# Patient Record
Sex: MALE | Race: WHITE | NOT HISPANIC OR LATINO | URBAN - METROPOLITAN AREA
[De-identification: names, ages, dates, MRNs, and addresses within clinical notes are randomized per-mention and may not be internally consistent; named-entity substitution may affect disease eponyms.]

---

## 2018-07-05 ENCOUNTER — APPOINTMENT (RX ONLY)
Dept: URBAN - METROPOLITAN AREA CLINIC 23 | Facility: CLINIC | Age: 73
Setting detail: DERMATOLOGY
End: 2018-07-05

## 2018-07-05 DIAGNOSIS — L85.3 XEROSIS CUTIS: ICD-10-CM

## 2018-07-05 DIAGNOSIS — L30.0 NUMMULAR DERMATITIS: ICD-10-CM

## 2018-07-05 PROBLEM — E13.9 OTHER SPECIFIED DIABETES MELLITUS WITHOUT COMPLICATIONS: Status: ACTIVE | Noted: 2018-07-05

## 2018-07-05 PROCEDURE — ? PRESCRIPTION

## 2018-07-05 PROCEDURE — ? TREATMENT REGIMEN

## 2018-07-05 PROCEDURE — 99202 OFFICE O/P NEW SF 15 MIN: CPT

## 2018-07-05 PROCEDURE — ? COUNSELING

## 2018-07-05 PROCEDURE — ? ADDITIONAL NOTES

## 2018-07-05 RX ORDER — PIMECROLIMUS 10 MG/G
CREAM TOPICAL
Qty: 1 | Refills: 1 | Status: ERX | COMMUNITY
Start: 2018-07-05

## 2018-07-05 RX ORDER — BETAMETHASONE DIPROPIONATE 0.5 MG/G
CREAM TOPICAL
Qty: 3 | Refills: 3 | Status: ERX | COMMUNITY
Start: 2018-07-05

## 2018-07-05 RX ADMIN — PIMECROLIMUS: 10 CREAM TOPICAL at 19:04

## 2018-07-05 RX ADMIN — BETAMETHASONE DIPROPIONATE: 0.5 CREAM TOPICAL at 19:09

## 2018-07-05 ASSESSMENT — LOCATION SIMPLE DESCRIPTION DERM
LOCATION SIMPLE: RIGHT PRETIBIAL REGION
LOCATION SIMPLE: LEFT PRETIBIAL REGION

## 2018-07-05 ASSESSMENT — LOCATION ZONE DERM: LOCATION ZONE: LEG

## 2018-07-05 ASSESSMENT — LOCATION DETAILED DESCRIPTION DERM
LOCATION DETAILED: RIGHT LATERAL DISTAL PRETIBIAL REGION
LOCATION DETAILED: LEFT LATERAL DISTAL PRETIBIAL REGION

## 2018-07-05 NOTE — PROCEDURE: TREATMENT REGIMEN
Action 4: Continue
Otc Regimen: CeraVe anti-itch cream
Detail Level: Zone
Initiate Treatment: Elidel cream bid x 2 weeks on 2 weeks off and betamethasone dipropionate 0.05 % alternating between the two tropicals advised to apply moisturizer afterwards
Plan: Advised to get sunlight on legs without getting a sunburn
Plan: Limit soap to underarms and groin and avoid hot water
Initiate Regimen: OTC moisturizer after showering

## 2018-07-05 NOTE — HPI: RASH
How Severe Is Your Rash?: moderate
Is This A New Presentation, Or A Follow-Up?: Rash
Additional History: Pt states this occurred after going on a mission trip in [place] 7 years ago.

## 2018-12-27 PROCEDURE — 83993 ASSAY FOR CALPROTECTIN FECAL: CPT | Performed by: INTERNAL MEDICINE

## 2018-12-27 PROCEDURE — 82656 EL-1 FECAL QUAL/SEMIQ: CPT | Performed by: INTERNAL MEDICINE

## 2018-12-27 PROCEDURE — 87493 C DIFF AMPLIFIED PROBE: CPT | Performed by: INTERNAL MEDICINE

## 2019-01-02 ENCOUNTER — ANESTHESIA EVENT (OUTPATIENT)
Dept: ENDOSCOPY | Facility: HOSPITAL | Age: 74
End: 2019-01-02

## 2019-01-02 ENCOUNTER — ANESTHESIA (OUTPATIENT)
Dept: ENDOSCOPY | Facility: HOSPITAL | Age: 74
End: 2019-01-02

## 2019-01-02 ENCOUNTER — HOSPITAL ENCOUNTER (OUTPATIENT)
Facility: HOSPITAL | Age: 74
Setting detail: HOSPITAL OUTPATIENT SURGERY
Discharge: HOME | End: 2019-01-02
Attending: INTERNAL MEDICINE | Admitting: INTERNAL MEDICINE
Payer: MEDICARE

## 2019-01-02 VITALS
OXYGEN SATURATION: 99 % | RESPIRATION RATE: 18 BRPM | WEIGHT: 193 LBS | BODY MASS INDEX: 27.63 KG/M2 | HEART RATE: 56 BPM | DIASTOLIC BLOOD PRESSURE: 75 MMHG | HEIGHT: 70 IN | SYSTOLIC BLOOD PRESSURE: 124 MMHG

## 2019-01-02 DIAGNOSIS — K86.89 PANCREATIC NECROSIS: ICD-10-CM

## 2019-01-02 LAB
GLUCOSE BLDC GLUCOMTR-MCNC: 188 MG/DL (ref 70–99)
GLUCOSE BLDC GLUCOMTR-MCNC: 63 MG/DL (ref 70–99)

## 2019-01-02 PROCEDURE — 82962 GLUCOSE BLOOD TEST: CPT

## 2019-01-02 RX ORDER — SODIUM CHLORIDE, SODIUM LACTATE, POTASSIUM CHLORIDE, CALCIUM CHLORIDE 600; 310; 30; 20 MG/100ML; MG/100ML; MG/100ML; MG/100ML
INJECTION, SOLUTION INTRAVENOUS CONTINUOUS
Status: DISCONTINUED | OUTPATIENT
Start: 2019-01-02 | End: 2019-01-02

## 2019-01-02 RX ORDER — DEXTROSE MONOHYDRATE 25 G/50ML
50 INJECTION, SOLUTION INTRAVENOUS
Status: DISCONTINUED | OUTPATIENT
Start: 2019-01-02 | End: 2019-01-02 | Stop reason: HOSPADM

## 2019-01-02 RX ORDER — NALOXONE HYDROCHLORIDE 0.4 MG/ML
80 INJECTION, SOLUTION INTRAMUSCULAR; INTRAVENOUS; SUBCUTANEOUS AS NEEDED
Status: DISCONTINUED | OUTPATIENT
Start: 2019-01-02 | End: 2019-01-02 | Stop reason: HOSPADM

## 2019-01-02 NOTE — H&P
[address] [facility] Department of  Gastroenterology  Update Health History :       Alon Mar  male   Aaron Faith MD     [id number]  5/30/1945 Primary Care Physician  Trae Denis DO        HPI :  Patient with pancreatic necrosis.   He was Rfl:    Pancrelipase, Lip-Prot-Amyl, (CREON) 40394-77060 units Oral Cap DR Particles Take 2 capsules by mouth 3 (three) times daily with meals.  Disp:  Rfl:    TraMADol HCl 50 MG Oral Tab Take 1 tablet (50 mg total) by mouth every 6 (six) hours as needed fo Eliquis today. He verbalized understanding. He needs to avoid dairy products. Continue with Creon 24,000 to take 3 tablets with meals and 1-2 with snacks. Wife was at the bedside and verbalized understanding.           Radha Metcalf MD

## 2019-01-02 NOTE — ANESTHESIA PREPROCEDURE EVALUATION
Anesthesia PreOp Note    HPI:     Jaxson Henry is a 72year old male who presents for preoperative consultation requested by: Lennox Riggers, MD    Date of Surgery: 1/2/2019    Procedure(s):  ENDOSCOPIC ULTRASOUND (EUS)  Indication: Pancreatic necr DR Particles Take 2 capsules by mouth 3 (three) times daily with meals. Disp:  Rfl:  Taking   TraMADol HCl 50 MG Oral Tab Take 1 tablet (50 mg total) by mouth every 6 (six) hours as needed for Pain.  Disp: 60 tablet Rfl: 0    apixaban 5 MG Oral Tab Take 5 m status: Never Smoker      Smokeless tobacco: Never Used    Substance and Sexual Activity      Alcohol use: No      Drug use: No      Sexual activity: Not on file    Other Topics      Concerns:        Not on file    Social History Narrative      Not on file my ability. The patient desires the anesthetic management as planned.   Serafin Sheth  1/2/2019 1:09 PM

## 2019-01-02 NOTE — PLAN OF CARE
Case cancelled per dr. Eusebio Cain,  spoke in length with patient and wife, multiple rn's spoke in length with patient concerning diabetes and bs control. Pt stated to anesthesia that his bs was 50's at home and was symptomatic and did not treat it.  While in

## 2020-09-23 RX ORDER — CYCLOPENTOLATE HYDROCHLORIDE 20 MG/ML
1 SOLUTION/ DROPS OPHTHALMIC
Status: CANCELLED | OUTPATIENT
Start: 2020-09-23 | End: 2020-09-23

## 2020-09-23 RX ORDER — TROPICAMIDE 10 MG/ML
1 SOLUTION/ DROPS OPHTHALMIC
Status: CANCELLED | OUTPATIENT
Start: 2020-09-23 | End: 2020-09-23

## 2020-09-23 RX ORDER — PHENYLEPHRINE HYDROCHLORIDE 25 MG/ML
1 SOLUTION/ DROPS OPHTHALMIC
Status: CANCELLED | OUTPATIENT
Start: 2020-09-23 | End: 2020-09-23

## 2020-10-05 ENCOUNTER — HOSPITAL ENCOUNTER (OUTPATIENT)
Dept: SURGERY | Age: 75
Discharge: HOME | End: 2020-10-05
Payer: MEDICARE

## 2020-10-05 VITALS
WEIGHT: 232.2 LBS | DIASTOLIC BLOOD PRESSURE: 67 MMHG | BODY MASS INDEX: 33.24 KG/M2 | SYSTOLIC BLOOD PRESSURE: 139 MMHG | RESPIRATION RATE: 16 BRPM | HEART RATE: 84 BPM | TEMPERATURE: 98.2 F | OXYGEN SATURATION: 98 % | HEIGHT: 70 IN

## 2020-10-05 LAB — GLUCOSE BLD STRIP.AUTO-MCNC: 194 MG/DL (ref 65–100)

## 2020-10-05 PROCEDURE — 82962 GLUCOSE BLOOD TEST: CPT

## 2020-10-05 RX ORDER — GLUCOSAMINE SULFATE 1500 MG
5000 POWDER IN PACKET (EA) ORAL
COMMUNITY

## 2020-10-05 RX ORDER — ASPIRIN 325 MG
325 TABLET ORAL
COMMUNITY

## 2020-10-05 RX ORDER — PANCRELIPASE 24000; 76000; 120000 [USP'U]/1; [USP'U]/1; [USP'U]/1
1 CAPSULE, DELAYED RELEASE PELLETS ORAL
COMMUNITY
End: 2022-01-24

## 2020-10-05 RX ORDER — FINASTERIDE 5 MG/1
5 TABLET, FILM COATED ORAL DAILY
COMMUNITY

## 2020-10-05 RX ORDER — INSULIN ASPART 100 [IU]/ML
INJECTION, SOLUTION INTRAVENOUS; SUBCUTANEOUS
COMMUNITY

## 2020-10-05 RX ORDER — LISINOPRIL 20 MG/1
20 TABLET ORAL DAILY
COMMUNITY

## 2020-10-05 RX ORDER — CHOLECALCIFEROL (VITAMIN D3) 50 MCG
1 CAPSULE ORAL DAILY
COMMUNITY
End: 2022-01-24

## 2020-10-05 RX ORDER — GABAPENTIN 100 MG/1
100 CAPSULE ORAL
COMMUNITY
End: 2022-01-24

## 2020-10-05 RX ORDER — ASCORBIC ACID 250 MG
1000 TABLET ORAL DAILY
COMMUNITY

## 2020-10-05 RX ORDER — BISMUTH SUBSALICYLATE 262 MG
1 TABLET,CHEWABLE ORAL
COMMUNITY

## 2020-10-05 RX ORDER — ALLOPURINOL 100 MG/1
100 TABLET ORAL DAILY
Status: ON HOLD | COMMUNITY
End: 2022-01-15

## 2020-10-05 RX ORDER — VITAMIN E 268 MG
450 CAPSULE ORAL
COMMUNITY
End: 2022-01-24

## 2020-10-05 RX ORDER — GLUCOSAM/CHONDRO/HERB 149/HYAL 750-100 MG
1 TABLET ORAL DAILY
COMMUNITY

## 2020-10-05 RX ORDER — SIMVASTATIN 10 MG/1
10 TABLET, FILM COATED ORAL
COMMUNITY
End: 2022-01-24

## 2020-10-05 RX ORDER — INSULIN GLARGINE 100 [IU]/ML
INJECTION, SOLUTION SUBCUTANEOUS 2 TIMES DAILY
COMMUNITY
End: 2022-05-03 | Stop reason: ALTCHOICE

## 2020-10-05 NOTE — PERIOP NOTES
Patient verified name and     Order for consent found in EHR and matches case posting; patient verified. Type 1B surgery, walk-in assessment complete. Labs per surgeon: none  Labs per anesthesia protocol: POC Glucose; results 194  EKG: EKG 2020, cardiac clearance and aspirin received from [facility]- to be sent to pre-op. Instructed pt if bs is >300 DOS, surgery may be canceled. Covid swab completed 10/5/2020 at [place] [address], [place]. [facility] approved surgical skin cleanser and instructions given per hospital policy. Patient provided with and instructed on educational handouts including Guide to Surgery, Pain Management, Hand Hygiene, Blood Transfusion Education, and [facility] Brochure. Patient answered medical/surgical history questions at their best of ability. All prior to admission medications documented in [facility]. Original medication prescription bottle were not visualized during patient appointment. Patient instructed to hold all vitamins 7 days prior to surgery and NSAIDS 5 days prior to surgery, patient verbalized understanding. Patient teach back successful and patient demonstrates knowledge of instructions.

## 2020-10-05 NOTE — PERIOP NOTES
PLEASE CONTINUE TAKING ALL PRESCRIPTION MEDICATIONS UP TO THE DAY OF SURGERY UNLESS OTHERWISE DIRECTED BELOW. DISCONTINUE all vitamins and supplements 7 days prior to surgery. DISCONTINUE Non-Steriodal Anti-Inflammatory (NSAIDS) such as Advil and Aleve 5 days prior to surgery. Home Medications to take  the day of surgery      Take Lantus insulin- 36 units the morning of surgery     Finasteride (Proscar)   Allopurinol (Zyloprim)  Gabapentin (Neurontin)     Home Medications   to Hold   Vitamin E  Fish oil  Multivitamin   Vitamin D  Vitamin C     Comments      Take Lantus insulin- 24 units the night before surgery. Decrease aspirin to aspirin 81 mg daily. *Visitor policy of 1 visitor per patient discussed. Please do not bring home medications with you on the day of surgery unless otherwise directed by your nurse. If you are instructed to bring home medications, please give them to your nurse as they will be administered by the nursing staff. If you have any questions, [facility] call [facility] [phone] or [address] [place] [address] [phone]. A copy of this note was provided to the patient for reference.

## 2020-10-12 ENCOUNTER — ANESTHESIA EVENT (OUTPATIENT)
Dept: SURGERY | Age: 75
End: 2020-10-12
Payer: MEDICARE

## 2020-10-13 ENCOUNTER — HOSPITAL ENCOUNTER (OUTPATIENT)
Age: 75
Setting detail: OUTPATIENT SURGERY
Discharge: HOME | End: 2020-10-13
Attending: OPHTHALMOLOGY | Admitting: OPHTHALMOLOGY
Payer: MEDICARE

## 2020-10-13 ENCOUNTER — ANESTHESIA (OUTPATIENT)
Dept: SURGERY | Age: 75
End: 2020-10-13
Payer: MEDICARE

## 2020-10-13 VITALS
HEART RATE: 76 BPM | DIASTOLIC BLOOD PRESSURE: 67 MMHG | TEMPERATURE: 98 F | SYSTOLIC BLOOD PRESSURE: 117 MMHG | OXYGEN SATURATION: 97 % | WEIGHT: 232 LBS | BODY MASS INDEX: 33.29 KG/M2 | RESPIRATION RATE: 16 BRPM

## 2020-10-13 LAB
GLUCOSE BLD STRIP.AUTO-MCNC: 109 MG/DL (ref 65–100)
GLUCOSE BLD STRIP.AUTO-MCNC: 132 MG/DL (ref 65–100)

## 2020-10-13 PROCEDURE — 76010000160 HC OR TIME 0.5 TO 1 HR INTENSV-TIER 1: Performed by: OPHTHALMOLOGY

## 2020-10-13 PROCEDURE — 77030018837 HC SOL IRR OPTH ALCN -A: Performed by: OPHTHALMOLOGY

## 2020-10-13 PROCEDURE — 77030032623 HC KT VITRCMY TOT PLS ALCN -F: Performed by: OPHTHALMOLOGY

## 2020-10-13 PROCEDURE — 77030008547 HC TBNG OPHTH BD -A: Performed by: OPHTHALMOLOGY

## 2020-10-13 PROCEDURE — 74011250636 HC RX REV CODE- 250/636: Performed by: REGISTERED NURSE

## 2020-10-13 PROCEDURE — 77030018838 HC SOL IRR OPTH ALCN -B: Performed by: OPHTHALMOLOGY

## 2020-10-13 PROCEDURE — 74011000250 HC RX REV CODE- 250: Performed by: REGISTERED NURSE

## 2020-10-13 PROCEDURE — 82962 GLUCOSE BLOOD TEST: CPT

## 2020-10-13 PROCEDURE — 2709999900 HC NON-CHARGEABLE SUPPLY: Performed by: OPHTHALMOLOGY

## 2020-10-13 PROCEDURE — 76210000021 HC REC RM PH II 0.5 TO 1 HR: Performed by: OPHTHALMOLOGY

## 2020-10-13 PROCEDURE — 74011000250 HC RX REV CODE- 250: Performed by: OPHTHALMOLOGY

## 2020-10-13 PROCEDURE — 74011250636 HC RX REV CODE- 250/636: Performed by: STUDENT IN AN ORGANIZED HEALTH CARE EDUCATION/TRAINING PROGRAM

## 2020-10-13 PROCEDURE — 74011250636 HC RX REV CODE- 250/636: Performed by: OPHTHALMOLOGY

## 2020-10-13 PROCEDURE — 76060000032 HC ANESTHESIA 0.5 TO 1 HR: Performed by: OPHTHALMOLOGY

## 2020-10-13 PROCEDURE — 77030040740 HC PK VITRCMY ULTRVIT ALCN -D: Performed by: OPHTHALMOLOGY

## 2020-10-13 RX ORDER — TETRACAINE HYDROCHLORIDE 5 MG/ML
SOLUTION OPHTHALMIC AS NEEDED
Status: DISCONTINUED | OUTPATIENT
Start: 2020-10-13 | End: 2020-10-13 | Stop reason: HOSPADM

## 2020-10-13 RX ORDER — LIDOCAINE HYDROCHLORIDE 10 MG/ML
0.1 INJECTION INFILTRATION; PERINEURAL AS NEEDED
Status: DISCONTINUED | OUTPATIENT
Start: 2020-10-13 | End: 2020-10-13 | Stop reason: HOSPADM

## 2020-10-13 RX ORDER — LIDOCAINE HYDROCHLORIDE 20 MG/ML
INJECTION, SOLUTION INFILTRATION; PERINEURAL AS NEEDED
Status: DISCONTINUED | OUTPATIENT
Start: 2020-10-13 | End: 2020-10-13 | Stop reason: HOSPADM

## 2020-10-13 RX ORDER — PROPOFOL 10 MG/ML
INJECTION, EMULSION INTRAVENOUS AS NEEDED
Status: DISCONTINUED | OUTPATIENT
Start: 2020-10-13 | End: 2020-10-13 | Stop reason: HOSPADM

## 2020-10-13 RX ORDER — PHENYLEPHRINE HYDROCHLORIDE 25 MG/ML
1 SOLUTION/ DROPS OPHTHALMIC
Status: COMPLETED | OUTPATIENT
Start: 2020-10-13 | End: 2020-10-13

## 2020-10-13 RX ORDER — CEFAZOLIN SODIUM 1 G/3ML
INJECTION, POWDER, FOR SOLUTION INTRAMUSCULAR; INTRAVENOUS AS NEEDED
Status: DISCONTINUED | OUTPATIENT
Start: 2020-10-13 | End: 2020-10-13 | Stop reason: HOSPADM

## 2020-10-13 RX ORDER — TROPICAMIDE 10 MG/ML
1 SOLUTION/ DROPS OPHTHALMIC
Status: COMPLETED | OUTPATIENT
Start: 2020-10-13 | End: 2020-10-13

## 2020-10-13 RX ORDER — SODIUM CHLORIDE 0.9 % (FLUSH) 0.9 %
5-40 SYRINGE (ML) INJECTION EVERY 8 HOURS
Status: DISCONTINUED | OUTPATIENT
Start: 2020-10-13 | End: 2020-10-13 | Stop reason: HOSPADM

## 2020-10-13 RX ORDER — ACETAMINOPHEN 500 MG
500 TABLET ORAL
COMMUNITY
End: 2022-05-03

## 2020-10-13 RX ORDER — SODIUM CHLORIDE 0.9 % (FLUSH) 0.9 %
5-40 SYRINGE (ML) INJECTION AS NEEDED
Status: DISCONTINUED | OUTPATIENT
Start: 2020-10-13 | End: 2020-10-13 | Stop reason: HOSPADM

## 2020-10-13 RX ORDER — SODIUM CHLORIDE, SODIUM LACTATE, POTASSIUM CHLORIDE, CALCIUM CHLORIDE 600; 310; 30; 20 MG/100ML; MG/100ML; MG/100ML; MG/100ML
100 INJECTION, SOLUTION INTRAVENOUS CONTINUOUS
Status: DISCONTINUED | OUTPATIENT
Start: 2020-10-13 | End: 2020-10-13 | Stop reason: HOSPADM

## 2020-10-13 RX ORDER — CYCLOPENTOLATE HYDROCHLORIDE 20 MG/ML
1 SOLUTION/ DROPS OPHTHALMIC
Status: COMPLETED | OUTPATIENT
Start: 2020-10-13 | End: 2020-10-13

## 2020-10-13 RX ORDER — BETAMETHASONE SODIUM PHOSPHATE AND BETAMETHASONE ACETATE 3; 3 MG/ML; MG/ML
INJECTION, SUSPENSION INTRA-ARTICULAR; INTRALESIONAL; INTRAMUSCULAR; SOFT TISSUE AS NEEDED
Status: DISCONTINUED | OUTPATIENT
Start: 2020-10-13 | End: 2020-10-13 | Stop reason: HOSPADM

## 2020-10-13 RX ORDER — DIPHENHYDRAMINE HYDROCHLORIDE 50 MG/ML
12.5 INJECTION, SOLUTION INTRAMUSCULAR; INTRAVENOUS
Status: DISCONTINUED | OUTPATIENT
Start: 2020-10-13 | End: 2020-10-13 | Stop reason: HOSPADM

## 2020-10-13 RX ORDER — GUAIFENESIN 100 MG/5ML
81 LIQUID (ML) ORAL DAILY
COMMUNITY
End: 2021-09-09

## 2020-10-13 RX ORDER — LIDOCAINE HYDROCHLORIDE 20 MG/ML
INJECTION, SOLUTION EPIDURAL; INFILTRATION; INTRACAUDAL; PERINEURAL AS NEEDED
Status: DISCONTINUED | OUTPATIENT
Start: 2020-10-13 | End: 2020-10-13 | Stop reason: HOSPADM

## 2020-10-13 RX ORDER — BUPIVACAINE HYDROCHLORIDE 5 MG/ML
INJECTION, SOLUTION EPIDURAL; INTRACAUDAL AS NEEDED
Status: DISCONTINUED | OUTPATIENT
Start: 2020-10-13 | End: 2020-10-13 | Stop reason: HOSPADM

## 2020-10-13 RX ORDER — NALOXONE HYDROCHLORIDE 0.4 MG/ML
0.1 INJECTION, SOLUTION INTRAMUSCULAR; INTRAVENOUS; SUBCUTANEOUS AS NEEDED
Status: DISCONTINUED | OUTPATIENT
Start: 2020-10-13 | End: 2020-10-13 | Stop reason: HOSPADM

## 2020-10-13 RX ORDER — FLUMAZENIL 0.1 MG/ML
0.2 INJECTION INTRAVENOUS
Status: DISCONTINUED | OUTPATIENT
Start: 2020-10-13 | End: 2020-10-13 | Stop reason: HOSPADM

## 2020-10-13 RX ADMIN — PHENYLEPHRINE HYDROCHLORIDE 1 DROP: 25 SOLUTION/ DROPS OPHTHALMIC at 07:07

## 2020-10-13 RX ADMIN — PROPOFOL 20 MG: 10 INJECTION, EMULSION INTRAVENOUS at 07:27

## 2020-10-13 RX ADMIN — PROPOFOL 10 MG: 10 INJECTION, EMULSION INTRAVENOUS at 07:44

## 2020-10-13 RX ADMIN — SODIUM CHLORIDE, SODIUM LACTATE, POTASSIUM CHLORIDE, AND CALCIUM CHLORIDE 100 ML/HR: 600; 310; 30; 20 INJECTION, SOLUTION INTRAVENOUS at 06:26

## 2020-10-13 RX ADMIN — CYCLOPENTOLATE HYDROCHLORIDE 1 DROP: 20 SOLUTION/ DROPS OPHTHALMIC at 07:06

## 2020-10-13 RX ADMIN — PROPOFOL 20 MG: 10 INJECTION, EMULSION INTRAVENOUS at 07:28

## 2020-10-13 RX ADMIN — TROPICAMIDE 1 DROP: 10 SOLUTION/ DROPS OPHTHALMIC at 06:55

## 2020-10-13 RX ADMIN — CYCLOPENTOLATE HYDROCHLORIDE 1 DROP: 20 SOLUTION/ DROPS OPHTHALMIC at 06:28

## 2020-10-13 RX ADMIN — PROPOFOL 40 MG: 10 INJECTION, EMULSION INTRAVENOUS at 07:26

## 2020-10-13 RX ADMIN — PHENYLEPHRINE HYDROCHLORIDE 1 DROP: 25 SOLUTION/ DROPS OPHTHALMIC at 06:55

## 2020-10-13 RX ADMIN — LIDOCAINE HYDROCHLORIDE 50 MG: 20 INJECTION, SOLUTION EPIDURAL; INFILTRATION; INTRACAUDAL; PERINEURAL at 07:26

## 2020-10-13 RX ADMIN — CYCLOPENTOLATE HYDROCHLORIDE 1 DROP: 20 SOLUTION/ DROPS OPHTHALMIC at 06:55

## 2020-10-13 RX ADMIN — TROPICAMIDE 1 DROP: 10 SOLUTION/ DROPS OPHTHALMIC at 06:28

## 2020-10-13 RX ADMIN — PHENYLEPHRINE HYDROCHLORIDE 1 DROP: 25 SOLUTION/ DROPS OPHTHALMIC at 06:28

## 2020-10-13 RX ADMIN — PROPOFOL 10 MG: 10 INJECTION, EMULSION INTRAVENOUS at 07:52

## 2020-10-13 RX ADMIN — TROPICAMIDE 1 DROP: 10 SOLUTION/ DROPS OPHTHALMIC at 07:07

## 2020-10-13 NOTE — BRIEF OP NOTE
Brief Postoperative Note    Patient: Yoselin Garner  YOB: 1945  MRN: [id number]    Date of Procedure: 10/13/2020     Pre-Op Diagnosis: Vitreous opacities of right eye [H43.391]    Post-Op Diagnosis: Same as preoperative diagnosis. Procedure(s):  RIGHT EYE VITRECTOMY POSTERIOR 25 GAUGE LASER    Surgeon(s):  Sherin Tripp MD    Surgical Assistant: None    Anesthesia: General     Estimated Blood Loss (mL): Minimal    Complications: None    Specimens: * No specimens in log *     Implants: * No implants in log *    Drains: * No LDAs found *    Findings: Dense prolapsed vitreous debris in anterior segment, PVD, vitreous opacities of the right eye.   Dictation# [id number]    Electronically Signed by Colton Banegas MD on 10/13/2020 at 8:13 AM

## 2020-10-13 NOTE — OP NOTES
[facility]  OPERATIVE REPORT    Name:  Duglas Gonzalez  MR#:  [id number]  :  1945  ACCOUNT #:  [de-identified]  DATE OF SERVICE:  10/13/2020    PREOPERATIVE DIAGNOSES:  1.  Vitreous prolapse into the anterior chamber of the right eye.  2.  Significant vitreous opacities of the right eye. 3.  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 POSTOPERATIVE DIAGNOSES:  1.  Vitreous prolapse into the anterior chamber of the right eye.  2.  Significant vitreous opacities of the right eye. 3.  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 PROCEDURE PERFORMED:  Trans pars plana vitrectomy with peripheral laser treatment of the right eye. SURGEON:  Rosaline Blackwell MD    ASSISTANT:  None. ANESTHESIA:  MAC.    COMPLICATIONS:  None. SPECIMENS REMOVED:  None. IMPLANTS:  None. ESTIMATED BLOOD LOSS:  None. INDICATIONS FOR SURGERY:  The patient has experienced blurred vision of the right eye due to vitreous prolapse into the anterior chamber and large visually significant vitreous opacities. Surgery is recommended to treat these conditions in order to improve vision. The risks and benefits of the surgical procedures were thoroughly reviewed and the patient wished to proceed. SUMMARY OF PROCEDURE:  After appropriate preoperative evaluation and signing of operative permit, the patient was taken to the operating room and placed in a supine fashion upon the procedure table. A time-out was then performed and all operating room personnel confirmed the patient's identity, the surgical site, and the procedures to be performed. The patient was then given IV sedation as well as a lid block on the right consisting of 5 mL of 50:50 solution of 2% Xylocaine and 0.75% Marcaine. This was given in a modified [employer] fashion. A retrobulbar injection was also given on the right consisting of 5 mL of the same solution.     Once an appropriate level of akinesia of the extraocular muscle was achieved, the patient was draped and prepped in sterile ophthalmic fashion with 5% Betadine solution. The conjunctiva and periorbital tissues were carefully prepped. A 25-gauge 4-mm infusion cannula was placed, 4 mm posterior to limbus at the 8 o'clock position. Once the tip of the cannula was visualized and the posterior segment was found to be clear, it was turned to the on position and the intraocular pressure was adjusted to 30 mmHg. Two additional cannulas were placed at the 10 and 2 o'clock positions, 4 mm posterior to limbus. Inspection of the anterior segment confirmed a significant vitreous debris present in the anterior chamber overlying the entire pupil. A well-centered multifocal intraocular lens was present. The handheld fiberoptic tube was inserted in the nasal cannula site while the Microvit instrument was inserted in the temporal cannula site. Visualization of posterior segment was achieved using overhead microscope and a handheld contact lens. A posterior vitreous detachment was present with significant central vitreous opacities. A thorough central vitrectomy was performed. Care was taken to trim the vitreous to the far periphery in all quadrants. Inspection of the posterior segment confirmed the pink and healthy optic nerve and a flat and dry macula. The Microvit instrument was then introduced into the anterior chamber through the temporal sclerotomy site. The vitreous was carefully removed from the anterior chamber throughout with the [facility]. No vitreous remained following this procedure. All instruments were then removed from the eye and the peripheral retina was inspected by indirect ophthalmoscopy and scleral depression. No retinal breaks or tears were identified. The indirect laser was then used to apply laser treatment throughout the far periphery between the ora carli and the vortex veins. Approximately 550 spots were placed.   All instruments were then removed from the eye and the intraocular pressure was adjusted to 20 mmHg. All cannulas were removed. No leakage was noted from the cannula sites. A subconjunctival injection consisting of 0.5 mL of Ancef and 0.5 mL of betamethasone was given in the inferotemporal quadrant. Prednisolone and ofloxacin were placed in the inferior fornix. A patch and shield were then placed over the right eye. The patient tolerated the procedure well and was taken to recovery room for routine postoperative care.       Alex Barton MD      [id number]  D:  10/13/2020 8:12  T:  10/13/2020 14:36  JOB #:  [id number]

## 2020-10-13 NOTE — PERIOP NOTES
Pt back to [facility] to dress for discharge, had liquid stool per pt. States has had \"diarrhea\" episodes for the last two days.

## 2020-10-13 NOTE — ANESTHESIA POSTPROCEDURE EVALUATION
Procedure(s):  RIGHT EYE VITRECTOMY POSTERIOR 25 GAUGE LASER.    total IV anesthesia, general - backup    Anesthesia Post Evaluation      Multimodal analgesia: multimodal analgesia used between 6 hours prior to anesthesia start to PACU discharge  Patient location during evaluation: bedside  Patient participation: complete - patient participated  Level of consciousness: awake and alert  Pain management: adequate  Airway patency: patent  Anesthetic complications: no  Cardiovascular status: acceptable  Respiratory status: acceptable  Hydration status: acceptable  Post anesthesia nausea and vomiting:  controlled  Final Post Anesthesia Temperature Assessment:  Normothermia (36.0-37.5 degrees C)      INITIAL Post-op Vital signs:   Vitals Value Taken Time   /67 10/13/2020  8:30 AM   Temp     Pulse 75 10/13/2020  8:31 AM   Resp     SpO2 97 % 10/13/2020  8:30 AM

## 2020-10-13 NOTE — ANESTHESIA PREPROCEDURE EVALUATION
Relevant Problems   No relevant active problems       Anesthetic History   No history of anesthetic complications            Review of Systems / Medical History  Patient summary reviewed, nursing notes reviewed and pertinent labs reviewed    Pulmonary        Sleep apnea: CPAP      Pertinent negatives: No smoker     Neuro/Psych   Within defined limits           Cardiovascular    Hypertension        Dysrhythmias (s/p watchman 2020.  history of ablation x3. hx of WPW.) : atrial fibrillation      Exercise tolerance: >4 METS     GI/Hepatic/Renal                Endo/Other    Diabetes: well controlled, type 2, using insulin    Obesity and arthritis     Other Findings              Physical Exam    Airway  Mallampati: II  TM Distance: 4 - 6 cm  Neck ROM: normal range of motion   Mouth opening: Normal     Cardiovascular    Rhythm: regular  Rate: normal         Dental  No notable dental hx       Pulmonary  Breath sounds clear to auscultation               Abdominal  GI exam deferred       Other Findings            Anesthetic Plan    ASA: 3  Anesthesia type: total IV anesthesia and general - backup          Induction: Intravenous  Anesthetic plan and risks discussed with: Patient and Spouse

## 2020-10-13 NOTE — PERIOP NOTES
Dc instructions reviewed with pt and wife, pt states he needs to have BM, to toilet via wheelchair with wife

## 2020-10-13 NOTE — DISCHARGE INSTRUCTIONS
[facility] [address] MD Jere Ahmadi MD Adelina Basil. MD Mainor Box. Sandhya Raymond MD    [phone] or [phone] or [phone] or [phone]    Post Operative Instructions for Retina and Vitreous Surgery Patients    The following are a few guidelines that you should observe for two weeks after surgery:    1. Avoid stooping over, lifting heavy weights or bumping your head. 2.  Special positioning:Rest on extra pillow    3. No extensive traveling except what is necessary. If a gas air bubble was put in your      eye at surgery - avoid air travel until you consult your doctor. 4.  You may watch television, read, cook and wash dishes as long as it does not interfere        with special positioning instructions. 5.  No strenuous activity or sexual intercourse. 6.  Some discharge from the operated eye is to be expected. This should gradually        improve. 8.  If you experience increasing pain, decreasing vision, or pus like discharge, call the      Office. 9. BRING ALL EYE DROPS TO YOU POSTOPERATIVE APPOINTMENT! ACTIVITY  · As tolerated and as directed by your doctor. · Bathe or shower as directed by your doctor. DIET  · Clear liquids until no nausea or vomiting; then light diet for the first day. · Advance to regular diet on second day, unless your doctor orders otherwise. · If nausea and vomiting continues, call your doctor. PAIN  · Take pain medication as directed by your doctor. · Call your doctor if pain is NOT relieved by medication. · DO NOT take aspirin of blood thinners unless directed by your doctor.          CALL YOUR DOCTOR IF   · Excessive bleeding that does not stop after holding pressure over the area  · Temperature of 101 degrees F or above  · Excessive redness, swelling or bruising, and/ or green or yellow, smelly discharge from incision    AFTER ANESTHESIA   · For the first 24 hours: DO NOT Drive, Drink alcoholic beverages, or Make important decisions. · Be aware of dizziness following anesthesia and while taking pain medication. DISCHARGE SUMMARY from Nurse    PATIENT INSTRUCTIONS:    After general anesthesia or intravenous sedation, for 24 hours or while taking prescription Narcotics:  · Limit your activities  · Do not drive and operate hazardous machinery  · Do not make important personal or business decisions  · Do  not drink alcoholic beverages  · If you have not urinated within 8 hours after discharge, please contact your surgeon on call. *  Please give a list of your current medications to your Primary Care Provider. *  Please update this list whenever your medications are discontinued, doses are      changed, or new medications (including over-the-counter products) are added. *  Please carry medication information at all times in case of emergency situations. These are general instructions for a healthy lifestyle:    No smoking/ No tobacco products/ Avoid exposure to second hand smoke    Surgeon General's Warning:  Quitting smoking now greatly reduces serious risk to your health. Obesity, smoking, and sedentary lifestyle greatly increases your risk for illness    A healthy diet, regular physical exercise & weight monitoring are important for maintaining a healthy lifestyle    You may be retaining fluid if you have a history of heart failure or if you experience any of the following symptoms:  Weight gain of 3 pounds or more overnight or 5 pounds in a week, increased swelling in our hands or feet or shortness of breath while lying flat in bed. Please call your doctor as soon as you notice any of these symptoms; do not wait until your next office visit.     Recognize signs and symptoms of STROKE:    F-face looks uneven    A-arms unable to move or move unevenly    S-speech slurred or non-existent    T-time-call 911 as soon as signs and symptoms begin-DO NOT go       Back to bed or wait to see if you get better-TIME IS BRAIN. Learning About COVID-19 and Social Distancing  What is it? Social distancing means putting space between yourself and other people. The recommended distance is 6 feet, or about 2 meters. This also means staying away from any place where people may gather, such as ceballos or other public gathering places. Why is it important? Social distancing is the best way to reduce the spread of COVID-19. This virus seems to spread from person to person through droplets from coughing and sneezing. So if you keep your distance from others, you're less likely to get it or spread it. And social distancing is important for everyone, not just those who are at high risk of infection, like older people. You might have the virus but not have symptoms. You could then give the infection to someone you come into contact with. How is it done? Putting 6 feet, or about 2 meters, between you and other people is the recommended distance. Also stay away from any place where people may gather, such as ceballos or other public gathering places. So if possible:  · Work from home, and keep your kids at home. · Don't travel if you don't have to. And avoid public transportation, ride-shares, and taxis unless you have no choice. · Limit shopping to essentials, like food and medicines. · Wear a cloth face cover if you have to go to a public place like the grocery store or pharmacy. · Don't eat in restaurants. (You can still get takeout or food deliveries.)  · Avoid crowds and busy places. Follow stay-at-home orders or other directions for your area. Current as of: July 10, 2020               Content Version: 12.6  © 0919-0631 [employer]. Care instructions adapted under license by [employer] (which disclaims liability or warranty for this information).  If you have questions about a medical condition or this instruction, always ask your healthcare professional. [place] [address] any warranty or liability for your use of this information.

## 2020-10-14 NOTE — PROGRESS NOTES
Pt stated Kieran Reyes in pre-op, Juliana ROCHA, and Jessica Tavera in PACU were all excellent and he appreciated the post-op call.

## 2020-10-30 ENCOUNTER — HOSPITAL ENCOUNTER (EMERGENCY)
Age: 75
Discharge: HOME | End: 2020-10-30
Attending: EMERGENCY MEDICINE
Payer: MEDICARE

## 2020-10-30 VITALS
HEART RATE: 82 BPM | RESPIRATION RATE: 18 BRPM | OXYGEN SATURATION: 97 % | HEIGHT: 70 IN | WEIGHT: 218 LBS | TEMPERATURE: 98.4 F | DIASTOLIC BLOOD PRESSURE: 63 MMHG | SYSTOLIC BLOOD PRESSURE: 131 MMHG | BODY MASS INDEX: 31.21 KG/M2

## 2020-10-30 DIAGNOSIS — K85.90 ACUTE PANCREATITIS, UNSPECIFIED COMPLICATION STATUS, UNSPECIFIED PANCREATITIS TYPE: Primary | ICD-10-CM

## 2020-10-30 LAB
ALBUMIN SERPL-MCNC: 3.1 G/DL (ref 3.2–4.6)
ALBUMIN/GLOB SERPL: 0.7 {RATIO} (ref 1.2–3.5)
ALP SERPL-CCNC: 77 U/L (ref 50–136)
ALT SERPL-CCNC: 18 U/L (ref 12–65)
ANION GAP SERPL CALC-SCNC: 5 MMOL/L (ref 7–16)
AST SERPL-CCNC: 18 U/L (ref 15–37)
BASOPHILS # BLD: 0.1 K/UL (ref 0–0.2)
BASOPHILS NFR BLD: 1 % (ref 0–2)
BILIRUB SERPL-MCNC: 0.3 MG/DL (ref 0.2–1.1)
BUN SERPL-MCNC: 30 MG/DL (ref 8–23)
CALCIUM SERPL-MCNC: 9 MG/DL (ref 8.3–10.4)
CHLORIDE SERPL-SCNC: 108 MMOL/L (ref 98–107)
CO2 SERPL-SCNC: 26 MMOL/L (ref 21–32)
CREAT SERPL-MCNC: 1.9 MG/DL (ref 0.8–1.5)
DIFFERENTIAL METHOD BLD: ABNORMAL
EOSINOPHIL # BLD: 0.1 K/UL (ref 0–0.8)
EOSINOPHIL NFR BLD: 2 % (ref 0.5–7.8)
ERYTHROCYTE [DISTWIDTH] IN BLOOD BY AUTOMATED COUNT: 14.2 % (ref 11.9–14.6)
GLOBULIN SER CALC-MCNC: 4.4 G/DL (ref 2.3–3.5)
GLUCOSE SERPL-MCNC: 261 MG/DL (ref 65–100)
HCT VFR BLD AUTO: 34.4 % (ref 41.1–50.3)
HGB BLD-MCNC: 10.7 G/DL (ref 13.6–17.2)
IMM GRANULOCYTES # BLD AUTO: 0.1 K/UL (ref 0–0.5)
IMM GRANULOCYTES NFR BLD AUTO: 1 % (ref 0–5)
LIPASE SERPL-CCNC: 3112 U/L (ref 73–393)
LYMPHOCYTES # BLD: 1.5 K/UL (ref 0.5–4.6)
LYMPHOCYTES NFR BLD: 18 % (ref 13–44)
MCH RBC QN AUTO: 29 PG (ref 26.1–32.9)
MCHC RBC AUTO-ENTMCNC: 31.1 G/DL (ref 31.4–35)
MCV RBC AUTO: 93.2 FL (ref 79.6–97.8)
MONOCYTES # BLD: 0.7 K/UL (ref 0.1–1.3)
MONOCYTES NFR BLD: 8 % (ref 4–12)
NEUTS SEG # BLD: 6.2 K/UL (ref 1.7–8.2)
NEUTS SEG NFR BLD: 72 % (ref 43–78)
NRBC # BLD: 0 K/UL (ref 0–0.2)
PLATELET # BLD AUTO: 208 K/UL (ref 150–450)
PMV BLD AUTO: 9.3 FL (ref 9.4–12.3)
POTASSIUM SERPL-SCNC: 4.9 MMOL/L (ref 3.5–5.1)
PROT SERPL-MCNC: 7.5 G/DL (ref 6.3–8.2)
RBC # BLD AUTO: 3.69 M/UL (ref 4.23–5.6)
SODIUM SERPL-SCNC: 139 MMOL/L (ref 136–145)
WBC # BLD AUTO: 8.6 K/UL (ref 4.3–11.1)

## 2020-10-30 PROCEDURE — 80053 COMPREHEN METABOLIC PANEL: CPT

## 2020-10-30 PROCEDURE — 74011250636 HC RX REV CODE- 250/636: Performed by: EMERGENCY MEDICINE

## 2020-10-30 PROCEDURE — 83690 ASSAY OF LIPASE: CPT

## 2020-10-30 PROCEDURE — 96374 THER/PROPH/DIAG INJ IV PUSH: CPT

## 2020-10-30 PROCEDURE — 99284 EMERGENCY DEPT VISIT MOD MDM: CPT

## 2020-10-30 PROCEDURE — 81003 URINALYSIS AUTO W/O SCOPE: CPT

## 2020-10-30 PROCEDURE — 85025 COMPLETE CBC W/AUTO DIFF WBC: CPT

## 2020-10-30 PROCEDURE — 96375 TX/PRO/DX INJ NEW DRUG ADDON: CPT

## 2020-10-30 RX ORDER — ONDANSETRON 2 MG/ML
4 INJECTION INTRAMUSCULAR; INTRAVENOUS
Status: COMPLETED | OUTPATIENT
Start: 2020-10-30 | End: 2020-10-30

## 2020-10-30 RX ORDER — ONDANSETRON 8 MG/1
8 TABLET, ORALLY DISINTEGRATING ORAL
Qty: 10 TAB | Refills: 0 | Status: SHIPPED | OUTPATIENT
Start: 2020-10-30 | End: 2022-01-24

## 2020-10-30 RX ORDER — MORPHINE SULFATE 4 MG/ML
4 INJECTION INTRAVENOUS
Status: COMPLETED | OUTPATIENT
Start: 2020-10-30 | End: 2020-10-30

## 2020-10-30 RX ORDER — TRAMADOL HYDROCHLORIDE 50 MG/1
50 TABLET ORAL
Qty: 9 TAB | Refills: 0 | Status: SHIPPED | OUTPATIENT
Start: 2020-10-30 | End: 2020-11-02

## 2020-10-30 RX ADMIN — SODIUM CHLORIDE 1000 ML: 900 INJECTION, SOLUTION INTRAVENOUS at 21:24

## 2020-10-30 RX ADMIN — MORPHINE SULFATE 4 MG: 4 INJECTION INTRAVENOUS at 21:24

## 2020-10-30 RX ADMIN — ONDANSETRON 4 MG: 2 INJECTION INTRAMUSCULAR; INTRAVENOUS at 21:24

## 2020-10-31 NOTE — ED PROVIDER NOTES
63-year-old white male reports history of pancreatitis due to [address] presents with aching epigastric pain onset around 11:00 this morning. Also reports that his glucose has been elevated over 250 which is unusual for him. He has had some chills but no fever. Denies nausea, vomiting, diarrhea, constipation and urinary changes. The history is provided by the patient. Abdominal Pain    Pertinent negatives include no fever, no diarrhea, no nausea, no vomiting, no dysuria, no headaches, no chest pain and no back pain.         Past Medical History:   Diagnosis Date    Arthritis     Blurred vision, right eye     BPH (benign prostatic hyperplasia)     Gout     History of Clostridioides difficile colitis 2010    History of pancreatitis     History of renal carcinoma     partial nephrectomy    Hyperlipidemia     Hypertension     Nausea & vomiting     small amount of N/V and pt not sure of it antiemetics work    Neuropathy     Paroxysmal A-fib ([address]     Presence of Watchman left atrial appendage closure device     Sleep apnea     compliant with C-pap    Thromboembolus ([address]     hx of left lower extremity    Type 2 diabetes mellitus (HCC)     insulin reliant; AVG -140; s.s. of hypoglycemia 65-75; last A1c 7.1    WPW (Alba-Parkinson-White syndrome)     ablation x4       Past Surgical History:   Procedure Laterality Date    HX AFIB ABLATION      WPW- ablations x3    HX BACK SURGERY      ruptured disc    HX CATARACT REMOVAL      HX CHOLECYSTECTOMY      HX ENDOSCOPY      HX KNEE REPLACEMENT Right     HX LUMBAR LAMINECTOMY      HX NEPHRECTOMY      partial    HX ORTHOPAEDIC Left     foot    HX ORTHOPAEDIC Left     great toe straightened    HX OTHER SURGICAL      placed watchman 2/2020    HX WISDOM TEETH EXTRACTION           Family History:   Problem Relation Age of Onset    Other Mother         ALS    Parkinson's Disease Father     Cancer Sister         colon ca    No Known Problems Sister     Cancer Sister         ovarian       Social History     Socioeconomic History    Marital status:      Spouse name: Not on file    Number of children: Not on file    Years of education: Not on file    Highest education level: Not on file   Occupational History    Not on file   Social Needs    Financial resource strain: Not on file    Food insecurity     Worry: Not on file     Inability: Not on file   [employer] needs     Medical: Not on file     Non-medical: Not on file   Tobacco Use    Smoking status: Never Smoker    Smokeless tobacco: Never Used   Substance and Sexual Activity    Alcohol use: Not Currently    Drug use: Never    Sexual activity: Not on file   Lifestyle    Physical activity     Days per week: Not on file     Minutes per session: Not on file    Stress: Not on file   Relationships    Social connections     Talks on phone: Not on file     Gets together: Not on file     Attends Hinduism service: Not on file     Active member of club or organization: Not on file     Attends meetings of clubs or organizations: Not on file     Relationship status: Not on file    Intimate partner violence     Fear of current or ex partner: Not on file     Emotionally abused: Not on file     Physically abused: Not on file     Forced sexual activity: Not on file   Other Topics Concern    Not on file   Social History Narrative    Not on file         ALLERGIES: Codeine; Fenofibrate; Hydromorphone; Januvia [sitagliptin]; and Metformin    Review of Systems   Constitutional: Positive for chills. Negative for fever. HENT: Negative for congestion. Respiratory: Negative for cough and shortness of breath. Cardiovascular: Negative for chest pain. Gastrointestinal: Positive for abdominal pain. Negative for diarrhea, nausea and vomiting. Genitourinary: Negative for dysuria. Musculoskeletal: Negative for back pain and neck pain. Skin: Negative for rash.    Neurological: Negative for headaches. Psychiatric/Behavioral: Negative for confusion. Vitals:    10/30/20 2100   BP: (!) 145/83   Pulse: 96   Resp: 18   Temp: 98.4 °F (36.9 °C)   SpO2: 98%   Weight: 98.9 kg (218 lb)   Height: 5' 10\" (1.778 m)            Physical Exam  Vitals signs and nursing note reviewed. Constitutional:       General: He is not in acute distress. Appearance: Normal appearance. He is not toxic-appearing. HENT:      Head: Normocephalic and atraumatic. Nose: Nose normal.      Mouth/Throat:      Mouth: Mucous membranes are moist.      Pharynx: Oropharynx is clear. Eyes:      General: No scleral icterus. Conjunctiva/sclera: Conjunctivae normal.      Pupils: Pupils are equal, round, and reactive to light. Neck:      Musculoskeletal: Normal range of motion and neck supple. Cardiovascular:      Rate and Rhythm: Normal rate. Heart sounds: No murmur. Pulmonary:      Effort: Pulmonary effort is normal.      Breath sounds: Normal breath sounds. Abdominal:      Palpations: Abdomen is soft. Tenderness: There is abdominal tenderness in the epigastric area. There is no guarding or rebound. Musculoskeletal: Normal range of motion. Skin:     General: Skin is warm and dry. Neurological:      Mental Status: He is alert and oriented to person, place, and time. Psychiatric:         Mood and Affect: Mood normal.         Behavior: Behavior normal.          MDM  Number of Diagnoses or Management Options  Diagnosis management comments: Lab work shows elevated lipase 3112 otherwise unremarkable. Patient is feeling much better after IV fluids, morphine and Zofran. As he has no vomiting and is very familiar with pancreatitis he is requesting to be treated at home. Will discharge home with tramadol and Zofran. He is advised to stick to liquid diet for at least 3 days.   Return if problems       Amount and/or Complexity of Data Reviewed  Clinical lab tests: ordered and reviewed  Tests in the medicine section of CPT®: reviewed and ordered    Risk of Complications, Morbidity, and/or Mortality  Presenting problems: moderate  Diagnostic procedures: moderate  Management options: moderate           Procedures

## 2020-10-31 NOTE — ED NOTES
I have reviewed discharge instructions with the patient. The patient verbalized understanding. Patient left ED via Discharge Method: ambulatory to Home with wife. Opportunity for questions and clarification provided. Patient given 2 scripts. To continue your aftercare when you leave the hospital, you may receive an automated call from our care team to check in on how you are doing. This is a free service and part of our promise to provide the best care and service to meet your aftercare needs.  If you have questions, or wish to unsubscribe from this service please call [phone]. Thank you for Choosing our [facility] Emergency Department.

## 2020-10-31 NOTE — ED TRIAGE NOTES
Pt states he is certain he is having a pancreatitis flare-up. C/o upper abdominal pain that started earlier today, denies N/V.  Masked on arrival.

## 2020-10-31 NOTE — DISCHARGE INSTRUCTIONS
Patient Education        Pancreatitis: Care Instructions  Your Care Instructions     The pancreas is an organ behind the stomach. It makes hormones and enzymes to help your body digest food. But if these enzymes attack the pancreas, it can get inflamed. This is called pancreatitis. Most cases are caused by gallstones or by heavy alcohol use. If you take care of yourself at home, it will help you get better. It will also help you avoid more problems with your pancreas. Follow-up care is a key part of your treatment and safety. Be sure to make and go to all appointments, and call your doctor if you are having problems. It's also a good idea to know your test results and keep a list of the medicines you take. How can you care for yourself at home? · Drink clear liquids and eat bland foods until you feel better. [place] foods include rice, dry toast, and crackers. They also include bananas and applesauce. · Eat a low-fat diet until your doctor says your pancreas is healed. · Do not drink alcohol. Tell your doctor if you need help to quit. Counseling, support groups, and sometimes medicines can help you stay sober. · Be safe with medicines. Read and follow all instructions on the label. ? If the doctor gave you a prescription medicine for pain, take it as prescribed. ? If you are not taking a prescription pain medicine, ask your doctor if you can take an over-the-counter medicine. · If your doctor prescribed antibiotics, take them as directed. Do not stop taking them just because you feel better. You need to take the full course of antibiotics. · Get extra rest until you feel better. To prevent future problems with your pancreas  · Do not drink alcohol. · Tell your doctors and pharmacist that you've had pancreatitis. They can help you avoid medicines that may cause this problem again. When should you call for help? Call 911 anytime you think you may need emergency care.  For example, call if:    · You vomit blood or what looks like coffee grounds.     · Your stools are maroon or very bloody. Call your doctor now or seek immediate medical care if:    · You have new or worse belly pain.     · Your stools are black and look like tar, or they have streaks of blood.     · You are vomiting. Watch closely for changes in your health, and be sure to contact your doctor if:    · You do not get better as expected. Where can you learn more? Go to http://www.gray.com/  Enter J381 in the search box to learn more about \"Pancreatitis: Care Instructions. \"  Current as of: April 15, 2020               Content Version: 12.6  © 0832-3198 [employer]. Care instructions adapted under license by [employer] (which disclaims liability or warranty for this information). If you have questions about a medical condition or this instruction, always ask your healthcare professional. [address] any warranty or liability for your use of this information.

## 2021-07-22 ENCOUNTER — HOSPITAL ENCOUNTER (OUTPATIENT)
Dept: MRI IMAGING | Age: 76
Discharge: HOME | End: 2021-07-22
Attending: ORTHOPAEDIC SURGERY
Payer: MEDICARE

## 2021-07-22 DIAGNOSIS — M20.5X1 ACQUIRED CLAW TOE OF RIGHT FOOT: ICD-10-CM

## 2021-07-22 DIAGNOSIS — M86.171 ACUTE OSTEOMYELITIS OF TOE OF RIGHT FOOT (HCC): ICD-10-CM

## 2021-07-22 PROCEDURE — 73718 MRI LOWER EXTREMITY W/O DYE: CPT

## 2021-09-08 ENCOUNTER — ANESTHESIA EVENT (OUTPATIENT)
Dept: SURGERY | Age: 76
End: 2021-09-08
Payer: MEDICARE

## 2021-09-09 ENCOUNTER — APPOINTMENT (OUTPATIENT)
Dept: GENERAL RADIOLOGY | Age: 76
End: 2021-09-09
Attending: ORTHOPAEDIC SURGERY
Payer: MEDICARE

## 2021-09-09 ENCOUNTER — ANESTHESIA (OUTPATIENT)
Dept: SURGERY | Age: 76
End: 2021-09-09
Payer: MEDICARE

## 2021-09-09 ENCOUNTER — HOSPITAL ENCOUNTER (OUTPATIENT)
Age: 76
Setting detail: OUTPATIENT SURGERY
Discharge: HOME | End: 2021-09-09
Attending: ORTHOPAEDIC SURGERY | Admitting: ORTHOPAEDIC SURGERY
Payer: MEDICARE

## 2021-09-09 VITALS
SYSTOLIC BLOOD PRESSURE: 170 MMHG | RESPIRATION RATE: 16 BRPM | DIASTOLIC BLOOD PRESSURE: 79 MMHG | OXYGEN SATURATION: 98 % | TEMPERATURE: 98.8 F | HEART RATE: 66 BPM

## 2021-09-09 DIAGNOSIS — G89.18 ACUTE POST-OPERATIVE PAIN: Primary | ICD-10-CM

## 2021-09-09 LAB
GLUCOSE BLD STRIP.AUTO-MCNC: 133 MG/DL (ref 65–100)
SERVICE CMNT-IMP: ABNORMAL

## 2021-09-09 PROCEDURE — C1713 ANCHOR/SCREW BN/BN,TIS/BN: HCPCS | Performed by: ORTHOPAEDIC SURGERY

## 2021-09-09 PROCEDURE — 74011000250 HC RX REV CODE- 250: Performed by: REGISTERED NURSE

## 2021-09-09 PROCEDURE — 28010 INCISION OF TOE TENDON: CPT | Performed by: ORTHOPAEDIC SURGERY

## 2021-09-09 PROCEDURE — 74011250636 HC RX REV CODE- 250/636: Performed by: REGISTERED NURSE

## 2021-09-09 PROCEDURE — 76010000138 HC OR TIME 0.5 TO 1 HR: Performed by: ORTHOPAEDIC SURGERY

## 2021-09-09 PROCEDURE — 77030008845 HC WRE K STRY -B: Performed by: ORTHOPAEDIC SURGERY

## 2021-09-09 PROCEDURE — 74011250636 HC RX REV CODE- 250/636: Performed by: NURSE PRACTITIONER

## 2021-09-09 PROCEDURE — 77030000032 HC CUF TRNQT ZIMM -B: Performed by: ORTHOPAEDIC SURGERY

## 2021-09-09 PROCEDURE — 74011000250 HC RX REV CODE- 250: Performed by: ORTHOPAEDIC SURGERY

## 2021-09-09 PROCEDURE — 76060000032 HC ANESTHESIA 0.5 TO 1 HR: Performed by: ORTHOPAEDIC SURGERY

## 2021-09-09 PROCEDURE — 2709999900 HC NON-CHARGEABLE SUPPLY: Performed by: ORTHOPAEDIC SURGERY

## 2021-09-09 PROCEDURE — 76210000020 HC REC RM PH II FIRST 0.5 HR: Performed by: ORTHOPAEDIC SURGERY

## 2021-09-09 PROCEDURE — 82962 GLUCOSE BLOOD TEST: CPT

## 2021-09-09 PROCEDURE — 76210000006 HC OR PH I REC 0.5 TO 1 HR: Performed by: ORTHOPAEDIC SURGERY

## 2021-09-09 PROCEDURE — 28285 REPAIR OF HAMMERTOE: CPT | Performed by: ORTHOPAEDIC SURGERY

## 2021-09-09 PROCEDURE — 74011250637 HC RX REV CODE- 250/637: Performed by: ANESTHESIOLOGY

## 2021-09-09 PROCEDURE — 77030002916 HC SUT ETHLN J&J -A: Performed by: ORTHOPAEDIC SURGERY

## 2021-09-09 PROCEDURE — 74011250636 HC RX REV CODE- 250/636: Performed by: ANESTHESIOLOGY

## 2021-09-09 DEVICE — HEADLESS SCREW
Type: IMPLANTABLE DEVICE | Site: FOOT | Status: FUNCTIONAL
Brand: MINI

## 2021-09-09 RX ORDER — CEPHALEXIN 500 MG/1
500 CAPSULE ORAL 4 TIMES DAILY
Qty: 12 CAPSULE | Refills: 0 | Status: SHIPPED | OUTPATIENT
Start: 2021-09-09 | End: 2022-01-24

## 2021-09-09 RX ORDER — SODIUM CHLORIDE 0.9 % (FLUSH) 0.9 %
5-40 SYRINGE (ML) INJECTION EVERY 8 HOURS
Status: DISCONTINUED | OUTPATIENT
Start: 2021-09-09 | End: 2021-09-09 | Stop reason: HOSPADM

## 2021-09-09 RX ORDER — ACETAMINOPHEN 500 MG
1000 TABLET ORAL ONCE
Status: COMPLETED | OUTPATIENT
Start: 2021-09-09 | End: 2021-09-09

## 2021-09-09 RX ORDER — NALOXONE HYDROCHLORIDE 0.4 MG/ML
0.1 INJECTION, SOLUTION INTRAMUSCULAR; INTRAVENOUS; SUBCUTANEOUS AS NEEDED
Status: DISCONTINUED | OUTPATIENT
Start: 2021-09-09 | End: 2021-09-09 | Stop reason: HOSPADM

## 2021-09-09 RX ORDER — PROPOFOL 10 MG/ML
INJECTION, EMULSION INTRAVENOUS
Status: DISCONTINUED | OUTPATIENT
Start: 2021-09-09 | End: 2021-09-09 | Stop reason: HOSPADM

## 2021-09-09 RX ORDER — PROPOFOL 10 MG/ML
INJECTION, EMULSION INTRAVENOUS AS NEEDED
Status: DISCONTINUED | OUTPATIENT
Start: 2021-09-09 | End: 2021-09-09 | Stop reason: HOSPADM

## 2021-09-09 RX ORDER — SODIUM CHLORIDE, SODIUM LACTATE, POTASSIUM CHLORIDE, CALCIUM CHLORIDE 600; 310; 30; 20 MG/100ML; MG/100ML; MG/100ML; MG/100ML
75 INJECTION, SOLUTION INTRAVENOUS CONTINUOUS
Status: DISCONTINUED | OUTPATIENT
Start: 2021-09-09 | End: 2021-09-09 | Stop reason: HOSPADM

## 2021-09-09 RX ORDER — OXYCODONE HYDROCHLORIDE 5 MG/1
5 TABLET ORAL
Qty: 20 TABLET | Refills: 0 | Status: SHIPPED | OUTPATIENT
Start: 2021-09-09 | End: 2021-09-14

## 2021-09-09 RX ORDER — MORPHINE SULFATE 2 MG/ML
4 INJECTION, SOLUTION INTRAMUSCULAR; INTRAVENOUS
Status: DISCONTINUED | OUTPATIENT
Start: 2021-09-09 | End: 2021-09-09 | Stop reason: HOSPADM

## 2021-09-09 RX ORDER — CEFAZOLIN SODIUM/WATER 2 G/20 ML
2 SYRINGE (ML) INTRAVENOUS ONCE
Status: COMPLETED | OUTPATIENT
Start: 2021-09-09 | End: 2021-09-09

## 2021-09-09 RX ORDER — LIDOCAINE HYDROCHLORIDE 10 MG/ML
0.1 INJECTION INFILTRATION; PERINEURAL AS NEEDED
Status: DISCONTINUED | OUTPATIENT
Start: 2021-09-09 | End: 2021-09-09 | Stop reason: HOSPADM

## 2021-09-09 RX ORDER — SODIUM CHLORIDE 0.9 % (FLUSH) 0.9 %
5-40 SYRINGE (ML) INJECTION AS NEEDED
Status: DISCONTINUED | OUTPATIENT
Start: 2021-09-09 | End: 2021-09-09 | Stop reason: HOSPADM

## 2021-09-09 RX ORDER — FLUMAZENIL 0.1 MG/ML
0.2 INJECTION INTRAVENOUS
Status: DISCONTINUED | OUTPATIENT
Start: 2021-09-09 | End: 2021-09-09 | Stop reason: HOSPADM

## 2021-09-09 RX ORDER — DIPHENHYDRAMINE HYDROCHLORIDE 50 MG/ML
12.5 INJECTION, SOLUTION INTRAMUSCULAR; INTRAVENOUS
Status: DISCONTINUED | OUTPATIENT
Start: 2021-09-09 | End: 2021-09-09 | Stop reason: HOSPADM

## 2021-09-09 RX ORDER — BUPIVACAINE HYDROCHLORIDE 5 MG/ML
INJECTION, SOLUTION EPIDURAL; INTRACAUDAL AS NEEDED
Status: DISCONTINUED | OUTPATIENT
Start: 2021-09-09 | End: 2021-09-09 | Stop reason: HOSPADM

## 2021-09-09 RX ORDER — LIDOCAINE HYDROCHLORIDE 20 MG/ML
INJECTION, SOLUTION EPIDURAL; INFILTRATION; INTRACAUDAL; PERINEURAL AS NEEDED
Status: DISCONTINUED | OUTPATIENT
Start: 2021-09-09 | End: 2021-09-09 | Stop reason: HOSPADM

## 2021-09-09 RX ORDER — IBUPROFEN 600 MG/1
600 TABLET ORAL
Status: COMPLETED | OUTPATIENT
Start: 2021-09-09 | End: 2021-09-09

## 2021-09-09 RX ORDER — FENTANYL CITRATE 50 UG/ML
100 INJECTION, SOLUTION INTRAMUSCULAR; INTRAVENOUS AS NEEDED
Status: DISCONTINUED | OUTPATIENT
Start: 2021-09-09 | End: 2021-09-09 | Stop reason: HOSPADM

## 2021-09-09 RX ORDER — MIDAZOLAM HYDROCHLORIDE 1 MG/ML
2 INJECTION, SOLUTION INTRAMUSCULAR; INTRAVENOUS ONCE
Status: DISCONTINUED | OUTPATIENT
Start: 2021-09-09 | End: 2021-09-09 | Stop reason: HOSPADM

## 2021-09-09 RX ORDER — SODIUM CHLORIDE, SODIUM LACTATE, POTASSIUM CHLORIDE, CALCIUM CHLORIDE 600; 310; 30; 20 MG/100ML; MG/100ML; MG/100ML; MG/100ML
100 INJECTION, SOLUTION INTRAVENOUS CONTINUOUS
Status: DISCONTINUED | OUTPATIENT
Start: 2021-09-09 | End: 2021-09-09 | Stop reason: HOSPADM

## 2021-09-09 RX ADMIN — SODIUM CHLORIDE, SODIUM LACTATE, POTASSIUM CHLORIDE, AND CALCIUM CHLORIDE 100 ML/HR: 600; 310; 30; 20 INJECTION, SOLUTION INTRAVENOUS at 15:07

## 2021-09-09 RX ADMIN — IBUPROFEN 600 MG: 600 TABLET, FILM COATED ORAL at 16:50

## 2021-09-09 RX ADMIN — ACETAMINOPHEN 1000 MG: 500 TABLET ORAL at 15:07

## 2021-09-09 RX ADMIN — CEFAZOLIN 2 G: 1 INJECTION, POWDER, FOR SOLUTION INTRAVENOUS at 15:53

## 2021-09-09 RX ADMIN — LIDOCAINE HYDROCHLORIDE 50 MG: 20 INJECTION, SOLUTION EPIDURAL; INFILTRATION; INTRACAUDAL; PERINEURAL at 15:53

## 2021-09-09 RX ADMIN — PROPOFOL 100 MCG/KG/MIN: 10 INJECTION, EMULSION INTRAVENOUS at 15:53

## 2021-09-09 RX ADMIN — PROPOFOL 40 MG: 10 INJECTION, EMULSION INTRAVENOUS at 15:53

## 2021-09-09 NOTE — ANESTHESIA PREPROCEDURE EVALUATION
Relevant Problems   No relevant active problems       Anesthetic History   No history of anesthetic complications            Review of Systems / Medical History  Patient summary reviewed, nursing notes reviewed and pertinent labs reviewed    Pulmonary        Sleep apnea: CPAP      Pertinent negatives: No smoker     Neuro/Psych             Comments: Neuropathy? Cardiovascular    Hypertension        Dysrhythmias (s/p watchman 2020.  history of ablation x3. hx of WPW.) : atrial fibrillation  Hyperlipidemia    Exercise tolerance: >4 METS     GI/Hepatic/Renal     GERD: well controlled      Liver disease     Endo/Other    Diabetes: well controlled, type 2, using insulin    Obesity (fatty liver) and arthritis     Other Findings   Comments: gout           Physical Exam    Airway  Mallampati: II  TM Distance: 4 - 6 cm  Neck ROM: normal range of motion   Mouth opening: Normal     Cardiovascular    Rhythm: regular  Rate: normal         Dental  No notable dental hx       Pulmonary  Breath sounds clear to auscultation               Abdominal  GI exam deferred       Other Findings            Anesthetic Plan    ASA: 3  Anesthesia type: total IV anesthesia          Induction: Intravenous  Anesthetic plan and risks discussed with: Patient and Spouse      Local per surgeon

## 2021-09-09 NOTE — ANESTHESIA POSTPROCEDURE EVALUATION
Procedure(s):  RIGHT FOURTH FLEXOR TENOTOMY WITH PROXIMAL INTERPHALANGEAL RESECTION  ARTHROPLASTY. total IV anesthesia    Anesthesia Post Evaluation      Multimodal analgesia: multimodal analgesia used between 6 hours prior to anesthesia start to PACU discharge  Patient location during evaluation: PACU  Patient participation: complete - patient participated  Level of consciousness: awake and alert  Pain management: adequate  Airway patency: patent  Anesthetic complications: no  Cardiovascular status: acceptable  Respiratory status: acceptable  Hydration status: acceptable  Post anesthesia nausea and vomiting:  controlled  Final Post Anesthesia Temperature Assessment:  Normothermia (36.0-37.5 degrees C)      INITIAL Post-op Vital signs:   Vitals Value Taken Time   /80 09/09/21 1645   Temp 37.1 °C (98.8 °F) 09/09/21 1630   Pulse 64 09/09/21 1658   Resp 12 09/09/21 1630   SpO2 96 % 09/09/21 1658   Vitals shown include unvalidated device data.

## 2021-09-09 NOTE — BRIEF OP NOTE
Brief Postoperative Note    Patient: Latosha Rocha  YOB: 1945  MRN: [id number]    Date of Procedure: 9/9/2021     Pre-Op Diagnosis:   Acquired claw toe of right foot [M20.5X1]      Post-Op Diagnosis: Same as preoperative diagnosis. Procedure(s):  RIGHT FOURTH FLEXOR TENOTOMY WITH PROXIMAL INTERPHALANGEAL RESECTION  ARTHROPLASTY    Surgeon(s):  Amelie Vu MD    Surgical Assistant: None    Anesthesia: MAC     Estimated Blood Loss (mL): Minimal    Complications: None    Specimens: * No specimens in log *     Implants:   Implant Name Type Inv. Item Serial No.  Lot No. LRB No. Used Action   SCREW BNE L24MM OD2. 5MM HDLSS - [id number]  SCREW BNE L24MM OD2. 5MM HDLSS  [employer] [id number] Right 1 Implanted       Drains: * No LDAs found *    Findings:     Electronically Signed by Syd Corey MD on 9/9/2021 at 4:16 PM

## 2021-09-09 NOTE — OP NOTES
FULL OP NOTE    PATIENT NAME: Celia Jeronimo  MRN: [id number]    DATE OF SURGERY: 9/9/2021    PREOPERATIVE DIAGNOSIS:   Acquired claw toe of right foot [M20.5X1]        POSTOPERATIVE DIAGNOSIS:   Acquired claw toe of right foot [M20.5X1]        PROCEDURE: 1. Right fourth proximal interphalangeal joint resection arthroplasty, 91372                            2.  Right first flexor digitorum longus tenotomy, 84643    SURGEON: Clay Fischer MD    ASSISTANT: Guille Ricks NP  An assistant was required for positioning, retraction, and wound closure for this procedure. HARDWARE:   Implant Name Type Inv. Item Serial No.  Lot No. LRB No. Used Action   SCREW BNE L24MM OD2. 5MM HDLSS - [id number]  SCREW BNE L24MM OD2. 5MM HDLSS  [employer] [id number] Right 1 Implanted     INDICATIONS: This patient is a 78y.o. 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 Risks and benefits of the procedure including, but not limited to, anesthetic complications as well as surgical complications including damage to nerves and blood vessels, risk of infection, risk of incomplete pain relief, risk of malunion, nonunion and need for additional surgery have been discussed with the patient who wishes to proceed. PROCEDURE IN DETAIL: A time out was done to confirm the operating procedure, surgeon, patient and site. During a preop surgical timeout the right lower extremity was identified as the correct surgical site and prepped and draped in a standard sterile fashions and ChloraPrep solution. A field block was obtained with 0.5% plain Marcaine. A PIP resection arthroplasty was performed through an elliptical incision of the fourth toe and secured using a headless screw under fluoroscopic guidance.   A FDL tenotomy through a separate plantar approach to the fourth metatarsal phalangeal joint was also performed of the fourth toe at that time. The wound was then irrigated and closed using nylon sutures. A sterile dressing was applied. Anesthesia was discontinued. The patient was transferred back to recovery bed. He was taken to recovery in satisfactory condition. He appeared to tolerate the procedure well. There were no apparent trouble or anesthetic complications. All needle and sponge counts were correct. TOURNIQUET TIME: Approx 0 minutes. SPECIMENS: none    ESTIMATED BLOOD LOSS: min mL.

## 2021-09-09 NOTE — H&P
Outpatient Surgery History and Physical:  Dariel Manriquez was seen and examined. CHIEF COMPLAINT:   Right foot    PE:   There were no vitals taken for this visit. Heart:   Regular rhythm      Lungs:  Are clear      Past Medical History: There are no problems to display for this patient. Surgical History:   Past Surgical History:   Procedure Laterality Date    HX AFIB ABLATION      WPW- ablations x3    HX BACK SURGERY      ruptured disc    HX CATARACT REMOVAL      HX CHOLECYSTECTOMY      HX ENDOSCOPY      HX KNEE REPLACEMENT Right     HX LUMBAR LAMINECTOMY      HX NEPHRECTOMY      partial    HX ORTHOPAEDIC Left     foot    HX ORTHOPAEDIC Left     great toe straightened    HX OTHER SURGICAL      placed watchman 2/2020    HX WISDOM TEETH EXTRACTION         Social History: Patient  reports that he has never smoked. He has never used smokeless tobacco. He reports previous alcohol use. He reports that he does not use drugs. Family History:   Family History   Problem Relation Age of Onset    Other Mother         ALS    Parkinson's Disease Father     Cancer Sister         colon ca    No Known Problems Sister     Cancer Sister         ovarian       Allergies: Reviewed per EMR  Allergies   Allergen Reactions    Codeine Other (comments)     \"makes me a little crazy\"        Fenofibrate Other (comments)     \"causes pancreatic attacks\"    Hydromorphone Other (comments)     \"gives me craziness\"    Januvia [Sitagliptin] Other (comments)     Per pt causes pancreatic issues    Metformin Diarrhea       Medications:    No current facility-administered medications on file prior to encounter. Current Outpatient Medications on File Prior to Encounter   Medication Sig    acetaminophen (Tylenol Extra Strength) 500 mg tablet Take 500 mg by mouth every six (6) hours as needed for Pain.  gabapentin (NEURONTIN) 100 mg capsule Take 100 mg by mouth five (5) times daily.     allopurinoL (ZYLOPRIM) 100 mg tablet Take 100 mg by mouth daily.  lisinopriL (PRINIVIL, ZESTRIL) 20 mg tablet Take 20 mg by mouth daily.  lipase-protease-amylase (Creon) 24,000-76,000 -120,000 unit capsule Take 1 Cap by mouth three (3) times daily (with meals).  simvastatin (ZOCOR) 10 mg tablet Take 10 mg by mouth nightly.  finasteride (PROSCAR) 5 mg tablet Take 5 mg by mouth daily.  aspirin (ASPIRIN) 325 mg tablet Take 325 mg by mouth nightly.  multivitamin (ONE A DAY) tablet Take 1 Tab by mouth nightly.  cholecalciferol (Vitamin D3) 25 mcg (1,000 unit) cap Take 1,000 Units by mouth nightly.  ascorbic acid, vitamin C, (Vitamin C) 250 mg tablet Take 1,000 mg by mouth daily.  omega 3-DHA-EPA-fish oil 1,000 mg (120 mg-180 mg) capsule Take 1 Cap by mouth daily.  trimethoprim-sulfamethoxazole (BACTRIM DS, SEPTRA DS) 160-800 mg per tablet Take 1 Tablet by mouth two (2) times a day. (Patient not taking: Reported on 9/9/2021)    ondansetron (Zofran ODT) 8 mg disintegrating tablet Take 1 Tab by mouth every twelve (12) hours as needed for Nausea.  aspirin 81 mg chewable tablet Take 81 mg by mouth daily.  insulin aspart U-100 (NovoLOG Flexpen U-100 Insulin) 100 unit/mL (3 mL) inpn by SubCUTAneous route Before breakfast, lunch, and dinner. SSI    insulin glargine (Lantus Solostar U-100 Insulin) 100 unit/mL (3 mL) inpn by SubCUTAneous route two (2) times a day. 45 units in the morning and 30 units in the evening    B.infantis-B.ani-B.long-B.bifi (Probiotic 4X) 10-15 mg TbEC Take 1 Tab by mouth daily.  vitamin E (AQUA GEMS) 400 unit capsule Take 450 Units by mouth nightly. The surgery is planned for the     Procedure: RIGHT FOURTH FLEXOR TENOTOMY WITH PROXIMAL INTERPHALANGEAL RESECTION  ARTHROPLASTY    History and physical has been reviewed. The patient has been examined.  There have been no significant clinical changes since the completion of the originally dated History and Physical.  Patient identified by surgeon; surgical site was confirmed by patient and surgeon. The patient is here today for outpatient surgery. I have examined the patient, no changes are noted in the patient's medical status. Necessity for the procedure/care is still present and the history and physical above is current. See the office notes for the full long term history of the problem. Please see the recent office notes for the musculoskeletal examination.     Signed By: Юлия Castillo MD     September 9, 2021 2:50 PM

## 2021-09-09 NOTE — DISCHARGE INSTRUCTIONS
INSTRUCTIONS FOLLOWING FOOT SURGERY    ACTIVITY  Elevate foot (feet) for 48 hours. Use crutches as directed by your doctor. Protected partial, weight bearing in Post-Op boot as tolerated  Take one full strength aspirin (325 mg) per day, unless you have been told by your primary MD not to or have history of GI ulcers. Get up out of bed frequently. While in the bed, move your legs as much as possible. DIET  Clear liquids until no nausea or vomiting; then light diet for the first day. Advance to regular diet on second day, unless your doctor orders otherwise. PAIN  Take pain medications as directed by your doctor. Call your doctor if pain is NOT relieved by medication. DO NOT take aspirin or blood thinners until directed by your doctor. DRESSING CARE  Keep clean and dry until follow up appointment. CALL YOUR DOCTOR IF YOU HAVE  Excessive bleeding that does not stop after holding mild pressure over the area. Temperature of 101 degrees or above. Loss of sensation - cold, white or blue toes. After general anesthesia or intravenous sedation, for 24 hours or while taking prescription Narcotics:  · Limit your activities  · A responsible adult needs to be with you for the next 24 hours  · Do not drive and operate hazardous machinery  · Do not make important personal or business decisions  · Do not drink alcoholic beverages  · If you have not urinated within 8 hours after discharge, and you are experiencing discomfort from urinary retention, please go to the nearest ED. · If you have sleep apnea and have a CPAP machine, please use it for all naps and sleeping. · Please use caution when taking narcotics and any of your home medications that may cause drowsiness. *  Please give a list of your current medications to your Primary Care Provider.   *  Please update this list whenever your medications are discontinued, doses are      changed, or new medications (including over-the-counter products) are added.  *  Please carry medication information at all times in case of emergency situations. These are general instructions for a healthy lifestyle:  No smoking/ No tobacco products/ Avoid exposure to second hand smoke  Surgeon General's Warning:  Quitting smoking now greatly reduces serious risk to your health. Obesity, smoking, and sedentary lifestyle greatly increases your risk for illness  A healthy diet, regular physical exercise & weight monitoring are important for maintaining a healthy lifestyle    You may be retaining fluid if you have a history of heart failure or if you experience any of the following symptoms:  Weight gain of 3 pounds or more overnight or 5 pounds in a week, increased swelling in our hands or feet or shortness of breath while lying flat in bed. Please call your doctor as soon as you notice any of these symptoms; do not wait until your next office visit.

## 2022-01-15 ENCOUNTER — HOSPITAL ENCOUNTER (INPATIENT)
Age: 77
LOS: 2 days | Discharge: HOME | DRG: 378 | End: 2022-01-17
Attending: EMERGENCY MEDICINE | Admitting: FAMILY MEDICINE
Payer: MEDICARE

## 2022-01-15 DIAGNOSIS — M25.511 CHRONIC RIGHT SHOULDER PAIN: ICD-10-CM

## 2022-01-15 DIAGNOSIS — E11.65 TYPE 2 DIABETES MELLITUS WITH HYPERGLYCEMIA, WITH LONG-TERM CURRENT USE OF INSULIN (HCC): ICD-10-CM

## 2022-01-15 DIAGNOSIS — Z79.4 TYPE 2 DIABETES MELLITUS WITH HYPERGLYCEMIA, WITH LONG-TERM CURRENT USE OF INSULIN (HCC): ICD-10-CM

## 2022-01-15 DIAGNOSIS — N18.9 CHRONIC KIDNEY DISEASE, UNSPECIFIED CKD STAGE: ICD-10-CM

## 2022-01-15 DIAGNOSIS — G89.29 CHRONIC RIGHT SHOULDER PAIN: ICD-10-CM

## 2022-01-15 DIAGNOSIS — K62.5 GASTROINTESTINAL HEMORRHAGE ASSOCIATED WITH ANORECTAL SOURCE: Primary | ICD-10-CM

## 2022-01-15 PROBLEM — K92.2 GI BLEED: Status: ACTIVE | Noted: 2022-01-15

## 2022-01-15 PROBLEM — N18.32 STAGE 3B CHRONIC KIDNEY DISEASE (HCC): Status: ACTIVE | Noted: 2021-06-11

## 2022-01-15 PROBLEM — Z90.5 HISTORY OF PARTIAL NEPHRECTOMY: Status: ACTIVE | Noted: 2021-11-17

## 2022-01-15 PROBLEM — D62 ACUTE BLOOD LOSS ANEMIA: Status: ACTIVE | Noted: 2022-01-15

## 2022-01-15 PROBLEM — D72.829 LEUKOCYTOSIS: Status: ACTIVE | Noted: 2022-01-15

## 2022-01-15 PROBLEM — D64.9 CHRONIC ANEMIA: Status: ACTIVE | Noted: 2020-01-07

## 2022-01-15 LAB
ABO + RH BLD: NORMAL
ALBUMIN SERPL-MCNC: 2.1 G/DL (ref 3.2–4.6)
ALBUMIN/GLOB SERPL: 0.4 {RATIO} (ref 1.2–3.5)
ALP SERPL-CCNC: 138 U/L (ref 50–136)
ALT SERPL-CCNC: 32 U/L (ref 12–65)
ANION GAP SERPL CALC-SCNC: 9 MMOL/L (ref 7–16)
AST SERPL-CCNC: 22 U/L (ref 15–37)
BILIRUB SERPL-MCNC: 0.3 MG/DL (ref 0.2–1.1)
BLASTS NFR BLD MANUAL: 2 %
BLOOD GROUP ANTIBODIES SERPL: NORMAL
BUN SERPL-MCNC: 50 MG/DL (ref 8–23)
CALCIUM SERPL-MCNC: 8.7 MG/DL (ref 8.3–10.4)
CHLORIDE SERPL-SCNC: 108 MMOL/L (ref 98–107)
CO2 SERPL-SCNC: 23 MMOL/L (ref 21–32)
COVID-19 RAPID TEST, COVR: NOT DETECTED
CREAT SERPL-MCNC: 1.9 MG/DL (ref 0.8–1.5)
DIFFERENTIAL METHOD BLD: ABNORMAL
ERYTHROCYTE [DISTWIDTH] IN BLOOD BY AUTOMATED COUNT: 15.6 % (ref 11.9–14.6)
ERYTHROCYTE [DISTWIDTH] IN BLOOD BY AUTOMATED COUNT: 15.7 % (ref 11.9–14.6)
GLOBULIN SER CALC-MCNC: 4.8 G/DL (ref 2.3–3.5)
GLUCOSE SERPL-MCNC: 147 MG/DL (ref 65–100)
HCT VFR BLD AUTO: 26.9 % (ref 41.1–50.3)
HCT VFR BLD AUTO: 27.4 % (ref 41.1–50.3)
HCT VFR BLD AUTO: 34.4 % (ref 41.1–50.3)
HGB BLD-MCNC: 10.7 G/DL (ref 13.6–17.2)
HGB BLD-MCNC: 8.3 G/DL (ref 13.6–17.2)
HGB BLD-MCNC: 8.3 G/DL (ref 13.6–17.2)
INR PPP: 0.9
LYMPHOCYTES # BLD: 5.7 K/UL (ref 0.5–4.6)
LYMPHOCYTES NFR BLD MANUAL: 24 % (ref 16–44)
MCH RBC QN AUTO: 29.2 PG (ref 26.1–32.9)
MCH RBC QN AUTO: 29.2 PG (ref 26.1–32.9)
MCHC RBC AUTO-ENTMCNC: 30.3 G/DL (ref 31.4–35)
MCHC RBC AUTO-ENTMCNC: 31.1 G/DL (ref 31.4–35)
MCV RBC AUTO: 93.7 FL (ref 79.6–97.8)
MCV RBC AUTO: 96.5 FL (ref 79.6–97.8)
METAMYELOCYTES NFR BLD MANUAL: 5 %
MONOCYTES # BLD: 1.7 K/UL (ref 0.1–1.3)
MONOCYTES NFR BLD MANUAL: 7 % (ref 3–9)
MYELOCYTES NFR BLD MANUAL: 7 %
NEUTS BAND NFR BLD MANUAL: 9 % (ref 0–6)
NEUTS SEG # BLD: 13.2 K/UL (ref 1.7–8.2)
NEUTS SEG NFR BLD MANUAL: 42 % (ref 47–75)
NRBC # BLD: 0.03 K/UL (ref 0–0.2)
NRBC # BLD: 0.05 K/UL (ref 0–0.2)
NRBC BLD-RTO: 1 PER 100 WBC
PLATELET # BLD AUTO: 357 K/UL (ref 150–450)
PLATELET # BLD AUTO: 523 K/UL (ref 150–450)
PLATELET COMMENTS,PCOM: ABNORMAL
PMV BLD AUTO: 8.9 FL (ref 9.4–12.3)
PMV BLD AUTO: 8.9 FL (ref 9.4–12.3)
POTASSIUM SERPL-SCNC: 4.3 MMOL/L (ref 3.5–5.1)
PROMYELOCYTES NFR BLD MANUAL: 4 %
PROT SERPL-MCNC: 6.9 G/DL (ref 6.3–8.2)
PROTHROMBIN TIME: 12.9 SEC (ref 12.6–14.5)
RBC # BLD AUTO: 2.84 M/UL (ref 4.23–5.6)
RBC # BLD AUTO: 3.67 M/UL (ref 4.23–5.6)
RBC MORPH BLD: ABNORMAL
SODIUM SERPL-SCNC: 140 MMOL/L (ref 136–145)
SOURCE, COVRS: NORMAL
SPECIMEN EXP DATE BLD: NORMAL
WBC # BLD AUTO: 18.5 K/UL (ref 4.3–11.1)
WBC # BLD AUTO: 23.6 K/UL (ref 4.3–11.1)
WBC MORPH BLD: SLIGHT

## 2022-01-15 PROCEDURE — 65270000029 HC RM PRIVATE

## 2022-01-15 PROCEDURE — 85018 HEMOGLOBIN: CPT

## 2022-01-15 PROCEDURE — 74011250636 HC RX REV CODE- 250/636: Performed by: FAMILY MEDICINE

## 2022-01-15 PROCEDURE — 87635 SARS-COV-2 COVID-19 AMP PRB: CPT

## 2022-01-15 PROCEDURE — 74011636637 HC RX REV CODE- 636/637: Performed by: INTERNAL MEDICINE

## 2022-01-15 PROCEDURE — 94760 N-INVAS EAR/PLS OXIMETRY 1: CPT

## 2022-01-15 PROCEDURE — 96360 HYDRATION IV INFUSION INIT: CPT

## 2022-01-15 PROCEDURE — 80053 COMPREHEN METABOLIC PANEL: CPT

## 2022-01-15 PROCEDURE — 36415 COLL VENOUS BLD VENIPUNCTURE: CPT

## 2022-01-15 PROCEDURE — 94762 N-INVAS EAR/PLS OXIMTRY CONT: CPT

## 2022-01-15 PROCEDURE — C9113 INJ PANTOPRAZOLE SODIUM, VIA: HCPCS | Performed by: NURSE PRACTITIONER

## 2022-01-15 PROCEDURE — 74011250637 HC RX REV CODE- 250/637: Performed by: FAMILY MEDICINE

## 2022-01-15 PROCEDURE — 74011250636 HC RX REV CODE- 250/636: Performed by: EMERGENCY MEDICINE

## 2022-01-15 PROCEDURE — 74011000250 HC RX REV CODE- 250: Performed by: NURSE PRACTITIONER

## 2022-01-15 PROCEDURE — 74011250636 HC RX REV CODE- 250/636: Performed by: NURSE PRACTITIONER

## 2022-01-15 PROCEDURE — 85027 COMPLETE CBC AUTOMATED: CPT

## 2022-01-15 PROCEDURE — 86900 BLOOD TYPING SEROLOGIC ABO: CPT

## 2022-01-15 PROCEDURE — 85025 COMPLETE CBC W/AUTO DIFF WBC: CPT

## 2022-01-15 PROCEDURE — 99282 EMERGENCY DEPT VISIT SF MDM: CPT

## 2022-01-15 PROCEDURE — 77030021668 HC NEB PREFIL KT VYRM -A

## 2022-01-15 PROCEDURE — 85610 PROTHROMBIN TIME: CPT

## 2022-01-15 RX ORDER — GABAPENTIN 300 MG/1
300 CAPSULE ORAL 3 TIMES DAILY
COMMUNITY

## 2022-01-15 RX ORDER — LISINOPRIL 20 MG/1
20 TABLET ORAL DAILY
Status: DISCONTINUED | OUTPATIENT
Start: 2022-01-15 | End: 2022-01-17 | Stop reason: HOSPADM

## 2022-01-15 RX ORDER — PREDNISONE 10 MG/1
20 TABLET ORAL 2 TIMES DAILY WITH MEALS
Status: DISCONTINUED | OUTPATIENT
Start: 2022-01-15 | End: 2022-01-17 | Stop reason: HOSPADM

## 2022-01-15 RX ORDER — SODIUM CHLORIDE 9 MG/ML
1000 INJECTION, SOLUTION INTRAVENOUS ONCE
Status: COMPLETED | OUTPATIENT
Start: 2022-01-15 | End: 2022-01-15

## 2022-01-15 RX ORDER — SODIUM CHLORIDE, SODIUM LACTATE, POTASSIUM CHLORIDE, CALCIUM CHLORIDE 600; 310; 30; 20 MG/100ML; MG/100ML; MG/100ML; MG/100ML
75 INJECTION, SOLUTION INTRAVENOUS CONTINUOUS
Status: DISCONTINUED | OUTPATIENT
Start: 2022-01-15 | End: 2022-01-16

## 2022-01-15 RX ORDER — POLYETHYLENE GLYCOL 3350 17 G/17G
17 POWDER, FOR SOLUTION ORAL DAILY PRN
Status: DISCONTINUED | OUTPATIENT
Start: 2022-01-15 | End: 2022-01-17 | Stop reason: HOSPADM

## 2022-01-15 RX ORDER — HYDROCODONE BITARTRATE AND ACETAMINOPHEN 5; 325 MG/1; MG/1
1 TABLET ORAL
Status: DISCONTINUED | OUTPATIENT
Start: 2022-01-15 | End: 2022-01-17 | Stop reason: HOSPADM

## 2022-01-15 RX ORDER — PANTOPRAZOLE SODIUM 40 MG/1
40 TABLET, DELAYED RELEASE ORAL
Status: DISCONTINUED | OUTPATIENT
Start: 2022-01-15 | End: 2022-01-15

## 2022-01-15 RX ORDER — GABAPENTIN 300 MG/1
300 CAPSULE ORAL
Status: DISCONTINUED | OUTPATIENT
Start: 2022-01-15 | End: 2022-01-17 | Stop reason: HOSPADM

## 2022-01-15 RX ORDER — SODIUM CHLORIDE 0.9 % (FLUSH) 0.9 %
5-10 SYRINGE (ML) INJECTION EVERY 8 HOURS
Status: DISCONTINUED | OUTPATIENT
Start: 2022-01-15 | End: 2022-01-15 | Stop reason: SDUPTHER

## 2022-01-15 RX ORDER — ONDANSETRON 2 MG/ML
4 INJECTION INTRAMUSCULAR; INTRAVENOUS
Status: DISCONTINUED | OUTPATIENT
Start: 2022-01-15 | End: 2022-01-17 | Stop reason: HOSPADM

## 2022-01-15 RX ORDER — ACETAMINOPHEN 325 MG/1
650 TABLET ORAL
Status: DISCONTINUED | OUTPATIENT
Start: 2022-01-15 | End: 2022-01-17 | Stop reason: HOSPADM

## 2022-01-15 RX ORDER — ONDANSETRON 4 MG/1
4 TABLET, ORALLY DISINTEGRATING ORAL
Status: DISCONTINUED | OUTPATIENT
Start: 2022-01-15 | End: 2022-01-17 | Stop reason: HOSPADM

## 2022-01-15 RX ORDER — SODIUM CHLORIDE 0.9 % (FLUSH) 0.9 %
5-40 SYRINGE (ML) INJECTION EVERY 8 HOURS
Status: DISCONTINUED | OUTPATIENT
Start: 2022-01-15 | End: 2022-01-17 | Stop reason: HOSPADM

## 2022-01-15 RX ORDER — ALLOPURINOL 300 MG/1
100 TABLET ORAL 2 TIMES DAILY
COMMUNITY

## 2022-01-15 RX ORDER — ACETAMINOPHEN 650 MG/1
650 SUPPOSITORY RECTAL
Status: DISCONTINUED | OUTPATIENT
Start: 2022-01-15 | End: 2022-01-17 | Stop reason: HOSPADM

## 2022-01-15 RX ORDER — SODIUM CHLORIDE 0.9 % (FLUSH) 0.9 %
5-40 SYRINGE (ML) INJECTION AS NEEDED
Status: DISCONTINUED | OUTPATIENT
Start: 2022-01-15 | End: 2022-01-17 | Stop reason: HOSPADM

## 2022-01-15 RX ORDER — ALLOPURINOL 300 MG/1
300 TABLET ORAL DAILY
Status: DISCONTINUED | OUTPATIENT
Start: 2022-01-15 | End: 2022-01-17 | Stop reason: HOSPADM

## 2022-01-15 RX ORDER — SODIUM CHLORIDE 0.9 % (FLUSH) 0.9 %
5-10 SYRINGE (ML) INJECTION AS NEEDED
Status: DISCONTINUED | OUTPATIENT
Start: 2022-01-15 | End: 2022-01-15 | Stop reason: SDUPTHER

## 2022-01-15 RX ORDER — FINASTERIDE 5 MG/1
5 TABLET, FILM COATED ORAL DAILY
Status: DISCONTINUED | OUTPATIENT
Start: 2022-01-15 | End: 2022-01-17 | Stop reason: HOSPADM

## 2022-01-15 RX ORDER — ATORVASTATIN CALCIUM 10 MG/1
10 TABLET, FILM COATED ORAL
Status: DISCONTINUED | OUTPATIENT
Start: 2022-01-15 | End: 2022-01-15

## 2022-01-15 RX ADMIN — SODIUM CHLORIDE, PRESERVATIVE FREE 40 MG: 5 INJECTION INTRAVENOUS at 21:29

## 2022-01-15 RX ADMIN — PREDNISONE 20 MG: 10 TABLET ORAL at 17:03

## 2022-01-15 RX ADMIN — PANTOPRAZOLE SODIUM 40 MG: 40 TABLET, DELAYED RELEASE ORAL at 08:52

## 2022-01-15 RX ADMIN — ACETAMINOPHEN 650 MG: 325 TABLET, FILM COATED ORAL at 15:20

## 2022-01-15 RX ADMIN — FINASTERIDE 5 MG: 5 TABLET, FILM COATED ORAL at 08:52

## 2022-01-15 RX ADMIN — SODIUM CHLORIDE 1000 ML: 900 INJECTION, SOLUTION INTRAVENOUS at 02:45

## 2022-01-15 RX ADMIN — GABAPENTIN 300 MG: 100 CAPSULE ORAL at 17:03

## 2022-01-15 RX ADMIN — GABAPENTIN 300 MG: 100 CAPSULE ORAL at 15:20

## 2022-01-15 RX ADMIN — GABAPENTIN 300 MG: 100 CAPSULE ORAL at 21:28

## 2022-01-15 RX ADMIN — SODIUM CHLORIDE, SODIUM LACTATE, POTASSIUM CHLORIDE, AND CALCIUM CHLORIDE 125 ML/HR: 600; 310; 30; 20 INJECTION, SOLUTION INTRAVENOUS at 05:44

## 2022-01-15 RX ADMIN — SODIUM CHLORIDE, PRESERVATIVE FREE 40 MG: 5 INJECTION INTRAVENOUS at 12:24

## 2022-01-15 NOTE — CONSULTS
[facility] Consult Note       Primary GI Physician: Rosalie Barrett    Referring Provider:  Dr. Fredricka Holstein Date:  1/15/2022    Admit Date:  1/15/2022    Chief Complaint: Rectal bleeding    Subjective:     History of Present Illness:  Patient is a 78 y.o. male with PMH including but not limited to chronic pancreatitis on pancreatic enzymes, hx of duodenal ulcer, chronic anemia followed by Dr. Katherin Oh receiving IV Iron, CKD,  A fib not on anticoagulation, Mandel Parkinson White s/p ablation,DM, hx renal cell carcinoma, Hx of DVT, KAITLIN, HTN, HLD, gout , who is seen in consultation at the request of Dr. Santos Kapadia for Rectal bleeding. This began on 1/14/2022 and occurred x 5 episodes. He denies any straining at the stool. He denies any abdominal pain, N&V, GERD, unintentional weight loss or melena. He has been taking ibuprofen and prednisone for some kidney . He takes pantorprazole 20mg at home. On evaluation in the ED, he was found to have a Hgb 10.7 (10.7 10/30/2020), BUN is elevated at 50. He has been admitted by the hospital team and started on pantoprazole 40mg daily. He has had no further bleeding since admission. EGD with EUS by Dr. Alvin Grover on 6/1/2021 revealed gastritis and duodeninits. PD stone 3mm, chronic pancreatitis. Negative biopsies for H pylori  2/19/2021 Colonoscopy for chronic diarrhea and chronic anemia. Tubulovillous adenoma and tubular adenomas in 2018.     PMH:  Past Medical History:   Diagnosis Date    Arthritis     Blurred vision, right eye     BPH (benign prostatic hyperplasia)     Gout     History of Clostridioides difficile colitis 2010    History of pancreatitis     History of renal carcinoma     partial nephrectomy    Hyperlipidemia     Hypertension     Nausea & vomiting     small amount of N/V and pt not sure of it antiemetics work    Neuropathy     Paroxysmal A-fib ([address]     Presence of Watchman left atrial appendage closure device     Sleep apnea     compliant with C-pap    Thromboembolus ([address]     hx of left lower extremity    Type 2 diabetes mellitus (HCC)     insulin reliant; AVG -140; s.s. of hypoglycemia 65-75; last A1c 7.1    WPW (Alba-Parkinson-White syndrome)     ablation x4       PSH:  Past Surgical History:   Procedure Laterality Date    HX AFIB ABLATION      WPW- ablations x3    HX BACK SURGERY      ruptured disc    HX CATARACT REMOVAL      HX CHOLECYSTECTOMY      HX ENDOSCOPY      HX KNEE REPLACEMENT Right     HX LUMBAR LAMINECTOMY      HX NEPHRECTOMY      partial    HX ORTHOPAEDIC Left     foot    HX ORTHOPAEDIC Left     great toe straightened    HX OTHER SURGICAL      placed watchman 2/2020    HX WISDOM TEETH EXTRACTION         Allergies: Allergies   Allergen Reactions    Codeine Other (comments)     \"makes me a little crazy\"        Fenofibrate Other (comments)     \"causes pancreatic attacks\"    Hydromorphone Other (comments)     \"gives me craziness\"    Januvia [Sitagliptin] Other (comments)     Per pt causes pancreatic issues    Metformin Diarrhea       Home Medications:  Prior to Admission medications    Medication Sig Start Date End Date Taking? Authorizing Provider   gabapentin (NEURONTIN) 300 mg capsule Take 300 mg by mouth five (5) times daily. Indications: neuropathic pain   Yes Provider, Historical   pantoprazole (PROTONIX) 20 mg tablet Take 20 mg by mouth daily. Yes Provider, Historical   ibuprofen (ADVIL;MOTRIN) 100 mg/5 mL suspension Take 6 mg by mouth four (4) times daily as needed for Fever. Yes Provider, Historical   acetaminophen (Tylenol Extra Strength) 500 mg tablet Take 500 mg by mouth every six (6) hours as needed for Pain. Yes Provider, Historical   allopurinoL (ZYLOPRIM) 100 mg tablet Take 100 mg by mouth daily. Yes Provider, Historical   lisinopriL (PRINIVIL, ZESTRIL) 20 mg tablet Take 20 mg by mouth daily.    Yes Provider, Historical   insulin aspart U-100 (NovoLOG Flexpen U-100 Insulin) 100 unit/mL (3 mL) inpn by SubCUTAneous route Before breakfast, lunch, and dinner. SSI   Yes Provider, Historical   insulin glargine (Lantus Solostar U-100 Insulin) 100 unit/mL (3 mL) inpn by SubCUTAneous route two (2) times a day. 45 units in the morning and 30 units in the evening   Yes Provider, Historical   finasteride (PROSCAR) 5 mg tablet Take 5 mg by mouth daily. Yes Provider, Historical   aspirin (ASPIRIN) 325 mg tablet Take 325 mg by mouth nightly. Yes Provider, Historical   multivitamin (ONE A DAY) tablet Take 1 Tab by mouth nightly. Yes Provider, Historical   cholecalciferol (Vitamin D3) 25 mcg (1,000 unit) cap Take 1,000 Units by mouth nightly. Yes Provider, Historical   ascorbic acid, vitamin C, (Vitamin C) 250 mg tablet Take 1,000 mg by mouth daily. Yes Provider, Historical   omega 3-DHA-EPA-fish oil 1,000 mg (120 mg-180 mg) capsule Take 1 Cap by mouth daily. Yes Provider, Historical   cephALEXin (KEFLEX) 500 mg capsule Take 1 Capsule by mouth four (4) times daily. Patient not taking: Reported on 1/15/2022 9/9/21   Cj Dillon NP   ondansetron (Zofran ODT) 8 mg disintegrating tablet Take 1 Tab by mouth every twelve (12) hours as needed for Nausea. Patient not taking: Reported on 1/15/2022 10/30/20   Krystal Perez MD   gabapentin (NEURONTIN) 100 mg capsule Take 100 mg by mouth five (5) times daily. Patient not taking: Reported on 1/15/2022    Provider, Historical   lipase-protease-amylase (Creon) 24,000-76,000 -120,000 unit capsule Take 1 Cap by mouth three (3) times daily (with meals). Patient not taking: Reported on 1/15/2022    Provider, Historical   simvastatin (ZOCOR) 10 mg tablet Take 10 mg by mouth nightly. Patient not taking: Reported on 1/15/2022    Provider, Historical   B.infantis-B.ani-B.long-B.bifi (Probiotic 4X) 10-15 mg TbEC Take 1 Tab by mouth daily.   Patient not taking: Reported on 1/15/2022    Provider, Historical   vitamin E (AQUA GEMS) 400 unit capsule Take 450 Units by mouth nightly. Patient not taking: Reported on 1/15/2022    Provider, Historical       Hospital Medications:  Current Facility-Administered Medications   Medication Dose Route Frequency    gabapentin (NEURONTIN) capsule 100 mg  100 mg Oral 5XD    allopurinoL (ZYLOPRIM) tablet 100 mg  100 mg Oral DAILY    lisinopriL (PRINIVIL, ZESTRIL) tablet 20 mg  20 mg Oral DAILY    . PHARMACY TO SUBSTITUTE PER PROTOCOL (Reordered from: lipase-protease-amylase (Creon) 24,000-76,000 -120,000 unit capsule)    Per Protocol    atorvastatin (LIPITOR) tablet 10 mg  10 mg Oral QHS    finasteride (PROSCAR) tablet 5 mg  5 mg Oral DAILY    pantoprazole (PROTONIX) tablet 40 mg  40 mg Oral ACB    [Held by provider] . PHARMACY TO SUBSTITUTE PER PROTOCOL (Reordered from: insulin glargine (Lantus Solostar U-100 Insulin) 100 unit/mL (3 mL) inpn)    Per Protocol    sodium chloride (NS) flush 5-40 mL  5-40 mL IntraVENous Q8H    sodium chloride (NS) flush 5-40 mL  5-40 mL IntraVENous PRN    acetaminophen (TYLENOL) tablet 650 mg  650 mg Oral Q6H PRN    Or    acetaminophen (TYLENOL) suppository 650 mg  650 mg Rectal Q6H PRN    polyethylene glycol (MIRALAX) packet 17 g  17 g Oral DAILY PRN    ondansetron (ZOFRAN ODT) tablet 4 mg  4 mg Oral Q8H PRN    Or    ondansetron (ZOFRAN) injection 4 mg  4 mg IntraVENous Q6H PRN    lactated Ringers infusion  125 mL/hr IntraVENous CONTINUOUS       Social History:  Social History     Tobacco Use    Smoking status: Never Smoker    Smokeless tobacco: Never Used   Substance Use Topics    Alcohol use: Not Currently       Family History:  Family History   Problem Relation Age of Onset    Other Mother         ALS    Parkinson's Disease Father     Cancer Sister         colon ca    No Known Problems Sister     Cancer Sister         ovarian       Review of Systems:  A detailed 10 system ROS is obtained, with pertinent positives as listed above.   All others are negative. Diet:  Clear liquid diet    Objective:     Physical Exam:  Vitals:  Visit Vitals  /69 (BP 1 Location: Left upper arm, BP Patient Position: At rest)   Pulse 84   Temp 98.5 °F (36.9 °C)   Resp 18   Ht 5' 10\" (1.778 m)   Wt 100.7 kg (222 lb)   SpO2 97%   BMI 31.85 kg/m²     Gen:  Pt is alert, cooperative, no acute distress EATING CLEAR LIQUID DIET  Skin:  Extremities and face reveal no rashes. HEENT: Sclerae anicteric. Extra-occular muscles are intact. No oral ulcers. No abnormal pigmentation of the lips. The neck is supple. Cardiovascular: Regular rate and rhythm. No murmurs, gallops, or rubs. Respiratory:  Comfortable breathing with no accessory muscle use. Clear breath sounds anteriorly with no wheezes, rales, or rhonchi. GI:  Abdomen nondistended, soft, and nontender. Normal active bowel sounds. No enlargement of the liver or spleen. No masses palpable. Musculoskeletal:  No pitting edema of the lower legs. Neurological:  Gross memory appears intact. Patient is alert and oriented. Psychiatric:  Mood appears appropriate with judgement intact. Lymphatic:  No cervical or supraclavicular adenopathy.     Laboratory:    Recent Labs     01/15/22  0111   WBC 23.6*   HGB 10.7*   HCT 34.4*   *   MCV 93.7      K 4.3   *   CO2 23   BUN 50*   CREA 1.90*   CA 8.7   *   *   AST 22   ALT 32   TBILI 0.3   ALB 2.1*   TP 6.9   PTP 12.9   INR 0.9          Assessment:     Principal Problem:    GI bleed (1/15/2022)    Active Problems:    Leukocytosis (1/15/2022)      Benign essential hypertension (8/19/2012)      Overview: Last Assessment & Plan:       Formatting of this note might be different from the original.      - BP low normal on lisinopril      - monitor BP at home, may need to reduce dose due to dizziness      Chronic anemia (1/7/2020)      Gout (9/20/2016)      Overview: Last Assessment & Plan:       Formatting of this note might be different from the original.      Chronic, controlled with allopurinol 300 mg      Plan:        Continue current therapy, check uric acid with next labs      History of partial nephrectomy (11/17/2021)      Overview: Formatting of this note might be different from the original.      2nd to [facility]      Paroxysmal atrial fibrillation ([facility] (8/29/2012)      Overview: Formatting of this note might be different from the original.      Paroxysmal             Last Assessment & Plan:       Formatting of this note might be different from the original.      - s/p ablation       - not fully anticoagulated due to maintenance of sinus rhythm      Type 2 diabetes mellitus ([facility] (8/19/2012)      Stage 3b chronic kidney disease ([facility] (6/11/2021)      Overview: Formatting of this note might be different from the original.      Follows with Dr. Regan Maxwell    78 y.o. male with PMH including but not limited to chronic pancreatitis on pancreatitic enzymes, hx of duodenal ulcer, chronic anemia followed by Dr. Margarito Jackson on IV iron, CKD,  A fib not on anticoagulation, Mandel Parkinson White s/p ablation,DM, hx renal cell carcinoma, Hx of DVT, KAITLIN, HTN, HLD, gout , who is seen in consultation at the request of Dr. [place] for Rectal bleeding. This began on 1/14/2022 and occurred x 5 episodes. He has been taking ibuprofen and prednisone. On evaluation in the ED, he was found to have a Hgb 10.7 (10.7 10/30/2020), BUN is elevated at 50. He has had no further bleeding since admission. Likely    Plan:     -continue to monitor for further bleeding. If bleeding recurs, will plan on [place] Acute GI bleeding scan  -Serial H&H and transfuse for <7  -Change pantoprazole to 40mg IV bid  -Continue clear liquid diet for now  -NPO after midnight  -Avoid NSAIDS  -Will continue to follow  -Can follow up with primary GI and oncology at [facility] for chronic pancreatitis and chronic anemia. Sim Caballero.  [address]   [facility]    Patient is seen and examined in collaboration with Dr. Douglas Polk. Assessment and plan as per Dr. Douglas Polk.

## 2022-01-15 NOTE — PROGRESS NOTES
Problem: Falls - Risk of  Goal: *Absence of Falls  Description: Document Cary García Fall Risk and appropriate interventions in the flowsheet.   Outcome: Progressing Towards Goal  Note: Fall Risk Interventions:  Mobility Interventions: OT consult for ADLs         Medication Interventions: Patient to call before getting OOB,Teach patient to arise slowly    Elimination Interventions: Call light in reach              Problem: Patient Education: Go to Patient Education Activity  Goal: Patient/Family Education  Outcome: Progressing Towards Goal

## 2022-01-15 NOTE — PROGRESS NOTES
Care Management Interventions  PCP Verified by CM: Yes  Support Systems: Spouse/Significant Other  Discharge Location  Discharge Placement: Home with family assistance  78 yr-old Male with GI bleed. Chart screened by  for discharge planning. No needs identified at this time. Please consult  if any new issues arise.

## 2022-01-15 NOTE — ED PROVIDER NOTES
Patient is a 72-year-old male with a history of gout, hypertension, hyperlipidemia, type 2 diabetes and Alba-Parkinson-White syndrome who presents with GI bleed. He states he was fine until around 10:30 PM tonight when he had a bowel movement. He states it was a large amount of bright red blood. He denies any abdominal or rectal pain. Since that time, he has had 5 more bloody bowel movements including 1 here in the ER. He states he has been taking Motrin recently for right shoulder pain. Today he took prednisone and aspirin as well. He he denies any nausea or vomiting. He denies any aggravating or alleviating factors.            Past Medical History:   Diagnosis Date    Arthritis     Blurred vision, right eye     BPH (benign prostatic hyperplasia)     Gout     History of Clostridioides difficile colitis 2010    History of pancreatitis     History of renal carcinoma     partial nephrectomy    Hyperlipidemia     Hypertension     Nausea & vomiting     small amount of N/V and pt not sure of it antiemetics work    Neuropathy     Paroxysmal A-fib ([address]     Presence of Watchman left atrial appendage closure device     Sleep apnea     compliant with C-pap    Thromboembolus ([address]     hx of left lower extremity    Type 2 diabetes mellitus (HCC)     insulin reliant; AVG -140; s.s. of hypoglycemia 65-75; last A1c 7.1    WPW (Alba-Parkinson-White syndrome)     ablation x4       Past Surgical History:   Procedure Laterality Date    HX AFIB ABLATION      WPW- ablations x3    HX BACK SURGERY      ruptured disc    HX CATARACT REMOVAL      HX CHOLECYSTECTOMY      HX ENDOSCOPY      HX KNEE REPLACEMENT Right     HX LUMBAR LAMINECTOMY      HX NEPHRECTOMY      partial    HX ORTHOPAEDIC Left     foot    HX ORTHOPAEDIC Left     great toe straightened    HX OTHER SURGICAL      placed watchman 2/2020    HX WISDOM TEETH EXTRACTION           Family History:   Problem Relation Age of Onset    Other Mother         ALS    Parkinson's Disease Father     Cancer Sister         colon ca    No Known Problems Sister     Cancer Sister         ovarian       Social History     Socioeconomic History    Marital status:      Spouse name: Not on file    Number of children: Not on file    Years of education: Not on file    Highest education level: Not on file   Occupational History    Not on file   Tobacco Use    Smoking status: Never Smoker    Smokeless tobacco: Never Used   Substance and Sexual Activity    Alcohol use: Not Currently    Drug use: Never    Sexual activity: Not on file   Other Topics Concern    Not on file   Social History Narrative    Not on file     Social Determinants of Health     Financial Resource Strain:     Difficulty of Paying Living Expenses: Not on file   Food Insecurity:     Worried About Running Out of Food in the Last Year: Not on file    [place] of Food in the Last Year: Not on file   Transportation Needs:     Lack of Transportation (Medical): Not on file    Lack of Transportation (Non-Medical):  Not on file   Physical Activity:     Days of Exercise per Week: Not on file    Minutes of Exercise per Session: Not on file   Stress:     Feeling of Stress : Not on file   Social Connections:     Frequency of Communication with Friends and Family: Not on file    Frequency of Social Gatherings with Friends and Family: Not on file    Attends Cheondoism Services: Not on file    Active Member of [address] [place] [address] [employer] or Organizations: Not on file    Attends Club or Organization Meetings: Not on file    Marital Status: Not on file   Intimate Partner Violence:     Fear of Current or Ex-Partner: Not on file    Emotionally Abused: Not on file    Physically Abused: Not on file    Sexually Abused: Not on file   Housing Stability:     Unable to Pay for Housing in the Last Year: Not on file    Number of [place] in the Last Year: Not on file    Unstable Housing in the Last Year: Not on file         ALLERGIES: Codeine, Fenofibrate, Hydromorphone, Januvia [sitagliptin], and Metformin    Review of Systems   Constitutional: Negative for chills and fever. Gastrointestinal: Positive for anal bleeding and diarrhea. Negative for abdominal pain, nausea and vomiting. All other systems reviewed and are negative. Vitals:    01/15/22 0108   BP: 128/74   Pulse: 98   Resp: 18   Temp: 97.5 °F (36.4 °C)   SpO2: 98%   Weight: 100.7 kg (222 lb)   Height: 5' 10\" (1.778 m)            Physical Exam  Vitals and nursing note reviewed. Constitutional:       Appearance: Normal appearance. He is well-developed. HENT:      Head: Normocephalic and atraumatic. Eyes:      Conjunctiva/sclera: Conjunctivae normal.      Pupils: Pupils are equal, round, and reactive to light. Pulmonary:      Effort: Pulmonary effort is normal. No respiratory distress. Abdominal:      General: Bowel sounds are normal. There is no distension. Palpations: Abdomen is soft. Tenderness: There is no abdominal tenderness. There is no guarding. Musculoskeletal:         General: Normal range of motion. Cervical back: Normal range of motion and neck supple. Skin:     General: Skin is warm and dry. Neurological:      Mental Status: He is alert and oriented to person, place, and time. MDM  Number of Diagnoses or Management Options  Chronic kidney disease, unspecified CKD stage  Gastrointestinal hemorrhage associated with anorectal source: new and requires workup  Type 2 diabetes mellitus with hyperglycemia, with long-term current use of insulin ([facility]  Diagnosis management comments: 1:47 AM hemoglobin is 10.7. Per old records, his hemoglobin was also 10.7 in 2020.  2:00 AM discussed results thus far with patient, need for admission. He is agreeable. 3:14 AM spoke with Dr. Brenda Mathis, notified him of patient. Patient will be admitted to hospitalist.  They have been paged for admission.        Amount and/or Complexity of Data Reviewed  Clinical lab tests: ordered and reviewed  Decide to obtain previous medical records or to obtain history from someone other than the patient: yes  Review and summarize past medical records: yes  Discuss the patient with other providers: yes    Risk of Complications, Morbidity, and/or Mortality  Presenting problems: moderate  Diagnostic procedures: moderate  Management options: moderate    Patient Progress  Patient progress: stable         Procedures

## 2022-01-15 NOTE — ED NOTES
TRANSFER - OUT REPORT:    Verbal report given to [facility]   on Mendez Cast  being transferred to [facility]   for routine progression of care       Report consisted of patients Situation, Background, Assessment and   Recommendations(SBAR). Information from the following report(s) SBAR was reviewed with the receiving nurse. Lines:   Peripheral IV 01/15/22 Right Antecubital (Active)   Site Assessment Clean, dry, & intact 01/15/22 0106   Phlebitis Assessment 0 01/15/22 0106   Infiltration Assessment 0 01/15/22 0106   Dressing Status Clean, dry, & intact 01/15/22 0106        Opportunity for questions and clarification was provided.       Patient transported with:   Registered Nurse

## 2022-01-15 NOTE — H&P
Hospitalist History and Physical   Admit Date:  1/15/2022  1:01 AM   Name:  Trace Gallego   Age:  78 y.o. Sex:  male  :  1945   MRN:  [id number]     Presenting Complaint: BRBPR  Reason(s) for Admission: GI bleed [K92.2]  Leukocytosis [D72.829]     History of Present Illness:   Trace Gallego is a pleasant 78 y.o. male with medical history of DM2, HTN who presented to ED with bright red blood per rectum. Patient reports that around 10:30 last night he had a bowel movement that contained a substantial amount of bright red blood. He has had an additional 5 more episodes of BRBPR. He denies any fevers, chills, abdominal pain, SOB. He does report a h/o GERD, and has been taking ibuprofen and prednisone recently. GI consulted. Hospitalist to admit. Review of Systems:  10 systems reviewed and negative except as noted in HPI. Assessment & Plan:   * GI bleed  - Several episodes of BRBPR  - Clear liquids  - GI consulted by ER  - Hold home ibuprofen, aspirin, and prednisone    Stage 3b chronic kidney disease (HCC)  - Stable, followed by Nephrology    Type 2 diabetes mellitus ([facility]  - Has insulin pump and manages BG well on his own, will allow continuance of this while hospitalized    Paroxysmal atrial fibrillation (HCC)  - Stable  - S/P ablation, not on [facility]  - Monitor    History of partial nephrectomy  - Of note    Gout  - Stable  - Has referral to Rheum for further eval of joint pain    Chronic anemia  - Last hgb in system is similar at 10.7  - Concern for active GI though  - Monitor hgb    Benign essential hypertension  - Hold home BP meds at this time due to borderline pressures    Leukocytosis  - WBC significantly elevated, but pt does report recent prednisone  - Will monitor CBC  - No obvious s/sx of infection at this time       Disposition: inpatient    Diet: clears  VTE ppx: SCDs  Code status: FULL      Past medical history reviewed.     Past Medical History:   Diagnosis Date    Arthritis     Blurred vision, right eye     BPH (benign prostatic hyperplasia)     Gout     History of Clostridioides difficile colitis 2010    History of pancreatitis     History of renal carcinoma     partial nephrectomy    Hyperlipidemia     Hypertension     Nausea & vomiting     small amount of N/V and pt not sure of it antiemetics work    Neuropathy     Paroxysmal A-fib ([address]     Presence of Watchman left atrial appendage closure device     Sleep apnea     compliant with C-pap    Thromboembolus ([address]     hx of left lower extremity    Type 2 diabetes mellitus (HCC)     insulin reliant; AVG -140; s.s. of hypoglycemia 65-75; last A1c 7.1    WPW (Alba-Parkinson-White syndrome)     ablation x4     Past surgical history reviewed.     Past Surgical History:   Procedure Laterality Date    HX AFIB ABLATION      WPW- ablations x3    HX BACK SURGERY      ruptured disc    HX CATARACT REMOVAL      HX CHOLECYSTECTOMY      HX ENDOSCOPY      HX KNEE REPLACEMENT Right     HX LUMBAR LAMINECTOMY      HX NEPHRECTOMY      partial    HX ORTHOPAEDIC Left     foot    HX ORTHOPAEDIC Left     great toe straightened    HX OTHER SURGICAL      placed watchman 2/2020    HX WISDOM TEETH EXTRACTION        Allergies   Allergen Reactions    Codeine Other (comments)     \"makes me a little crazy\"        Fenofibrate Other (comments)     \"causes pancreatic attacks\"    Hydromorphone Other (comments)     \"gives me craziness\"    Januvia [Sitagliptin] Other (comments)     Per pt causes pancreatic issues    Metformin Diarrhea      Social History     Tobacco Use    Smoking status: Never Smoker    Smokeless tobacco: Never Used   Substance Use Topics    Alcohol use: Not Currently      Family History   Problem Relation Age of Onset    Other Mother         ALS    Parkinson's Disease Father     Cancer Sister         colon ca    No Known Problems Sister     Cancer Sister         ovarian      Family history reviewed and noncontributory to patient's acute condition; no relevant family history unless otherwise noted above.   Immunization History   Administered Date(s) Administered    COVID-19, PFIZER, MRNA, LNP-S, PF, 30MCG/0.3ML DOSE 01/22/2021, 02/09/2021    Hep A Vaccine 04/01/2007, 06/03/2007    Hep B Vaccine (Adult) 04/01/2007, 06/03/2007, 11/04/2007    Influenza High Dose Vaccine PF 10/23/2012, 11/30/2013, 09/29/2014, 10/08/2015, 09/25/2019    Influenza Vaccine 11/03/2017, 12/01/2018    Influenza Vaccine (Quad) PF (>6 Mo Flulaval, Fluarix, and >3 Yrs Afluria, Fluzone [id number]) 09/20/2016    Influenza Vaccine (Trivalent) 09/28/2014    Influenza, High-dose, Quadrivalent (>65 Yrs Fluzone High Dose Quad 01454) 09/02/2020    MMR 04/08/2007    Meningococcal ACWY Vaccine 04/01/2007    Pneumococcal Conjugate (PCV-13) 09/20/2016    Pneumococcal Polysaccharide (PPSV-23) 03/21/2010, 10/01/2014    Poliovirus vaccine 04/01/2007    Tdap 09/19/2014    Typhoid Vaccine 04/01/2007    Yellow Fever Vaccine 03/30/2007    Zoster Vaccine, Live 01/01/2012     PTA Medications:  Current Outpatient Medications   Medication Instructions    acetaminophen (TYLENOL EXTRA STRENGTH) 500 mg, Oral, EVERY 6 HOURS AS NEEDED    allopurinoL (ZYLOPRIM) 100 mg, Oral, DAILY    ascorbic acid (vitamin C) (VITAMIN C) 1,000 mg, Oral, DAILY    aspirin (ASPIRIN) 325 mg, Oral, EVERY BEDTIME    B.infantis-B.ani-B.long-B.bifi (Probiotic 4X) 10-15 mg TbEC 1 Tablet, Oral, DAILY    cephALEXin (KEFLEX) 500 mg, Oral, 4 TIMES DAILY    cholecalciferol (VITAMIN D3) 1,000 Units, Oral, EVERY BEDTIME    finasteride (PROSCAR) 5 mg, Oral, DAILY    gabapentin (NEURONTIN) 100 mg, Oral, 5 TIMES DAILY    ibuprofen (ADVIL;MOTRIN) 6 mg, Oral, 4 TIMES DAILY AS NEEDED    insulin aspart U-100 (NovoLOG Flexpen U-100 Insulin) 100 unit/mL (3 mL) inpn SubCUTAneous, 3 TIMES DAILY BEFORE MEALS, SSI     insulin glargine (Lantus Solostar U-100 Insulin) 100 unit/mL (3 mL) inpn SubCUTAneous, 2 TIMES DAILY, 45 units in the morning and 30 units in the evening     lipase-protease-amylase (Creon) 24,000-76,000 -120,000 unit capsule 1 Capsule, Oral, 3 TIMES DAILY WITH MEALS    lisinopriL (PRINIVIL, ZESTRIL) 20 mg, Oral, DAILY    multivitamin (ONE A DAY) tablet 1 Tablet, Oral, EVERY BEDTIME    omega 3-DHA-EPA-fish oil 1,000 mg (120 mg-180 mg) capsule 1 Capsule, Oral, DAILY    ondansetron (ZOFRAN ODT) 8 mg, Oral, EVERY 12 HOURS AS NEEDED    pantoprazole (PROTONIX) 20 mg, Oral, DAILY    simvastatin (ZOCOR) 10 mg, Oral, EVERY BEDTIME    vitamin E (AQUA GEMS) 450 Units, Oral, EVERY BEDTIME       Objective:     Patient Vitals for the past 24 hrs:   Temp Pulse Resp BP SpO2   01/15/22 0415  71  (!) 90/55 98 %   01/15/22 0345  78  (!) 95/58 98 %   01/15/22 0335  72  (!) 93/53 97 %   01/15/22 0326 98.3 °F (36.8 °C) 81 20 (!) 89/56 97 %   01/15/22 0108 97.5 °F (36.4 °C) 98 18 128/74 98 %     Oxygen Therapy  O2 Sat (%): 98 % (01/15/22 0415)  Pulse via Oximetry: 72 beats per minute (01/15/22 0415)  O2 Device: None (Room air) (01/15/22 0108)    Estimated body mass index is 31.85 kg/m² as calculated from the following:    Height as of this encounter: 5' 10\" (1.778 m). Weight as of this encounter: 100.7 kg (222 lb). No intake or output data in the 24 hours ending 01/15/22 0522      Physical Exam:  General:    Well nourished. No overt distress  Head:  Normocephalic, atraumatic  Eyes:  Sclerae appear normal.  Pupils equally round. HENT:  Nares appear normal, no drainage. Moist mucous membranes  Neck:  No restricted ROM. Trachea midline  CV:   RRR. S1/S2 auscultated  Lungs:   CTAB. No wheezing, rhonchi, or rales. Appears even, unlabored  Abdomen: Bowel sounds present. Soft, nontender, nondistended. Extremities: Warm and dry. No cyanosis or clubbing. No edema. Skin:     No rashes. Normal turgor. Normal coloration  Neuro:  Cranial nerves II-XII grossly intact. Sensation intact  Psych:  Normal mood and affect. Alert and oriented x3    Data Ordered and Personally Reviewed:    Last 24hr Labs:  Recent Results (from the past 24 hour(s))   TYPE & SCREEN    Collection Time: 01/15/22  1:11 AM   Result Value Ref Range    Crossmatch Expiration 01/18/2022,2359     ABO/Rh(D) A POSITIVE     Antibody screen NEG    CBC WITH AUTOMATED DIFF    Collection Time: 01/15/22  1:11 AM   Result Value Ref Range    WBC 23.6 (H) 4.3 - 11.1 K/uL    RBC 3.67 (L) 4.23 - 5.6 M/uL    HGB 10.7 (L) 13.6 - 17.2 g/dL    HCT 34.4 (L) 41.1 - 50.3 %    MCV 93.7 79.6 - 97.8 FL    MCH 29.2 26.1 - 32.9 PG    MCHC 31.1 (L) 31.4 - 35.0 g/dL    RDW 15.6 (H) 11.9 - 14.6 %    PLATELET 825 (H) 044 - 450 K/uL    MPV 8.9 (L) 9.4 - 12.3 FL    ABSOLUTE NRBC 0.05 0.0 - 0.2 K/uL    NEUTROPHILS 42 (L) 47 - 75 %    BAND NEUTROPHILS 9 (H) 0 - 6 %    LYMPHOCYTES 24 16 - 44 %    MONOCYTES 7 3 - 9 %    METAMYELOCYTES 5 %    MYELOCYTES 7 %    PROMYELOCYTES 4 %    BLASTS 2 %    NRBC 1.0  WBC    ABS. NEUTROPHILS 13.2 (H) 1.7 - 8.2 K/UL    ABS. LYMPHOCYTES 5.7 (H) 0.5 - 4.6 K/UL    ABS. MONOCYTES 1.7 (H) 0.1 - 1.3 K/UL    RBC COMMENTS SLIGHT  ANISOCYTOSIS        RBC COMMENTS SLIGHT  POLYCHROMASIA        RBC COMMENTS SLIGHT  MACROCYTOSIS        WBC COMMENTS SLIGHT      PLATELET COMMENTS INCREASED      DF MANUAL     METABOLIC PANEL, COMPREHENSIVE    Collection Time: 01/15/22  1:11 AM   Result Value Ref Range    Sodium 140 136 - 145 mmol/L    Potassium 4.3 3.5 - 5.1 mmol/L    Chloride 108 (H) 98 - 107 mmol/L    CO2 23 21 - 32 mmol/L    Anion gap 9 7 - 16 mmol/L    Glucose 147 (H) 65 - 100 mg/dL    BUN 50 (H) 8 - 23 MG/DL    Creatinine 1.90 (H) 0.8 - 1.5 MG/DL    GFR est AA 45 (L) >60 ml/min/1.73m2    GFR est non-AA 37 (L) >60 ml/min/1.73m2    Calcium 8.7 8.3 - 10.4 MG/DL    Bilirubin, total 0.3 0.2 - 1.1 MG/DL    ALT (SGPT) 32 12 - 65 U/L    AST (SGOT) 22 15 - 37 U/L    Alk.  phosphatase 138 (H) 50 - 136 U/L    Protein, total 6.9 6.3 - 8.2 g/dL    Albumin 2.1 (L) 3.2 - 4.6 g/dL    Globulin 4.8 (H) 2.3 - 3.5 g/dL    A-G Ratio 0.4 (L) 1.2 - 3.5     PROTHROMBIN TIME + INR    Collection Time: 01/15/22  1:11 AM   Result Value Ref Range    Prothrombin time 12.9 12.6 - 14.5 sec    INR 0.9     COVID-19 RAPID TEST    Collection Time: 01/15/22  3:35 AM   Result Value Ref Range    Specimen source Nasopharyngeal      COVID-19 rapid test Not detected NOTD         All Micro Results     Procedure Component Value Units Date/Time    COVID-19 RAPID TEST [[id number]] Collected: 01/15/22 0335    Order Status: Completed Specimen: Nasopharyngeal Updated: 01/15/22 0354     Specimen source Nasopharyngeal        COVID-19 rapid test Not detected        Comment:      The specimen is NEGATIVE for SARS-CoV-2, the novel coronavirus associated with COVID-19. A negative result does not rule out COVID-19. This test has been authorized by the FDA under an Emergency Use Authorization (EUA) for use by authorized laboratories. Fact sheet for Healthcare Providers: ConventionUpdate.co.nz  Fact sheet for Patients: ConventionUpdate.co.nz       Methodology: Isothermal Nucleic Acid Amplification               Other Studies:  No results found.             Signed:  Layla Rizzo MD

## 2022-01-15 NOTE — ASSESSMENT & PLAN NOTE
- WBC significantly elevated, but pt does report recent prednisone  - Will monitor CBC  - No obvious s/sx of infection at this time

## 2022-01-15 NOTE — ASSESSMENT & PLAN NOTE
- Several episodes of BRBPR  - Clear liquids  - GI consulted by ER  - Hold home ibuprofen, aspirin, and prednisone

## 2022-01-15 NOTE — PROGRESS NOTES
01/15/22 0518   Dual Skin Pressure Injury Assessment   Dual Skin Pressure Injury Assessment WDL   Second Care Provider (Based on [address] [place] [address]) Veronica RN   Skin Integumentary   Skin Integumentary (WDL) WDL    Pressure  Injury Documentation No Pressure Injury Noted-Pressure Ulcer Prevention Initiated

## 2022-01-15 NOTE — PROGRESS NOTES
Admission assessment complete. Pt a/ox4 and resting in bed. Respirations even and unlabored, lung sounds clear. S1S2 heart sounds auscultated. Abd soft with active bowel sounds. No complaints of abd pain at this time. SCDs in place. IV site c/d/i and infusing without difficulty. Bed low and locked, side rails x3, gripper socks on, and call light in reach. Encouraged to call for help if needed and pt verbalized understanding.

## 2022-01-15 NOTE — PROGRESS NOTES
Hospitalist Progress Note   Admit Date:  1/15/2022  1:01 AM   Name:  Virgil Hines   Age:  78 y.o. Sex:  male  :  1945   MRN:  [id number]   Room:  [facility]/01    Presenting Complaint: Rectal Bleeding    Reason(s) for Admission: GI bleed [K92.2]  Leukocytosis [facility] Course & Interval History:   Mr. Galo Garza is a nice 77 y/o WM with a h/o DM2, CKD3, AFib, HTN and chronic anemia who was admitted on 1/15 for hematochezia. He developed a rather sudden onset BRBPR and had 5 episodes so he presented to the ER. Hi Hb was 10.7 (11.3 in December). WBCs elevated but he noted recent prednisone use. Afebrile. Started on IVFs, GI consulted. Hb dropped to 8.3 on 1/15. Subjective (01/15/22): Hb dropped to 8.3 though he says he had a BM around 0200 that seemed to be less bloody and was starting to \"clear up\". No abdominal pain, N/V/D, chest pain. Assessment & Plan:   # Acute blood loss anemia 2/2 lower GIB   - Suspect diverticular and seems it may already be starting to resolve given his last BM was not bloody. Hb 10.7 to 8.6, will con't to monitor. Appreciate GI help. # Leukocytosis   - Afebrile, improved, recently on prednisone. # HTN   - ACEi    # CKD3   - Baseline, repeat tomorrow. # DM2   - Self insulin pump    # Atrial fibrillation   - Not on OAC, s/p ablation. # H/o gout   - Allopurinol    Dispo/Discharge Planning: Home when able.   Diet:  DIET NPO Sips of Water with Meds  ADULT DIET Full Liquid  DVT PPx: SCDs  Code status: Full Code    Hospital Problems as of 1/15/2022 Date Reviewed: 2021          Codes Class Noted - Resolved POA    Leukocytosis ICD-10-CM: U24.480  ICD-9-CM: 288.60  1/15/2022 - Present Unknown        GI bleed ICD-10-CM: K92.2  ICD-9-CM: 578.9  1/15/2022 - Present Unknown        * (Principal) Acute blood loss anemia ICD-10-CM: D62  ICD-9-CM: 285.1  1/15/2022 - Present Unknown        History of partial nephrectomy ICD-10-CM: Z90.5  ICD-9-CM: V15.29 11/17/2021 - Present Yes    Overview Signed 1/15/2022  3:58 AM by Odette Suarez MD     Formatting of this note might be different from the original.  2nd to [address]             Stage 3b chronic kidney disease ([facility] ICD-10-CM: N18.32  ICD-9-CM: 585.3  6/11/2021 - Present Yes    Overview Signed 1/15/2022  3:58 AM by Odette Suarez MD     Formatting of this note might be different from the original.  Follows with Dr. Yanet Hall             Chronic anemia ICD-10-CM: D64.9  ICD-9-CM: 285.9  1/7/2020 - Present Yes        Gout ICD-10-CM: M10.9  ICD-9-CM: 274.9  9/20/2016 - Present Yes    Overview Signed 1/15/2022  3:58 AM by Odette Suarez MD     Last Assessment & Plan:   Formatting of this note might be different from the original.  Chronic, controlled with allopurinol 300 mg  Plan:    Continue current therapy, check uric acid with next labs             Paroxysmal atrial fibrillation ([facility] ICD-10-CM: I48.0  ICD-9-CM: 427.31  8/29/2012 - Present Yes    Overview Signed 1/15/2022  3:58 AM by Odette Suarez MD     Formatting of this note might be different from the original.  Paroxysmal     Last Assessment & Plan:   Formatting of this note might be different from the original.  - s/p ablation   - not fully anticoagulated due to maintenance of sinus rhythm             Benign essential hypertension ICD-10-CM: I10  ICD-9-CM: 401.1  8/19/2012 - Present Yes    Overview Signed 1/15/2022  3:58 AM by Odette Suarez MD     Last Assessment & Plan:   Formatting of this note might be different from the original.  - BP low normal on lisinopril  - monitor BP at home, may need to reduce dose due to dizziness             Type 2 diabetes mellitus ([facility] ICD-10-CM: E11.9  ICD-9-CM: 250.00  8/19/2012 - Present Yes              Objective:     Patient Vitals for the past 24 hrs:   Temp Pulse Resp BP SpO2   01/15/22 1454 98 °F (36.7 °C) 80 16 104/60 100 %   01/15/22 1145 98.6 °F (37 °C) 72 16 112/80    01/15/22 0853 98.7 °F (37.1 °C) (!) 101 16 (!) 94/59    01/15/22 0852    (!) 94/59    01/15/22 0518 98.5 °F (36.9 °C) 84 18 121/69 97 %   01/15/22 0415  71  (!) 90/55 98 %   01/15/22 0345  78  (!) 95/58 98 %   01/15/22 0335  72  (!) 93/53 97 %   01/15/22 0326 98.3 °F (36.8 °C) 81 20 (!) 89/56 97 %   01/15/22 0108 97.5 °F (36.4 °C) 98 18 128/74 98 %     Oxygen Therapy  O2 Sat (%): 100 % (01/15/22 1454)  Pulse via Oximetry: 72 beats per minute (01/15/22 0415)  O2 Device: None (Room air) (01/15/22 0518)  ETCO2 (mmHg): 100 mmHg (01/15/22 0853)    Estimated body mass index is 31.85 kg/m² as calculated from the following:    Height as of this encounter: 5' 10\" (1.778 m). Weight as of this encounter: 100.7 kg (222 lb). No intake or output data in the 24 hours ending 01/15/22 1501      Physical Exam:   Blood pressure 104/60, pulse 80, temperature 98 °F (36.7 °C), resp. rate 16, height 5' 10\" (1.778 m), weight 100.7 kg (222 lb), SpO2 100 %. General:    Well nourished. No overt distress. Obese. Head:  Normocephalic, atraumatic  Eyes:  Sclerae appear normal.  Pupils equally round. ENT:  Nares appear normal, no drainage. Moist oral mucosa  Neck:  No restricted ROM. Trachea midline   CV:   RRR. No m/r/g. No jugular venous distension. Lungs:   CTAB. No wheezing, rhonchi, or rales. Respirations even, unlabored  Abdomen: Bowel sounds present. Soft, nontender, nondistended. Extremities: No cyanosis or clubbing. No edema  Skin:     No rashes and normal coloration. Warm and dry. Neuro:  CN II-XII grossly intact. Sensation intact. A&Ox3  Psych:  Normal mood and affect.       I have reviewed ordered lab tests and independently visualized imaging below:    Recent Labs:  Recent Results (from the past 48 hour(s))   TYPE & SCREEN    Collection Time: 01/15/22  1:11 AM   Result Value Ref Range    Crossmatch Expiration 01/18/2022,2359     ABO/Rh(D) A POSITIVE     Antibody screen NEG    CBC WITH AUTOMATED DIFF    Collection Time: 01/15/22 1:11 AM   Result Value Ref Range    WBC 23.6 (H) 4.3 - 11.1 K/uL    RBC 3.67 (L) 4.23 - 5.6 M/uL    HGB 10.7 (L) 13.6 - 17.2 g/dL    HCT 34.4 (L) 41.1 - 50.3 %    MCV 93.7 79.6 - 97.8 FL    MCH 29.2 26.1 - 32.9 PG    MCHC 31.1 (L) 31.4 - 35.0 g/dL    RDW 15.6 (H) 11.9 - 14.6 %    PLATELET 837 (H) 076 - 450 K/uL    MPV 8.9 (L) 9.4 - 12.3 FL    ABSOLUTE NRBC 0.05 0.0 - 0.2 K/uL    NEUTROPHILS 42 (L) 47 - 75 %    BAND NEUTROPHILS 9 (H) 0 - 6 %    LYMPHOCYTES 24 16 - 44 %    MONOCYTES 7 3 - 9 %    METAMYELOCYTES 5 %    MYELOCYTES 7 %    PROMYELOCYTES 4 %    BLASTS 2 %    NRBC 1.0  WBC    ABS. NEUTROPHILS 13.2 (H) 1.7 - 8.2 K/UL    ABS. LYMPHOCYTES 5.7 (H) 0.5 - 4.6 K/UL    ABS. MONOCYTES 1.7 (H) 0.1 - 1.3 K/UL    RBC COMMENTS SLIGHT  ANISOCYTOSIS        RBC COMMENTS SLIGHT  POLYCHROMASIA        RBC COMMENTS SLIGHT  MACROCYTOSIS        WBC COMMENTS SLIGHT      PLATELET COMMENTS INCREASED      DF MANUAL     METABOLIC PANEL, COMPREHENSIVE    Collection Time: 01/15/22  1:11 AM   Result Value Ref Range    Sodium 140 136 - 145 mmol/L    Potassium 4.3 3.5 - 5.1 mmol/L    Chloride 108 (H) 98 - 107 mmol/L    CO2 23 21 - 32 mmol/L    Anion gap 9 7 - 16 mmol/L    Glucose 147 (H) 65 - 100 mg/dL    BUN 50 (H) 8 - 23 MG/DL    Creatinine 1.90 (H) 0.8 - 1.5 MG/DL    GFR est AA 45 (L) >60 ml/min/1.73m2    GFR est non-AA 37 (L) >60 ml/min/1.73m2    Calcium 8.7 8.3 - 10.4 MG/DL    Bilirubin, total 0.3 0.2 - 1.1 MG/DL    ALT (SGPT) 32 12 - 65 U/L    AST (SGOT) 22 15 - 37 U/L    Alk.  phosphatase 138 (H) 50 - 136 U/L    Protein, total 6.9 6.3 - 8.2 g/dL    Albumin 2.1 (L) 3.2 - 4.6 g/dL    Globulin 4.8 (H) 2.3 - 3.5 g/dL    A-G Ratio 0.4 (L) 1.2 - 3.5     PROTHROMBIN TIME + INR    Collection Time: 01/15/22  1:11 AM   Result Value Ref Range    Prothrombin time 12.9 12.6 - 14.5 sec    INR 0.9     COVID-19 RAPID TEST    Collection Time: 01/15/22  3:35 AM   Result Value Ref Range    Specimen source Nasopharyngeal      COVID-19 rapid test Not detected NOTD     HGB & HCT    Collection Time: 01/15/22 11:26 AM   Result Value Ref Range    HGB 8.3 (L) 13.6 - 17.2 g/dL    HCT 26.9 (L) 41.1 - 50.3 %   CBC W/O DIFF    Collection Time: 01/15/22 11:26 AM   Result Value Ref Range    WBC 18.5 (H) 4.3 - 11.1 K/uL    RBC 2.84 (L) 4.23 - 5.6 M/uL    HGB 8.3 (L) 13.6 - 17.2 g/dL    HCT 27.4 (L) 41.1 - 50.3 %    MCV 96.5 79.6 - 97.8 FL    MCH 29.2 26.1 - 32.9 PG    MCHC 30.3 (L) 31.4 - 35.0 g/dL    RDW 15.7 (H) 11.9 - 14.6 %    PLATELET 739 424 - 428 K/uL    MPV 8.9 (L) 9.4 - 12.3 FL    ABSOLUTE NRBC 0.03 0.0 - 0.2 K/uL       All Micro Results     Procedure Component Value Units Date/Time    COVID-19 RAPID TEST [[id number]] Collected: 01/15/22 0335    Order Status: Completed Specimen: Nasopharyngeal Updated: 01/15/22 0354     Specimen source Nasopharyngeal        COVID-19 rapid test Not detected        Comment:      The specimen is NEGATIVE for SARS-CoV-2, the novel coronavirus associated with COVID-19. A negative result does not rule out COVID-19. This test has been authorized by the FDA under an Emergency Use Authorization (EUA) for use by authorized laboratories. Fact sheet for Healthcare Providers: ConventionUpdate.co.nz  Fact sheet for Patients: ConventionUpdate.co.nz       Methodology: Isothermal Nucleic Acid Amplification               Other Studies:  No results found.     Current Meds:  Current Facility-Administered Medications   Medication Dose Route Frequency    gabapentin (NEURONTIN) capsule 300 mg  300 mg Oral 5XD    allopurinoL (ZYLOPRIM) tablet 100 mg  100 mg Oral DAILY    lisinopriL (PRINIVIL, ZESTRIL) tablet 20 mg  20 mg Oral DAILY    lipase-protease-amylase (CREON 24,000) capsule 1 Capsule (Patient Supplied)  1 Capsule Oral TID WITH MEALS    finasteride (PROSCAR) tablet 5 mg  5 mg Oral DAILY    sodium chloride (NS) flush 5-40 mL  5-40 mL IntraVENous Q8H    sodium chloride (NS) flush 5-40 mL 5-40 mL IntraVENous PRN    acetaminophen (TYLENOL) tablet 650 mg  650 mg Oral Q6H PRN    Or    acetaminophen (TYLENOL) suppository 650 mg  650 mg Rectal Q6H PRN    polyethylene glycol (MIRALAX) packet 17 g  17 g Oral DAILY PRN    ondansetron (ZOFRAN ODT) tablet 4 mg  4 mg Oral Q8H PRN    Or    ondansetron (ZOFRAN) injection 4 mg  4 mg IntraVENous Q6H PRN    lactated Ringers infusion  125 mL/hr IntraVENous CONTINUOUS    pantoprazole (PROTONIX) 40 mg in 0.9% sodium chloride 10 mL injection  40 mg IntraVENous Q12H       Signed:  Donna Rausch MD    Part of this note may have been written by using a voice dictation software. The note has been proof read but may still contain some grammatical/other typographical errors.

## 2022-01-15 NOTE — ASSESSMENT & PLAN NOTE
- Has insulin pump and manages BG well on his own, will allow continuance of this while hospitalized

## 2022-01-16 LAB
ANION GAP SERPL CALC-SCNC: 4 MMOL/L (ref 7–16)
BUN SERPL-MCNC: 46 MG/DL (ref 8–23)
CALCIUM SERPL-MCNC: 8.6 MG/DL (ref 8.3–10.4)
CHLORIDE SERPL-SCNC: 110 MMOL/L (ref 98–107)
CO2 SERPL-SCNC: 26 MMOL/L (ref 21–32)
CREAT SERPL-MCNC: 1.75 MG/DL (ref 0.8–1.5)
ERYTHROCYTE [DISTWIDTH] IN BLOOD BY AUTOMATED COUNT: 16.1 % (ref 11.9–14.6)
GLUCOSE SERPL-MCNC: 98 MG/DL (ref 65–100)
HCT VFR BLD AUTO: 25.7 % (ref 41.1–50.3)
HGB BLD-MCNC: 7.9 G/DL (ref 13.6–17.2)
MCH RBC QN AUTO: 29.2 PG (ref 26.1–32.9)
MCHC RBC AUTO-ENTMCNC: 30.7 G/DL (ref 31.4–35)
MCV RBC AUTO: 94.8 FL (ref 79.6–97.8)
NRBC # BLD: 0 K/UL (ref 0–0.2)
PLATELET # BLD AUTO: 304 K/UL (ref 150–450)
PMV BLD AUTO: 9 FL (ref 9.4–12.3)
POTASSIUM SERPL-SCNC: 5 MMOL/L (ref 3.5–5.1)
RBC # BLD AUTO: 2.71 M/UL (ref 4.23–5.6)
SODIUM SERPL-SCNC: 140 MMOL/L (ref 136–145)
WBC # BLD AUTO: 17 K/UL (ref 4.3–11.1)

## 2022-01-16 PROCEDURE — 85027 COMPLETE CBC AUTOMATED: CPT

## 2022-01-16 PROCEDURE — 74011250636 HC RX REV CODE- 250/636: Performed by: INTERNAL MEDICINE

## 2022-01-16 PROCEDURE — 36415 COLL VENOUS BLD VENIPUNCTURE: CPT

## 2022-01-16 PROCEDURE — 74011250637 HC RX REV CODE- 250/637: Performed by: INTERNAL MEDICINE

## 2022-01-16 PROCEDURE — 80048 BASIC METABOLIC PNL TOTAL CA: CPT

## 2022-01-16 PROCEDURE — 74011636637 HC RX REV CODE- 636/637: Performed by: INTERNAL MEDICINE

## 2022-01-16 PROCEDURE — 74011250637 HC RX REV CODE- 250/637: Performed by: FAMILY MEDICINE

## 2022-01-16 PROCEDURE — 65270000029 HC RM PRIVATE

## 2022-01-16 PROCEDURE — 74011000250 HC RX REV CODE- 250: Performed by: FAMILY MEDICINE

## 2022-01-16 RX ORDER — PANTOPRAZOLE SODIUM 40 MG/1
40 TABLET, DELAYED RELEASE ORAL
Status: DISCONTINUED | OUTPATIENT
Start: 2022-01-16 | End: 2022-01-17 | Stop reason: HOSPADM

## 2022-01-16 RX ADMIN — GABAPENTIN 300 MG: 100 CAPSULE ORAL at 17:46

## 2022-01-16 RX ADMIN — PREDNISONE 20 MG: 10 TABLET ORAL at 10:05

## 2022-01-16 RX ADMIN — PREDNISONE 20 MG: 10 TABLET ORAL at 17:46

## 2022-01-16 RX ADMIN — PANTOPRAZOLE SODIUM 40 MG: 40 TABLET, DELAYED RELEASE ORAL at 10:05

## 2022-01-16 RX ADMIN — LISINOPRIL 20 MG: 20 TABLET ORAL at 10:05

## 2022-01-16 RX ADMIN — GABAPENTIN 300 MG: 100 CAPSULE ORAL at 05:51

## 2022-01-16 RX ADMIN — HYDROCODONE BITARTRATE AND ACETAMINOPHEN 1 TABLET: 5; 325 TABLET ORAL at 11:46

## 2022-01-16 RX ADMIN — GABAPENTIN 300 MG: 100 CAPSULE ORAL at 15:16

## 2022-01-16 RX ADMIN — GABAPENTIN 300 MG: 100 CAPSULE ORAL at 11:35

## 2022-01-16 RX ADMIN — SODIUM CHLORIDE, SODIUM LACTATE, POTASSIUM CHLORIDE, AND CALCIUM CHLORIDE 75 ML/HR: 600; 310; 30; 20 INJECTION, SOLUTION INTRAVENOUS at 04:27

## 2022-01-16 RX ADMIN — ALLOPURINOL 300 MG: 300 TABLET ORAL at 10:05

## 2022-01-16 RX ADMIN — SODIUM CHLORIDE, PRESERVATIVE FREE 10 ML: 5 INJECTION INTRAVENOUS at 21:50

## 2022-01-16 RX ADMIN — FINASTERIDE 5 MG: 5 TABLET, FILM COATED ORAL at 10:05

## 2022-01-16 RX ADMIN — GABAPENTIN 300 MG: 100 CAPSULE ORAL at 21:50

## 2022-01-16 NOTE — PROGRESS NOTES
Hospitalist Progress Note   Admit Date:  1/15/2022  1:01 AM   Name:  Andrea Ewing   Age:  78 y.o. Sex:  male  :  1945   MRN:  [id number]   Room:  322/01    Presenting Complaint: Rectal Bleeding    Reason(s) for Admission: GI bleed [K92.2]  Leukocytosis [facility] Course & Interval History:   Mr. Heaven Perez is a nice 79 y/o WM with a h/o DM2, CKD3, AFib, HTN and chronic anemia who was admitted on 1/15 for hematochezia. He developed a rather sudden onset BRBPR and had 5 episodes so he presented to the ER. Hi Hb was 10.7 (11.3 in December). WBCs elevated but he noted recent prednisone use. Afebrile. Started on IVFs, GI consulted. Hb dropped to 8.3 on 1/15. Bleeding resolved . Subjective (22): Had one maroon stool yesterday and has since had 2 brown stools. Hb 7.9 today from 8.3 yesterday. Continues to feel well. No chest pain, N/V/D. Assessment & Plan:   # Acute blood loss anemia 2/2 lower GIB              - Suspect diverticular, Hb dropped to 8.3 and relatively stable today at 7.9. Now having brown stools, bleeding resolved, change back to home PO PPI. Diet advanced per GI.     # Leukocytosis              - Afebrile, stable, on prednisone (with PPI).    # HTN              - ACEi     # CKD3              - Baseline, Cr 1.7 today     # DM2              - Self insulin pump    # Chronic pancreatitis   - Creon    # Atrial fibrillation              - Not on [address] [place] [address], s/p ablation.     # H/o gout              - Allopurinol     Dispo/Discharge Planning: Home when able, tomorrow?   Diet:  ADULT DIET Full Liquid  DVT PPx: SCDs only  Code status: Full Code    Hospital Problems as of 2022 Date Reviewed: 2021          Codes Class Noted - Resolved POA    Leukocytosis ICD-10-CM: J65.375  ICD-9-CM: 288.60  1/15/2022 - Present Unknown        GI bleed ICD-10-CM: K92.2  ICD-9-CM: 578.9  1/15/2022 - Present Unknown        * (Principal) Acute blood loss anemia ICD-10-CM: D62  ICD-9-CM: 285.1  1/15/2022 - Present Unknown        History of partial nephrectomy ICD-10-CM: Z90.5  ICD-9-CM: V15.29  11/17/2021 - Present Yes    Overview Signed 1/15/2022  3:58 AM by Bridget Rodriguez MD     Formatting of this note might be different from the original.  2nd to [address]             Stage 3b chronic kidney disease ([facility] ICD-10-CM: N18.32  ICD-9-CM: 585.3  6/11/2021 - Present Yes    Overview Signed 1/15/2022  3:58 AM by Bridget Rodriguez MD     Formatting of this note might be different from the original.  Follows with Dr. Андрей Velez             Chronic anemia ICD-10-CM: D64.9  ICD-9-CM: 285.9  1/7/2020 - Present Yes        Gout ICD-10-CM: M10.9  ICD-9-CM: 274.9  9/20/2016 - Present Yes    Overview Signed 1/15/2022  3:58 AM by Bridget Rodriguez MD     Last Assessment & Plan:   Formatting of this note might be different from the original.  Chronic, controlled with allopurinol 300 mg  Plan:    Continue current therapy, check uric acid with next labs             Paroxysmal atrial fibrillation ([facility] ICD-10-CM: I48.0  ICD-9-CM: 427.31  8/29/2012 - Present Yes    Overview Signed 1/15/2022  3:58 AM by Bridget Rodriguez MD     Formatting of this note might be different from the original.  Paroxysmal     Last Assessment & Plan:   Formatting of this note might be different from the original.  - s/p ablation   - not fully anticoagulated due to maintenance of sinus rhythm             Benign essential hypertension ICD-10-CM: I10  ICD-9-CM: 401.1  8/19/2012 - Present Yes    Overview Signed 1/15/2022  3:58 AM by Bridget Rodriguez MD     Last Assessment & Plan:   Formatting of this note might be different from the original.  - BP low normal on lisinopril  - monitor BP at home, may need to reduce dose due to dizziness             Type 2 diabetes mellitus ([facility] ICD-10-CM: E11.9  ICD-9-CM: 250.00  8/19/2012 - Present Yes              Objective:     Patient Vitals for the past 24 hrs:   Temp Pulse Resp BP SpO2   01/16/22 0747  67 18 125/61 97 %   01/16/22 0005 97.9 °F (36.6 °C) 75 16 120/67 98 %   01/15/22 2029     97 %   01/15/22 1934 99 °F (37.2 °C) 81 16 106/64 97 %   01/15/22 1454 98 °F (36.7 °C) 80 16 104/60 100 %   01/15/22 1145 98.6 °F (37 °C) 72 16 112/80      Oxygen Therapy  O2 Sat (%): 97 % (01/16/22 0747)  Pulse via Oximetry: 76 beats per minute (01/15/22 2029)  O2 Device: None (Room air) (Home CPAP set up at bedside) (01/15/22 2029)  ETCO2 (mmHg): 100 mmHg (01/15/22 0853)    Estimated body mass index is 31.85 kg/m² as calculated from the following:    Height as of this encounter: 5' 10\" (1.778 m). Weight as of this encounter: 100.7 kg (222 lb). No intake or output data in the 24 hours ending 01/16/22 0901      Physical Exam:  Blood pressure 125/61, pulse 67, temperature 97.9 °F (36.6 °C), resp. rate 18, height 5' 10\" (1.778 m), weight 100.7 kg (222 lb), SpO2 97 %. General:    Well nourished. No overt distress. Obese. Head:  Normocephalic, atraumatic  Eyes:  Sclerae appear normal.  Pupils equally round. ENT:  Nares appear normal, no drainage. Moist oral mucosa  Neck:  No restricted ROM. Trachea midline   CV:   RRR. No m/r/g. No jugular venous distension. Lungs:   CTAB. No wheezing, rhonchi, or rales. Respirations even, unlabored  Abdomen: Bowel sounds present. Soft, nontender, nondistended. Extremities: No cyanosis or clubbing. No edema  Skin:     No rashes and normal coloration. Warm and dry. Neuro:  CN II-XII grossly intact. Sensation intact. A&Ox3  Psych:  Normal mood and affect.       I have reviewed ordered lab tests and independently visualized imaging below:    Recent Labs:  Recent Results (from the past 48 hour(s))   TYPE & SCREEN    Collection Time: 01/15/22  1:11 AM   Result Value Ref Range    Crossmatch Expiration 01/18/2022,2359     ABO/Rh(D) A POSITIVE     Antibody screen NEG    CBC WITH AUTOMATED DIFF    Collection Time: 01/15/22  1:11 AM   Result Value Ref Range    WBC 23.6 (H) 4.3 - 11.1 K/uL    RBC 3.67 (L) 4.23 - 5.6 M/uL    HGB 10.7 (L) 13.6 - 17.2 g/dL    HCT 34.4 (L) 41.1 - 50.3 %    MCV 93.7 79.6 - 97.8 FL    MCH 29.2 26.1 - 32.9 PG    MCHC 31.1 (L) 31.4 - 35.0 g/dL    RDW 15.6 (H) 11.9 - 14.6 %    PLATELET 235 (H) 565 - 450 K/uL    MPV 8.9 (L) 9.4 - 12.3 FL    ABSOLUTE NRBC 0.05 0.0 - 0.2 K/uL    NEUTROPHILS 42 (L) 47 - 75 %    BAND NEUTROPHILS 9 (H) 0 - 6 %    LYMPHOCYTES 24 16 - 44 %    MONOCYTES 7 3 - 9 %    METAMYELOCYTES 5 %    MYELOCYTES 7 %    PROMYELOCYTES 4 %    BLASTS 2 %    NRBC 1.0  WBC    ABS. NEUTROPHILS 13.2 (H) 1.7 - 8.2 K/UL    ABS. LYMPHOCYTES 5.7 (H) 0.5 - 4.6 K/UL    ABS. MONOCYTES 1.7 (H) 0.1 - 1.3 K/UL    RBC COMMENTS SLIGHT  ANISOCYTOSIS        RBC COMMENTS SLIGHT  POLYCHROMASIA        RBC COMMENTS SLIGHT  MACROCYTOSIS        WBC COMMENTS SLIGHT      PLATELET COMMENTS INCREASED      DF MANUAL     METABOLIC PANEL, COMPREHENSIVE    Collection Time: 01/15/22  1:11 AM   Result Value Ref Range    Sodium 140 136 - 145 mmol/L    Potassium 4.3 3.5 - 5.1 mmol/L    Chloride 108 (H) 98 - 107 mmol/L    CO2 23 21 - 32 mmol/L    Anion gap 9 7 - 16 mmol/L    Glucose 147 (H) 65 - 100 mg/dL    BUN 50 (H) 8 - 23 MG/DL    Creatinine 1.90 (H) 0.8 - 1.5 MG/DL    GFR est AA 45 (L) >60 ml/min/1.73m2    GFR est non-AA 37 (L) >60 ml/min/1.73m2    Calcium 8.7 8.3 - 10.4 MG/DL    Bilirubin, total 0.3 0.2 - 1.1 MG/DL    ALT (SGPT) 32 12 - 65 U/L    AST (SGOT) 22 15 - 37 U/L    Alk.  phosphatase 138 (H) 50 - 136 U/L    Protein, total 6.9 6.3 - 8.2 g/dL    Albumin 2.1 (L) 3.2 - 4.6 g/dL    Globulin 4.8 (H) 2.3 - 3.5 g/dL    A-G Ratio 0.4 (L) 1.2 - 3.5     PROTHROMBIN TIME + INR    Collection Time: 01/15/22  1:11 AM   Result Value Ref Range    Prothrombin time 12.9 12.6 - 14.5 sec    INR 0.9     COVID-19 RAPID TEST    Collection Time: 01/15/22  3:35 AM   Result Value Ref Range    Specimen source Nasopharyngeal      COVID-19 rapid test Not detected NOTD     HGB & HCT    Collection Time: 01/15/22 11:26 AM   Result Value Ref Range    HGB 8.3 (L) 13.6 - 17.2 g/dL    HCT 26.9 (L) 41.1 - 50.3 %   CBC W/O DIFF    Collection Time: 01/15/22 11:26 AM   Result Value Ref Range    WBC 18.5 (H) 4.3 - 11.1 K/uL    RBC 2.84 (L) 4.23 - 5.6 M/uL    HGB 8.3 (L) 13.6 - 17.2 g/dL    HCT 27.4 (L) 41.1 - 50.3 %    MCV 96.5 79.6 - 97.8 FL    MCH 29.2 26.1 - 32.9 PG    MCHC 30.3 (L) 31.4 - 35.0 g/dL    RDW 15.7 (H) 11.9 - 14.6 %    PLATELET 821 390 - 241 K/uL    MPV 8.9 (L) 9.4 - 12.3 FL    ABSOLUTE NRBC 0.03 0.0 - 0.2 K/uL   METABOLIC PANEL, BASIC    Collection Time: 01/16/22  3:31 AM   Result Value Ref Range    Sodium 140 136 - 145 mmol/L    Potassium 5.0 3.5 - 5.1 mmol/L    Chloride 110 (H) 98 - 107 mmol/L    CO2 26 21 - 32 mmol/L    Anion gap 4 (L) 7 - 16 mmol/L    Glucose 98 65 - 100 mg/dL    BUN 46 (H) 8 - 23 MG/DL    Creatinine 1.75 (H) 0.8 - 1.5 MG/DL    GFR est AA 49 (L) >60 ml/min/1.73m2    GFR est non-AA 41 (L) >60 ml/min/1.73m2    Calcium 8.6 8.3 - 10.4 MG/DL   CBC W/O DIFF    Collection Time: 01/16/22  3:31 AM   Result Value Ref Range    WBC 17.0 (H) 4.3 - 11.1 K/uL    RBC 2.71 (L) 4.23 - 5.6 M/uL    HGB 7.9 (L) 13.6 - 17.2 g/dL    HCT 25.7 (L) 41.1 - 50.3 %    MCV 94.8 79.6 - 97.8 FL    MCH 29.2 26.1 - 32.9 PG    MCHC 30.7 (L) 31.4 - 35.0 g/dL    RDW 16.1 (H) 11.9 - 14.6 %    PLATELET 651 962 - 635 K/uL    MPV 9.0 (L) 9.4 - 12.3 FL    ABSOLUTE NRBC 0.00 0.0 - 0.2 K/uL       All Micro Results     Procedure Component Value Units Date/Time    COVID-19 RAPID TEST [[id number]] Collected: 01/15/22 0335    Order Status: Completed Specimen: Nasopharyngeal Updated: 01/15/22 0354     Specimen source Nasopharyngeal        COVID-19 rapid test Not detected        Comment:      The specimen is NEGATIVE for SARS-CoV-2, the novel coronavirus associated with COVID-19. A negative result does not rule out COVID-19.        This test has been authorized by the FDA under an Emergency Use Authorization (EUA) for use by authorized laboratories. Fact sheet for Healthcare Providers: ConventionUpdate.co.nz  Fact sheet for Patients: ConventionUpdate.co.nz       Methodology: Isothermal Nucleic Acid Amplification               Other Studies:  No results found. Current Meds:  Current Facility-Administered Medications   Medication Dose Route Frequency    gabapentin (NEURONTIN) capsule 300 mg  300 mg Oral 5XD    allopurinoL (ZYLOPRIM) tablet 300 mg  300 mg Oral DAILY    lisinopriL (PRINIVIL, ZESTRIL) tablet 20 mg  20 mg Oral DAILY    lipase-protease-amylase (CREON 24,000) capsule 1 Capsule (Patient Supplied)  1 Capsule Oral TID WITH MEALS    finasteride (PROSCAR) tablet 5 mg  5 mg Oral DAILY    sodium chloride (NS) flush 5-40 mL  5-40 mL IntraVENous Q8H    sodium chloride (NS) flush 5-40 mL  5-40 mL IntraVENous PRN    acetaminophen (TYLENOL) tablet 650 mg  650 mg Oral Q6H PRN    Or    acetaminophen (TYLENOL) suppository 650 mg  650 mg Rectal Q6H PRN    polyethylene glycol (MIRALAX) packet 17 g  17 g Oral DAILY PRN    ondansetron (ZOFRAN ODT) tablet 4 mg  4 mg Oral Q8H PRN    Or    ondansetron (ZOFRAN) injection 4 mg  4 mg IntraVENous Q6H PRN    lactated Ringers infusion  75 mL/hr IntraVENous CONTINUOUS    pantoprazole (PROTONIX) 40 mg in 0.9% sodium chloride 10 mL injection  40 mg IntraVENous Q12H    predniSONE (DELTASONE) tablet 20 mg  20 mg Oral BID WITH MEALS    HYDROcodone-acetaminophen (NORCO) 5-325 mg per tablet 1 Tablet  1 Tablet Oral Q4H PRN       Signed:  Kelsie Hirsch MD    Part of this note may have been written by using a voice dictation software. The note has been proof read but may still contain some grammatical/other typographical errors.

## 2022-01-16 NOTE — PROGRESS NOTES
[facility] Progress Note         Admit Date:  1/15/2022    Today's Date:  1/16/2022    CC:  bleeding    Subjective:     Patient feeling well this am, no bleeding since yesterday. Reports brown stool. He is tolerating full liquids. He has a complicated history of necrotizing pancreatitis. His gastroenterologist at [facility] (Dr Dorian Caro) has retired. Medications:   Current Facility-Administered Medications   Medication Dose Route Frequency    pantoprazole (PROTONIX) tablet 40 mg  40 mg Oral ACB    gabapentin (NEURONTIN) capsule 300 mg  300 mg Oral 5XD    allopurinoL (ZYLOPRIM) tablet 300 mg  300 mg Oral DAILY    lisinopriL (PRINIVIL, ZESTRIL) tablet 20 mg  20 mg Oral DAILY    lipase-protease-amylase (CREON 24,000) capsule 1 Capsule (Patient Supplied)  1 Capsule Oral TID WITH MEALS    finasteride (PROSCAR) tablet 5 mg  5 mg Oral DAILY    sodium chloride (NS) flush 5-40 mL  5-40 mL IntraVENous Q8H    sodium chloride (NS) flush 5-40 mL  5-40 mL IntraVENous PRN    acetaminophen (TYLENOL) tablet 650 mg  650 mg Oral Q6H PRN    Or    acetaminophen (TYLENOL) suppository 650 mg  650 mg Rectal Q6H PRN    polyethylene glycol (MIRALAX) packet 17 g  17 g Oral DAILY PRN    ondansetron (ZOFRAN ODT) tablet 4 mg  4 mg Oral Q8H PRN    Or    ondansetron (ZOFRAN) injection 4 mg  4 mg IntraVENous Q6H PRN    predniSONE (DELTASONE) tablet 20 mg  20 mg Oral BID WITH MEALS    HYDROcodone-acetaminophen (NORCO) 5-325 mg per tablet 1 Tablet  1 Tablet Oral Q4H PRN       Review of Systems:  ROS was obtained, with pertinent positives as listed above. No chest pain or SOB. Diet:      Objective:   Vitals:  Visit Vitals  /61   Pulse 67   Temp 97.9 °F (36.6 °C)   Resp 18   Ht 5' 10\" (1.778 m)   Wt 100.7 kg (222 lb)   SpO2 97%   BMI 31.85 kg/m²     Intake/Output:  No intake/output data recorded. No intake/output data recorded.   Exam:  General appearance: alert, cooperative, no distress  Lungs: clear to auscultation bilaterally anteriorly  Heart: regular rate and rhythm  Abdomen: soft, non-tender.  Bowel sounds normal. No masses, no organomegaly  Extremities: extremities normal, atraumatic, no cyanosis or edema  Neuro:  alert and oriented    Data Review (Labs):    Recent Labs     01/16/22  0331 01/15/22  1126 01/15/22  0111   WBC 17.0* 18.5* 23.6*   HGB 7.9* 8.3*  8.3* 10.7*   HCT 25.7* 27.4*  26.9* 34.4*    357 523*   MCV 94.8 96.5 93.7     --  140   K 5.0  --  4.3   *  --  108*   CO2 26  --  23   BUN 46*  --  50*   CREA 1.75*  --  1.90*   CA 8.6  --  8.7   GLU 98  --  147*   AP  --   --  138*   AST  --   --  22   ALT  --   --  32   TBILI  --   --  0.3   ALB  --   --  2.1*   TP  --   --  6.9   PTP  --   --  12.9   INR  --   --  0.9       Assessment:     Principal Problem:    Acute blood loss anemia (1/15/2022)    Active Problems:    Leukocytosis (1/15/2022)      GI bleed (1/15/2022)      Benign essential hypertension (8/19/2012)      Overview: Last Assessment & Plan:       Formatting of this note might be different from the original.      - BP low normal on lisinopril      - monitor BP at home, may need to reduce dose due to dizziness      Chronic anemia (1/7/2020)      Gout (9/20/2016)      Overview: Last Assessment & Plan:       Formatting of this note might be different from the original.      Chronic, controlled with allopurinol 300 mg      Plan:        Continue current therapy, check uric acid with next labs      History of partial nephrectomy (11/17/2021)      Overview: Formatting of this note might be different from the original.      2nd to [facility]      Paroxysmal atrial fibrillation ([facility] (8/29/2012)      Overview: Formatting of this note might be different from the original.      Paroxysmal             Last Assessment & Plan:       Formatting of this note might be different from the original.      - s/p ablation       - not fully anticoagulated due to maintenance of sinus rhythm      Type 2 diabetes mellitus ([facility] (8/19/2012)      Stage 3b chronic kidney disease ([facility] (6/11/2021)      Overview: Formatting of this note might be different from the original.      Follows with Dr. Fregoso End:     Nuria Bonillas diverticular, and has resolved. His last colonoscopy was a year ago at [facility]. I will advance him to a regular diet. He may be discharged from a medical standpoint; although, there may be issues with transportation/ weather. [facility] or resume his relationship with [facility].

## 2022-01-16 NOTE — PROGRESS NOTES
Received patient awake, alert and oriented in bed working on computer. Patient states that he has not had a bloody bowel movement since noon today. Denies needs at this time. Nursing assessment completed at this time. Call bell within patient's reach.

## 2022-01-16 NOTE — PROGRESS NOTES
Pt reports BM this morning with no blood. VSS. Was placed NPO after MN but does not know why. He is asking if he can eat.  [employer] message sent to GI regarding diet

## 2022-01-17 VITALS
TEMPERATURE: 97.7 F | BODY MASS INDEX: 31.78 KG/M2 | OXYGEN SATURATION: 96 % | DIASTOLIC BLOOD PRESSURE: 73 MMHG | SYSTOLIC BLOOD PRESSURE: 113 MMHG | WEIGHT: 222 LBS | HEART RATE: 82 BPM | HEIGHT: 70 IN | RESPIRATION RATE: 16 BRPM

## 2022-01-17 PROBLEM — G89.29 CHRONIC RIGHT SHOULDER PAIN: Status: ACTIVE | Noted: 2022-01-17

## 2022-01-17 PROBLEM — K92.2 GI BLEED: Status: RESOLVED | Noted: 2022-01-15 | Resolved: 2022-01-17

## 2022-01-17 PROBLEM — M25.511 CHRONIC RIGHT SHOULDER PAIN: Status: ACTIVE | Noted: 2022-01-17

## 2022-01-17 LAB — POTASSIUM SERPL-SCNC: 4.8 MMOL/L (ref 3.5–5.1)

## 2022-01-17 PROCEDURE — 84132 ASSAY OF SERUM POTASSIUM: CPT

## 2022-01-17 PROCEDURE — 36415 COLL VENOUS BLD VENIPUNCTURE: CPT

## 2022-01-17 PROCEDURE — 74011250637 HC RX REV CODE- 250/637: Performed by: INTERNAL MEDICINE

## 2022-01-17 PROCEDURE — 74011250637 HC RX REV CODE- 250/637: Performed by: FAMILY MEDICINE

## 2022-01-17 PROCEDURE — 74011636637 HC RX REV CODE- 636/637: Performed by: INTERNAL MEDICINE

## 2022-01-17 PROCEDURE — 74011000250 HC RX REV CODE- 250: Performed by: FAMILY MEDICINE

## 2022-01-17 RX ORDER — HYDROCODONE BITARTRATE AND ACETAMINOPHEN 5; 325 MG/1; MG/1
1 TABLET ORAL
Qty: 18 TABLET | Refills: 0 | Status: SHIPPED | OUTPATIENT
Start: 2022-01-17 | End: 2022-01-20

## 2022-01-17 RX ADMIN — PANTOPRAZOLE SODIUM 40 MG: 40 TABLET, DELAYED RELEASE ORAL at 06:36

## 2022-01-17 RX ADMIN — SODIUM CHLORIDE, PRESERVATIVE FREE 10 ML: 5 INJECTION INTRAVENOUS at 05:37

## 2022-01-17 RX ADMIN — ALLOPURINOL 300 MG: 300 TABLET ORAL at 09:43

## 2022-01-17 RX ADMIN — HYDROCODONE BITARTRATE AND ACETAMINOPHEN 1 TABLET: 5; 325 TABLET ORAL at 09:48

## 2022-01-17 RX ADMIN — GABAPENTIN 300 MG: 100 CAPSULE ORAL at 06:36

## 2022-01-17 RX ADMIN — PREDNISONE 20 MG: 10 TABLET ORAL at 09:42

## 2022-01-17 RX ADMIN — FINASTERIDE 5 MG: 5 TABLET, FILM COATED ORAL at 09:43

## 2022-01-17 RX ADMIN — GABAPENTIN 300 MG: 100 CAPSULE ORAL at 10:59

## 2022-01-17 NOTE — PROGRESS NOTES
Care Management Interventions  PCP Verified by CM: Yes  Support Systems: Spouse/Significant Other  Discharge Location  Discharge Placement: Home with family assistance    MD order rec'd to obtain [facility] PCP per patient request. Referral for PCP sent electronically. They will f/u with pt at home.

## 2022-01-17 NOTE — PROGRESS NOTES
Patient noted to have an insulin pump with dex-con glucose monitor. Patient stated his blood glucose per pump was 255 mg/dl and he administered a 7 mg/dl Novolog insulin bolus at 2200. Added insulin pump to [facility] and patient education given regarding notifying nurse when he administers boluses. Verbal understanding noted. Dr. Kaiden Gupta asked notified regarding adding patient supplied insulin pump to [facility] per pharmacy recommendations and about [facility] FSBS checks but states it is ok to hold off on FSBS checks for now.

## 2022-01-17 NOTE — DISCHARGE INSTRUCTIONS
Patient Education        Anemia: Care Instructions  Your Care Instructions     Anemia is a low level of red blood cells, which carry oxygen throughout your body. Many things can cause anemia. Lack of iron is one of the most common causes. Your body needs iron to make hemoglobin, a substance in red blood cells that carries oxygen from the lungs to your body's cells. Without enough iron, the body produces fewer and smaller red blood cells. As a result, your body's cells do not get enough oxygen, and you feel tired and weak. And you may have trouble concentrating. Bleeding is the most common cause of a lack of iron. You may have heavy menstrual bleeding or bleeding caused by conditions such as ulcers, hemorrhoids, or cancer. Regular use of aspirin or other anti-inflammatory medicines (such as ibuprofen) also can cause bleeding in some people. A lack of iron in your diet also can cause anemia, especially at times when the body needs more iron, such as during pregnancy, infancy, and the teen years. Your doctor may have prescribed iron pills. It may take several months of treatment for your iron levels to return to normal. Your doctor also may suggest that you eat foods that are rich in iron, such as meat and beans. There are many other causes of anemia. It is not always due to a lack of iron. Finding the specific cause of your anemia will help your doctor find the right treatment for you. Follow-up care is a key part of your treatment and safety. Be sure to make and go to all appointments, and call your doctor if you are having problems. It's also a good idea to know your test results and keep a list of the medicines you take. How can you care for yourself at home? · Take your medicines exactly as prescribed. Call your doctor if you think you are having a problem with your medicine.   · If your doctor recommends iron pills, take them as directed:  ? Try to take the pills on an empty stomach about 1 hour before or 2 hours after meals. But you may need to take iron with food to avoid an upset stomach. ? Do not take antacids or drink milk or caffeine drinks (such as coffee, tea, or cola) at the same time or within 2 hours of the time that you take your iron. They can make it hard for your body to absorb the iron. ? Vitamin C (from food or supplements) helps your body absorb iron. Try taking iron pills with a glass of orange juice or some other food that is high in vitamin C, such as citrus fruits. ? Iron pills may cause stomach problems, such as heartburn, nausea, diarrhea, constipation, and cramps. Be sure to drink plenty of fluids, and include fruits, vegetables, and fiber in your diet each day. Iron pills often make your bowel movements dark or green. ? If you forget to take an iron pill, do not take a double dose of iron the next time you take a pill. ? Keep iron pills out of the reach of small children. An overdose of iron can be very dangerous. · Follow your doctor's advice about eating iron-rich foods. These include red meat, shellfish, poultry, eggs, beans, raisins, whole-grain bread, and leafy green vegetables. · Steam vegetables to help them keep their iron content. When should you call for help? Call 911 anytime you think you may need emergency care. For example, call if:    · You have symptoms of a heart attack. These may include:  ? Chest pain or pressure, or a strange feeling in the chest.  ? Sweating. ? Shortness of breath. ? Nausea or vomiting. ? Pain, pressure, or a strange feeling in the back, neck, jaw, or upper belly or in one or both shoulders or arms. ? Lightheadedness or sudden weakness. ? A fast or irregular heartbeat. After you call 911, the  may tell you to chew 1 adult-strength or 2 to 4 low-dose aspirin. Wait for an ambulance. Do not try to drive yourself.     · You passed out (lost consciousness).    Call your doctor now or seek immediate medical care if:    · You have new or increased shortness of breath.     · You are dizzy or lightheaded, or you feel like you may faint.     · Your fatigue and weakness continue or get worse.     · You have any abnormal bleeding, such as:  ? Nosebleeds. ? Vaginal bleeding that is different (heavier, more frequent, at a different time of the month) than what you are used to.  ? Bloody or black stools, or rectal bleeding. ? Bloody or pink urine. Watch closely for changes in your health, and be sure to contact your doctor if:    · You do not get better as expected. Where can you learn more? Go to http://www.orelily.com/  Enter R301 in the search box to learn more about \"Anemia: Care Instructions. \"  Current as of: April 29, 2021               Content Version: 13.0  © 2006-2021 [employer]. Care instructions adapted under license by [employer] (which disclaims liability or warranty for this information). If you have questions about a medical condition or this instruction, always ask your healthcare professional. [place] [address] any warranty or liability for your use of this information. Patient Education        Lower Gastrointestinal Bleeding: Care Instructions  Your Care Instructions     The digestive or gastrointestinal tract goes from the mouth to the anus. It is often called the GI tract. Bleeding in the lower GI tract can happen anywhere in your small or large intestine. It can also happen in your rectum or anus. In some cases, it is caused by an infection, cancer, or inflammatory bowel disease. Or it may be caused by hemorrhoids, diverticulitis, or clotting problems. Light bleeding may not cause any symptoms at first. But if you continue to bleed for a while, you may feel very weak or tired. Sudden, heavy bleeding means you need to see a doctor right away. This kind of bleeding can be very dangerous. But it can usually be cured or controlled.  The doctor may do some tests to find the cause of your bleeding. Follow-up care is a key part of your treatment and safety. Be sure to make and go to all appointments, and call your doctor if you are having problems. It's also a good idea to know your test results and keep a list of the medicines you take. How can you care for yourself at home? · Be safe with medicines. Take your medicines exactly as prescribed. Call your doctor if you think you are having a problem with your medicine. You will get more details on the specific medicines your doctor prescribes. · Do not take aspirin or other anti-inflammatory medicines, such as naproxen (Aleve) or ibuprofen (Advil, Motrin), without talking to your doctor first. Ask your doctor if it is okay to use acetaminophen (Tylenol). · Do not drink alcohol. · The bleeding may make you lose iron. So it's important to eat foods that have a lot of iron. These include red meat, shellfish, poultry, and eggs. They also include beans, raisins, whole-grain breads, and leafy green vegetables. If you want help planning meals, you can meet with a dietitian. When should you call for help? Call 911 anytime you think you may need emergency care. For example, call if:    · You have sudden, severe belly pain.     · You vomit blood or what looks like coffee grounds.     · You passed out (lost consciousness).     · Your stools are maroon or very bloody. Call your doctor now or seek immediate medical care if:    · You are dizzy or lightheaded, or you feel like you may faint.     · Your stools are black and look like tar, or they have streaks of blood.     · You have belly pain.     · You vomit or have nausea. Watch closely for changes in your health, and be sure to contact your doctor if you do not get better as expected. Where can you learn more?   Go to http://www.gray.com/  Enter [id number] in the search box to learn more about \"Lower Gastrointestinal Bleeding: Care Instructions. \"  Current as of: July 1, 2021               Content Version: 13.0  © 5291-7977 Healthwise, [facility]. Care instructions adapted under license by [employer] (which disclaims liability or warranty for this information). If you have questions about a medical condition or this instruction, always ask your healthcare professional. [place] [address] any warranty or liability for your use of this information.

## 2022-01-17 NOTE — DISCHARGE SUMMARY
Hospitalist Discharge Summary   Admit Date:  1/15/2022  1:01 AM   DC Note date: 2022  Name:  Marcela Richey   Age:  78 y.o.   Sex:  male  :  1945   MRN:  [id number]   Room:  [facility]  PCP:  Robbin Buckner MD    Presenting Complaint: Rectal Bleeding    Initial Admission Diagnosis: GI bleed [K92.2]  Leukocytosis [D72.829]     Problem List for this Hospitalization:  Hospital Problems as of 2022 Date Reviewed: 2021          Codes Class Noted - Resolved POA    Chronic right shoulder pain ICD-10-CM: M25.511, G89.29  ICD-9-CM: 719.41, 338.29  2022 - Present Unknown        Leukocytosis ICD-10-CM: D72.829  ICD-9-CM: 288.60  1/15/2022 - Present Unknown        * (Principal) Acute blood loss anemia ICD-10-CM: D62  ICD-9-CM: 285.1  1/15/2022 - Present Yes        History of partial nephrectomy ICD-10-CM: Z90.5  ICD-9-CM: V15.29  2021 - Present Yes    Overview Signed 1/15/2022  3:58 AM by Deangelo Viramontes MD     Formatting of this note might be different from the original.   to  [address]             Stage 3b chronic kidney disease ([facility] [address] ICD-10-CM: N18.32  ICD-9-CM: 585.3  2021 - Present Yes    Overview Signed 1/15/2022  3:58 AM by Deangelo Viramontes MD     Formatting of this note might be different from the original.  Follows with Dr. James Torres             Chronic anemia ICD-10-CM: D64.9  ICD-9-CM: 285.9  2020 - Present Yes        Gout ICD-10-CM: M10.9  ICD-9-CM: 274.9  2016 - Present Yes    Overview Signed 1/15/2022  3:58 AM by Deangelo Viramontes MD     Last Assessment & Plan:   Formatting of this note might be different from the original.  Chronic, controlled with allopurinol 300 mg  Plan:    Continue current therapy, check uric acid with next labs             Paroxysmal atrial fibrillation ([facility] [address] ICD-10-CM: I48.0  ICD-9-CM: 427.31  2012 - Present Yes    Overview Signed 1/15/2022  3:58 AM by Deangelo Viramontes MD     Formatting of this note might be different from the original.  Paroxysmal     Last Assessment & Plan:   Formatting of this note might be different from the original.  - s/p ablation   - not fully anticoagulated due to maintenance of sinus rhythm             Benign essential hypertension ICD-10-CM: I10  ICD-9-CM: 401.1  8/19/2012 - Present Yes    Overview Signed 1/15/2022  3:58 AM by Neeru Garrison MD     Last Assessment & Plan:   Formatting of this note might be different from the original.  - BP low normal on lisinopril  - monitor BP at home, may need to reduce dose due to dizziness             Type 2 diabetes mellitus ([facility] [address] ICD-10-CM: E11.9  ICD-9-CM: 250.00  8/19/2012 - Present Yes        RESOLVED: GI bleed ICD-10-CM: K92.2  ICD-9-CM: 578.9  1/15/2022 - 1/17/2022 Yes            Did Patient have Sepsis (YES OR NO): No    Hospital Course:  Mr. Da Valverde is a nice 77 y/o WM with a h/o DM2, CKD3, AFib, HTN and chronic anemia who was admitted on 1/15 for hematochezia. He developed a rather sudden onset BRBPR and had 5 episodes so he presented to the ER. His Hb was 10.7 (11.3 in December). WBCs were elevated but he takes prednisone. He was started on IVFs and GI was consulted. His Hb dropped to 8.3 on 1/15. His bleeding then resolved and he began to have brown BMs. Hb stable at 8 and no further bleeding. Needs referral for PCP and can follow up with his prior [facility] or Dr. Medardo Fountain. His hospital course was otherwise unremarkable and he is medically stable for discharge home. Disposition: Home or Self Care  Diet: ADULT DIET Regular; 4 carb choices (60 gm/meal); Low Fat/Low Chol/High Fiber/2 gm Na  Code Status: Full Code    Follow Up Orders:  No orders of the defined types were placed in this encounter. Follow-up Information     Follow up With Specialties Details Why Shaggy Sosa MD Gastroenterology  As needed. Hospital follow up, lower GI bleed.  [address]      Jonah Anders MD Internal Medicine   [address]  [address]  Internal Medicine-[facility] [address] [phone]            Follow up labs/diagnostics (ultimately defer to outpatient provider):  CBC w/o diff (order provided)    Time spent in patient discharge and coordination 35 minutes. Plan was discussed with patient, RN, CM. All questions answered. Patient was stable at time of discharge. Instructions given to call a physician or return if any concerns. Discharge Info:   Current Discharge Medication List      START taking these medications    Details   HYDROcodone-acetaminophen (NORCO) 5-325 mg per tablet Take 1 Tablet by mouth every four (4) hours as needed for Pain for up to 3 days. Max Daily Amount: 6 Tablets. Qty: 18 Tablet, Refills: 0  Start date: 1/17/2022, End date: 1/20/2022    Associated Diagnoses: Chronic right shoulder pain         CONTINUE these medications which have NOT CHANGED    Details   !! gabapentin (NEURONTIN) 300 mg capsule Take 300 mg by mouth five (5) times daily. Indications: neuropathic pain      allopurinoL (ZYLOPRIM) 300 mg tablet Take 300 mg by mouth daily. pantoprazole (PROTONIX) 20 mg tablet Take 20 mg by mouth daily. acetaminophen (Tylenol Extra Strength) 500 mg tablet Take 500 mg by mouth every six (6) hours as needed for Pain. lisinopriL (PRINIVIL, ZESTRIL) 20 mg tablet Take 20 mg by mouth daily. insulin aspart U-100 (NovoLOG Flexpen U-100 Insulin) 100 unit/mL (3 mL) inpn by SubCUTAneous route Before breakfast, lunch, and dinner. SSI      insulin glargine (Lantus Solostar U-100 Insulin) 100 unit/mL (3 mL) inpn by SubCUTAneous route two (2) times a day. 45 units in the morning and 30 units in the evening      finasteride (PROSCAR) 5 mg tablet Take 5 mg by mouth daily. aspirin (ASPIRIN) 325 mg tablet Take 325 mg by mouth nightly. multivitamin (ONE A DAY) tablet Take 1 Tab by mouth nightly.       cholecalciferol (Vitamin D3) 25 mcg (1,000 unit) cap Take 1,000 Units by mouth nightly. ascorbic acid, vitamin C, (Vitamin C) 250 mg tablet Take 1,000 mg by mouth daily. omega 3-DHA-EPA-fish oil 1,000 mg (120 mg-180 mg) capsule Take 1 Cap by mouth daily. cephALEXin (KEFLEX) 500 mg capsule Take 1 Capsule by mouth four (4) times daily. Qty: 12 Capsule, Refills: 0      ondansetron (Zofran ODT) 8 mg disintegrating tablet Take 1 Tab by mouth every twelve (12) hours as needed for Nausea. Qty: 10 Tab, Refills: 0      !! gabapentin (NEURONTIN) 100 mg capsule Take 100 mg by mouth five (5) times daily. lipase-protease-amylase (Creon) 24,000-76,000 -120,000 unit capsule Take 1 Cap by mouth three (3) times daily (with meals). simvastatin (ZOCOR) 10 mg tablet Take 10 mg by mouth nightly. B.infantis-B.ani-B.long-B.bifi (Probiotic 4X) 10-15 mg TbEC Take 1 Tab by mouth daily. vitamin E (AQUA GEMS) 400 unit capsule Take 450 Units by mouth nightly. !! - Potential duplicate medications found. Please discuss with provider. STOP taking these medications       ibuprofen (ADVIL;MOTRIN) 100 mg/5 mL suspension Comments:   Reason for Stopping:               Procedures done this admission:  * No surgery found *    Consults this admission:  IP CONSULT TO GASTROENTEROLOGY    Echocardiogram/EKG results:  No results found for this or any previous visit. EKG Results     None          Diagnostic Imaging/Tests:   No results found. All Micro Results     Procedure Component Value Units Date/Time    COVID-19 RAPID TEST [[id number]] Collected: 01/15/22 0335    Order Status: Completed Specimen: Nasopharyngeal Updated: 01/15/22 0354     Specimen source Nasopharyngeal        COVID-19 rapid test Not detected        Comment:      The specimen is NEGATIVE for SARS-CoV-2, the novel coronavirus associated with COVID-19. A negative result does not rule out COVID-19.        This test has been authorized by the FDA under an Emergency Use Authorization (EUA) for use by authorized laboratories.         Fact sheet for Healthcare Providers: ConventionUpdate.co.nz  Fact sheet for Patients: ConventionUpdate.co.nz       Methodology: Isothermal Nucleic Acid Amplification               Labs: Results:       BMP, Mg, Phos Recent Labs     01/17/22  0407 01/16/22  0331 01/15/22  0111   NA  --  140 140   K 4.8 5.0 4.3   CL  --  110* 108*   CO2  --  26 23   AGAP  --  4* 9   BUN  --  46* 50*   CREA  --  1.75* 1.90*   CA  --  8.6 8.7   GLU  --  98 147*      CBC Recent Labs     01/16/22  0331 01/15/22  1126 01/15/22  0111   WBC 17.0* 18.5* 23.6*   RBC 2.71* 2.84* 3.67*   HGB 7.9* 8.3*  8.3* 10.7*   HCT 25.7* 27.4*  26.9* 34.4*    357 523*   GRANS  --   --  42*   LYMPH  --   --  24   MONOS  --   --  7   ANEU  --   --  13.2*   ABL  --   --  5.7*   ABM  --   --  1.7*      LFT Recent Labs     01/15/22  0111   ALT 32   *   TP 6.9   ALB 2.1*   GLOB 4.8*   AGRAT 0.4*      Cardiac Testing No results found for: BNPP, BNP, CPK, RCK1, RCK2, RCK3, RCK4, CKMB, CKNDX, CKND1, TROPT, TROIQ   Coagulation Tests Lab Results   Component Value Date/Time    Prothrombin time 12.9 01/15/2022 01:11 AM    INR 0.9 01/15/2022 01:11 AM      A1c No results found for: HBA1C, [id number]   Lipid Panel No results found for: CHOL, CHOLPOCT, CHOLX, CHLST, CHOLV, 467192, HDL, HDLP, LDL, LDLC, DLDLP, 783715, VLDLC, VLDL, TGLX, TRIGL, TRIGP, TGLPOCT, CHHD, CHHDX   Thyroid Panel No results found for: TSH, T4, FT4, TT3, T3U, TSHEXT     Most Recent UA No results found for: COLOR, APPRN, REFSG, WAYNE, PROTU, GLUCU, KETU, BILU, BLDU, UROU, JESSIE, LEUKU, WBCU, RBCU, UEPI, BACTU, CASTS, UCRY, MUCUS, UCOM       All Labs from Last 24 Hrs:  Recent Results (from the past 24 hour(s))   POTASSIUM    Collection Time: 01/17/22  4:07 AM   Result Value Ref Range    Potassium 4.8 3.5 - 5.1 mmol/L       Current Med List in Hospital:   Current Facility-Administered Medications Medication Dose Route Frequency    pantoprazole (PROTONIX) tablet 40 mg  40 mg Oral ACB    insulin pump (PATIENT SUPPLIED) (Patient Supplied)   SubCUTAneous CONTINUOUS    gabapentin (NEURONTIN) capsule 300 mg  300 mg Oral 5XD    allopurinoL (ZYLOPRIM) tablet 300 mg  300 mg Oral DAILY    [Held by provider] lisinopriL (PRINIVIL, ZESTRIL) tablet 20 mg  20 mg Oral DAILY    lipase-protease-amylase (CREON 24,000) capsule 1 Capsule (Patient Supplied)  1 Capsule Oral TID WITH MEALS    finasteride (PROSCAR) tablet 5 mg  5 mg Oral DAILY    sodium chloride (NS) flush 5-40 mL  5-40 mL IntraVENous Q8H    sodium chloride (NS) flush 5-40 mL  5-40 mL IntraVENous PRN    acetaminophen (TYLENOL) tablet 650 mg  650 mg Oral Q6H PRN    Or    acetaminophen (TYLENOL) suppository 650 mg  650 mg Rectal Q6H PRN    polyethylene glycol (MIRALAX) packet 17 g  17 g Oral DAILY PRN    ondansetron (ZOFRAN ODT) tablet 4 mg  4 mg Oral Q8H PRN    Or    ondansetron (ZOFRAN) injection 4 mg  4 mg IntraVENous Q6H PRN    predniSONE (DELTASONE) tablet 20 mg  20 mg Oral BID WITH MEALS    HYDROcodone-acetaminophen (NORCO) 5-325 mg per tablet 1 Tablet  1 Tablet Oral Q4H PRN       Allergies   Allergen Reactions    Codeine Other (comments)     \"makes me a little crazy\"        Fenofibrate Other (comments)     \"causes pancreatic attacks\"    Hydromorphone Other (comments)     \"gives me craziness\"    Januvia [Sitagliptin] Other (comments)     Per pt causes pancreatic issues    Metformin Diarrhea     Immunization History   Administered Date(s) Administered    COVID-19, PFIZER, MRNA, LNP-S, PF, 30MCG/0.3ML DOSE 01/22/2021, 02/09/2021    Hep A Vaccine 04/01/2007, 06/03/2007    Hep B Vaccine (Adult) 04/01/2007, 06/03/2007, 11/04/2007    Influenza High Dose Vaccine PF 10/23/2012, 11/30/2013, 09/29/2014, 10/08/2015, 09/25/2019    Influenza Vaccine 11/03/2017, 12/01/2018    Influenza Vaccine (Quad) PF (>6 Mo Flulaval, Fluarix, and >3 Yrs [place], [place] [address]) 09/20/2016    Influenza Vaccine (Trivalent) 09/28/2014    Influenza, High-dose, Quadrivalent (>65 Yrs Fluzone High Dose Quad 02869) 09/02/2020    MMR 04/08/2007    Meningococcal ACWY Vaccine 04/01/2007    Pneumococcal Conjugate (PCV-13) 09/20/2016    Pneumococcal Polysaccharide (PPSV-23) 03/21/2010, 10/01/2014    Poliovirus vaccine 04/01/2007    Tdap 09/19/2014    Typhoid Vaccine 04/01/2007    Yellow Fever Vaccine 03/30/2007    Zoster Vaccine, Live 01/01/2012       Recent Vital Data:  Patient Vitals for the past 24 hrs:   Temp Pulse Resp BP SpO2   01/17/22 1138 97.7 °F (36.5 °C) 82 16 113/73 96 %   01/17/22 0749 97.9 °F (36.6 °C) 66 15 133/77 95 %   01/17/22 0348 97.9 °F (36.6 °C) 65 16 117/75 95 %   01/16/22 2330 98 °F (36.7 °C) 80 18 130/66 98 %   01/16/22 1912 97.9 °F (36.6 °C) 84 18 131/63 97 %   01/16/22 1548 98 °F (36.7 °C) 72 18 111/67 97 %     Oxygen Therapy  O2 Sat (%): 96 % (01/17/22 1138)  Pulse via Oximetry: 76 beats per minute (01/15/22 2029)  O2 Device: None (Room air) (Home CPAP set up at bedside) (01/15/22 2029)  ETCO2 (mmHg): 100 mmHg (01/15/22 0853)    Estimated body mass index is 31.85 kg/m² as calculated from the following:    Height as of this encounter: 5' 10\" (1.778 m). Weight as of this encounter: 100.7 kg (222 lb). No intake or output data in the 24 hours ending 01/17/22 1226      Physical Exam:  General:    Well nourished. No overt distress. Obese. Head:  Normocephalic, atraumatic  Eyes:  Sclerae appear normal.  Pupils equally round. HENT:  Nares appear normal, no drainage. Moist mucous membranes  Neck:  No restricted ROM. Trachea midline  CV:   RRR. No m/r/g. No JVD  Lungs:   CTAB. No wheezing, rhonchi, or rales. Even, unlabored  Abdomen:   Soft, nontender, nondistended. Extremities: Warm and dry. No cyanosis or clubbing. No edema. Skin:     No rashes. Normal coloration  Neuro:  CN II-XII grossly intact.   Psych:  Normal mood and affect. Signed:  Herlinda Lynch MD    Part of this note may have been written by using a voice dictation software. The note has been proof read but may still contain some grammatical/other typographical errors.

## 2022-01-19 ENCOUNTER — HOSPITAL ENCOUNTER (EMERGENCY)
Age: 77
Discharge: HOME | DRG: 378 | End: 2022-01-19
Attending: EMERGENCY MEDICINE
Payer: MEDICARE

## 2022-01-19 ENCOUNTER — APPOINTMENT (OUTPATIENT)
Dept: GENERAL RADIOLOGY | Age: 77
DRG: 378 | End: 2022-01-19
Attending: PHYSICIAN ASSISTANT
Payer: MEDICARE

## 2022-01-19 VITALS
BODY MASS INDEX: 31.5 KG/M2 | SYSTOLIC BLOOD PRESSURE: 108 MMHG | HEART RATE: 88 BPM | RESPIRATION RATE: 18 BRPM | DIASTOLIC BLOOD PRESSURE: 58 MMHG | HEIGHT: 70 IN | WEIGHT: 220 LBS | TEMPERATURE: 98.9 F | OXYGEN SATURATION: 98 %

## 2022-01-19 DIAGNOSIS — R73.9 HYPERGLYCEMIA: ICD-10-CM

## 2022-01-19 DIAGNOSIS — R40.4 TRANSIENT ALTERATION OF AWARENESS: Primary | ICD-10-CM

## 2022-01-19 DIAGNOSIS — R50.9 ACUTE FEBRILE ILLNESS: ICD-10-CM

## 2022-01-19 LAB
ALBUMIN SERPL-MCNC: 2.2 G/DL (ref 3.2–4.6)
ALBUMIN/GLOB SERPL: 0.5 {RATIO} (ref 1.2–3.5)
ALP SERPL-CCNC: 131 U/L (ref 50–136)
ALT SERPL-CCNC: 41 U/L (ref 12–65)
ANION GAP SERPL CALC-SCNC: 5 MMOL/L (ref 7–16)
AST SERPL-CCNC: 22 U/L (ref 15–37)
BASOPHILS # BLD: 0.2 K/UL (ref 0–0.2)
BASOPHILS NFR BLD: 1 % (ref 0–2)
BILIRUB SERPL-MCNC: 0.4 MG/DL (ref 0.2–1.1)
BUN SERPL-MCNC: 50 MG/DL (ref 8–23)
CALCIUM SERPL-MCNC: 8.3 MG/DL (ref 8.3–10.4)
CHLORIDE SERPL-SCNC: 107 MMOL/L (ref 98–107)
CO2 SERPL-SCNC: 24 MMOL/L (ref 21–32)
COVID-19 RAPID TEST, COVR: NOT DETECTED
CREAT SERPL-MCNC: 1.98 MG/DL (ref 0.8–1.5)
DIFFERENTIAL METHOD BLD: ABNORMAL
EOSINOPHIL # BLD: 0.2 K/UL (ref 0–0.8)
EOSINOPHIL NFR BLD: 1 % (ref 0.5–7.8)
ERYTHROCYTE [DISTWIDTH] IN BLOOD BY AUTOMATED COUNT: 17.7 % (ref 11.9–14.6)
FLUAV AG NPH QL IA: NEGATIVE
FLUBV AG NPH QL IA: NEGATIVE
GLOBULIN SER CALC-MCNC: 4.2 G/DL (ref 2.3–3.5)
GLUCOSE SERPL-MCNC: 226 MG/DL (ref 65–100)
HCT VFR BLD AUTO: 28.2 % (ref 41.1–50.3)
HGB BLD-MCNC: 8.7 G/DL (ref 13.6–17.2)
IMM GRANULOCYTES # BLD AUTO: 1.2 K/UL (ref 0–0.5)
IMM GRANULOCYTES NFR BLD AUTO: 7 % (ref 0–5)
LYMPHOCYTES # BLD: 3.6 K/UL (ref 0.5–4.6)
LYMPHOCYTES NFR BLD: 21 % (ref 13–44)
MCH RBC QN AUTO: 30.4 PG (ref 26.1–32.9)
MCHC RBC AUTO-ENTMCNC: 30.9 G/DL (ref 31.4–35)
MCV RBC AUTO: 98.6 FL (ref 79.6–97.8)
MONOCYTES # BLD: 1.2 K/UL (ref 0.1–1.3)
MONOCYTES NFR BLD: 7 % (ref 4–12)
NEUTS SEG # BLD: 10.6 K/UL (ref 1.7–8.2)
NEUTS SEG NFR BLD: 63 % (ref 43–78)
NRBC # BLD: 0.03 K/UL (ref 0–0.2)
PLATELET # BLD AUTO: 366 K/UL (ref 150–450)
PLATELET COMMENTS,PCOM: ADEQUATE
PMV BLD AUTO: 9.3 FL (ref 9.4–12.3)
POTASSIUM SERPL-SCNC: 5.5 MMOL/L (ref 3.5–5.1)
PROCALCITONIN SERPL-MCNC: 0.06 NG/ML (ref 0–0.49)
PROT SERPL-MCNC: 6.4 G/DL (ref 6.3–8.2)
RBC # BLD AUTO: 2.86 M/UL (ref 4.23–5.6)
RBC MORPH BLD: ABNORMAL
SODIUM SERPL-SCNC: 136 MMOL/L (ref 136–145)
SOURCE, COVRS: NORMAL
SPECIMEN SOURCE: NORMAL
WBC # BLD AUTO: 17 K/UL (ref 4.3–11.1)
WBC MORPH BLD: ABNORMAL

## 2022-01-19 PROCEDURE — 87804 INFLUENZA ASSAY W/OPTIC: CPT

## 2022-01-19 PROCEDURE — 74011250637 HC RX REV CODE- 250/637: Performed by: EMERGENCY MEDICINE

## 2022-01-19 PROCEDURE — 74011636637 HC RX REV CODE- 636/637: Performed by: EMERGENCY MEDICINE

## 2022-01-19 PROCEDURE — 80053 COMPREHEN METABOLIC PANEL: CPT

## 2022-01-19 PROCEDURE — 85025 COMPLETE CBC W/AUTO DIFF WBC: CPT

## 2022-01-19 PROCEDURE — 84145 PROCALCITONIN (PCT): CPT

## 2022-01-19 PROCEDURE — 71046 X-RAY EXAM CHEST 2 VIEWS: CPT

## 2022-01-19 PROCEDURE — 96374 THER/PROPH/DIAG INJ IV PUSH: CPT

## 2022-01-19 PROCEDURE — 87635 SARS-COV-2 COVID-19 AMP PRB: CPT

## 2022-01-19 PROCEDURE — 99284 EMERGENCY DEPT VISIT MOD MDM: CPT

## 2022-01-19 PROCEDURE — 74011000250 HC RX REV CODE- 250: Performed by: PHYSICIAN ASSISTANT

## 2022-01-19 RX ORDER — SODIUM CHLORIDE 0.9 % (FLUSH) 0.9 %
5-40 SYRINGE (ML) INJECTION EVERY 8 HOURS
Status: DISCONTINUED | OUTPATIENT
Start: 2022-01-19 | End: 2022-01-20 | Stop reason: HOSPADM

## 2022-01-19 RX ORDER — SODIUM CHLORIDE 0.9 % (FLUSH) 0.9 %
5-40 SYRINGE (ML) INJECTION AS NEEDED
Status: DISCONTINUED | OUTPATIENT
Start: 2022-01-19 | End: 2022-01-20 | Stop reason: HOSPADM

## 2022-01-19 RX ORDER — ACETAMINOPHEN 500 MG
1000 TABLET ORAL
Status: COMPLETED | OUTPATIENT
Start: 2022-01-19 | End: 2022-01-19

## 2022-01-19 RX ADMIN — SODIUM CHLORIDE, PRESERVATIVE FREE 5 ML: 5 INJECTION INTRAVENOUS at 22:05

## 2022-01-19 RX ADMIN — INSULIN HUMAN 5 UNITS: 100 INJECTION, SOLUTION PARENTERAL at 22:04

## 2022-01-19 RX ADMIN — ACETAMINOPHEN 1000 MG: 500 TABLET, FILM COATED ORAL at 16:32

## 2022-01-19 NOTE — ED NOTES
Pt seen here 2 days ago for diverticulitis, hgb 7.6, took pain pill this am was confused this afternoon, seen at GI office sent here for further evaluation, bodyaches and chills started today, no sob  Patient evaluated initially in triage. Rapid Medical Evaluation was conducted and necessary orders have been placed. I have performed a medical screening exam.  Care will now be transferred to the provider in the back of the emergency department.   EDUARDO Ramirez 4:21 PM

## 2022-01-19 NOTE — ED TRIAGE NOTES
Patient with complaints of fatigue, generalized body aches. Chills, fever. Advises vaccinated for COVID. Patient also with chronic right shoulder pain. Patient was admitted for GI hemorrhage. Patient with confusion per wife not acting like himself or answering questions when asked. Masked.

## 2022-01-20 ENCOUNTER — HOSPITAL ENCOUNTER (OUTPATIENT)
Dept: LAB | Age: 77
Discharge: HOME | DRG: 378 | End: 2022-01-20
Payer: MEDICARE

## 2022-01-20 NOTE — ED NOTES
I have reviewed discharge instructions with the patient. The patient and spouse verbalized understanding. Patient left ED via Discharge Method: ambulatory to Home with (insert name of family/friend, self, transport wife). Opportunity for questions and clarification provided. Patient given 0 scripts. To continue your aftercare when you leave the hospital, you may receive an automated call from our care team to check in on how you are doing. This is a free service and part of our promise to provide the best care and service to meet your aftercare needs.  If you have questions, or wish to unsubscribe from this service please call [phone]. Thank you for Choosing our [address] [place] [address] Emergency Department.

## 2022-01-20 NOTE — DISCHARGE INSTRUCTIONS
Call your primary care doctor to recheck in the next 24 to 48 hours. Tylenol for any fever. Decreased or do not use the hydrocodone as it severely clouds her thinking. Call gastrointestinal doctor regarding the iron infusion. Recheck sooner for worse or worrisome symptoms.

## 2022-01-20 NOTE — ED PROVIDER NOTES
62-year-old male has a history of chronic renal insufficiency paroxysmal atrial fibrillation diabetes. Of significance, patient was admitted for lower GI bleed January 15 and discharged 48 hours ago. He had a follow-up appointment for possible iron infusion today. He was noted to be confused at his GI doctor's office and was referred here. No history of stroke. No recent trauma. Denies to me any known fever chills cough congestion shortness of breath or dysuria. No further bleeding his bowels. No syncope. No trouble with focal numbness or weakness. The history is provided by the patient. Generalized Body Aches  This is a new problem. The current episode started 3 to 5 hours ago. The problem occurs constantly. The problem has been resolved. Pertinent negatives include no chest pain, no abdominal pain, no headaches and no shortness of breath. Nothing aggravates the symptoms. Nothing relieves the symptoms. He has tried nothing for the symptoms. Altered mental status     Fever   Pertinent negatives include no chest pain, no headaches and no shortness of breath.         Past Medical History:   Diagnosis Date    Arthritis     Blurred vision, right eye     BPH (benign prostatic hyperplasia)     Gout     History of Clostridioides difficile colitis 2010    History of pancreatitis     History of renal carcinoma     partial nephrectomy    Hyperlipidemia     Hypertension     Nausea & vomiting     small amount of N/V and pt not sure of it antiemetics work    Neuropathy     Paroxysmal A-fib ([address]     Presence of Watchman left atrial appendage closure device     Sleep apnea     compliant with C-pap    Thromboembolus ([address]     hx of left lower extremity    Type 2 diabetes mellitus (HCC)     insulin reliant; AVG -140; s.s. of hypoglycemia 65-75; last A1c 7.1    WPW (Alba-Parkinson-White syndrome)     ablation x4       Past Surgical History:   Procedure Laterality Date    HX AFIB ABLATION WPW- ablations x3    HX BACK SURGERY      ruptured disc    HX CATARACT REMOVAL      HX CHOLECYSTECTOMY      HX ENDOSCOPY      HX KNEE REPLACEMENT Right     HX LUMBAR LAMINECTOMY      HX NEPHRECTOMY      partial    HX ORTHOPAEDIC Left     foot    HX ORTHOPAEDIC Left     great toe straightened    HX OTHER SURGICAL      placed watchman 2/2020    HX WISDOM TEETH EXTRACTION           Family History:   Problem Relation Age of Onset    Other Mother         ALS    Parkinson's Disease Father     Cancer Sister         colon ca   Josianeguanakito Barnes No Known Problems Sister     Cancer Sister         ovarian       Social History     Socioeconomic History    Marital status:      Spouse name: Not on file    Number of children: Not on file    Years of education: Not on file    Highest education level: Not on file   Occupational History    Not on file   Tobacco Use    Smoking status: Never Smoker    Smokeless tobacco: Never Used   Substance and Sexual Activity    Alcohol use: Not Currently    Drug use: Never    Sexual activity: Not on file   Other Topics Concern    Not on file   Social History Narrative    Not on file     Social Determinants of Health     Financial Resource Strain:     Difficulty of Paying Living Expenses: Not on file   Food Insecurity:     Worried About Running Out of Food in the Last Year: Not on file    Marilu of Food in the Last Year: Not on file   Transportation Needs:     Lack of Transportation (Medical): Not on file    Lack of Transportation (Non-Medical):  Not on file   Physical Activity:     Days of Exercise per Week: Not on file    Minutes of Exercise per Session: Not on file   Stress:     Feeling of Stress : Not on file   Social Connections:     Frequency of Communication with Friends and Family: Not on file    Frequency of Social Gatherings with Friends and Family: Not on file    Attends Judaism Services: Not on file    Active Member of Clubs or Organizations: Not on file  Attends Club or Organization Meetings: Not on file    Marital Status: Not on file   Intimate Partner Violence:     Fear of Current or Ex-Partner: Not on file    Emotionally Abused: Not on file    Physically Abused: Not on file    Sexually Abused: Not on file   Housing Stability:     Unable to Pay for Housing in the Last Year: Not on file    Number of Adriana in the Last Year: Not on file    Unstable Housing in the Last Year: Not on file         ALLERGIES: Codeine, Fenofibrate, Hydromorphone, Januvia [sitagliptin], and Metformin    Review of Systems   Constitutional: Positive for chills, fatigue and fever. Respiratory: Negative for shortness of breath. Cardiovascular: Negative for chest pain. Gastrointestinal: Negative for abdominal pain. Neurological: Negative for headaches. All other systems reviewed and are negative. Vitals:    01/19/22 1620 01/19/22 2006   BP:  (!) 104/55   Pulse: (!) 105    Resp: 16    Temp: (!) 101.2 °F (38.4 °C)    SpO2: 98%    Weight: 99.8 kg (220 lb)    Height: 5' 10\" (1.778 m)             Physical Exam  Vitals and nursing note reviewed. Constitutional:       General: He is not in acute distress. Appearance: He is well-developed. HENT:      Head: Normocephalic and atraumatic. Right Ear: External ear normal.      Left Ear: External ear normal.      Mouth/Throat:      Pharynx: No oropharyngeal exudate. Eyes:      Conjunctiva/sclera: Conjunctivae normal.      Pupils: Pupils are equal, round, and reactive to light. Cardiovascular:      Rate and Rhythm: Normal rate and regular rhythm. Heart sounds: No murmur heard. Pulmonary:      Effort: No respiratory distress. Breath sounds: Normal breath sounds. Abdominal:      General: Bowel sounds are normal.      Palpations: Abdomen is soft. There is no mass. Tenderness: There is no abdominal tenderness. There is no guarding or rebound. Hernia: No hernia is present. Musculoskeletal:      Cervical back: Normal range of motion and neck supple. Skin:     General: Skin is warm and dry. Neurological:      Mental Status: He is alert and oriented to person, place, and time. Gait: Gait normal.      Comments: Nl speech and gait. Psychiatric:         Speech: Speech normal.          MDM  Number of Diagnoses or Management Options  Diagnosis management comments: Screen for UTI, pneumonia, sepsis. Patient's wife states the patient took a hydrocodone this morning. He was having to take this because of the GI bleed due to nonsteroidals he was taken for chronic shoulder pain. Suspect some of the altered mental status was due to this. Patient is improved to the point where he is alert and ambulatory without any issue. Suspect previous altered mental status was due to combination of fever plus the hydrocodone. Patient is back to baseline. Amount and/or Complexity of Data Reviewed  Clinical lab tests: ordered and reviewed  Tests in the radiology section of CPT®: ordered and reviewed  Review and summarize past medical records: yes (Previous admission. Discharged 48 hours ago after GI bleed.   No evidence for infection during that admission.)    Risk of Complications, Morbidity, and/or Mortality  Presenting problems: moderate  Diagnostic procedures: minimal           Procedures      Results Include:    Recent Results (from the past 24 hour(s))   INFLUENZA A & B AG (RAPID TEST)    Collection Time: 01/19/22  4:29 PM   Result Value Ref Range    Influenza A Ag Negative NEG      Influenza B Ag Negative NEG      Source Nasopharyngeal     COVID-19 RAPID TEST    Collection Time: 01/19/22  4:29 PM   Result Value Ref Range    Specimen source Nasopharyngeal      COVID-19 rapid test Not detected NOTD     CBC WITH AUTOMATED DIFF    Collection Time: 01/19/22  4:59 PM   Result Value Ref Range    WBC 17.0 (H) 4.3 - 11.1 K/uL    RBC 2.86 (L) 4.23 - 5.6 M/uL    HGB 8.7 (L) 13.6 - 17.2 g/dL HCT 28.2 (L) 41.1 - 50.3 %    MCV 98.6 (H) 79.6 - 97.8 FL    MCH 30.4 26.1 - 32.9 PG    MCHC 30.9 (L) 31.4 - 35.0 g/dL    RDW 17.7 (H) 11.9 - 14.6 %    PLATELET 879 389 - 574 K/uL    MPV 9.3 (L) 9.4 - 12.3 FL    ABSOLUTE NRBC 0.03 0.0 - 0.2 K/uL    NEUTROPHILS 63 43 - 78 %    LYMPHOCYTES 21 13 - 44 %    MONOCYTES 7 4.0 - 12.0 %    EOSINOPHILS 1 0.5 - 7.8 %    BASOPHILS 1 0.0 - 2.0 %    IMMATURE GRANULOCYTES 7 (H) 0.0 - 5.0 %    ABS. NEUTROPHILS 10.6 (H) 1.7 - 8.2 K/UL    ABS. LYMPHOCYTES 3.6 0.5 - 4.6 K/UL    ABS. MONOCYTES 1.2 0.1 - 1.3 K/UL    ABS. EOSINOPHILS 0.2 0.0 - 0.8 K/UL    ABS. BASOPHILS 0.2 0.0 - 0.2 K/UL    ABS. IMM. GRANS. 1.2 (H) 0.0 - 0.5 K/UL    RBC COMMENTS SLIGHT  ANISOCYTOSIS + POIKILOCYTOSIS        RBC COMMENTS SLIGHT  MACROCYTOSIS        RBC COMMENTS OCCASIONAL  POLYCHROMASIA        WBC COMMENTS Result Confirmed By Smear      PLATELET COMMENTS ADEQUATE      DF AUTOMATED     METABOLIC PANEL, COMPREHENSIVE    Collection Time: 01/19/22  4:59 PM   Result Value Ref Range    Sodium 136 136 - 145 mmol/L    Potassium 5.5 (H) 3.5 - 5.1 mmol/L    Chloride 107 98 - 107 mmol/L    CO2 24 21 - 32 mmol/L    Anion gap 5 (L) 7 - 16 mmol/L    Glucose 226 (H) 65 - 100 mg/dL    BUN 50 (H) 8 - 23 MG/DL    Creatinine 1.98 (H) 0.8 - 1.5 MG/DL    GFR est AA 43 (L) >60 ml/min/1.73m2    GFR est non-AA 35 (L) >60 ml/min/1.73m2    Calcium 8.3 8.3 - 10.4 MG/DL    Bilirubin, total 0.4 0.2 - 1.1 MG/DL    ALT (SGPT) 41 12 - 65 U/L    AST (SGOT) 22 15 - 37 U/L    Alk. phosphatase 131 50 - 136 U/L    Protein, total 6.4 6.3 - 8.2 g/dL    Albumin 2.2 (L) 3.2 - 4.6 g/dL    Globulin 4.2 (H) 2.3 - 3.5 g/dL    A-G Ratio 0.5 (L) 1.2 - 3.5       XR CHEST PA LAT    Result Date: 1/19/2022  Two view chest History: Patient with complaints of fatigue, generalized body aches. Chills, fever. Advises vaccinated for COVID. Comparison: None Findings: The heart is normal in size and configuration.  There are mild streaky opacities at the right mid and lower lung zone. There are no pleural effusions. The pulmonary vascularity is within normal limits. The visualized osseous structures are unremarkable. Mild streaky areas of subsegmental atelectasis or scar on the right. 10:02 PM  Patient's insulin pump has run out. Will give dose of IV insulin. BUN and creatinine remained stable. Patient has been on steroids previously as well. Leukocytosis remains. Lactate not part of original orders. Patient is not interested in remaining for lactate test.  Allows Pro-Elijah to be added onto the lab work. He is agreeable for me to call if elevated severely to recheck. Will advise gastroenterology regarding patient's desire for iron infusion due to his anemia.

## 2022-01-21 LAB — HISTORY CHECKED?,CKHIST: NORMAL

## 2022-01-21 RX ORDER — SODIUM CHLORIDE 9 MG/ML
250 INJECTION, SOLUTION INTRAVENOUS AS NEEDED
Status: CANCELLED | OUTPATIENT
Start: 2022-01-21

## 2022-01-22 ENCOUNTER — HOSPITAL ENCOUNTER (OUTPATIENT)
Dept: INFUSION THERAPY | Age: 77
Discharge: HOME | End: 2022-01-22

## 2022-01-22 ENCOUNTER — APPOINTMENT (OUTPATIENT)
Dept: GENERAL RADIOLOGY | Age: 77
DRG: 378 | End: 2022-01-22
Payer: MEDICARE

## 2022-01-22 ENCOUNTER — HOSPITAL ENCOUNTER (INPATIENT)
Age: 77
LOS: 2 days | Discharge: HOME | DRG: 378 | End: 2022-01-24
Admitting: HOSPITALIST
Payer: MEDICARE

## 2022-01-22 ENCOUNTER — ANESTHESIA EVENT (OUTPATIENT)
Dept: ENDOSCOPY | Age: 77
DRG: 378 | End: 2022-01-22
Payer: MEDICARE

## 2022-01-22 DIAGNOSIS — K92.2 LOWER GI BLEED: Primary | ICD-10-CM

## 2022-01-22 LAB
ALBUMIN SERPL-MCNC: 1.8 G/DL (ref 3.2–4.6)
ALBUMIN/GLOB SERPL: 0.4 {RATIO} (ref 1.2–3.5)
ALP SERPL-CCNC: 111 U/L (ref 50–136)
ALT SERPL-CCNC: 25 U/L (ref 12–65)
ANION GAP SERPL CALC-SCNC: 9 MMOL/L (ref 7–16)
AST SERPL-CCNC: 22 U/L (ref 15–37)
BACTERIA URNS QL MICRO: 0 /HPF
BASOPHILS # BLD: 0.1 K/UL (ref 0–0.2)
BASOPHILS NFR BLD: 1 % (ref 0–2)
BILIRUB SERPL-MCNC: 0.2 MG/DL (ref 0.2–1.1)
BUN SERPL-MCNC: 52 MG/DL (ref 8–23)
CALCIUM SERPL-MCNC: 8.7 MG/DL (ref 8.3–10.4)
CASTS URNS QL MICRO: NORMAL /LPF
CHLORIDE SERPL-SCNC: 110 MMOL/L (ref 98–107)
CO2 SERPL-SCNC: 21 MMOL/L (ref 21–32)
COVID-19 RAPID TEST, COVR: NOT DETECTED
CREAT SERPL-MCNC: 2.02 MG/DL (ref 0.8–1.5)
DIFFERENTIAL METHOD BLD: ABNORMAL
EOSINOPHIL # BLD: 0.2 K/UL (ref 0–0.8)
EOSINOPHIL NFR BLD: 1 % (ref 0.5–7.8)
EPI CELLS #/AREA URNS HPF: 0 /HPF
ERYTHROCYTE [DISTWIDTH] IN BLOOD BY AUTOMATED COUNT: 17.9 % (ref 11.9–14.6)
GLOBULIN SER CALC-MCNC: 4.1 G/DL (ref 2.3–3.5)
GLUCOSE BLD STRIP.AUTO-MCNC: 121 MG/DL (ref 65–100)
GLUCOSE BLD STRIP.AUTO-MCNC: 77 MG/DL (ref 65–100)
GLUCOSE BLD STRIP.AUTO-MCNC: 78 MG/DL (ref 65–100)
GLUCOSE BLD STRIP.AUTO-MCNC: 83 MG/DL (ref 65–100)
GLUCOSE SERPL-MCNC: 108 MG/DL (ref 65–100)
HCT VFR BLD AUTO: 24.2 % (ref 41.1–50.3)
HCT VFR BLD AUTO: 25.7 % (ref 41.1–50.3)
HCT VFR BLD AUTO: 30.3 % (ref 41.1–50.3)
HGB BLD-MCNC: 7.4 G/DL (ref 13.6–17.2)
HGB BLD-MCNC: 7.7 G/DL (ref 13.6–17.2)
HGB BLD-MCNC: 8.5 G/DL (ref 13.6–17.2)
HISTORY CHECKED?,CKHIST: NORMAL
IMM GRANULOCYTES # BLD AUTO: 0.5 K/UL (ref 0–0.5)
IMM GRANULOCYTES NFR BLD AUTO: 4 % (ref 0–5)
LIPASE SERPL-CCNC: 30 U/L (ref 73–393)
LYMPHOCYTES # BLD: 3.8 K/UL (ref 0.5–4.6)
LYMPHOCYTES NFR BLD: 29 % (ref 13–44)
MCH RBC QN AUTO: 29.7 PG (ref 26.1–32.9)
MCHC RBC AUTO-ENTMCNC: 30 G/DL (ref 31.4–35)
MCV RBC AUTO: 99.2 FL (ref 79.6–97.8)
MONOCYTES # BLD: 1 K/UL (ref 0.1–1.3)
MONOCYTES NFR BLD: 7 % (ref 4–12)
NEUTS SEG # BLD: 7.7 K/UL (ref 1.7–8.2)
NEUTS SEG NFR BLD: 59 % (ref 43–78)
NRBC # BLD: 0 K/UL (ref 0–0.2)
PLATELET # BLD AUTO: 285 K/UL (ref 150–450)
PMV BLD AUTO: 9.7 FL (ref 9.4–12.3)
POTASSIUM SERPL-SCNC: 4.9 MMOL/L (ref 3.5–5.1)
PROT SERPL-MCNC: 5.9 G/DL (ref 6.3–8.2)
RBC # BLD AUTO: 2.59 M/UL (ref 4.23–5.6)
RBC #/AREA URNS HPF: NORMAL /HPF
SERVICE CMNT-IMP: ABNORMAL
SERVICE CMNT-IMP: NORMAL
SODIUM SERPL-SCNC: 140 MMOL/L (ref 136–145)
SOURCE, COVRS: NORMAL
WBC # BLD AUTO: 13.2 K/UL (ref 4.3–11.1)
WBC URNS QL MICRO: 0 /HPF

## 2022-01-22 PROCEDURE — 94760 N-INVAS EAR/PLS OXIMETRY 1: CPT

## 2022-01-22 PROCEDURE — 85018 HEMOGLOBIN: CPT

## 2022-01-22 PROCEDURE — 81003 URINALYSIS AUTO W/O SCOPE: CPT

## 2022-01-22 PROCEDURE — 81015 MICROSCOPIC EXAM OF URINE: CPT

## 2022-01-22 PROCEDURE — 74011250636 HC RX REV CODE- 250/636

## 2022-01-22 PROCEDURE — 99285 EMERGENCY DEPT VISIT HI MDM: CPT

## 2022-01-22 PROCEDURE — 30233N1 TRANSFUSION OF NONAUTOLOGOUS RED BLOOD CELLS INTO PERIPHERAL VEIN, PERCUTANEOUS APPROACH: ICD-10-PCS | Performed by: HOSPITALIST

## 2022-01-22 PROCEDURE — 65270000029 HC RM PRIVATE

## 2022-01-22 PROCEDURE — 87635 SARS-COV-2 COVID-19 AMP PRB: CPT

## 2022-01-22 PROCEDURE — 96374 THER/PROPH/DIAG INJ IV PUSH: CPT

## 2022-01-22 PROCEDURE — 74022 RADEX COMPL AQT ABD SERIES: CPT

## 2022-01-22 PROCEDURE — 74011000250 HC RX REV CODE- 250

## 2022-01-22 PROCEDURE — 74011000250 HC RX REV CODE- 250: Performed by: HOSPITALIST

## 2022-01-22 PROCEDURE — 82962 GLUCOSE BLOOD TEST: CPT

## 2022-01-22 PROCEDURE — 74011250637 HC RX REV CODE- 250/637: Performed by: PHYSICIAN ASSISTANT

## 2022-01-22 PROCEDURE — 85025 COMPLETE CBC W/AUTO DIFF WBC: CPT

## 2022-01-22 PROCEDURE — 86923 COMPATIBILITY TEST ELECTRIC: CPT

## 2022-01-22 PROCEDURE — 83690 ASSAY OF LIPASE: CPT

## 2022-01-22 PROCEDURE — 36430 TRANSFUSION BLD/BLD COMPNT: CPT

## 2022-01-22 PROCEDURE — 80053 COMPREHEN METABOLIC PANEL: CPT

## 2022-01-22 PROCEDURE — P9016 RBC LEUKOCYTES REDUCED: HCPCS

## 2022-01-22 PROCEDURE — 74011250637 HC RX REV CODE- 250/637: Performed by: HOSPITALIST

## 2022-01-22 PROCEDURE — 86900 BLOOD TYPING SEROLOGIC ABO: CPT

## 2022-01-22 PROCEDURE — C9113 INJ PANTOPRAZOLE SODIUM, VIA: HCPCS

## 2022-01-22 PROCEDURE — 2709999900 HC NON-CHARGEABLE SUPPLY

## 2022-01-22 PROCEDURE — 36415 COLL VENOUS BLD VENIPUNCTURE: CPT

## 2022-01-22 RX ORDER — ONDANSETRON 2 MG/ML
4 INJECTION INTRAMUSCULAR; INTRAVENOUS
Status: DISCONTINUED | OUTPATIENT
Start: 2022-01-22 | End: 2022-01-24 | Stop reason: HOSPADM

## 2022-01-22 RX ORDER — GABAPENTIN 300 MG/1
300 CAPSULE ORAL ONCE
Status: COMPLETED | OUTPATIENT
Start: 2022-01-22 | End: 2022-01-22

## 2022-01-22 RX ORDER — SODIUM CHLORIDE 0.9 % (FLUSH) 0.9 %
5-40 SYRINGE (ML) INJECTION AS NEEDED
Status: DISCONTINUED | OUTPATIENT
Start: 2022-01-22 | End: 2022-01-24 | Stop reason: HOSPADM

## 2022-01-22 RX ORDER — POLYETHYLENE GLYCOL 3350 17 G/17G
17 POWDER, FOR SOLUTION ORAL DAILY PRN
Status: DISCONTINUED | OUTPATIENT
Start: 2022-01-22 | End: 2022-01-24 | Stop reason: HOSPADM

## 2022-01-22 RX ORDER — BISACODYL 5 MG
20 TABLET, DELAYED RELEASE (ENTERIC COATED) ORAL
Status: COMPLETED | OUTPATIENT
Start: 2022-01-22 | End: 2022-01-22

## 2022-01-22 RX ORDER — ACETAMINOPHEN 325 MG/1
650 TABLET ORAL
Status: DISCONTINUED | OUTPATIENT
Start: 2022-01-22 | End: 2022-01-24 | Stop reason: HOSPADM

## 2022-01-22 RX ORDER — POLYETHYLENE GLYCOL 3350 17 G/17G
238 POWDER, FOR SOLUTION ORAL ONCE
Status: COMPLETED | OUTPATIENT
Start: 2022-01-22 | End: 2022-01-22

## 2022-01-22 RX ORDER — SODIUM CHLORIDE 9 MG/ML
250 INJECTION, SOLUTION INTRAVENOUS AS NEEDED
Status: DISCONTINUED | OUTPATIENT
Start: 2022-01-22 | End: 2022-01-24 | Stop reason: HOSPADM

## 2022-01-22 RX ORDER — SODIUM CHLORIDE 0.9 % (FLUSH) 0.9 %
5-40 SYRINGE (ML) INJECTION EVERY 8 HOURS
Status: DISCONTINUED | OUTPATIENT
Start: 2022-01-22 | End: 2022-01-24 | Stop reason: HOSPADM

## 2022-01-22 RX ORDER — ONDANSETRON 4 MG/1
4 TABLET, ORALLY DISINTEGRATING ORAL
Status: DISCONTINUED | OUTPATIENT
Start: 2022-01-22 | End: 2022-01-24 | Stop reason: HOSPADM

## 2022-01-22 RX ORDER — INSULIN LISPRO 100 [IU]/ML
INJECTION, SOLUTION INTRAVENOUS; SUBCUTANEOUS
Status: DISCONTINUED | OUTPATIENT
Start: 2022-01-22 | End: 2022-01-24 | Stop reason: HOSPADM

## 2022-01-22 RX ORDER — SODIUM CHLORIDE 9 MG/ML
1000 INJECTION, SOLUTION INTRAVENOUS ONCE
Status: COMPLETED | OUTPATIENT
Start: 2022-01-22 | End: 2022-01-22

## 2022-01-22 RX ORDER — ACETAMINOPHEN 650 MG/1
650 SUPPOSITORY RECTAL
Status: DISCONTINUED | OUTPATIENT
Start: 2022-01-22 | End: 2022-01-24 | Stop reason: HOSPADM

## 2022-01-22 RX ADMIN — ACETAMINOPHEN 650 MG: 325 TABLET, FILM COATED ORAL at 10:16

## 2022-01-22 RX ADMIN — SODIUM CHLORIDE, PRESERVATIVE FREE 10 ML: 5 INJECTION INTRAVENOUS at 16:57

## 2022-01-22 RX ADMIN — POLYETHYLENE GLYCOL 3350 238 G: 17 POWDER, FOR SOLUTION ORAL at 18:27

## 2022-01-22 RX ADMIN — SODIUM CHLORIDE, PRESERVATIVE FREE 10 ML: 5 INJECTION INTRAVENOUS at 22:14

## 2022-01-22 RX ADMIN — BISACODYL 20 MG: 5 TABLET, COATED ORAL at 17:01

## 2022-01-22 RX ADMIN — SODIUM CHLORIDE 1000 ML: 9 INJECTION, SOLUTION INTRAVENOUS at 02:46

## 2022-01-22 RX ADMIN — GABAPENTIN 300 MG: 300 CAPSULE ORAL at 22:13

## 2022-01-22 RX ADMIN — SODIUM CHLORIDE 40 MG: 9 INJECTION INTRAMUSCULAR; INTRAVENOUS; SUBCUTANEOUS at 02:46

## 2022-01-22 RX ADMIN — SODIUM CHLORIDE 250 ML: 9 INJECTION, SOLUTION INTRAVENOUS at 05:22

## 2022-01-22 NOTE — H&P
Hospitalist History and Physical   Admit Date:  2022  2:01 AM   Name:  Dinah Stanley   Age:  78 y.o. Sex:  male  :  1945   MRN:  [id number]     Presenting Complaint: Rectal Bleeding    Reason(s) for Admission: Acute GI bleeding [K92.2]  Acute blood loss anemia [D62]     History of Present Illness:     78years old male with past medical history of diabetes type 2, hypertension presented to emergency room with chief complaint of bright blood per rectum. Patient reports he had 3 episodes bright red blood to maroon-colored bowel movements. Patient reports he was admitted and discharged and plan to follow as outpatient for colonoscopy meanwhile patient started experiencing bleeding again. Patient reports longstanding history of left shoulder pain for which he took multiple doses of NSAIDs/ibuprofen since discharge, patient reports he is not aware of bleeding related to the ibuprofen. On top patient also take aspirin 325. Patient reports crampy abdominal discomfort. Denies any fever, chills, cough, sputum sputum production patient reports some chills but no fever, cough, runny nose, sore throat. Evaluated in emergency room, ER physician contacted GI physician Dr. Quan Pittman who requested admission of this patient for further evaluation and management. Review of Systems:  10 systems reviewed and negative except as noted in HPI. Assessment & Plan:     Principal problem:    Acute GI bleed: Likely diverticular bleed aggravated when NSAIDs and aspirin. Will request consultation with GI team, monitor hemoglobin, hematocrit. Active Problems:     Acute blood loss anemia: Plan for 1 unit of blood transfusion by emergency room physician, will monitor hemoglobin and hematocrit, transfuse as needed, will request GI consultation. Benign essential hypertension (2012): Continue on home medications. Paroxysmal atrial fibrillation ([facility] [address] (2012):  Status post ablation, currently not on anticoagulation. Type 2 diabetes mellitus ([facility] (8/19/2012): Patient is on insulin pump. Will monitor blood sugars. Disposition/Discharge Planning: Cleared home. Diet: DIET NPO  VTE ppx: SCD.   Code status: Full Code      Past Medical History:   Diagnosis Date    Arthritis     Blurred vision, right eye     BPH (benign prostatic hyperplasia)     Gout     History of Clostridioides difficile colitis 2010    History of pancreatitis     History of renal carcinoma     partial nephrectomy    Hyperlipidemia     Hypertension     Nausea & vomiting     small amount of N/V and pt not sure of it antiemetics work    Neuropathy     Paroxysmal A-fib ([facility] [facility]     Presence of Watchman left atrial appendage closure device     Sleep apnea     compliant with C-pap    Thromboembolus ([facility] [address]     hx of left lower extremity    Type 2 diabetes mellitus (HCC)     insulin reliant; AVG -140; s.s. of hypoglycemia 65-75; last A1c 7.1    WPW (Alba-Parkinson-White syndrome)     ablation x4     Past Surgical History:   Procedure Laterality Date    HX AFIB ABLATION      WPW- ablations x3    HX BACK SURGERY      ruptured disc    HX CATARACT REMOVAL      HX CHOLECYSTECTOMY      HX ENDOSCOPY      HX KNEE REPLACEMENT Right     HX LUMBAR LAMINECTOMY      HX NEPHRECTOMY      partial    HX ORTHOPAEDIC Left     foot    HX ORTHOPAEDIC Left     great toe straightened    HX OTHER SURGICAL      placed watchman 2/2020    HX WISDOM TEETH EXTRACTION        Allergies   Allergen Reactions    Codeine Other (comments)     \"makes me a little crazy\"        Fenofibrate Other (comments)     \"causes pancreatic attacks\"    Hydromorphone Other (comments)     \"gives me craziness\"    Januvia [Sitagliptin] Other (comments)     Per pt causes pancreatic issues    Metformin Diarrhea      Social History     Tobacco Use    Smoking status: Never Smoker    Smokeless tobacco: Never Used   Substance Use Topics  Alcohol use: Not Currently      Family History   Problem Relation Age of Onset    Other Mother         ALS    Parkinson's Disease Father     Cancer Sister         colon ca    No Known Problems Sister     Cancer Sister         ovarian      Family history reviewed and noncontributory to patient's acute condition; no relevant family history unless otherwise noted above.   Immunization History   Administered Date(s) Administered    COVID-19, Pfizer Purple top, DILUTE for use, 12+ yrs, 30mcg/0.3mL dose 01/22/2021, 02/09/2021    Hep A Vaccine 04/01/2007, 06/03/2007    Hep B Vaccine (Adult) 04/01/2007, 06/03/2007, 11/04/2007    Influenza High Dose Vaccine PF 10/23/2012, 11/30/2013, 09/29/2014, 10/08/2015, 09/25/2019    Influenza Vaccine 11/03/2017, 12/01/2018    Influenza Vaccine (Quad) PF (>6 Mo Flulaval, Fluarix, and >3 Yrs Afluria, Fluzone [id number]) 09/20/2016    Influenza Vaccine (Trivalent) 09/28/2014    Influenza, High-dose, Quadrivalent (>65 Yrs Fluzone High Dose Quad 53603) 09/02/2020    MMR 04/08/2007    Meningococcal ACWY Vaccine 04/01/2007    Pneumococcal Conjugate (PCV-13) 09/20/2016    Pneumococcal Polysaccharide (PPSV-23) 03/21/2010, 10/01/2014    Poliovirus vaccine 04/01/2007    Tdap 09/19/2014    Typhoid Vaccine 04/01/2007    Yellow Fever Vaccine 03/30/2007    Zoster Vaccine, Live 01/01/2012     PTA Medications:  Current Outpatient Medications   Medication Instructions    acetaminophen (TYLENOL EXTRA STRENGTH) 500 mg, Oral, EVERY 6 HOURS AS NEEDED    allopurinoL (ZYLOPRIM) 300 mg, Oral, DAILY    ascorbic acid (vitamin C) (VITAMIN C) 1,000 mg, Oral, DAILY    aspirin (ASPIRIN) 325 mg, Oral, EVERY BEDTIME    B.infantis-B.ani-B.long-B.bifi (Probiotic 4X) 10-15 mg TbEC 1 Tablet, DAILY    cephALEXin (KEFLEX) 500 mg, Oral, 4 TIMES DAILY    cholecalciferol (VITAMIN D3) 1,000 Units, Oral, EVERY BEDTIME    finasteride (PROSCAR) 5 mg, Oral, DAILY    gabapentin (NEURONTIN) 100 mg, 5 TIMES DAILY    gabapentin (NEURONTIN) 300 mg, Oral, 5 TIMES DAILY    insulin aspart U-100 (NovoLOG Flexpen U-100 Insulin) 100 unit/mL (3 mL) inpn SubCUTAneous, 3 TIMES DAILY BEFORE MEALS, SSI     insulin glargine (Lantus Solostar U-100 Insulin) 100 unit/mL (3 mL) inpn SubCUTAneous, 2 TIMES DAILY, 45 units in the morning and 30 units in the evening     lipase-protease-amylase (Creon) 24,000-76,000 -120,000 unit capsule 1 Capsule, 3 TIMES DAILY WITH MEALS    lisinopriL (PRINIVIL, ZESTRIL) 20 mg, Oral, DAILY    multivitamin (ONE A DAY) tablet 1 Tablet, Oral, EVERY BEDTIME    omega 3-DHA-EPA-fish oil 1,000 mg (120 mg-180 mg) capsule 1 Capsule, Oral, DAILY    ondansetron (ZOFRAN ODT) 8 mg, Oral, EVERY 12 HOURS AS NEEDED    pantoprazole (PROTONIX) 20 mg, Oral, DAILY    simvastatin (ZOCOR) 10 mg, EVERY BEDTIME    vitamin E (AQUA GEMS) 450 Units, EVERY BEDTIME       Objective:     Patient Vitals for the past 24 hrs:   Temp Pulse Resp BP SpO2   01/22/22 0430  86 19 (!) 131/56    01/22/22 0400  86 17 (!) 140/60    01/22/22 0330  80 21 (!) 125/56 98 %   01/22/22 0323  86 23  99 %   01/22/22 0322    126/60    01/22/22 0230  84  (!) 167/71    01/22/22 0203 98.7 °F (37.1 °C) 96 18 (!) 155/98 99 %     Oxygen Therapy  O2 Sat (%): 98 % (01/22/22 0330)  Pulse via Oximetry: 80 beats per minute (01/22/22 0330)  O2 Device: None (Room air) (01/22/22 0203)    Estimated body mass index is 31.57 kg/m² as calculated from the following:    Height as of this encounter: 5' 10\" (1.778 m). Weight as of this encounter: 99.8 kg (220 lb). No intake or output data in the 24 hours ending 01/22/22 0514      Physical Exam:    General:    Well nourished. No overt distress  Head:  Normocephalic, atraumatic  Eyes:  Sclerae appear normal.  Pupils equally round. HENT:  Nares appear normal, no drainage. Moist mucous membranes  Neck:  No restricted ROM. Trachea midline  CV:   RRR. No m/r/g. No JVD  Lungs:   CTAB.   No wheezing, rhonchi, or rales. Appears even, unlabored  Abdomen: Bowel sounds present. Soft, nontender, nondistended. Extremities: Warm and dry. No cyanosis or clubbing. No edema. Skin:     No rashes. Normal turgor. Normal coloration  Neuro:   Alert and oriented x3    Data Ordered and Personally Reviewed:    Last 24hr Labs:  Recent Results (from the past 24 hour(s))   RBC, ALLOCATE    Collection Time: 01/21/22  3:30 PM   Result Value Ref Range    HISTORY CHECKED? Historical check performed    CBC WITH AUTOMATED DIFF    Collection Time: 01/22/22  2:05 AM   Result Value Ref Range    WBC 13.2 (H) 4.3 - 11.1 K/uL    RBC 2.59 (L) 4.23 - 5.6 M/uL    HGB 7.7 (L) 13.6 - 17.2 g/dL    HCT 25.7 (L) 41.1 - 50.3 %    MCV 99.2 (H) 79.6 - 97.8 FL    MCH 29.7 26.1 - 32.9 PG    MCHC 30.0 (L) 31.4 - 35.0 g/dL    RDW 17.9 (H) 11.9 - 14.6 %    PLATELET 463 810 - 951 K/uL    MPV 9.7 9.4 - 12.3 FL    ABSOLUTE NRBC 0.00 0.0 - 0.2 K/uL    DF AUTOMATED      NEUTROPHILS 59 43 - 78 %    LYMPHOCYTES 29 13 - 44 %    MONOCYTES 7 4.0 - 12.0 %    EOSINOPHILS 1 0.5 - 7.8 %    BASOPHILS 1 0.0 - 2.0 %    IMMATURE GRANULOCYTES 4 0.0 - 5.0 %    ABS. NEUTROPHILS 7.7 1.7 - 8.2 K/UL    ABS. LYMPHOCYTES 3.8 0.5 - 4.6 K/UL    ABS. MONOCYTES 1.0 0.1 - 1.3 K/UL    ABS. EOSINOPHILS 0.2 0.0 - 0.8 K/UL    ABS. BASOPHILS 0.1 0.0 - 0.2 K/UL    ABS. IMM. GRANS. 0.5 0.0 - 0.5 K/UL   METABOLIC PANEL, COMPREHENSIVE    Collection Time: 01/22/22  2:05 AM   Result Value Ref Range    Sodium 140 136 - 145 mmol/L    Potassium 4.9 3.5 - 5.1 mmol/L    Chloride 110 (H) 98 - 107 mmol/L    CO2 21 21 - 32 mmol/L    Anion gap 9 7 - 16 mmol/L    Glucose 108 (H) 65 - 100 mg/dL    BUN 52 (H) 8 - 23 MG/DL    Creatinine 2.02 (H) 0.8 - 1.5 MG/DL    GFR est AA 42 (L) >60 ml/min/1.73m2    GFR est non-AA 34 (L) >60 ml/min/1.73m2    Calcium 8.7 8.3 - 10.4 MG/DL    Bilirubin, total 0.2 0.2 - 1.1 MG/DL    ALT (SGPT) 25 12 - 65 U/L    AST (SGOT) 22 15 - 37 U/L    Alk.  phosphatase 111 50 - 136 U/L    Protein, total 5.9 (L) 6.3 - 8.2 g/dL    Albumin 1.8 (L) 3.2 - 4.6 g/dL    Globulin 4.1 (H) 2.3 - 3.5 g/dL    A-G Ratio 0.4 (L) 1.2 - 3.5     LIPASE    Collection Time: 01/22/22  2:05 AM   Result Value Ref Range    Lipase 30 (L) 73 - 393 U/L   TYPE & SCREEN    Collection Time: 01/22/22  2:47 AM   Result Value Ref Range    Crossmatch Expiration 01/25/2022,2359     ABO/Rh(D) A POSITIVE     Antibody screen NEG     Unit number [id number]     Blood component type RC LR     Unit division 00     Status of unit ISSUED     Crossmatch result Compatible    URINE MICROSCOPIC    Collection Time: 01/22/22  4:17 AM   Result Value Ref Range    WBC 0 0 /hpf    RBC 0-3 0 /hpf    Epithelial cells 0 0 /hpf    Bacteria 0 0 /hpf    Casts 0-3 0 /lpf   RBC, ALLOCATE    Collection Time: 01/22/22  4:45 AM   Result Value Ref Range    HISTORY CHECKED? Historical check performed        All Micro Results     None          Other Studies:  XR ABD ACUTE W 1 V CHEST    Result Date: 1/22/2022  EXAMINATION: Acute Abdominal Series. HISTORY: GI bleed TECHNIQUE: Supine and upright views of the abdomen. Single frontal view of the chest. COMPARISON: Chest x-ray dated 1/19/2022 FINDINGS: Supine images show a nonspecific, nonobstructed bowel gas pattern. There is a moderate amount of retained fecal material. Gas is projecting over the rectum. The upright images show no definitive evidence of free air. No air-fluid levels are seen. The cardiac silhouette mediastinum and pulmonary vasculature are within normal limits. The lungs are clear and there is no pleural effusion or pneumothorax. Chronic appearing changes are seen at the right base. Osseous structures and soft tissues appear within normal limits. Nonspecific, nonobstructed bowel gas pattern. No evidence of free intraperitoneal air. There is a moderate amount of retained fecal material. No acute intrathoracic process.         Medications Administered     0.9% sodium chloride infusion 1,000 mL     Admin Date  01/22/2022 Action  New Bag Dose  1,000 mL Rate  2,000 mL/hr Route  IntraVENous Administered By  Yajaira Smart RN          pantoprazole (PROTONIX) 40 mg in 0.9% sodium chloride 10 mL injection     Admin Date  01/22/2022 Action  Given Dose  40 mg Route  IntraVENous Administered By  Yajaira Smart RN                  Signed:  Olga Dejesus MD    Part of this note may have been written by using a voice dictation software. The note has been proof read but may still contain some grammatical/other typographical errors.

## 2022-01-22 NOTE — H&P (VIEW-ONLY)
[facility] Consult Note       Primary GI Physician:  Shelbie Carrizales and Dr. Stacy Evans    Referring Provider:  Dr. Wsiam Zhang Date:  1/22/2022    Admit Date:  1/22/2022    Chief Complaint:  GI bleed    Subjective:     History of Present Illness:  Patient is a 78 y.o. male with PMH including but not limited to chronic pancreatitis with hx of necrotizing - on pancreatic enzymes, hx of duodenal ulcer, chronic anemia - followed by Dr. Kar Call and receiving IV Iron, CKD, A fib - not on anticoagulation, [employer] s/p ablation, DM, hx renal cell carcinoma, hx of DVT, KAITLIN, HTN, HLD, gout, who is seen in consultation at the request of Dr. Elif Valles for GI bleed. He was seen during recent admission at [facility] 15-17 Jan 2022 for rectal bleeding and hgb was stable in 10 range. Bleeding felt to be likely diverticular in nature and resolved over the admission. He was recommended to follow up with [facility] or our office. He was seen in our office on 19 Jan 2022 for abd pain, noted to have confusion and SOB, and as sent to the ER at [facility]. He was evaluated and AMS was felt to be related to side effect of Hydrocodone he had taken earlier that day, as well as fever. He was back to baseline at the ER. His insulin pump had also run out and was given an IV dose of insulin. He wished to go home and was discharged. He returned to the ER today due to recurrent rectal bleeding, having 3 episodes of BRB to maroon stools with clots, with associated cramping abd pain. He had been taking Ibuprofen and other NSAIDs for shoulder pain, as well as  mg. Labs in the ER noted hgb 7.7 with MCV 99.2, hgb down from 8.7 on 19 Jan 2022 (7.9 on 16 Jan 2022), BUN 52, Cr 2.02. Hgb 7.4 on recheck. AAS noted nonspecific, nonobstructed bowel gas pattern, no evidence of free intraperitoneal air, moderate amount of retained fecal material, no acute intrathoracic process.     PMH:  Past Medical History:   Diagnosis Date    Arthritis     Blurred vision, right eye     BPH (benign prostatic hyperplasia)     Gout     History of Clostridioides difficile colitis 2010    History of pancreatitis     History of renal carcinoma     partial nephrectomy    Hyperlipidemia     Hypertension     Nausea & vomiting     small amount of N/V and pt not sure of it antiemetics work    Neuropathy     Paroxysmal A-fib ([address]     Presence of Watchman left atrial appendage closure device     Sleep apnea     compliant with C-pap    Thromboembolus ([address]     hx of left lower extremity    Type 2 diabetes mellitus (HCC)     insulin reliant; AVG -140; s.s. of hypoglycemia 65-75; last A1c 7.1    WPW (Alba-Parkinson-White syndrome)     ablation x4     Tubuar adenoma colon polyp in 2013  Multiple TVA and TA colon polyps in 2018     Colonoscopy 2/19/2021 for chronic diarrhea and chronic anemia  Impression: - Congested, erythematous and granular mucosa in the   terminal ileum. Biopsied.  - No cecal \"patch. \"  - Moderate diverticulosis in the sigmoid colon, in the   descending colon, in the transverse colon, at the   hepatic flexure and in the ascending colon. There was   no evidence of diverticular bleeding.  - One 2 mm polyp in the cecum, removed with a cold   biopsy forceps. Resected and retrieved. - Stool in the rectum and in the ascending colon. Fluid aspiration performed.   - The rectum, sigmoid colon, descending colon, splenic   flexure, transverse colon, ascending colon,   appendiceal orifice and ileocecal valve are normal.  - Non-bleeding external hemorrhoids  FINAL DIAGNOSIS:  A.  Small bowel, terminal ileum, biopsies:      *   Small bowel mucosa with prominent lymphoid aggregates; otherwise, no significant pathologic abnormalities      *   No active or chronic inflammatory lesions are identified      *   No pathologic microorganisms, viral cytopathic changes, or granulomas are identified   B.  Colon, cecum/ascending, biopsies:      *   Colonic mucosa with no significant pathologic abnormalities      *   No active or chronic inflammatory lesions are identified      *   No pathologic microorganisms, viral cytopathic changes, or granulomas are identified   C.  Colon, cecum, polyp:      *   Fragment of tubular adenoma      *   No high-grade dysplasia is identified   D.  Colon, transverse, biopsies:      *   Colonic mucosa with no significant pathologic abnormalities      *   No active or chronic inflammatory lesions are identified      *   No pathologic microorganisms, viral cytopathic changes, or granulomas are identified   E.  Colon, descending/sigmoid, biopsies:      *   Colonic mucosa with no significant pathologic abnormalities      *   No active or chronic inflammatory lesions are identified      *   No pathologic microorganisms, viral cytopathic changes, or granulomas are identified   F.  Colon, rectum, biopsies:      *   Colonic mucosa with very mild architectural disarray; otherwise, no significant pathologic abnormalities      *   No active or chronic inflammatory lesions are identified      *   No pathologic microorganisms, viral cytopathic changes, or granulomas are identified     EGD/EUS 6/1/2021  Impression:  --Gastritis in gastric fundus and body identified. Punctate hemorrhages in gastric mucosa. Biopsies obtained. --Duodenitis identified in duodenal bulb. --PD stone noted 3mm, chronic pancreatitis  FINAL DIAGNOSIS:   Stomach, biopsies:Antral and oxyntic type gastric mucosa with no significant histopathologic abnormality. Negative for features of Helicobacter gastritis, intestinal metaplasia, dysplasia, or malignancy.     PSH:  Past Surgical History:   Procedure Laterality Date    HX AFIB ABLATION      WPW- ablations x3    HX BACK SURGERY      ruptured disc    HX CATARACT REMOVAL      HX CHOLECYSTECTOMY      HX ENDOSCOPY      HX KNEE REPLACEMENT Right     HX LUMBAR LAMINECTOMY      HX NEPHRECTOMY      partial    HX ORTHOPAEDIC Left     foot    HX ORTHOPAEDIC Left     great toe straightened    HX OTHER SURGICAL      placed watchman 2/2020    HX WISDOM TEETH EXTRACTION         Allergies: Allergies   Allergen Reactions    Codeine Other (comments)     \"makes me a little crazy\"        Fenofibrate Other (comments)     \"causes pancreatic attacks\"    Hydromorphone Other (comments)     \"gives me craziness\"    Januvia [Sitagliptin] Other (comments)     Per pt causes pancreatic issues    Metformin Diarrhea       Home Medications:  Prior to Admission medications    Medication Sig Start Date End Date Taking? Authorizing Provider   gabapentin (NEURONTIN) 300 mg capsule Take 300 mg by mouth five (5) times daily. Indications: neuropathic pain   Yes Provider, Historical   allopurinoL (ZYLOPRIM) 300 mg tablet Take 300 mg by mouth daily. Yes Provider, Historical   pantoprazole (PROTONIX) 20 mg tablet Take 20 mg by mouth daily. Yes Provider, Historical   lisinopriL (PRINIVIL, ZESTRIL) 20 mg tablet Take 20 mg by mouth daily. Yes Provider, Historical   finasteride (PROSCAR) 5 mg tablet Take 5 mg by mouth daily. Yes Provider, Historical   multivitamin (ONE A DAY) tablet Take 1 Tab by mouth nightly. Yes Provider, Historical   cholecalciferol (Vitamin D3) 25 mcg (1,000 unit) cap Take 1,000 Units by mouth nightly. Yes Provider, Historical   ascorbic acid, vitamin C, (Vitamin C) 250 mg tablet Take 1,000 mg by mouth daily. Yes Provider, Historical   omega 3-DHA-EPA-fish oil 1,000 mg (120 mg-180 mg) capsule Take 1 Cap by mouth daily. Yes Provider, Historical   cephALEXin (KEFLEX) 500 mg capsule Take 1 Capsule by mouth four (4) times daily. Patient not taking: Reported on 1/15/2022 9/9/21   Eric Richards NP   ondansetron (Zofran ODT) 8 mg disintegrating tablet Take 1 Tab by mouth every twelve (12) hours as needed for Nausea.   Patient not taking: Reported on 1/15/2022 10/30/20   Joel Mendiola MD   acetaminophen (Tylenol Extra Strength) 500 mg tablet Take 500 mg by mouth every six (6) hours as needed for Pain. Provider, Historical   gabapentin (NEURONTIN) 100 mg capsule Take 100 mg by mouth five (5) times daily. Patient not taking: Reported on 1/15/2022    Provider, Historical   lipase-protease-amylase (Creon) 24,000-76,000 -120,000 unit capsule Take 1 Cap by mouth three (3) times daily (with meals). Patient not taking: Reported on 1/15/2022    Provider, Historical   insulin aspart U-100 (NovoLOG Flexpen U-100 Insulin) 100 unit/mL (3 mL) inpn by SubCUTAneous route Before breakfast, lunch, and dinner. SSI    Provider, Historical   insulin glargine (Lantus Solostar U-100 Insulin) 100 unit/mL (3 mL) inpn by SubCUTAneous route two (2) times a day. 45 units in the morning and 30 units in the evening    Provider, Historical   simvastatin (ZOCOR) 10 mg tablet Take 10 mg by mouth nightly. Patient not taking: Reported on 1/15/2022    Provider, Historical   aspirin (ASPIRIN) 325 mg tablet Take 325 mg by mouth nightly. Provider, Historical   B.infantis-B.ani-B.long-B.bifi (Probiotic 4X) 10-15 mg TbEC Take 1 Tab by mouth daily. Patient not taking: Reported on 1/15/2022    Provider, Historical   vitamin E (AQUA GEMS) 400 unit capsule Take 450 Units by mouth nightly.   Patient not taking: Reported on 1/15/2022    Provider, Historical       Hospital Medications:  Current Facility-Administered Medications   Medication Dose Route Frequency    0.9% sodium chloride infusion 250 mL  250 mL IntraVENous PRN    sodium chloride (NS) flush 5-40 mL  5-40 mL IntraVENous Q8H    sodium chloride (NS) flush 5-40 mL  5-40 mL IntraVENous PRN    acetaminophen (TYLENOL) tablet 650 mg  650 mg Oral Q6H PRN    Or    acetaminophen (TYLENOL) suppository 650 mg  650 mg Rectal Q6H PRN    polyethylene glycol (MIRALAX) packet 17 g  17 g Oral DAILY PRN    ondansetron (ZOFRAN ODT) tablet 4 mg  4 mg Oral Q8H PRN    Or    ondansetron (ZOFRAN) injection 4 mg  4 mg IntraVENous Q6H PRN    insulin lispro (HUMALOG) injection   SubCUTAneous AC&HS       Social History:  Social History     Tobacco Use    Smoking status: Never Smoker    Smokeless tobacco: Never Used   Substance Use Topics    Alcohol use: Not Currently       Family History:  Family History   Problem Relation Age of Onset    Other Mother         ALS    Parkinson's Disease Father     Cancer Sister         colon ca    No Known Problems Sister     Cancer Sister         ovarian       Review of Systems:  A detailed 10 system ROS is obtained, with pertinent positives as listed above. All others are negative. Diet:  NPO    Objective:     Physical Exam:  Vitals:  Visit Vitals  /78 (BP 1 Location: Left upper arm, BP Patient Position: At rest;Supine)   Pulse 81   Temp 97.8 °F (36.6 °C)   Resp 16   Ht 5' 10\" (1.778 m)   Wt 99.8 kg (220 lb)   SpO2 99%   BMI 31.57 kg/m²     Gen:  Pt is alert, cooperative, no acute distress  Skin:  Extremities and face reveal no rashes. HEENT: Sclerae anicteric. Extra-occular muscles are intact. No oral ulcers. No abnormal pigmentation of the lips. The neck is supple. Cardiovascular: Regular rate and rhythm. No murmurs, gallops, or rubs. Respiratory:  Comfortable breathing with no accessory muscle use. Clear breath sounds anteriorly with no wheezes, rales, or rhonchi. GI:  Abdomen nondistended, soft, and nontender. Normal active bowel sounds. No enlargement of the liver or spleen. No masses palpable. Rectal:  Deferred  Musculoskeletal:  No pitting edema of the lower legs. Neurological:  Gross memory appears intact. Patient is alert and oriented. Psychiatric:  Mood appears appropriate with judgement intact. Lymphatic:  No cervical or supraclavicular adenopathy.     Laboratory:    Recent Labs     01/22/22  1241 01/22/22  0205 01/19/22  1659   WBC  --  13.2* 17.0*   HGB 7.4* 7.7* 8.7*   HCT 24.2* 25.7* 28.2*   PLT  --  285 366   MCV  --  99.2* 98.6*   NA  -- 140 136   K  --  4.9 5.5*   CL  --  110* 107   CO2  --  21 24   BUN  --  52* 50*   CREA  --  2.02* 1.98*   CA  --  8.7 8.3   GLU  --  108* 226*   AP  --  111 131   AST  --  22 22   ALT  --  25 41   TBILI  --  0.2 0.4   ALB  --  1.8* 2.2*   TP  --  5.9* 6.4   LPSE  --  30*  --       Acute Abdominal Series. 22 Jan 2022   HISTORY: GI bleed   TECHNIQUE: Supine and upright views of the abdomen. Single frontal view of the  chest.   COMPARISON: Chest x-ray dated 1/19/2022   FINDINGS:   Supine images show a nonspecific, nonobstructed bowel gas pattern. There is a  moderate amount of retained fecal material. Gas is projecting over the rectum.   The upright images show no definitive evidence of free air. No air-fluid levels  are seen.   The cardiac silhouette mediastinum and pulmonary vasculature are within normal  limits.   The lungs are clear and there is no pleural effusion or pneumothorax. Chronic  appearing changes are seen at the right base.   Osseous structures and soft tissues appear within normal limits.   IMPRESSION  Nonspecific, nonobstructed bowel gas pattern. No evidence of free  intraperitoneal air.   There is a moderate amount of retained fecal material.   No acute intrathoracic process.     Assessment:     Principal Problem:    Acute GI bleeding (1/22/2022)    Active Problems:    Benign essential hypertension (8/19/2012)      Overview: Last Assessment & Plan:       Formatting of this note might be different from the original.      - BP low normal on lisinopril      - monitor BP at home, may need to reduce dose due to dizziness      Paroxysmal atrial fibrillation (HCC) (8/29/2012)      Overview: Formatting of this note might be different from the original.      Paroxysmal             Last Assessment & Plan:       Formatting of this note might be different from the original.      - s/p ablation       - not fully anticoagulated due to maintenance of sinus rhythm      Type 2 diabetes mellitus ([facility] [address] (8/19/2012) Acute blood loss anemia (1/15/2022)    79 yo male pt of [facility] with PMH including but not limited to chronic pancreatitis with hx of necrotizing - on pancreatic enzymes, hx of duodenal ulcer, chronic anemia - followed by Dr. Rosie Huff and receiving IV Iron, CKD, A fib - not on anticoagulation, [employer] s/p ablation, DM, hx renal cell carcinoma, hx of DVT, KAITLIN, HTN, HLD, gout, who is seen in consultation 22 Jan 2022 at the request of Dr. Ida Lagunas for GI bleed, who was recently seen at [facility] for GI bleeding, felt to be diverticular. He has had recurrent bleeding and labs in the ER noted hgb 7.7 with MCV 99.2, hgb down from 8.7 on 19 Jan 2022 (7.9 on 16 Jan 2022), BUN 52, Cr 2.02. Hgb 7.4 on recheck. AAS noted moderate amount of retained fecal material.  Bleeding is likely diverticular, but given quick recurrence, recommend colonoscopy to evaluate for any inflammation, AVMs, polyps. Plan:     - Supportive care, maintain electrolytes. - Clear liquids, prep for colonoscopy. - Colonoscopy on Sunday with Dr. Stephen Bradford.  - NPO after MN.  - Monitor hgb and transfuse PRN if hgb <7.  - Follow. Mr Alma Sen is well known to me from last weekend, unfortunately he has had recurrence of his GI bleeding which will prompt us to perform colonoscopy in am.  He is in agreement.

## 2022-01-22 NOTE — PROGRESS NOTES
01/22/22 1132   Dual Skin Pressure Injury Assessment   Dual Skin Pressure Injury Assessment WDL   Second Care Provider (Based on [address] [place] [address]) Sukhjinder Miller    Skin Integumentary   Skin Integumentary (WDL) WDL    Pressure  Injury Documentation No Pressure Injury Noted-Pressure Ulcer Prevention Initiated

## 2022-01-22 NOTE — PROGRESS NOTES
Hospitalist Progress Note   Admit Date:  2022  2:01 AM   Name:  Cameron Jordan   Age:  78 y.o. Sex:  male  :  1945   MRN:  [id number]   Room:  /    Presenting Complaint: Rectal Bleeding    Reason(s) for Admission: Acute GI bleeding [K92.2]  Acute blood loss anemia [D62]     Hospital Course & Interval History:   Please refer to the admission H&P for details of presentation. In summary, Cameron Jordan is a 78 y.o. male with medical history significant for diabetes type 2, hypertension, CKD3, afib, and chronic anemia presented to emergency room with chief complaint of bright blood per rectum. Patient reports he had 3 episodes bright red blood to maroon-colored bowel movements. Patient was admitted (1/15 - ) for hematocheza and discharged withplan to follow as outpatient for colonoscopy. Subjective/24 hr Events (22) : Patient is seen and examined at bedside. No acute events reported overnight by nursing staff. Resting comfortably without any distress. Patient denies fever, chills, chest pains, shortness of breath, n/v, abdominal pain. .     Review of Systems: 10 point review of systems is otherwise negative with the exception of the elements mentioned above. Assessment & Plan:     Acute Blood loss anemia due to lower GI bleeding   Hb of 7.7. s/p 1unit of PRBC in ED. Recheck is 7.4  - GI recommendations appreciated  - NPO after midnight  - colonoscopy tomorrow  - monitor hb and transfuse as needed    Benign essential hypertension: Continue on home medications. Paroxysmal atrial fibrillation : Status post ablation, currently not on anticoagulation. Type 2 diabetes mellitus : Patient is on insulin pump. Will monitor blood sugars.     Diet:  DIET NPO  ADULT DIET Clear Liquid  DVT PPx:SCDs  Code status: Full Code    Hospital Problems as of 2022 Date Reviewed: 2021          Codes Class Noted - Resolved POA    * (Principal) Acute GI bleeding ICD-10-CM: K92.2  ICD-9-CM: 578.9  1/22/2022 - Present Unknown        Acute blood loss anemia ICD-10-CM: D62  ICD-9-CM: 285.1  1/15/2022 - Present Unknown        Paroxysmal atrial fibrillation (HCC) ICD-10-CM: I48.0  ICD-9-CM: 427.31  8/29/2012 - Present Yes    Overview Signed 1/15/2022  3:58 AM by Sami Gonzales MD     Formatting of this note might be different from the original.  Paroxysmal     Last Assessment & Plan:   Formatting of this note might be different from the original.  - s/p ablation   - not fully anticoagulated due to maintenance of sinus rhythm             Benign essential hypertension ICD-10-CM: I10  ICD-9-CM: 401.1  8/19/2012 - Present Yes    Overview Signed 1/15/2022  3:58 AM by Sami Gonzales MD     Last Assessment & Plan:   Formatting of this note might be different from the original.  - BP low normal on lisinopril  - monitor BP at home, may need to reduce dose due to dizziness             Type 2 diabetes mellitus ([address] ICD-10-CM: E11.9  ICD-9-CM: 250.00  8/19/2012 - Present Yes              Objective:     Patient Vitals for the past 24 hrs:   Temp Pulse Resp BP SpO2   01/22/22 1154 97.8 °F (36.6 °C) 81 16 136/78 99 %   01/22/22 1100  79 20 (!) 126/58 95 %   01/22/22 0925  76 15  97 %   01/22/22 0900  80 22 (!) 140/65    01/22/22 0717 98 °F (36.7 °C) 78  111/62 98 %   01/22/22 0545 97.9 °F (36.6 °C) 82 20 (!) 118/58 98 %   01/22/22 0540 97.8 °F (36.6 °C) 82 16 126/74 98 %   01/22/22 0535 97.9 °F (36.6 °C) 82 16 124/60 97 %   01/22/22 0530 98.1 °F (36.7 °C) 87 11 127/61 98 %   01/22/22 0521 98.1 °F (36.7 °C) 82 16 120/69 98 %   01/22/22 0430  86 19 (!) 131/56    01/22/22 0400  86 17 (!) 140/60    01/22/22 0330  80 21 (!) 125/56 98 %   01/22/22 0323  86 23  99 %   01/22/22 0322    126/60    01/22/22 0230  84  (!) 167/71    01/22/22 0203 98.7 °F (37.1 °C) 96 18 (!) 155/98 99 %     Oxygen Therapy  O2 Sat (%): 99 % (01/22/22 1154)  Pulse via Oximetry: 77 beats per minute (01/22/22 1100)  O2 Device: None (01/22/22 0521)    Estimated body mass index is 31.57 kg/m² as calculated from the following:    Height as of this encounter: 5' 10\" (1.778 m). Weight as of this encounter: 99.8 kg (220 lb). Intake/Output Summary (Last 24 hours) at 1/22/2022 1539  Last data filed at 1/22/2022 0717  Gross per 24 hour   Intake 138.8 ml   Output    Net 138.8 ml         Physical Exam:     Blood pressure 136/78, pulse 81, temperature 97.8 °F (36.6 °C), resp. rate 16, height 5' 10\" (1.778 m), weight 99.8 kg (220 lb), SpO2 99 %. General:    Well nourished. No overt distress  Head:  Normocephalic, atraumatic  Eyes:  Sclerae appear normal.  Pupils equally round. ENT:  Nares appear normal, no drainage. Moist oral mucosa  Neck:  No restricted ROM. Trachea midline   CV:   RRR. No jugular venous distension. Lungs:   CTAB. No wheezing. Respirations even, unlabored  Abdomen: Bowel sounds present. Soft, nontender, nondistended. Extremities: No cyanosis or clubbing. No edema  Skin:     No rashes and normal coloration. Warm and dry. Neuro:  CN II-XII grossly intact. A&Ox3  Psych:  Normal mood and affect. I have reviewed ordered lab tests and independently visualized imaging below:    Recent Labs:  Recent Results (from the past 48 hour(s))   RBC, ALLOCATE    Collection Time: 01/21/22  3:30 PM   Result Value Ref Range    HISTORY CHECKED?  Historical check performed    CBC WITH AUTOMATED DIFF    Collection Time: 01/22/22  2:05 AM   Result Value Ref Range    WBC 13.2 (H) 4.3 - 11.1 K/uL    RBC 2.59 (L) 4.23 - 5.6 M/uL    HGB 7.7 (L) 13.6 - 17.2 g/dL    HCT 25.7 (L) 41.1 - 50.3 %    MCV 99.2 (H) 79.6 - 97.8 FL    MCH 29.7 26.1 - 32.9 PG    MCHC 30.0 (L) 31.4 - 35.0 g/dL    RDW 17.9 (H) 11.9 - 14.6 %    PLATELET 009 480 - 730 K/uL    MPV 9.7 9.4 - 12.3 FL    ABSOLUTE NRBC 0.00 0.0 - 0.2 K/uL    DF AUTOMATED      NEUTROPHILS 59 43 - 78 %    LYMPHOCYTES 29 13 - 44 %    MONOCYTES 7 4.0 - 12.0 %    EOSINOPHILS 1 0.5 - 7.8 %    BASOPHILS 1 0.0 - 2.0 %    IMMATURE GRANULOCYTES 4 0.0 - 5.0 %    ABS. NEUTROPHILS 7.7 1.7 - 8.2 K/UL    ABS. LYMPHOCYTES 3.8 0.5 - 4.6 K/UL    ABS. MONOCYTES 1.0 0.1 - 1.3 K/UL    ABS. EOSINOPHILS 0.2 0.0 - 0.8 K/UL    ABS. BASOPHILS 0.1 0.0 - 0.2 K/UL    ABS. IMM. GRANS. 0.5 0.0 - 0.5 K/UL   METABOLIC PANEL, COMPREHENSIVE    Collection Time: 01/22/22  2:05 AM   Result Value Ref Range    Sodium 140 136 - 145 mmol/L    Potassium 4.9 3.5 - 5.1 mmol/L    Chloride 110 (H) 98 - 107 mmol/L    CO2 21 21 - 32 mmol/L    Anion gap 9 7 - 16 mmol/L    Glucose 108 (H) 65 - 100 mg/dL    BUN 52 (H) 8 - 23 MG/DL    Creatinine 2.02 (H) 0.8 - 1.5 MG/DL    GFR est AA 42 (L) >60 ml/min/1.73m2    GFR est non-AA 34 (L) >60 ml/min/1.73m2    Calcium 8.7 8.3 - 10.4 MG/DL    Bilirubin, total 0.2 0.2 - 1.1 MG/DL    ALT (SGPT) 25 12 - 65 U/L    AST (SGOT) 22 15 - 37 U/L    Alk. phosphatase 111 50 - 136 U/L    Protein, total 5.9 (L) 6.3 - 8.2 g/dL    Albumin 1.8 (L) 3.2 - 4.6 g/dL    Globulin 4.1 (H) 2.3 - 3.5 g/dL    A-G Ratio 0.4 (L) 1.2 - 3.5     LIPASE    Collection Time: 01/22/22  2:05 AM   Result Value Ref Range    Lipase 30 (L) 73 - 393 U/L   TYPE & SCREEN    Collection Time: 01/22/22  2:47 AM   Result Value Ref Range    Crossmatch Expiration 01/25/2022,2359     ABO/Rh(D) A POSITIVE     Antibody screen NEG     Unit number [id number]     Blood component type RC LR     Unit division 00     Status of unit ISSUED     Crossmatch result Compatible    URINE MICROSCOPIC    Collection Time: 01/22/22  4:17 AM   Result Value Ref Range    WBC 0 0 /hpf    RBC 0-3 0 /hpf    Epithelial cells 0 0 /hpf    Bacteria 0 0 /hpf    Casts 0-3 0 /lpf   RBC, ALLOCATE    Collection Time: 01/22/22  4:45 AM   Result Value Ref Range    HISTORY CHECKED?  Historical check performed    COVID-19 RAPID TEST    Collection Time: 01/22/22  6:17 AM   Result Value Ref Range    Specimen source NASAL SWAB      COVID-19 rapid test Not detected NOTD     GLUCOSE, POC    Collection Time: 01/22/22  7:24 AM   Result Value Ref Range    Glucose (POC) 83 65 - 100 mg/dL    Performed by [facility]    HGB & HCT    Collection Time: 01/22/22 12:41 PM   Result Value Ref Range    HGB 7.4 (L) 13.6 - 17.2 g/dL    HCT 24.2 (L) 41.1 - 50.3 %   GLUCOSE, POC    Collection Time: 01/22/22  1:19 PM   Result Value Ref Range    Glucose (POC) 78 65 - 100 mg/dL    Performed by [facility]        All Micro Results     Procedure Component Value Units Date/Time    COVID-19 RAPID TEST [[id number]] Collected: 01/22/22 0617    Order Status: Completed Specimen: Nasopharyngeal Updated: 01/22/22 4221     Specimen source NASAL SWAB        COVID-19 rapid test Not detected        Comment:      The specimen is NEGATIVE for SARS-CoV-2, the novel coronavirus associated with COVID-19. A negative result does not rule out COVID-19. This test has been authorized by the FDA under an Emergency Use Authorization (EUA) for use by authorized laboratories. Fact sheet for Healthcare Providers: ConventionUpdate.co.nz  Fact sheet for Patients: ConventionUpdate.co.nz       Methodology: Isothermal Nucleic Acid Amplification               Other Studies:  XR ABD ACUTE W 1 V CHEST    Result Date: 1/22/2022  EXAMINATION: Acute Abdominal Series. HISTORY: GI bleed TECHNIQUE: Supine and upright views of the abdomen. Single frontal view of the chest. COMPARISON: Chest x-ray dated 1/19/2022 FINDINGS: Supine images show a nonspecific, nonobstructed bowel gas pattern. There is a moderate amount of retained fecal material. Gas is projecting over the rectum. The upright images show no definitive evidence of free air. No air-fluid levels are seen. The cardiac silhouette mediastinum and pulmonary vasculature are within normal limits. The lungs are clear and there is no pleural effusion or pneumothorax. Chronic appearing changes are seen at the right base.  Osseous structures and soft tissues appear within normal limits. Nonspecific, nonobstructed bowel gas pattern. No evidence of free intraperitoneal air. There is a moderate amount of retained fecal material. No acute intrathoracic process. Current Meds:  Current Facility-Administered Medications   Medication Dose Route Frequency    0.9% sodium chloride infusion 250 mL  250 mL IntraVENous PRN    sodium chloride (NS) flush 5-40 mL  5-40 mL IntraVENous Q8H    sodium chloride (NS) flush 5-40 mL  5-40 mL IntraVENous PRN    acetaminophen (TYLENOL) tablet 650 mg  650 mg Oral Q6H PRN    Or    acetaminophen (TYLENOL) suppository 650 mg  650 mg Rectal Q6H PRN    polyethylene glycol (MIRALAX) packet 17 g  17 g Oral DAILY PRN    ondansetron (ZOFRAN ODT) tablet 4 mg  4 mg Oral Q8H PRN    Or    ondansetron (ZOFRAN) injection 4 mg  4 mg IntraVENous Q6H PRN    insulin lispro (HUMALOG) injection   SubCUTAneous AC&HS    polyethylene glycol (MIRALAX) powder 238 g  238 g Oral ONCE    bisacodyL (DULCOLAX) tablet 20 mg  20 mg Oral NOW       Signed:  Pennie Palma MD    Part of this note may have been written by using a voice dictation software. The note has been proof read but may still contain some grammatical/other typographical errors.

## 2022-01-22 NOTE — PROGRESS NOTES
Problem: Falls - Risk of  Goal: *Absence of Falls  Description: Document Lettie Duverney Fall Risk and appropriate interventions in the flowsheet.   Outcome: Progressing Towards Goal  Note: Fall Risk Interventions:            Medication Interventions: Patient to call before getting OOB,Teach patient to arise slowly                   Problem: Patient Education: Go to Patient Education Activity  Goal: Patient/Family Education  Outcome: Progressing Towards Goal     Problem: Patient Education: Go to Patient Education Activity  Goal: Patient/Family Education  Outcome: Progressing Towards Goal     Problem: Upper and Lower GI Bleed: Day 1  Goal: Off Pathway (Use only if patient is Off Pathway)  Outcome: Progressing Towards Goal  Goal: Activity/Safety  Outcome: Progressing Towards Goal  Goal: Consults, if ordered  Outcome: Progressing Towards Goal  Goal: Diagnostic Test/Procedures  Outcome: Progressing Towards Goal  Goal: Nutrition/Diet  Outcome: Progressing Towards Goal  Goal: Discharge Planning  Outcome: Progressing Towards Goal  Goal: Medications  Outcome: Progressing Towards Goal  Goal: Respiratory  Outcome: Progressing Towards Goal  Goal: Treatments/Interventions/Procedures  Outcome: Progressing Towards Goal  Goal: Psychosocial  Outcome: Progressing Towards Goal  Goal: *Optimal pain control at patient's stated goal  Outcome: Progressing Towards Goal  Goal: *Hemodynamically stable  Outcome: Progressing Towards Goal  Goal: *Demonstrates progressive activity  Outcome: Progressing Towards Goal     Problem: Upper and Lower GI Bleed: Day 2  Goal: Off Pathway (Use only if patient is Off Pathway)  Outcome: Progressing Towards Goal  Goal: Activity/Safety  Outcome: Progressing Towards Goal  Goal: Consults, if ordered  Outcome: Progressing Towards Goal  Goal: Diagnostic Test/Procedures  Outcome: Progressing Towards Goal  Goal: Nutrition/Diet  Outcome: Progressing Towards Goal  Goal: Discharge Planning  Outcome: Progressing Towards Goal  Goal: Medications  Outcome: Progressing Towards Goal  Goal: Respiratory  Outcome: Progressing Towards Goal  Goal: Treatments/Interventions/Procedures  Outcome: Progressing Towards Goal  Goal: Psychosocial  Outcome: Progressing Towards Goal  Goal: *Optimal pain control at patient's stated goal  Outcome: Progressing Towards Goal  Goal: *Hemodynamically stable  Outcome: Progressing Towards Goal  Goal: *Tolerating diet  Outcome: Progressing Towards Goal  Goal: *Demonstrates progressive activity  Outcome: Progressing Towards Goal     Problem: Upper and Lower GI Bleed: Day 3  Goal: Off Pathway (Use only if patient is Off Pathway)  Outcome: Progressing Towards Goal  Goal: Activity/Safety  Outcome: Progressing Towards Goal  Goal: Diagnostic Test/Procedures  Outcome: Progressing Towards Goal  Goal: Nutrition/Diet  Outcome: Progressing Towards Goal  Goal: Discharge Planning  Outcome: Progressing Towards Goal  Goal: Medications  Outcome: Progressing Towards Goal  Goal: Treatments/Interventions/Procedures  Outcome: Progressing Towards Goal  Goal: Psychosocial  Outcome: Progressing Towards Goal     Problem: Upper and Lower GI Bleed:  Discharge Outcomes  Goal: *Hemodynamically stable  Outcome: Progressing Towards Goal  Goal: *Lungs clear or at baseline  Outcome: Progressing Towards Goal  Goal: *Demonstrates independent activity or return to baseline  Outcome: Progressing Towards Goal  Goal: *Pain is controlled to three or less  Outcome: Progressing Towards Goal  Goal: *Verbalizes understanding and describes prescribed diet  Outcome: Progressing Towards Goal  Goal: *Tolerating diet  Outcome: Progressing Towards Goal  Goal: *Verbalizes name, dosage, time, side effects, and number of days to continue medications  Outcome: Progressing Towards Goal  Goal: *Anxiety reduced or absent  Outcome: Progressing Towards Goal  Goal: *Understands and describes signs and symptoms to report to providers(Stroke Metric)  Outcome: Progressing Towards Goal  Goal: *Describes follow-up/return visits to physicians  Outcome: Progressing Towards Goal  Goal: *Describes available resources and support systems  Outcome: Progressing Towards Goal

## 2022-01-22 NOTE — ED PROVIDER NOTES
81-year-old male lower GI bleeding. Patient was seen in the hospital last week for the same thing had received blood transfusion is due for another 2 units packed red cells in the morning. The previous bleeding had stopped still in the process of her work-up. Patient has a history of diabetes Alba-Parkinson-White syndrome thromboembolism paroxysmal A. fib renal cell carcinoma history of pancreatitis hypertension benign prostatic hyperplasia    The history is provided by the patient. Rectal Bleeding   This is a recurrent problem. The current episode started 3 to 5 hours ago. Stool description: Blood and blood clots. Associated symptoms include abdominal pain and chills. Pertinent negatives include no abdominal distention. He has tried nothing for the symptoms.         Past Medical History:   Diagnosis Date    Arthritis     Blurred vision, right eye     BPH (benign prostatic hyperplasia)     Gout     History of Clostridioides difficile colitis 2010    History of pancreatitis     History of renal carcinoma     partial nephrectomy    Hyperlipidemia     Hypertension     Nausea & vomiting     small amount of N/V and pt not sure of it antiemetics work    Neuropathy     Paroxysmal A-fib ([address]     Presence of Watchman left atrial appendage closure device     Sleep apnea     compliant with C-pap    Thromboembolus ([address]     hx of left lower extremity    Type 2 diabetes mellitus (HCC)     insulin reliant; AVG -140; s.s. of hypoglycemia 65-75; last A1c 7.1    WPW (Alba-Parkinson-White syndrome)     ablation x4       Past Surgical History:   Procedure Laterality Date    HX AFIB ABLATION      WPW- ablations x3    HX BACK SURGERY      ruptured disc    HX CATARACT REMOVAL      HX CHOLECYSTECTOMY      HX ENDOSCOPY      HX KNEE REPLACEMENT Right     HX LUMBAR LAMINECTOMY      HX NEPHRECTOMY      partial    HX ORTHOPAEDIC Left     foot    HX ORTHOPAEDIC Left     great toe straightened    HX OTHER SURGICAL      placed watchman 2/2020    HX WISDOM TEETH EXTRACTION           Family History:   Problem Relation Age of Onset    Other Mother         ALS    Parkinson's Disease Father     Cancer Sister         colon ca    No Known Problems Sister     Cancer Sister         ovarian       Social History     Socioeconomic History    Marital status:      Spouse name: Not on file    Number of children: Not on file    Years of education: Not on file    Highest education level: Not on file   Occupational History    Not on file   Tobacco Use    Smoking status: Never Smoker    Smokeless tobacco: Never Used   Substance and Sexual Activity    Alcohol use: Not Currently    Drug use: Never    Sexual activity: Not on file   Other Topics Concern    Not on file   Social History Narrative    Not on file     Social Determinants of Health     Financial Resource Strain:     Difficulty of Paying Living Expenses: Not on file   Food Insecurity:     Worried About [address] in the Last Year: Not on file    [place] of Food in the Last Year: Not on file   Transportation Needs:     Lack of Transportation (Medical): Not on file    Lack of Transportation (Non-Medical):  Not on file   Physical Activity:     Days of Exercise per Week: Not on file    Minutes of Exercise per Session: Not on file   Stress:     Feeling of Stress : Not on file   Social Connections:     Frequency of Communication with Friends and Family: Not on file    Frequency of Social Gatherings with Friends and Family: Not on file    Attends [employer] Services: Not on file    Active Member of Clubs or Organizations: Not on file    Attends Club or Organization Meetings: Not on file    Marital Status: Not on file   Intimate Partner Violence:     Fear of Current or Ex-Partner: Not on file    Emotionally Abused: Not on file    Physically Abused: Not on file    Sexually Abused: Not on file   Housing Stability:     Unable to Pay for Housing in the Last Year: Not on file    Number of Places Lived in the Last Year: Not on file    Unstable Housing in the Last Year: Not on file         ALLERGIES: Codeine, Fenofibrate, Hydromorphone, Januvia [sitagliptin], and Metformin    Review of Systems   Constitutional: Positive for chills. Negative for activity change. HENT: Negative. Eyes: Negative. Respiratory: Negative. Cardiovascular: Negative. Gastrointestinal: Positive for abdominal pain and blood in stool. Negative for abdominal distention. Genitourinary: Negative. Musculoskeletal: Negative. Skin: Negative. Neurological: Negative. Psychiatric/Behavioral: Negative. All other systems reviewed and are negative. Vitals:    01/22/22 0203   BP: (!) 155/98   Pulse: 96   Resp: 18   Temp: 98.7 °F (37.1 °C)   SpO2: 99%   Weight: 99.8 kg (220 lb)   Height: 5' 10\" (1.778 m)            Physical Exam  Vitals and nursing note reviewed. Constitutional:       General: He is not in acute distress. Appearance: He is well-developed. HENT:      Head: Normocephalic and atraumatic. Right Ear: External ear normal.      Left Ear: External ear normal.      Nose: Nose normal.   Eyes:      General: No scleral icterus. Right eye: No discharge. Left eye: No discharge. Conjunctiva/sclera: Conjunctivae normal.      Pupils: Pupils are equal, round, and reactive to light. Cardiovascular:      Rate and Rhythm: Regular rhythm. Pulmonary:      Effort: Pulmonary effort is normal. No respiratory distress. Breath sounds: Normal breath sounds. No stridor. No wheezing or rales. Abdominal:      General: Bowel sounds are decreased. There is no distension. Palpations: Abdomen is soft. Tenderness: There is no abdominal tenderness. Musculoskeletal:         General: Normal range of motion. Cervical back: Normal range of motion. Skin:     General: Skin is warm and dry. Findings: No rash. Neurological:      Mental Status: He is alert and oriented to person, place, and time. Motor: No abnormal muscle tone. Coordination: Coordination normal.   Psychiatric:         Behavior: Behavior normal.          MDM  Number of Diagnoses or Management Options  Diagnosis management comments: Abdominal pain differential diagnosis: Bowel obstruction, ileus, appendicitis, biliary colic, biliary disease, diverticulitis, adhesions, hernia, incarcerated hernia, epididymitis, torsion, aortic aneurysm, renal lithiasis and renal colic. Several large bloody bowel movements hematochezia similar to previous events.   Discussed with GI Dr. Sarah Mosley and we can keep him here that he is sad for now transfuse him with packed red blood cells       Amount and/or Complexity of Data Reviewed  Clinical lab tests: ordered and reviewed  Tests in the radiology section of CPT®: ordered and reviewed  Tests in the medicine section of CPT®: ordered and reviewed  Decide to obtain previous medical records or to obtain history from someone other than the patient: yes  Review and summarize past medical records: yes  Discuss the patient with other providers: yes  Independent visualization of images, tracings, or specimens: yes    Risk of Complications, Morbidity, and/or Mortality  Presenting problems: high  Diagnostic procedures: high  Management options: high           Procedures

## 2022-01-22 NOTE — ED NOTES
TRANSFER - OUT REPORT:    Verbal report given to [id number] Destiny Szymanski RN on Nadir Mulligan  being transferred to [address] [place] [address]  for routine progression of care       Report consisted of patients Situation, Background, Assessment and   Recommendations(SBAR). Information from the following report(s) SBAR, ED Summary, MAR, Recent Results and Quality Measures was reviewed with the receiving nurse. Lines:   Peripheral IV 01/22/22 Left Wrist (Active)   Site Assessment Clean, dry, & intact 01/22/22 0201        Opportunity for questions and clarification was provided.       Patient transported with:   Registered Nurse

## 2022-01-22 NOTE — CONSULTS
[facility] Consult Note       Primary GI Physician:  Leonides Caro and Dr. David Headley    Referring Provider:  Dr. Nathan Zuluaga Date:  1/22/2022    Admit Date:  1/22/2022    Chief Complaint:  GI bleed    Subjective:     History of Present Illness:  Patient is a 78 y.o. male with PMH including but not limited to chronic pancreatitis with hx of necrotizing - on pancreatic enzymes, hx of duodenal ulcer, chronic anemia - followed by Dr. Jes Haile and receiving IV Iron, CKD, A fib - not on anticoagulation, [employer] s/p ablation, DM, hx renal cell carcinoma, hx of DVT, KAITLIN, HTN, HLD, gout, who is seen in consultation at the request of Dr. Donal Gallo for GI bleed. He was seen during recent admission at [facility] 15-17 Jan 2022 for rectal bleeding and hgb was stable in 10 range. Bleeding felt to be likely diverticular in nature and resolved over the admission. He was recommended to follow up with [facility] or our office. He was seen in our office on 19 Jan 2022 for abd pain, noted to have confusion and SOB, and as sent to the ER at [facility]. He was evaluated and AMS was felt to be related to side effect of Hydrocodone he had taken earlier that day, as well as fever. He was back to baseline at the ER. His insulin pump had also run out and was given an IV dose of insulin. He wished to go home and was discharged. He returned to the ER today due to recurrent rectal bleeding, having 3 episodes of BRB to maroon stools with clots, with associated cramping abd pain. He had been taking Ibuprofen and other NSAIDs for shoulder pain, as well as  mg. Labs in the ER noted hgb 7.7 with MCV 99.2, hgb down from 8.7 on 19 Jan 2022 (7.9 on 16 Jan 2022), BUN 52, Cr 2.02. Hgb 7.4 on recheck. AAS noted nonspecific, nonobstructed bowel gas pattern, no evidence of free intraperitoneal air, moderate amount of retained fecal material, no acute intrathoracic process.     PMH:  Past Medical History:   Diagnosis Date    Arthritis     Blurred vision, right eye     BPH (benign prostatic hyperplasia)     Gout     History of Clostridioides difficile colitis 2010    History of pancreatitis     History of renal carcinoma     partial nephrectomy    Hyperlipidemia     Hypertension     Nausea & vomiting     small amount of N/V and pt not sure of it antiemetics work    Neuropathy     Paroxysmal A-fib ([address]     Presence of Watchman left atrial appendage closure device     Sleep apnea     compliant with C-pap    Thromboembolus ([address]     hx of left lower extremity    Type 2 diabetes mellitus (HCC)     insulin reliant; AVG -140; s.s. of hypoglycemia 65-75; last A1c 7.1    WPW (Alba-Parkinson-White syndrome)     ablation x4     Tubuar adenoma colon polyp in 2013  Multiple TVA and TA colon polyps in 2018     Colonoscopy 2/19/2021 for chronic diarrhea and chronic anemia  Impression: - Congested, erythematous and granular mucosa in the   terminal ileum. Biopsied.  - No cecal \"patch. \"  - Moderate diverticulosis in the sigmoid colon, in the   descending colon, in the transverse colon, at the   hepatic flexure and in the ascending colon. There was   no evidence of diverticular bleeding.  - One 2 mm polyp in the cecum, removed with a cold   biopsy forceps. Resected and retrieved. - Stool in the rectum and in the ascending colon. Fluid aspiration performed.   - The rectum, sigmoid colon, descending colon, splenic   flexure, transverse colon, ascending colon,   appendiceal orifice and ileocecal valve are normal.  - Non-bleeding external hemorrhoids  FINAL DIAGNOSIS:  A.  Small bowel, terminal ileum, biopsies:      *   Small bowel mucosa with prominent lymphoid aggregates; otherwise, no significant pathologic abnormalities      *   No active or chronic inflammatory lesions are identified      *   No pathologic microorganisms, viral cytopathic changes, or granulomas are identified   B.  Colon, cecum/ascending, biopsies:      *   Colonic mucosa with no significant pathologic abnormalities      *   No active or chronic inflammatory lesions are identified      *   No pathologic microorganisms, viral cytopathic changes, or granulomas are identified   C.  Colon, cecum, polyp:      *   Fragment of tubular adenoma      *   No high-grade dysplasia is identified   D.  Colon, transverse, biopsies:      *   Colonic mucosa with no significant pathologic abnormalities      *   No active or chronic inflammatory lesions are identified      *   No pathologic microorganisms, viral cytopathic changes, or granulomas are identified   E.  Colon, descending/sigmoid, biopsies:      *   Colonic mucosa with no significant pathologic abnormalities      *   No active or chronic inflammatory lesions are identified      *   No pathologic microorganisms, viral cytopathic changes, or granulomas are identified   F.  Colon, rectum, biopsies:      *   Colonic mucosa with very mild architectural disarray; otherwise, no significant pathologic abnormalities      *   No active or chronic inflammatory lesions are identified      *   No pathologic microorganisms, viral cytopathic changes, or granulomas are identified     EGD/EUS 6/1/2021  Impression:  --Gastritis in gastric fundus and body identified. Punctate hemorrhages in gastric mucosa. Biopsies obtained. --Duodenitis identified in duodenal bulb. --PD stone noted 3mm, chronic pancreatitis  FINAL DIAGNOSIS:   Stomach, biopsies:Antral and oxyntic type gastric mucosa with no significant histopathologic abnormality. Negative for features of Helicobacter gastritis, intestinal metaplasia, dysplasia, or malignancy.     PSH:  Past Surgical History:   Procedure Laterality Date    HX AFIB ABLATION      WPW- ablations x3    HX BACK SURGERY      ruptured disc    HX CATARACT REMOVAL      HX CHOLECYSTECTOMY      HX ENDOSCOPY      HX KNEE REPLACEMENT Right     HX LUMBAR LAMINECTOMY      HX NEPHRECTOMY      partial    HX ORTHOPAEDIC Left     foot    HX ORTHOPAEDIC Left     great toe straightened    HX OTHER SURGICAL      placed watchman 2/2020    HX WISDOM TEETH EXTRACTION         Allergies: Allergies   Allergen Reactions    Codeine Other (comments)     \"makes me a little crazy\"        Fenofibrate Other (comments)     \"causes pancreatic attacks\"    Hydromorphone Other (comments)     \"gives me craziness\"    Januvia [Sitagliptin] Other (comments)     Per pt causes pancreatic issues    Metformin Diarrhea       Home Medications:  Prior to Admission medications    Medication Sig Start Date End Date Taking? Authorizing Provider   gabapentin (NEURONTIN) 300 mg capsule Take 300 mg by mouth five (5) times daily. Indications: neuropathic pain   Yes Provider, Historical   allopurinoL (ZYLOPRIM) 300 mg tablet Take 300 mg by mouth daily. Yes Provider, Historical   pantoprazole (PROTONIX) 20 mg tablet Take 20 mg by mouth daily. Yes Provider, Historical   lisinopriL (PRINIVIL, ZESTRIL) 20 mg tablet Take 20 mg by mouth daily. Yes Provider, Historical   finasteride (PROSCAR) 5 mg tablet Take 5 mg by mouth daily. Yes Provider, Historical   multivitamin (ONE A DAY) tablet Take 1 Tab by mouth nightly. Yes Provider, Historical   cholecalciferol (Vitamin D3) 25 mcg (1,000 unit) cap Take 1,000 Units by mouth nightly. Yes Provider, Historical   ascorbic acid, vitamin C, (Vitamin C) 250 mg tablet Take 1,000 mg by mouth daily. Yes Provider, Historical   omega 3-DHA-EPA-fish oil 1,000 mg (120 mg-180 mg) capsule Take 1 Cap by mouth daily. Yes Provider, Historical   cephALEXin (KEFLEX) 500 mg capsule Take 1 Capsule by mouth four (4) times daily. Patient not taking: Reported on 1/15/2022 9/9/21   Prashanth Tanner NP   ondansetron (Zofran ODT) 8 mg disintegrating tablet Take 1 Tab by mouth every twelve (12) hours as needed for Nausea.   Patient not taking: Reported on 1/15/2022 10/30/20   Aminata Knutson MD   acetaminophen (Tylenol Extra Strength) 500 mg tablet Take 500 mg by mouth every six (6) hours as needed for Pain. Provider, Historical   gabapentin (NEURONTIN) 100 mg capsule Take 100 mg by mouth five (5) times daily. Patient not taking: Reported on 1/15/2022    Provider, Historical   lipase-protease-amylase (Creon) 24,000-76,000 -120,000 unit capsule Take 1 Cap by mouth three (3) times daily (with meals). Patient not taking: Reported on 1/15/2022    Provider, Historical   insulin aspart U-100 (NovoLOG Flexpen U-100 Insulin) 100 unit/mL (3 mL) inpn by SubCUTAneous route Before breakfast, lunch, and dinner. SSI    Provider, Historical   insulin glargine (Lantus Solostar U-100 Insulin) 100 unit/mL (3 mL) inpn by SubCUTAneous route two (2) times a day. 45 units in the morning and 30 units in the evening    Provider, Historical   simvastatin (ZOCOR) 10 mg tablet Take 10 mg by mouth nightly. Patient not taking: Reported on 1/15/2022    Provider, Historical   aspirin (ASPIRIN) 325 mg tablet Take 325 mg by mouth nightly. Provider, Historical   B.infantis-B.ani-B.long-B.bifi (Probiotic 4X) 10-15 mg TbEC Take 1 Tab by mouth daily. Patient not taking: Reported on 1/15/2022    Provider, Historical   vitamin E (AQUA GEMS) 400 unit capsule Take 450 Units by mouth nightly.   Patient not taking: Reported on 1/15/2022    Provider, Historical       Hospital Medications:  Current Facility-Administered Medications   Medication Dose Route Frequency    0.9% sodium chloride infusion 250 mL  250 mL IntraVENous PRN    sodium chloride (NS) flush 5-40 mL  5-40 mL IntraVENous Q8H    sodium chloride (NS) flush 5-40 mL  5-40 mL IntraVENous PRN    acetaminophen (TYLENOL) tablet 650 mg  650 mg Oral Q6H PRN    Or    acetaminophen (TYLENOL) suppository 650 mg  650 mg Rectal Q6H PRN    polyethylene glycol (MIRALAX) packet 17 g  17 g Oral DAILY PRN    ondansetron (ZOFRAN ODT) tablet 4 mg  4 mg Oral Q8H PRN    Or    ondansetron (ZOFRAN) injection 4 mg  4 mg IntraVENous Q6H PRN    insulin lispro (HUMALOG) injection   SubCUTAneous AC&HS       Social History:  Social History     Tobacco Use    Smoking status: Never Smoker    Smokeless tobacco: Never Used   Substance Use Topics    Alcohol use: Not Currently       Family History:  Family History   Problem Relation Age of Onset    Other Mother         ALS    Parkinson's Disease Father     Cancer Sister         colon ca    No Known Problems Sister     Cancer Sister         ovarian       Review of Systems:  A detailed 10 system ROS is obtained, with pertinent positives as listed above. All others are negative. Diet:  NPO    Objective:     Physical Exam:  Vitals:  Visit Vitals  /78 (BP 1 Location: Left upper arm, BP Patient Position: At rest;Supine)   Pulse 81   Temp 97.8 °F (36.6 °C)   Resp 16   Ht 5' 10\" (1.778 m)   Wt 99.8 kg (220 lb)   SpO2 99%   BMI 31.57 kg/m²     Gen:  Pt is alert, cooperative, no acute distress  Skin:  Extremities and face reveal no rashes. HEENT: Sclerae anicteric. Extra-occular muscles are intact. No oral ulcers. No abnormal pigmentation of the lips. The neck is supple. Cardiovascular: Regular rate and rhythm. No murmurs, gallops, or rubs. Respiratory:  Comfortable breathing with no accessory muscle use. Clear breath sounds anteriorly with no wheezes, rales, or rhonchi. GI:  Abdomen nondistended, soft, and nontender. Normal active bowel sounds. No enlargement of the liver or spleen. No masses palpable. Rectal:  Deferred  Musculoskeletal:  No pitting edema of the lower legs. Neurological:  Gross memory appears intact. Patient is alert and oriented. Psychiatric:  Mood appears appropriate with judgement intact. Lymphatic:  No cervical or supraclavicular adenopathy.     Laboratory:    Recent Labs     01/22/22  1241 01/22/22  0205 01/19/22  1659   WBC  --  13.2* 17.0*   HGB 7.4* 7.7* 8.7*   HCT 24.2* 25.7* 28.2*   PLT  --  285 366   MCV  --  99.2* 98.6*   NA  -- 140 136   K  --  4.9 5.5*   CL  --  110* 107   CO2  --  21 24   BUN  --  52* 50*   CREA  --  2.02* 1.98*   CA  --  8.7 8.3   GLU  --  108* 226*   AP  --  111 131   AST  --  22 22   ALT  --  25 41   TBILI  --  0.2 0.4   ALB  --  1.8* 2.2*   TP  --  5.9* 6.4   LPSE  --  30*  --       Acute Abdominal Series. 22 Jan 2022   HISTORY: GI bleed   TECHNIQUE: Supine and upright views of the abdomen. Single frontal view of the  chest.   COMPARISON: Chest x-ray dated 1/19/2022   FINDINGS:   Supine images show a nonspecific, nonobstructed bowel gas pattern. There is a  moderate amount of retained fecal material. Gas is projecting over the rectum.   The upright images show no definitive evidence of free air. No air-fluid levels  are seen.   The cardiac silhouette mediastinum and pulmonary vasculature are within normal  limits.   The lungs are clear and there is no pleural effusion or pneumothorax. Chronic  appearing changes are seen at the right base.   Osseous structures and soft tissues appear within normal limits.   IMPRESSION  Nonspecific, nonobstructed bowel gas pattern. No evidence of free  intraperitoneal air.   There is a moderate amount of retained fecal material.   No acute intrathoracic process.     Assessment:     Principal Problem:    Acute GI bleeding (1/22/2022)    Active Problems:    Benign essential hypertension (8/19/2012)      Overview: Last Assessment & Plan:       Formatting of this note might be different from the original.      - BP low normal on lisinopril      - monitor BP at home, may need to reduce dose due to dizziness      Paroxysmal atrial fibrillation (HCC) (8/29/2012)      Overview: Formatting of this note might be different from the original.      Paroxysmal             Last Assessment & Plan:       Formatting of this note might be different from the original.      - s/p ablation       - not fully anticoagulated due to maintenance of sinus rhythm      Type 2 diabetes mellitus ([facility] [address] (8/19/2012) Acute blood loss anemia (1/15/2022)    77 yo male pt of [facility] with PMH including but not limited to chronic pancreatitis with hx of necrotizing - on pancreatic enzymes, hx of duodenal ulcer, chronic anemia - followed by Dr. Ben Ochoa and receiving IV Iron, CKD, A fib - not on anticoagulation, Katia Sick White s/p ablation, DM, hx renal cell carcinoma, hx of DVT, KAITLIN, HTN, HLD, gout, who is seen in consultation 22 Jan 2022 at the request of Dr. Ayse Hearn for GI bleed, who was recently seen at [facility] for GI bleeding, felt to be diverticular. He has had recurrent bleeding and labs in the ER noted hgb 7.7 with MCV 99.2, hgb down from 8.7 on 19 Jan 2022 (7.9 on 16 Jan 2022), BUN 52, Cr 2.02. Hgb 7.4 on recheck. AAS noted moderate amount of retained fecal material.  Bleeding is likely diverticular, but given quick recurrence, recommend colonoscopy to evaluate for any inflammation, AVMs, polyps. Plan:     - Supportive care, maintain electrolytes. - Clear liquids, prep for colonoscopy. - Colonoscopy on Sunday with Dr. Malcolm Ventura.  - NPO after MN.  - Monitor hgb and transfuse PRN if hgb <7.  - Follow. Mr Melissa Maurice is well known to me from last weekend, unfortunately he has had recurrence of his GI bleeding which will prompt us to perform colonoscopy in am.  He is in agreement.

## 2022-01-22 NOTE — ROUTINE PROCESS
TRANSFER - IN REPORT:    Verbal report received from [facility] on [place]  being received from ED(unit) for routine progression of care      Report consisted of patients Situation, Background, Assessment and   Recommendations(SBAR). Information from the following report(s) ED Summary was reviewed with the receiving nurse. Opportunity for questions and clarification was provided. Assessment completed upon patients arrival to unit and care assumed.

## 2022-01-23 ENCOUNTER — APPOINTMENT (OUTPATIENT)
Dept: CT IMAGING | Age: 77
DRG: 378 | End: 2022-01-23
Attending: HOSPITALIST
Payer: MEDICARE

## 2022-01-23 ENCOUNTER — ANESTHESIA (OUTPATIENT)
Dept: ENDOSCOPY | Age: 77
DRG: 378 | End: 2022-01-23
Payer: MEDICARE

## 2022-01-23 LAB
ABO + RH BLD: NORMAL
ALBUMIN SERPL-MCNC: 2 G/DL (ref 3.2–4.6)
ALBUMIN/GLOB SERPL: 0.5 {RATIO} (ref 1.2–3.5)
ALP SERPL-CCNC: 122 U/L (ref 50–136)
ALT SERPL-CCNC: 25 U/L (ref 12–65)
ANION GAP SERPL CALC-SCNC: 6 MMOL/L (ref 7–16)
AST SERPL-CCNC: 18 U/L (ref 15–37)
BILIRUB SERPL-MCNC: 0.5 MG/DL (ref 0.2–1.1)
BLD PROD TYP BPU: NORMAL
BLOOD GROUP ANTIBODIES SERPL: NORMAL
BPU ID: NORMAL
BUN SERPL-MCNC: 35 MG/DL (ref 8–23)
CALCIUM SERPL-MCNC: 8.8 MG/DL (ref 8.3–10.4)
CHLORIDE SERPL-SCNC: 111 MMOL/L (ref 98–107)
CO2 SERPL-SCNC: 24 MMOL/L (ref 21–32)
CREAT SERPL-MCNC: 1.78 MG/DL (ref 0.8–1.5)
CROSSMATCH RESULT,%XM: NORMAL
ERYTHROCYTE [DISTWIDTH] IN BLOOD BY AUTOMATED COUNT: 17.5 % (ref 11.9–14.6)
GLOBULIN SER CALC-MCNC: 4.4 G/DL (ref 2.3–3.5)
GLUCOSE BLD STRIP.AUTO-MCNC: 68 MG/DL (ref 65–100)
GLUCOSE BLD STRIP.AUTO-MCNC: 75 MG/DL (ref 65–100)
GLUCOSE BLD STRIP.AUTO-MCNC: 82 MG/DL (ref 65–100)
GLUCOSE BLD STRIP.AUTO-MCNC: 94 MG/DL (ref 65–100)
GLUCOSE SERPL-MCNC: 89 MG/DL (ref 65–100)
HCT VFR BLD AUTO: 27.4 % (ref 41.1–50.3)
HGB BLD-MCNC: 8.5 G/DL (ref 13.6–17.2)
MAGNESIUM SERPL-MCNC: 2.1 MG/DL (ref 1.8–2.4)
MCH RBC QN AUTO: 29.6 PG (ref 26.1–32.9)
MCHC RBC AUTO-ENTMCNC: 31 G/DL (ref 31.4–35)
MCV RBC AUTO: 95.5 FL (ref 79.6–97.8)
NRBC # BLD: 0 K/UL (ref 0–0.2)
PLATELET # BLD AUTO: 283 K/UL (ref 150–450)
PMV BLD AUTO: 9.1 FL (ref 9.4–12.3)
POTASSIUM SERPL-SCNC: 4.6 MMOL/L (ref 3.5–5.1)
PROT SERPL-MCNC: 6.4 G/DL (ref 6.3–8.2)
RBC # BLD AUTO: 2.87 M/UL (ref 4.23–5.6)
SERVICE CMNT-IMP: NORMAL
SODIUM SERPL-SCNC: 141 MMOL/L (ref 136–145)
SPECIMEN EXP DATE BLD: NORMAL
STATUS OF UNIT,%ST: NORMAL
UNIT DIVISION, %UDIV: 0
WBC # BLD AUTO: 10.7 K/UL (ref 4.3–11.1)

## 2022-01-23 PROCEDURE — 2709999900 HC NON-CHARGEABLE SUPPLY

## 2022-01-23 PROCEDURE — 70450 CT HEAD/BRAIN W/O DYE: CPT

## 2022-01-23 PROCEDURE — 74011000250 HC RX REV CODE- 250: Performed by: HOSPITALIST

## 2022-01-23 PROCEDURE — 74011250637 HC RX REV CODE- 250/637: Performed by: INTERNAL MEDICINE

## 2022-01-23 PROCEDURE — 85027 COMPLETE CBC AUTOMATED: CPT

## 2022-01-23 PROCEDURE — 65270000029 HC RM PRIVATE

## 2022-01-23 PROCEDURE — 83735 ASSAY OF MAGNESIUM: CPT

## 2022-01-23 PROCEDURE — 82962 GLUCOSE BLOOD TEST: CPT

## 2022-01-23 PROCEDURE — 80053 COMPREHEN METABOLIC PANEL: CPT

## 2022-01-23 PROCEDURE — 70486 CT MAXILLOFACIAL W/O DYE: CPT

## 2022-01-23 PROCEDURE — 36415 COLL VENOUS BLD VENIPUNCTURE: CPT

## 2022-01-23 RX ORDER — GABAPENTIN 300 MG/1
300 CAPSULE ORAL
Status: DISCONTINUED | OUTPATIENT
Start: 2022-01-23 | End: 2022-01-24 | Stop reason: HOSPADM

## 2022-01-23 RX ORDER — BISACODYL 5 MG
5 TABLET, DELAYED RELEASE (ENTERIC COATED) ORAL
Status: COMPLETED | OUTPATIENT
Start: 2022-01-23 | End: 2022-01-23

## 2022-01-23 RX ORDER — SODIUM CHLORIDE 9 MG/ML
250 INJECTION, SOLUTION INTRAVENOUS AS NEEDED
Status: CANCELLED | OUTPATIENT
Start: 2022-01-23

## 2022-01-23 RX ORDER — ONDANSETRON 4 MG/1
4 TABLET, ORALLY DISINTEGRATING ORAL
Status: CANCELLED | OUTPATIENT
Start: 2022-01-23

## 2022-01-23 RX ORDER — POLYETHYLENE GLYCOL 3350 17 G/17G
102 POWDER, FOR SOLUTION ORAL ONCE
Status: COMPLETED | OUTPATIENT
Start: 2022-01-23 | End: 2022-01-23

## 2022-01-23 RX ORDER — ACETAMINOPHEN 650 MG/1
650 SUPPOSITORY RECTAL
Status: CANCELLED | OUTPATIENT
Start: 2022-01-23

## 2022-01-23 RX ORDER — ACETAMINOPHEN 325 MG/1
650 TABLET ORAL
Status: CANCELLED | OUTPATIENT
Start: 2022-01-23

## 2022-01-23 RX ORDER — SODIUM CHLORIDE 0.9 % (FLUSH) 0.9 %
5-40 SYRINGE (ML) INJECTION AS NEEDED
Status: CANCELLED | OUTPATIENT
Start: 2022-01-23

## 2022-01-23 RX ORDER — INSULIN LISPRO 100 [IU]/ML
INJECTION, SOLUTION INTRAVENOUS; SUBCUTANEOUS
Status: CANCELLED | OUTPATIENT
Start: 2022-01-23

## 2022-01-23 RX ORDER — ONDANSETRON 2 MG/ML
4 INJECTION INTRAMUSCULAR; INTRAVENOUS
Status: CANCELLED | OUTPATIENT
Start: 2022-01-23

## 2022-01-23 RX ORDER — SODIUM CHLORIDE 0.9 % (FLUSH) 0.9 %
5-40 SYRINGE (ML) INJECTION EVERY 8 HOURS
Status: CANCELLED | OUTPATIENT
Start: 2022-01-23

## 2022-01-23 RX ORDER — POLYETHYLENE GLYCOL 3350 17 G/17G
17 POWDER, FOR SOLUTION ORAL DAILY PRN
Status: CANCELLED | OUTPATIENT
Start: 2022-01-23

## 2022-01-23 RX ORDER — FINASTERIDE 5 MG/1
5 TABLET, FILM COATED ORAL DAILY
Status: DISCONTINUED | OUTPATIENT
Start: 2022-01-24 | End: 2022-01-24 | Stop reason: HOSPADM

## 2022-01-23 RX ADMIN — POLYETHYLENE GLYCOL 3350 102 G: 17 POWDER, FOR SOLUTION ORAL at 18:00

## 2022-01-23 RX ADMIN — BISACODYL 5 MG: 5 TABLET, COATED ORAL at 17:05

## 2022-01-23 RX ADMIN — GABAPENTIN 300 MG: 300 CAPSULE ORAL at 18:00

## 2022-01-23 RX ADMIN — GABAPENTIN 300 MG: 300 CAPSULE ORAL at 21:45

## 2022-01-23 RX ADMIN — SODIUM CHLORIDE, PRESERVATIVE FREE 10 ML: 5 INJECTION INTRAVENOUS at 21:45

## 2022-01-23 RX ADMIN — SODIUM CHLORIDE, PRESERVATIVE FREE 5 ML: 5 INJECTION INTRAVENOUS at 06:00

## 2022-01-23 NOTE — PROGRESS NOTES
Spoke with primary RN. RN states that pt was finishing bowel prep this morning when she arrived. RN also reports that pt is still having non-clear BM's with some formed pieces. Dr. Marcia Lagunas notified and is to see pt at this time. [id number]: Per Dr. Marcia Lagunas case is postponed until tomorrow at 1200.

## 2022-01-23 NOTE — ANESTHESIA PREPROCEDURE EVALUATION
Anesthetic History   No history of anesthetic complications            Review of Systems / Medical History  Patient summary reviewed and pertinent labs reviewed    Pulmonary        Sleep apnea: CPAP           Neuro/Psych             Comments: Neuropathy? Cardiovascular    Hypertension        Dysrhythmias (h/o WPW but s/p ablation x 4. h/o A Fib and s/p ablation and then [address] in 2020)   Hyperlipidemia    Exercise tolerance: >4 METS  Comments: H/o LE DVT. Denies and recent CP, SOB, Palpitations. Echo 10/2021 showed LVEF 55-65%. GI/Hepatic/Renal     GERD: well controlled    Renal disease (Baseline Creat 1.9): CRI  PUD (H/o duodenal ulcer) and liver disease (Fatty Liver)    Comments: Chronic pancreatitis with h/o necrotizing Endo/Other    Diabetes (Has insulin pump): well controlled, type 2, using insulin    Obesity, arthritis, cancer (H/o Renal Cell Carcinoma s/p partial nephrectomy) and anemia (Hgb up to 8.5 from 7.4 after 1 unit pRBCs)     Other Findings            Physical Exam    Airway  Mallampati: II  TM Distance: 4 - 6 cm  Neck ROM: normal range of motion   Mouth opening: Normal     Cardiovascular    Rhythm: regular  Rate: normal         Dental  No notable dental hx       Pulmonary  Breath sounds clear to auscultation               Abdominal  GI exam deferred       Other Findings            Anesthetic Plan    ASA: 3  Anesthesia type: total IV anesthesia          Induction: Intravenous  Anesthetic plan and risks discussed with: Patient and Spouse      Per GI, patient has likely recurrence of diverticular bleed, which he was evaluated for last week.

## 2022-01-23 NOTE — PROGRESS NOTES
Hospitalist Progress Note   Admit Date:  2022  2:01 AM   Name:  Andrea Ewing   Age:  78 y.o. Sex:  male  :  1945   MRN:  [id number]   Room:  [facility]/01    Presenting Complaint: Rectal Bleeding    Reason(s) for Admission: Acute GI bleeding [K92.2]  Acute blood loss anemia [D62]     Hospital Course & Interval History:   Please refer to the admission H&P for details of presentation. In summary, Andrea Ewing is a 78 y.o. male with medical history significant for diabetes type 2, hypertension, CKD3, afib, and chronic anemia presented to emergency room with chief complaint of bright blood per rectum. Patient reports he had 3 episodes bright red blood to maroon-colored bowel movements. Patient was admitted (1/15 - ) for hematocheza and discharged withplan to follow as outpatient for colonoscopy. Subjective/24 hr Events (22) : Patient is seen and examined at bedside. No acute events reported overnight by nursing staff. BMs have been non melanotic and non bloody. On Bowel prep but still not having clear BMs. Resting comfortably without any distress. Patient denies fever, chills, chest pains, shortness of breath, n/v, abdominal pain. .     Review of Systems: 10 point review of systems is otherwise negative with the exception of the elements mentioned above. Assessment & Plan:     Acute Blood loss anemia due to lower GI bleeding   s/p 1unit of PRBC in ED. Recheck is 8.5  - GI recommendations appreciated  - NPO after midnight   - colonoscopy postponed till   - monitor hb and transfuse as needed    Benign essential hypertension: Continue on home medications. Paroxysmal atrial fibrillation : Status post ablation, currently not on anticoagulation. Type 2 diabetes mellitus : Patient is on insulin pump. Will monitor blood sugars.     Diet:  ADULT DIET Clear Liquid  DIET NPO  DVT PPx:SCDs  Code status: Full Code    Hospital Problems as of 2022 Date Reviewed: 12/6/2021          Codes Class Noted - Resolved POA    * (Principal) Acute GI bleeding ICD-10-CM: K92.2  ICD-9-CM: 578.9  1/22/2022 - Present Unknown        Acute blood loss anemia ICD-10-CM: D62  ICD-9-CM: 285.1  1/15/2022 - Present Unknown        Paroxysmal atrial fibrillation (HCC) ICD-10-CM: I48.0  ICD-9-CM: 427.31  8/29/2012 - Present Yes    Overview Signed 1/15/2022  3:58 AM by Gregory Woodard MD     Formatting of this note might be different from the original.  Paroxysmal     Last Assessment & Plan:   Formatting of this note might be different from the original.  - s/p ablation   - not fully anticoagulated due to maintenance of sinus rhythm             Benign essential hypertension ICD-10-CM: I10  ICD-9-CM: 401.1  8/19/2012 - Present Yes    Overview Signed 1/15/2022  3:58 AM by Gregory Woodard MD     Last Assessment & Plan:   Formatting of this note might be different from the original.  - BP low normal on lisinopril  - monitor BP at home, may need to reduce dose due to dizziness             [facility] ICD-10-CM: E11.9  ICD-9-CM: 250.00  8/19/2012 - Present Yes              Objective:     Patient Vitals for the past 24 hrs:   Temp Pulse Resp BP SpO2   01/23/22 1137 98.3 °F (36.8 °C) 85 17 115/66 97 %   01/23/22 0729 98.4 °F (36.9 °C) 74 15 122/70 96 %   01/23/22 0352 97.6 °F (36.4 °C) 89 16 118/71 98 %   01/22/22 2334 98.3 °F (36.8 °C) 94 14 138/77 96 %   01/22/22 2210     97 %   01/22/22 1952 98 °F (36.7 °C) 90 14 107/64 98 %   01/22/22 1547 98.5 °F (36.9 °C) 82 15 123/65 95 %     Oxygen Therapy  O2 Sat (%): 97 % (01/23/22 1137)  Pulse via Oximetry: 74 beats per minute (01/22/22 2210)  O2 Device: None (Room air) (Home CPAP set up at bedside.) (01/22/22 2210)    Estimated body mass index is 31.57 kg/m² as calculated from the following:    Height as of this encounter: 5' 10\" (1.778 m). Weight as of this encounter: 99.8 kg (220 lb).   No intake or output data in the 24 hours ending 01/23/22 1437      Physical Exam:     Blood pressure 115/66, pulse 85, temperature 98.3 °F (36.8 °C), resp. rate 17, height 5' 10\" (1.778 m), weight 99.8 kg (220 lb), SpO2 97 %. General:    Well nourished. No overt distress  Head:  Normocephalic, atraumatic  Eyes:  Sclerae appear normal.  Pupils equally round. ENT:  Nares appear normal, no drainage. Moist oral mucosa  Neck:  No restricted ROM. Trachea midline   CV:   RRR. No jugular venous distension. Lungs:   CTAB. No wheezing. Respirations even, unlabored  Abdomen: Bowel sounds present. Soft, nontender, nondistended. Extremities: No cyanosis or clubbing. No edema  Skin:     No rashes and normal coloration. Warm and dry. Neuro:  CN II-XII grossly intact. A&Ox3  Psych:  Normal mood and affect. I have reviewed ordered lab tests and independently visualized imaging below:    Recent Labs:  Recent Results (from the past 48 hour(s))   RBC, ALLOCATE    Collection Time: 01/21/22  3:30 PM   Result Value Ref Range    HISTORY CHECKED? Historical check performed    CBC WITH AUTOMATED DIFF    Collection Time: 01/22/22  2:05 AM   Result Value Ref Range    WBC 13.2 (H) 4.3 - 11.1 K/uL    RBC 2.59 (L) 4.23 - 5.6 M/uL    HGB 7.7 (L) 13.6 - 17.2 g/dL    HCT 25.7 (L) 41.1 - 50.3 %    MCV 99.2 (H) 79.6 - 97.8 FL    MCH 29.7 26.1 - 32.9 PG    MCHC 30.0 (L) 31.4 - 35.0 g/dL    RDW 17.9 (H) 11.9 - 14.6 %    PLATELET 804 364 - 775 K/uL    MPV 9.7 9.4 - 12.3 FL    ABSOLUTE NRBC 0.00 0.0 - 0.2 K/uL    DF AUTOMATED      NEUTROPHILS 59 43 - 78 %    LYMPHOCYTES 29 13 - 44 %    MONOCYTES 7 4.0 - 12.0 %    EOSINOPHILS 1 0.5 - 7.8 %    BASOPHILS 1 0.0 - 2.0 %    IMMATURE GRANULOCYTES 4 0.0 - 5.0 %    ABS. NEUTROPHILS 7.7 1.7 - 8.2 K/UL    ABS. LYMPHOCYTES 3.8 0.5 - 4.6 K/UL    ABS. MONOCYTES 1.0 0.1 - 1.3 K/UL    ABS. EOSINOPHILS 0.2 0.0 - 0.8 K/UL    ABS. BASOPHILS 0.1 0.0 - 0.2 K/UL    ABS. IMM. GRANS.  0.5 0.0 - 0.5 K/UL   METABOLIC PANEL, COMPREHENSIVE Collection Time: 01/22/22  2:05 AM   Result Value Ref Range    Sodium 140 136 - 145 mmol/L    Potassium 4.9 3.5 - 5.1 mmol/L    Chloride 110 (H) 98 - 107 mmol/L    CO2 21 21 - 32 mmol/L    Anion gap 9 7 - 16 mmol/L    Glucose 108 (H) 65 - 100 mg/dL    BUN 52 (H) 8 - 23 MG/DL    Creatinine 2.02 (H) 0.8 - 1.5 MG/DL    GFR est AA 42 (L) >60 ml/min/1.73m2    GFR est non-AA 34 (L) >60 ml/min/1.73m2    Calcium 8.7 8.3 - 10.4 MG/DL    Bilirubin, total 0.2 0.2 - 1.1 MG/DL    ALT (SGPT) 25 12 - 65 U/L    AST (SGOT) 22 15 - 37 U/L    Alk. phosphatase 111 50 - 136 U/L    Protein, total 5.9 (L) 6.3 - 8.2 g/dL    Albumin 1.8 (L) 3.2 - 4.6 g/dL    Globulin 4.1 (H) 2.3 - 3.5 g/dL    A-G Ratio 0.4 (L) 1.2 - 3.5     LIPASE    Collection Time: 01/22/22  2:05 AM   Result Value Ref Range    Lipase 30 (L) 73 - 393 U/L   TYPE & SCREEN    Collection Time: 01/22/22  2:47 AM   Result Value Ref Range    Crossmatch Expiration 01/25/2022,2359     ABO/Rh(D) A POSITIVE     Antibody screen NEG     Unit number [id number]     Blood component type  LR     Unit division 00     Status of unit TRANSFUSED     Crossmatch result Compatible    URINE MICROSCOPIC    Collection Time: 01/22/22  4:17 AM   Result Value Ref Range    WBC 0 0 /hpf    RBC 0-3 0 /hpf    Epithelial cells 0 0 /hpf    Bacteria 0 0 /hpf    Casts 0-3 0 /lpf   RBC, ALLOCATE    Collection Time: 01/22/22  4:45 AM   Result Value Ref Range    HISTORY CHECKED?  Historical check performed    COVID-19 RAPID TEST    Collection Time: 01/22/22  6:17 AM   Result Value Ref Range    Specimen source NASAL SWAB      COVID-19 rapid test Not detected NOTD     GLUCOSE, POC    Collection Time: 01/22/22  7:24 AM   Result Value Ref Range    Glucose (POC) 83 65 - 100 mg/dL    Performed by Kit    HGB & HCT    Collection Time: 01/22/22 12:41 PM   Result Value Ref Range    HGB 7.4 (L) 13.6 - 17.2 g/dL    HCT 24.2 (L) 41.1 - 50.3 %   GLUCOSE, POC    Collection Time: 01/22/22  1:19 PM   Result Value Ref Range    Glucose (POC) 78 65 - 100 mg/dL    Performed by Beny    GLUCOSE, POC    Collection Time: 01/22/22  4:49 PM   Result Value Ref Range    Glucose (POC) 77 65 - 100 mg/dL    Performed by Renaee Lesches    HGB & HCT    Collection Time: 01/22/22  5:32 PM   Result Value Ref Range    HGB 8.5 (L) 13.6 - 17.2 g/dL    HCT 30.3 (L) 41.1 - 50.3 %   GLUCOSE, POC    Collection Time: 01/22/22  9:22 PM   Result Value Ref Range    Glucose (POC) 121 (H) 65 - 100 mg/dL    Performed by [facility]    METABOLIC PANEL, COMPREHENSIVE    Collection Time: 01/23/22  3:49 AM   Result Value Ref Range    Sodium 141 136 - 145 mmol/L    Potassium 4.6 3.5 - 5.1 mmol/L    Chloride 111 (H) 98 - 107 mmol/L    CO2 24 21 - 32 mmol/L    Anion gap 6 (L) 7 - 16 mmol/L    Glucose 89 65 - 100 mg/dL    BUN 35 (H) 8 - 23 MG/DL    Creatinine 1.78 (H) 0.8 - 1.5 MG/DL    GFR est AA 48 (L) >60 ml/min/1.73m2    GFR est non-AA 40 (L) >60 ml/min/1.73m2    Calcium 8.8 8.3 - 10.4 MG/DL    Bilirubin, total 0.5 0.2 - 1.1 MG/DL    ALT (SGPT) 25 12 - 65 U/L    AST (SGOT) 18 15 - 37 U/L    Alk.  phosphatase 122 50 - 136 U/L    Protein, total 6.4 6.3 - 8.2 g/dL    Albumin 2.0 (L) 3.2 - 4.6 g/dL    Globulin 4.4 (H) 2.3 - 3.5 g/dL    A-G Ratio 0.5 (L) 1.2 - 3.5     MAGNESIUM    Collection Time: 01/23/22  3:49 AM   Result Value Ref Range    Magnesium 2.1 1.8 - 2.4 mg/dL   CBC W/O DIFF    Collection Time: 01/23/22  3:49 AM   Result Value Ref Range    WBC 10.7 4.3 - 11.1 K/uL    RBC 2.87 (L) 4.23 - 5.6 M/uL    HGB 8.5 (L) 13.6 - 17.2 g/dL    HCT 27.4 (L) 41.1 - 50.3 %    MCV 95.5 79.6 - 97.8 FL    MCH 29.6 26.1 - 32.9 PG    MCHC 31.0 (L) 31.4 - 35.0 g/dL    RDW 17.5 (H) 11.9 - 14.6 %    PLATELET 401 496 - 700 K/uL    MPV 9.1 (L) 9.4 - 12.3 FL    ABSOLUTE NRBC 0.00 0.0 - 0.2 K/uL   GLUCOSE, POC    Collection Time: 01/23/22  6:04 AM   Result Value Ref Range    Glucose (POC) 82 65 - 100 mg/dL    Performed by [address] [place] [address] Collection Time: 01/23/22 11:47 AM   Result Value Ref Range    Glucose (POC) 68 65 - 100 mg/dL    Performed by Vandana        All Micro Results     Procedure Component Value Units Date/Time    COVID-19 RAPID TEST [[id number]] Collected: 01/22/22 0617    Order Status: Completed Specimen: Nasopharyngeal Updated: 01/22/22 2594     Specimen source NASAL SWAB        COVID-19 rapid test Not detected        Comment:      The specimen is NEGATIVE for SARS-CoV-2, the novel coronavirus associated with COVID-19. A negative result does not rule out COVID-19. This test has been authorized by the FDA under an Emergency Use Authorization (EUA) for use by authorized laboratories. Fact sheet for Healthcare Providers: ConventionUpdate.co.nz  Fact sheet for Patients: ConventionUpdate.co.nz       Methodology: Isothermal Nucleic Acid Amplification               Other Studies:  CT HEAD WO CONT    Result Date: 1/23/2022  EXAM: CT HEAD WO CONT HISTORY:  Fall. TECHNIQUE: Axial images of the brain were performed without the administration of intravenous contrast. Images were obtained axial plane and coronal reformatted images were submitted. Dose reduction technique used: Automated exposure control/Adjustment of the mA and/or kV according to patient size/Use of iterative reconstruction technique. COMPARISON: None. FINDINGS: There is no evidence of acute intracranial hemorrhage, midline shift, or mass effect. No intra or extra-axial fluid collections are observed. There are mild chronic appearing microangiopathic changes within the periventricular white matter. No evidence of acute confluent territorial infarction. Ventricles and basal cisterns are patent and symmetric. There is mild generalized volume loss. Visualized paranasal sinuses and mastoid air cells are without significant fluid. Scalp, soft tissues, and calvarium are within normal limits.      1. No CT evidence of acute intracranial process. CT MAXILLOFACIAL WO CONT    Result Date: 1/23/2022  EXAMINATION: CT MAXILLOFACIAL WO CONT HISTORY: unwitnessed fall. TECHNIQUE: CT of the facial bones was performed without contrast. Images were obtained in the axial plane and coronal and sagittal reconstructions were performed. Dose reduction technique used: Automated exposure control/Adjustment of the mA and/or kV according to patient size/Use of iterative reconstruction technique. COMPARISON: None. FINDINGS: No facial fractures are identified. The bilateral pterygoid plates are intact. Coronal images demonstrate intact bilateral orbital floors. There is no evidence of mandibular fracture or dislocation. The paranasal sinuses are without evidence of hemorrhagic fluid levels. No soft tissue abnormalities are seen. Significant degenerative changes are seen at C1 and C2. No CT evidence of an acute fracture of the facial bones. Current Meds:  Current Facility-Administered Medications   Medication Dose Route Frequency    polyethylene glycol (MIRALAX) packet 102 g  102 g Oral ONCE    bisacodyL (DULCOLAX) tablet 5 mg  5 mg Oral NOW    0.9% sodium chloride infusion 250 mL  250 mL IntraVENous PRN    sodium chloride (NS) flush 5-40 mL  5-40 mL IntraVENous Q8H    sodium chloride (NS) flush 5-40 mL  5-40 mL IntraVENous PRN    acetaminophen (TYLENOL) tablet 650 mg  650 mg Oral Q6H PRN    Or    acetaminophen (TYLENOL) suppository 650 mg  650 mg Rectal Q6H PRN    polyethylene glycol (MIRALAX) packet 17 g  17 g Oral DAILY PRN    ondansetron (ZOFRAN ODT) tablet 4 mg  4 mg Oral Q8H PRN    Or    ondansetron (ZOFRAN) injection 4 mg  4 mg IntraVENous Q6H PRN    insulin lispro (HUMALOG) injection   SubCUTAneous AC&HS       Signed:  Shannan Juarez MD    Part of this note may have been written by using a voice dictation software. The note has been proof read but may still contain some grammatical/other typographical errors.

## 2022-01-23 NOTE — PROGRESS NOTES
Patient noted lying face down in bathroom on the floor by nurse tech. Patient stated he went to bathroom and had an accident.  He then

## 2022-01-23 NOTE — PROGRESS NOTES
[facility] Progress Note         Admit Date:  1/22/2022    Today's Date:  1/23/2022    CC: GI Bleed    Subjective:     Patient resting quietly, difficulties last pm with prep (fall this morning at 4am). He has not finished prep as of 8am.  He denies bloody stools with prep. Medications:   Current Facility-Administered Medications   Medication Dose Route Frequency    polyethylene glycol (MIRALAX) packet 102 g  102 g Oral ONCE    bisacodyL (DULCOLAX) tablet 5 mg  5 mg Oral NOW    0.9% sodium chloride infusion 250 mL  250 mL IntraVENous PRN    sodium chloride (NS) flush 5-40 mL  5-40 mL IntraVENous Q8H    sodium chloride (NS) flush 5-40 mL  5-40 mL IntraVENous PRN    acetaminophen (TYLENOL) tablet 650 mg  650 mg Oral Q6H PRN    Or    acetaminophen (TYLENOL) suppository 650 mg  650 mg Rectal Q6H PRN    polyethylene glycol (MIRALAX) packet 17 g  17 g Oral DAILY PRN    ondansetron (ZOFRAN ODT) tablet 4 mg  4 mg Oral Q8H PRN    Or    ondansetron (ZOFRAN) injection 4 mg  4 mg IntraVENous Q6H PRN    insulin lispro (HUMALOG) injection   SubCUTAneous AC&HS       Review of Systems:  ROS was obtained, with pertinent positives as listed above. No chest pain or SOB. Diet:      Objective:   Vitals:  Visit Vitals  /70 (BP 1 Location: Left upper arm, BP Patient Position: At rest;Supine)   Pulse 74   Temp 98.4 °F (36.9 °C)   Resp 15   Ht 5' 10\" (1.778 m)   Wt 99.8 kg (220 lb)   SpO2 96%   BMI 31.57 kg/m²     Intake/Output:  No intake/output data recorded. 01/21 1901 - 01/23 0700  In: 138.8   Out: -   Exam:  General appearance: alert, cooperative, no distress  Lungs: clear to auscultation bilaterally anteriorly  Heart: regular rate and rhythm  Abdomen: soft, non-tender.  Bowel sounds normal. No masses, no organomegaly  Extremities: extremities normal, atraumatic, no cyanosis or edema  Neuro:  alert and oriented    Data Review (Labs):    Recent Labs     01/23/22  0349 01/22/22  9659 01/22/22  1241 01/22/22  0205   WBC 10.7  --   --  13.2*   HGB 8.5* 8.5* 7.4* 7.7*   HCT 27.4* 30.3* 24.2* 25.7*     --   --  285   MCV 95.5  --   --  99.2*     --   --  140   K 4.6  --   --  4.9   *  --   --  110*   CO2 24  --   --  21   BUN 35*  --   --  52*   CREA 1.78*  --   --  2.02*   CA 8.8  --   --  8.7   MG 2.1  --   --   --    GLU 89  --   --  108*     --   --  111   AST 18  --   --  22   ALT 25  --   --  25   TBILI 0.5  --   --  0.2   ALB 2.0*  --   --  1.8*   TP 6.4  --   --  5.9*   LPSE  --   --   --  30*       Assessment:     Principal Problem:    Acute GI bleeding (1/22/2022)    Active Problems:    Benign essential hypertension (8/19/2012)      Overview: Last Assessment & Plan:       Formatting of this note might be different from the original.      - BP low normal on lisinopril      - monitor BP at home, may need to reduce dose due to dizziness      Paroxysmal atrial fibrillation (HCC) (8/29/2012)      Overview: Formatting of this note might be different from the original.      Paroxysmal             Last Assessment & Plan:       Formatting of this note might be different from the original.      - s/p ablation       - not fully anticoagulated due to maintenance of sinus rhythm      Type 2 diabetes mellitus ([facility] (8/19/2012)      Acute blood loss anemia (1/15/2022)        Plan:     Lower GI Bleed-  Colonoscopy for this am cancelled as prep had not been completed. Additional prep orders written. Plan for colonoscopy tomorrow at noon. Yossi Pascual

## 2022-01-23 NOTE — PROGRESS NOTES
Patient noted lying on the floor in bathroom on abdomen and face by nurse tech. Patient stated he was attempting to clean himself after having a bowel movement with the shower head. The floor was wet and he slipped and fell. Patient denies pain. Patient able to move all extremities the same as before the fall. Physician (Dr. Alia Nevarez) notified of fall and that patient can not remember if he hit his head. Physician ordered ct of head without contrast and of facial bones to r/o broken bones. Patient notified of CT scan.

## 2022-01-23 NOTE — PROGRESS NOTES
Care Management Interventions  PCP Verified by CM: Yes  Support Systems: Spouse/Significant Other  Discharge Location  Patient Expects to be Discharged to[de-identified] Home with family assistance  78 yr-old MM with previous admission 1-15 to 1-17 with GI bleed, was discharged with referral to [facility] for PCP then to f/u with his GI for colonoscopy. Admitted 1/22 with GI bleed. GI team consult. Chart screened by  for discharge planning. No needs identified at this time. Please consult  if any new issues arise.

## 2022-01-23 NOTE — PROGRESS NOTES
Patient sitting up in bed on computer watching TV. Patient denies pain. Patient assessment completed. Patient re-educated on the importance of completing bowel prep for colostomy scheduled 1/23/22. Bed in low position with wheels locked. Call light in reach.

## 2022-01-24 VITALS
WEIGHT: 220 LBS | HEIGHT: 70 IN | OXYGEN SATURATION: 95 % | HEART RATE: 89 BPM | RESPIRATION RATE: 16 BRPM | BODY MASS INDEX: 31.5 KG/M2 | DIASTOLIC BLOOD PRESSURE: 65 MMHG | TEMPERATURE: 98.2 F | SYSTOLIC BLOOD PRESSURE: 104 MMHG

## 2022-01-24 LAB
ANION GAP SERPL CALC-SCNC: 6 MMOL/L (ref 7–16)
BUN SERPL-MCNC: 26 MG/DL (ref 8–23)
CALCIUM SERPL-MCNC: 9 MG/DL (ref 8.3–10.4)
CHLORIDE SERPL-SCNC: 107 MMOL/L (ref 98–107)
CO2 SERPL-SCNC: 27 MMOL/L (ref 21–32)
CREAT SERPL-MCNC: 1.6 MG/DL (ref 0.8–1.5)
FERRITIN SERPL-MCNC: 950 NG/ML (ref 8–388)
FOLATE SERPL-MCNC: 67.8 NG/ML (ref 3.1–17.5)
GLUCOSE BLD STRIP.AUTO-MCNC: 100 MG/DL (ref 65–100)
GLUCOSE BLD STRIP.AUTO-MCNC: 72 MG/DL (ref 65–100)
GLUCOSE BLD STRIP.AUTO-MCNC: 89 MG/DL (ref 65–100)
GLUCOSE SERPL-MCNC: 72 MG/DL (ref 65–100)
HCT VFR BLD AUTO: 27.4 % (ref 41.1–50.3)
HGB BLD-MCNC: 8.4 G/DL (ref 13.6–17.2)
IRON SATN MFR SERPL: 22 %
IRON SERPL-MCNC: 38 UG/DL (ref 35–150)
POTASSIUM SERPL-SCNC: 4.2 MMOL/L (ref 3.5–5.1)
SERVICE CMNT-IMP: NORMAL
SODIUM SERPL-SCNC: 140 MMOL/L (ref 136–145)
TIBC SERPL-MCNC: 172 UG/DL (ref 250–450)
VIT B12 SERPL-MCNC: 907 PG/ML (ref 193–986)

## 2022-01-24 PROCEDURE — 74011250637 HC RX REV CODE- 250/637: Performed by: HOSPITALIST

## 2022-01-24 PROCEDURE — 94760 N-INVAS EAR/PLS OXIMETRY 1: CPT

## 2022-01-24 PROCEDURE — 80048 BASIC METABOLIC PNL TOTAL CA: CPT

## 2022-01-24 PROCEDURE — 2709999900 HC NON-CHARGEABLE SUPPLY

## 2022-01-24 PROCEDURE — 74011250636 HC RX REV CODE- 250/636: Performed by: NURSE ANESTHETIST, CERTIFIED REGISTERED

## 2022-01-24 PROCEDURE — 85018 HEMOGLOBIN: CPT

## 2022-01-24 PROCEDURE — 74011250637 HC RX REV CODE- 250/637: Performed by: INTERNAL MEDICINE

## 2022-01-24 PROCEDURE — 2709999900 HC NON-CHARGEABLE SUPPLY: Performed by: INTERNAL MEDICINE

## 2022-01-24 PROCEDURE — 74011000250 HC RX REV CODE- 250: Performed by: HOSPITALIST

## 2022-01-24 PROCEDURE — 82962 GLUCOSE BLOOD TEST: CPT

## 2022-01-24 PROCEDURE — 82607 VITAMIN B-12: CPT

## 2022-01-24 PROCEDURE — 0DJD8ZZ INSPECTION OF LOWER INTESTINAL TRACT, VIA NATURAL OR ARTIFICIAL OPENING ENDOSCOPIC: ICD-10-PCS | Performed by: INTERNAL MEDICINE

## 2022-01-24 PROCEDURE — 74011000250 HC RX REV CODE- 250: Performed by: NURSE ANESTHETIST, CERTIFIED REGISTERED

## 2022-01-24 PROCEDURE — 82728 ASSAY OF FERRITIN: CPT

## 2022-01-24 PROCEDURE — 82746 ASSAY OF FOLIC ACID SERUM: CPT

## 2022-01-24 PROCEDURE — 76040000025: Performed by: INTERNAL MEDICINE

## 2022-01-24 PROCEDURE — 83540 ASSAY OF IRON: CPT

## 2022-01-24 PROCEDURE — 76060000032 HC ANESTHESIA 0.5 TO 1 HR: Performed by: INTERNAL MEDICINE

## 2022-01-24 PROCEDURE — 36415 COLL VENOUS BLD VENIPUNCTURE: CPT

## 2022-01-24 RX ORDER — PROPOFOL 10 MG/ML
INJECTION, EMULSION INTRAVENOUS
Status: DISCONTINUED | OUTPATIENT
Start: 2022-01-24 | End: 2022-01-24 | Stop reason: HOSPADM

## 2022-01-24 RX ORDER — SODIUM CHLORIDE, SODIUM LACTATE, POTASSIUM CHLORIDE, CALCIUM CHLORIDE 600; 310; 30; 20 MG/100ML; MG/100ML; MG/100ML; MG/100ML
100 INJECTION, SOLUTION INTRAVENOUS CONTINUOUS
Status: DISCONTINUED | OUTPATIENT
Start: 2022-01-24 | End: 2022-01-24 | Stop reason: HOSPADM

## 2022-01-24 RX ORDER — SODIUM CHLORIDE, SODIUM LACTATE, POTASSIUM CHLORIDE, CALCIUM CHLORIDE 600; 310; 30; 20 MG/100ML; MG/100ML; MG/100ML; MG/100ML
INJECTION, SOLUTION INTRAVENOUS
Status: DISCONTINUED | OUTPATIENT
Start: 2022-01-24 | End: 2022-01-24 | Stop reason: HOSPADM

## 2022-01-24 RX ORDER — LIDOCAINE HYDROCHLORIDE 20 MG/ML
INJECTION, SOLUTION EPIDURAL; INFILTRATION; INTRACAUDAL; PERINEURAL AS NEEDED
Status: DISCONTINUED | OUTPATIENT
Start: 2022-01-24 | End: 2022-01-24 | Stop reason: HOSPADM

## 2022-01-24 RX ADMIN — PROPOFOL 75 MCG/KG/MIN: 10 INJECTION, EMULSION INTRAVENOUS at 12:20

## 2022-01-24 RX ADMIN — GABAPENTIN 300 MG: 300 CAPSULE ORAL at 14:21

## 2022-01-24 RX ADMIN — ACETAMINOPHEN 650 MG: 325 TABLET, FILM COATED ORAL at 14:50

## 2022-01-24 RX ADMIN — LIDOCAINE HYDROCHLORIDE 25 MG: 20 INJECTION, SOLUTION EPIDURAL; INFILTRATION; INTRACAUDAL; PERINEURAL at 12:20

## 2022-01-24 RX ADMIN — SODIUM CHLORIDE, PRESERVATIVE FREE 10 ML: 5 INJECTION INTRAVENOUS at 05:37

## 2022-01-24 RX ADMIN — FINASTERIDE 5 MG: 5 TABLET, FILM COATED ORAL at 08:18

## 2022-01-24 RX ADMIN — SODIUM CHLORIDE, SODIUM LACTATE, POTASSIUM CHLORIDE, AND CALCIUM CHLORIDE: 600; 310; 30; 20 INJECTION, SOLUTION INTRAVENOUS at 12:03

## 2022-01-24 NOTE — PERIOP NOTES
This RN contacted Dr. Seth Salinas in regards to discharge criteria. Dr. Seth Salinas states patient may be discharged from a GI standpoint, however he is not the primary physician. 3rd floor Charge RN notified.

## 2022-01-24 NOTE — ANESTHESIA POSTPROCEDURE EVALUATION
Procedure(s):  COLONOSCOPY.    total IV anesthesia    Anesthesia Post Evaluation        Patient location during evaluation: PACU  Patient participation: complete - patient participated  Level of consciousness: awake  Pain management: satisfactory to patient  Airway patency: patent  Anesthetic complications: no  Cardiovascular status: hemodynamically stable  Respiratory status: spontaneous ventilation  Hydration status: euvolemic  Post anesthesia nausea and vomiting:  none      INITIAL Post-op Vital signs:   Vitals Value Taken Time   /59 01/24/22 1305   Temp     Pulse 80 01/24/22 1307   Resp 64 01/24/22 1307   SpO2 98 % 01/24/22 1307   Vitals shown include unvalidated device data.

## 2022-01-24 NOTE — DISCHARGE INSTRUCTIONS
Patient Education   Patient Education        Learning About Diverticulosis and Diverticulitis  What are diverticulosis and diverticulitis? In diverticulosis and diverticulitis, pouches called diverticula form in the wall of the large intestine, or colon. · In diverticulosis, the pouches do not cause any pain or other symptoms. · In diverticulitis, the pouches get inflamed or infected and cause symptoms. Doctors aren't sure what causes these pouches in the colon. But they think that a low-fiber diet may play a role. Without fiber to add bulk to the stool, the colon has to work harder than normal to push the stool forward. The pressure from this may cause pouches to form in weak spots along the colon. Some people with diverticulosis get diverticulitis. But experts don't know why this happens. What are the symptoms? · In diverticulosis, most people don't have symptoms. But pouches sometimes bleed. · In diverticulitis, symptoms may last from a few hours to a week or more. They include:  ? Belly pain. This is usually in the lower left side. It is sometimes worse when you move. This is the most common symptom. ? Fever and chills. ? Bloating and gas. ? Diarrhea or constipation. ? Nausea and sometimes vomiting.  ? Not feeling like eating. How can you prevent diverticulitis? You may be able to lower your chance of getting diverticulitis. You can do this by taking steps to prevent constipation. · Eat fruits, vegetables, beans, and whole grains every day. These foods are high in fiber. · Drink plenty of fluids. If you have kidney, heart, or liver disease and have to limit fluids, talk with your doctor before you increase the amount of fluids you drink. · Get at least 30 minutes of exercise on most days of the week. Walking is a good choice. You also may want to do other activities, such as running, swimming, cycling, or playing tennis or team sports.   · Take a fiber supplement, such as Citrucel or Metamucil, every day if needed. Read and follow all instructions on the label. · Schedule time each day for a bowel movement. Having a daily routine may help. Take your time and do not strain when having a bowel movement. Some people avoid nuts, seeds, berries, and popcorn. They believe that these foods might get trapped in the diverticula and cause pain. But there is no proof that these foods cause diverticulitis or make it worse. How are these problems treated? · The best way to treat diverticulosis is to avoid constipation. · Treatment for diverticulitis includes antibiotics. It often includes a change in your diet. You may need only liquids at first. Your doctor may suggest pain medicines for pain or belly cramps. In some cases, surgery may be needed. Follow-up care is a key part of your treatment and safety. Be sure to make and go to all appointments, and call your doctor if you are having problems. It's also a good idea to know your test results and keep a list of the medicines you take. Where can you learn more? Go to http://www.gray.com/  Enter T659 in the search box to learn more about \"Learning About Diverticulosis and Diverticulitis. \"  Current as of: February 10, 2021               Content Version: 13.0  © 2006-2021 [employer]. Care instructions adapted under license by [facility] (which disclaims liability or warranty for this information). If you have questions about a medical condition or this instruction, always ask your healthcare professional. [place] [address] any warranty or liability for your use of this information. Gastrointestinal Bleeding: Care Instructions  Your Care Instructions     The digestive or gastrointestinal tract goes from the mouth to the anus. It is often called the GI tract. Bleeding can happen anywhere in the GI tract. It may be caused by an ulcer, an infection, or cancer.  It may also be caused by medicines such as aspirin or ibuprofen. Light bleeding may not cause any symptoms at first. But if you continue to bleed for a while, you may feel very weak or tired. Sudden, heavy bleeding means you need to see a doctor right away. This kind of bleeding can be very dangerous. But it can usually be cured or controlled. The doctor may do some tests to find the cause of your bleeding. Follow-up care is a key part of your treatment and safety. Be sure to make and go to all appointments, and call your doctor if you are having problems. It's also a good idea to know your test results and keep a list of the medicines you take. How can you care for yourself at home? · Be safe with medicines. Take your medicines exactly as prescribed. Call your doctor if you think you are having a problem with your medicine. You will get more details on the specific medicines your doctor prescribes. · Do not take aspirin or other anti-inflammatory medicines, such as naproxen (Aleve) or ibuprofen (Advil, Motrin), without talking to your doctor first. Ask your doctor if it is okay to use acetaminophen (Tylenol). · Do not drink alcohol. · The bleeding may make you lose iron. So it's important to eat foods that have a lot of iron. These include red meat, shellfish, poultry, and eggs. They also include beans, raisins, whole-grain breads, and leafy green vegetables. If you want help planning meals, you can make an appointment with a dietitian. When should you call for help? Call 911 anytime you think you may need emergency care. For example, call if:    · You have sudden, severe belly pain.     · You vomit blood or what looks like coffee grounds.     · You passed out (lost consciousness).     · Your stools are maroon or very bloody.    Call your doctor now or seek immediate medical care if:    · You are dizzy or lightheaded, or you feel like you may faint.     · Your stools are black and look like tar, or they have streaks of blood.     · You have belly pain.     · You vomit or have nausea.     · You have trouble swallowing, or it hurts when you swallow. Watch closely for changes in your health, and be sure to contact your doctor if:    · You do not get better as expected. Where can you learn more? Go to http://www.gray.com/  Enter F981 in the search box to learn more about \"Gastrointestinal Bleeding: Care Instructions. \"  Current as of: July 1, 2021               Content Version: 13.0  © 2006-2021 [employer]. Care instructions adapted under license by [employer] (which disclaims liability or warranty for this information). If you have questions about a medical condition or this instruction, always ask your healthcare professional. [address] any warranty or liability for your use of this information.

## 2022-01-24 NOTE — PERIOP NOTES
TRANSFER - IN REPORT:    Verbal report received from [facility] on Lemuel Coyle  being received from 3rd floor for routine progression of care      Report consisted of patients Situation, Background, Assessment and   Recommendations(SBAR). Information from the following report(s) Kardex was reviewed with the receiving nurse. Opportunity for questions and clarification was provided. Assessment completed upon patients arrival to unit and care assumed.

## 2022-01-24 NOTE — PERIOP NOTES
TRANSFER - OUT REPORT:    Verbal report given to Liv RN (name) on Lemuel Coyle  being transferred to [facility](unit) for routine post - op       Report consisted of patients Situation, Background, Assessment and   Recommendations(SBAR). Information from the following report(s) SBAR, Kardex, OR Summary, Procedure Summary, Intake/Output and MAR was reviewed with the receiving nurse. Lines:   Peripheral IV 01/24/22 Right Hand (Active)   Site Assessment Clean, dry, & intact 01/24/22 1221   Phlebitis Assessment 0 01/24/22 1221   Infiltration Assessment 0 01/24/22 1221   Dressing Status Clean, dry, & intact; New 01/24/22 1221   Dressing Type Tape;Transparent 01/24/22 1221   Hub Color/Line Status Blue; Infusing 01/24/22 1221        Opportunity for questions and clarification was provided.       Patient transported with:   [employer]

## 2022-01-24 NOTE — PROGRESS NOTES
I have reviewed discharge instructions with the patient and spouse. The patient and spouse verbalized understanding. Follow up appointment with Dr. Erika Fletcher in 2 weeks.

## 2022-01-24 NOTE — OP NOTES
COLONOSCOPY    DATE of PROCEDURE: 1/24/2022    INDICATION:Lower GI bleed  POSTPROCEDURE DIAGNOSIS:    MEDICATION:   MAC anesthesia (see anesthesia report)    INSTRUMENT:[id number]    PROCEDURE: After obtaining informed consent, the patient was placed in the left lateral position and sedated. The endoscope was advanced to the cecum where the appendiceal orifice and ileocecal valve were identified. On withdrawal, the colon was carefully visualized in a 360 degree fashion, looking between folds and proximal and distal aspect of the folds. The patient was taken to the recovery area in stable condition. FINDINGS:  Rectum: Hemorrhoids  Sigmoid: Diverticulosis  Descending Colon: Diverticulosis  Transverse Colon: Diverticulosis  Ascending Colon: Diverticulosis  Cecum: normal  Terminal ileum: not entered    Bowel Prep: Adequate  Estimated blood loss: 0-minimal   Specimens obtained during procedure: none    ASSESSMENT/PLAN:     Diverticulosis-  L sided greater than Right, No stigmata of recent bleeding. Advance diet.

## 2022-01-24 NOTE — DISCHARGE SUMMARY
Hospitalist Discharge Summary   Admit Date:  2022  2:01 AM   DC Note date: 2022  Name:  Prasanna Thomas   Age:  78 y.o. Sex:  male  :  1945   MRN:  [id number]   Room:  [facility]  PCP:  Neetu Douglas MD    Presenting Complaint: Rectal Bleeding    Initial Admission Diagnosis: Acute GI bleeding [K92.2]  Acute blood loss anemia [D62]     Problem List for this Hospitalization:  Hospital Problems as of 2022 Date Reviewed: 2021          Codes Class Noted - Resolved POA    * (Principal) Acute GI bleeding ICD-10-CM: K92.2  ICD-9-CM: 578.9  2022 - Present Unknown        Acute blood loss anemia ICD-10-CM: D62  ICD-9-CM: 285.1  1/15/2022 - Present Unknown        Paroxysmal atrial fibrillation (HCC) ICD-10-CM: I48.0  ICD-9-CM: 427.31  2012 - Present Yes    Overview Signed 1/15/2022  3:58 AM by Jefferson Cordero MD     Formatting of this note might be different from the original.  Paroxysmal     Last Assessment & Plan:   Formatting of this note might be different from the original.  - s/p ablation   - not fully anticoagulated due to maintenance of sinus rhythm             Benign essential hypertension ICD-10-CM: I10  ICD-9-CM: 401.1  2012 - Present Yes    Overview Signed 1/15/2022  3:58 AM by Jefferson Cordero MD     Last Assessment & Plan:   Formatting of this note might be different from the original.  - BP low normal on lisinopril  - monitor BP at home, may need to reduce dose due to dizziness             Type 2 diabetes mellitus ([facility] [address] ICD-10-CM: E11.9  ICD-9-CM: 250.00  2012 - Present Yes            Did Patient have Sepsis (YES OR NO): No    Hospital Course:  Please refer to the admission H&P for details of presentation. In summary, Prasanna Thomas is a 78 y.o. male with past medical history significant for diabetes type 2, hypertension, CKD3, afib, and chronic anemia presented to emergency room with chief complaint of bright blood per rectum.  Patient reports he had 3 episodes bright red blood to maroon-colored bowel movements. Patient was admitted (1/15 - 1/17) for hematocheza and discharged withplan to follow as outpatient for colonoscopy. Received 1 unit of PRBC for hb of 7.7 in ED. Hb has been stable at 8.5 and no longer having any bloody bm. Colonoscopy was unrevealing. He has been cleared by GI for discharge. It is likely that he had diverticular bleeding. Patient is medically stable for discharge. Patient is to continue taking medications as prescribed and to follow up with PCP on discharge. Patient is instructed to to call a physician or return to ED if any concerns/symptoms worsened. Discharge summary and encounter summary was sent to PCP electronically via \"Comm Mgt\" link in [facility], if possible. Disposition: Home or Self Care  Diet: ADULT DIET Regular; 4 carb choices (60 gm/meal)  Code Status: Full Code    Follow Up Orders:  No orders of the defined types were placed in this encounter. Follow-up Information     Follow up With Specialties Details Why Contact Info    Mesfin Garcia MD Internal Medicine  As needed [address]  [address] Internal Medicine[facility] [address] [phone]      Anita Clifford MD Gastroenterology In 2 weeks As needed [address] [facility] [address]  [phone]            Follow up labs/diagnostics (ultimately defer to outpatient provider): as above    Time spent in patient discharge and coordination 37 minutes. Plan was discussed with patient and wife. All questions answered. Patient was stable at time of discharge. Instructions given to call a physician or return if any concerns. Discharge Info:   Current Discharge Medication List      CONTINUE these medications which have NOT CHANGED    Details   gabapentin (NEURONTIN) 300 mg capsule Take 300 mg by mouth five (5) times daily.  Indications: neuropathic pain      allopurinoL (ZYLOPRIM) 300 mg tablet Take 300 mg by mouth daily. pantoprazole (PROTONIX) 20 mg tablet Take 20 mg by mouth daily. lisinopriL (PRINIVIL, ZESTRIL) 20 mg tablet Take 20 mg by mouth daily. finasteride (PROSCAR) 5 mg tablet Take 5 mg by mouth daily. multivitamin (ONE A DAY) tablet Take 1 Tab by mouth nightly. cholecalciferol (Vitamin D3) 25 mcg (1,000 unit) cap Take 1,000 Units by mouth nightly. ascorbic acid, vitamin C, (Vitamin C) 250 mg tablet Take 1,000 mg by mouth daily. omega 3-DHA-EPA-fish oil 1,000 mg (120 mg-180 mg) capsule Take 1 Cap by mouth daily. acetaminophen (Tylenol Extra Strength) 500 mg tablet Take 500 mg by mouth every six (6) hours as needed for Pain. insulin aspart U-100 (NovoLOG Flexpen U-100 Insulin) 100 unit/mL (3 mL) inpn by SubCUTAneous route Before breakfast, lunch, and dinner. SSI      insulin glargine (Lantus Solostar U-100 Insulin) 100 unit/mL (3 mL) inpn by SubCUTAneous route two (2) times a day. 45 units in the morning and 30 units in the evening      aspirin (ASPIRIN) 325 mg tablet Take 325 mg by mouth nightly. STOP taking these medications       cephALEXin (KEFLEX) 500 mg capsule Comments:   Reason for Stopping:         ondansetron (Zofran ODT) 8 mg disintegrating tablet Comments:   Reason for Stopping:         lipase-protease-amylase (Creon) 24,000-76,000 -120,000 unit capsule Comments:   Reason for Stopping:         simvastatin (ZOCOR) 10 mg tablet Comments:   Reason for Stopping:         B.infantis-B.ani-B.long-B.bifi (Probiotic 4X) 10-15 mg TbEC Comments:   Reason for Stopping:         vitamin E (AQUA GEMS) 400 unit capsule Comments:   Reason for Stopping:               Procedures done this admission:  Procedure(s):  COLONOSCOPY    Consults this admission:  IP CONSULT TO GASTROENTEROLOGY    Echocardiogram/EKG results:  No results found for this or any previous visit.        EKG Results     None          Diagnostic Imaging/Tests:   XR ABD ACUTE W 1 V CHEST    Result Date: 1/22/2022  EXAMINATION: Acute Abdominal Series. HISTORY: GI bleed TECHNIQUE: Supine and upright views of the abdomen. Single frontal view of the chest. COMPARISON: Chest x-ray dated 1/19/2022 FINDINGS: Supine images show a nonspecific, nonobstructed bowel gas pattern. There is a moderate amount of retained fecal material. Gas is projecting over the rectum. The upright images show no definitive evidence of free air. No air-fluid levels are seen. The cardiac silhouette mediastinum and pulmonary vasculature are within normal limits. The lungs are clear and there is no pleural effusion or pneumothorax. Chronic appearing changes are seen at the right base. Osseous structures and soft tissues appear within normal limits. Nonspecific, nonobstructed bowel gas pattern. No evidence of free intraperitoneal air. There is a moderate amount of retained fecal material. No acute intrathoracic process. CT HEAD WO CONT    Result Date: 1/23/2022  EXAM: CT HEAD WO CONT HISTORY:  Fall. TECHNIQUE: Axial images of the brain were performed without the administration of intravenous contrast. Images were obtained axial plane and coronal reformatted images were submitted. Dose reduction technique used: Automated exposure control/Adjustment of the mA and/or kV according to patient size/Use of iterative reconstruction technique. COMPARISON: None. FINDINGS: There is no evidence of acute intracranial hemorrhage, midline shift, or mass effect. No intra or extra-axial fluid collections are observed. There are mild chronic appearing microangiopathic changes within the periventricular white matter. No evidence of acute confluent territorial infarction. Ventricles and basal cisterns are patent and symmetric. There is mild generalized volume loss. Visualized paranasal sinuses and mastoid air cells are without significant fluid. Scalp, soft tissues, and calvarium are within normal limits.      1. No CT evidence of acute intracranial process. CT MAXILLOFACIAL WO CONT    Result Date: 1/23/2022  EXAMINATION: CT MAXILLOFACIAL WO CONT HISTORY: unwitnessed fall. TECHNIQUE: CT of the facial bones was performed without contrast. Images were obtained in the axial plane and coronal and sagittal reconstructions were performed. Dose reduction technique used: Automated exposure control/Adjustment of the mA and/or kV according to patient size/Use of iterative reconstruction technique. COMPARISON: None. FINDINGS: No facial fractures are identified. The bilateral pterygoid plates are intact. Coronal images demonstrate intact bilateral orbital floors. There is no evidence of mandibular fracture or dislocation. The paranasal sinuses are without evidence of hemorrhagic fluid levels. No soft tissue abnormalities are seen. Significant degenerative changes are seen at C1 and C2. No CT evidence of an acute fracture of the facial bones. All Micro Results     Procedure Component Value Units Date/Time    COVID-19 RAPID TEST [[id number]] Collected: 01/22/22 0617    Order Status: Completed Specimen: Nasopharyngeal Updated: 01/22/22 4166     Specimen source NASAL SWAB        COVID-19 rapid test Not detected        Comment:      The specimen is NEGATIVE for SARS-CoV-2, the novel coronavirus associated with COVID-19. A negative result does not rule out COVID-19. This test has been authorized by the FDA under an Emergency Use Authorization (EUA) for use by authorized laboratories.         Fact sheet for Healthcare Providers: ConventionUpdate.co.nz  Fact sheet for Patients: ConventionUpdate.co.nz       Methodology: Isothermal Nucleic Acid Amplification               Labs: Results:       BMP, Mg, Phos Recent Labs     01/24/22  0559 01/23/22  0349 01/22/22  0205    141 140   K 4.2 4.6 4.9    111* 110*   CO2 27 24 21   AGAP 6* 6* 9   BUN 26* 35* 52*   CREA 1.60* 1.78* 2.02*   CA 9.0 8.8 8.7   GLU 72 89 108*   MG  --  2.1  --       CBC Recent Labs     01/24/22  0559 01/23/22  0349 01/22/22  1732 01/22/22  1241 01/22/22  0205   WBC  --  10.7  --   --  13.2*   RBC  --  2.87*  --   --  2.59*   HGB 8.4* 8.5* 8.5*   < > 7.7*   HCT 27.4* 27.4* 30.3*   < > 25.7*   PLT  --  283  --   --  285   GRANS  --   --   --   --  59   LYMPH  --   --   --   --  29   EOS  --   --   --   --  1   MONOS  --   --   --   --  7   BASOS  --   --   --   --  1   IG  --   --   --   --  4   ANEU  --   --   --   --  7.7   ABL  --   --   --   --  3.8   ALEYDA  --   --   --   --  0.2   ABM  --   --   --   --  1.0   ABB  --   --   --   --  0.1   AIG  --   --   --   --  0.5    < > = values in this interval not displayed.       LFT Recent Labs     01/23/22  0349 01/22/22  0205   ALT 25 25    111   TP 6.4 5.9*   ALB 2.0* 1.8*   GLOB 4.4* 4.1*   AGRAT 0.5* 0.4*      Cardiac Testing No results found for: BNPP, BNP, CPK, RCK1, RCK2, RCK3, RCK4, CKMB, CKNDX, CKND1, TROPT, TROIQ   Coagulation Tests Lab Results   Component Value Date/Time    Prothrombin time 12.9 01/15/2022 01:11 AM    INR 0.9 01/15/2022 01:11 AM      A1c No results found for: HBA1C, [id number], OJO6SHSP   Lipid Panel No results found for: CHOL, CHOLPOCT, CHOLX, CHLST, CHOLV, 037829, HDL, HDLP, LDL, LDLC, DLDLP, 487120, VLDLC, VLDL, TGLX, TRIGL, TRIGP, TGLPOCT, CHHD, CHHDX   Thyroid Panel No results found for: TSH, T4, FT4, TT3, T3U, TSHEXT, TSHEXT     Most Recent UA Lab Results   Component Value Date/Time    WBC 0 01/22/2022 04:17 AM    RBC 0-3 01/22/2022 04:17 AM    Epithelial cells 0 01/22/2022 04:17 AM    Bacteria 0 01/22/2022 04:17 AM    Casts 0-3 01/22/2022 04:17 AM          All Labs from Last 24 Hrs:  Recent Results (from the past 24 hour(s))   GLUCOSE, POC    Collection Time: 01/23/22 10:14 PM   Result Value Ref Range    Glucose (POC) 75 65 - 100 mg/dL    Performed by AbercrombieTaylorBSN    GLUCOSE, POC    Collection Time: 01/24/22  5:53 AM   Result Value Ref Range    Glucose (POC) 72 65 - 100 mg/dL    Performed by AbercrombieTaylorBSN    HGB & HCT    Collection Time: 01/24/22  5:59 AM   Result Value Ref Range    HGB 8.4 (L) 13.6 - 17.2 g/dL    HCT 27.4 (L) 41.1 - 25.6 %   METABOLIC PANEL, BASIC    Collection Time: 01/24/22  5:59 AM   Result Value Ref Range    Sodium 140 136 - 145 mmol/L    Potassium 4.2 3.5 - 5.1 mmol/L    Chloride 107 98 - 107 mmol/L    CO2 27 21 - 32 mmol/L    Anion gap 6 (L) 7 - 16 mmol/L    Glucose 72 65 - 100 mg/dL    BUN 26 (H) 8 - 23 MG/DL    Creatinine 1.60 (H) 0.8 - 1.5 MG/DL    GFR est AA 55 (L) >60 ml/min/1.73m2    GFR est non-AA 45 (L) >60 ml/min/1.73m2    Calcium 9.0 8.3 - 10.4 MG/DL   TRANSFERRIN SATURATION    Collection Time: 01/24/22  5:59 AM   Result Value Ref Range    Iron 38 35 - 150 ug/dL    TIBC 172 (L) 250 - 450 ug/dL    Transferrin Saturation 22 %   FERRITIN    Collection Time: 01/24/22  5:59 AM   Result Value Ref Range    Ferritin 950 (H) 8 - 388 NG/ML   FOLATE    Collection Time: 01/24/22  5:59 AM   Result Value Ref Range    Folate 67.8 (H) 3.1 - 17.5 ng/mL   VITAMIN B12    Collection Time: 01/24/22  5:59 AM   Result Value Ref Range    Vitamin B12 907 193 - 986 pg/mL   GLUCOSE, POC    Collection Time: 01/24/22 11:22 AM   Result Value Ref Range    Glucose (POC) 100 65 - 100 mg/dL    Performed by Micki    GLUCOSE, POC    Collection Time: 01/24/22 11:49 AM   Result Value Ref Range    Glucose (POC) 89 65 - 100 mg/dL    Performed by Micki        Current Med List in Hospital:   Current Facility-Administered Medications   Medication Dose Route Frequency    lactated Ringers infusion  100 mL/hr IntraVENous CONTINUOUS    finasteride (PROSCAR) tablet 5 mg  5 mg Oral DAILY    gabapentin (NEURONTIN) capsule 300 mg  300 mg Oral 5XD    0.9% sodium chloride infusion 250 mL  250 mL IntraVENous PRN    sodium chloride (NS) flush 5-40 mL  5-40 mL IntraVENous Q8H    sodium chloride (NS) flush 5-40 mL  5-40 mL IntraVENous PRN    acetaminophen (TYLENOL) tablet 650 mg  650 mg Oral Q6H PRN    Or    acetaminophen (TYLENOL) suppository 650 mg  650 mg Rectal Q6H PRN    polyethylene glycol (MIRALAX) packet 17 g  17 g Oral DAILY PRN    ondansetron (ZOFRAN ODT) tablet 4 mg  4 mg Oral Q8H PRN    Or    ondansetron (ZOFRAN) injection 4 mg  4 mg IntraVENous Q6H PRN    insulin lispro (HUMALOG) injection   SubCUTAneous AC&HS       Allergies   Allergen Reactions    Codeine Other (comments)     \"makes me a little crazy\"        Fenofibrate Other (comments)     \"causes pancreatic attacks\"    Hydromorphone Other (comments)     \"gives me craziness\"    Januvia [Sitagliptin] Other (comments)     Per pt causes pancreatic issues    Metformin Diarrhea     Immunization History   Administered Date(s) Administered    COVID-19, Pfizer Purple top, DILUTE for use, 12+ yrs, 30mcg/0.3mL dose 01/22/2021, 02/09/2021    Hep A Vaccine 04/01/2007, 06/03/2007    Hep B Vaccine (Adult) 04/01/2007, 06/03/2007, 11/04/2007    Influenza High Dose Vaccine PF 10/23/2012, 11/30/2013, 09/29/2014, 10/08/2015, 09/25/2019    Influenza Vaccine 11/03/2017, 12/01/2018    Influenza Vaccine (Quad) PF (>6 Mo Flulaval, Fluarix, and >3 Yrs Afluria, Fluzone [id number]) 09/20/2016    Influenza Vaccine (Trivalent) 09/28/2014    Influenza, High-dose, Quadrivalent (>65 Yrs Fluzone High Dose Quad [id number]) 09/02/2020    MMR 04/08/2007    Meningococcal ACWY Vaccine 04/01/2007    Pneumococcal Conjugate (PCV-13) 09/20/2016    Pneumococcal Polysaccharide (PPSV-23) 03/21/2010, 10/01/2014    Poliovirus vaccine 04/01/2007    Tdap 09/19/2014    Typhoid Vaccine 04/01/2007    Yellow Fever Vaccine 03/30/2007    Zoster Vaccine, Live 01/01/2012       Recent Vital Data:  Patient Vitals for the past 24 hrs:   Temp Pulse Resp BP SpO2   01/24/22 1626 98.2 °F (36.8 °C) 89 16 104/65    01/24/22 1515 98.2 °F (36.8 °C) 89 16 104/65 95 %   01/24/22 1446     97 %   01/24/22 1331  76 16 126/75 95 %   01/24/22 1310  79 15 121/61 92 %   01/24/22 1305  81 14 (!) 112/59 97 %   01/24/22 1300  84 19 (!) 108/51 97 %   01/24/22 1252  88 20 (!) 104/51 97 %   01/24/22 1245  98 16  97 %   01/24/22 1208  92 16 127/76 98 %   01/24/22 0658 97.8 °F (36.6 °C) 75 16 (!) 105/59 96 %   01/24/22 0330 98.7 °F (37.1 °C) 89 16 133/84 97 %   01/23/22 2215 98.1 °F (36.7 °C) 90 16 114/76 95 %     Oxygen Therapy  O2 Sat (%): 95 % (01/24/22 1515)  Pulse via Oximetry: 83 beats per minute (01/24/22 1446)  O2 Device: None (Room air) (01/24/22 1446)  Skin Assessment: Clean, dry, & intact (01/24/22 1208)  Skin Protection for O2 Device: No (01/24/22 1208)  O2 Flow Rate (L/min): 0 l/min (01/24/22 1446)  FIO2 (%): 21 % (01/24/22 1446)  ETCO2 (mmHg): 35 mmHg (01/24/22 1208)    Estimated body mass index is 31.57 kg/m² as calculated from the following:    Height as of this encounter: 5' 10\" (1.778 m). Weight as of this encounter: 99.8 kg (220 lb). Intake/Output Summary (Last 24 hours) at 1/24/2022 1731  Last data filed at 1/24/2022 1239  Gross per 24 hour   Intake 300 ml   Output 0 ml   Net 300 ml         Physical Exam:    General:          Well nourished. No overt distress  Head:               Normocephalic, atraumatic  Eyes:               Sclerae appear normal.  Pupils equally round. ENT:                Nares appear normal, no drainage. Moist oral mucosa  Neck:               No restricted ROM. Trachea midline   CV:                  RRR. No jugular venous distension. Lungs:             CTAB. No wheezing. Respirations even, unlabored  Abdomen: Bowel sounds present. Soft, nontender, nondistended. Extremities:     No cyanosis or clubbing. No edema  Skin:                No rashes and normal coloration. Warm and dry. Neuro:             CN II-XII grossly intact. A&Ox3  Psych:             Normal mood and affect.           Signed:  Aj Angel MD    Part of this note may have been written by using a voice dictation software.   The note has been proof read but may still contain some grammatical/other typographical errors.

## 2022-01-24 NOTE — INTERVAL H&P NOTE
Update History & Physical    The Patient's History and Physical of 1/21/22,   was reviewed with the patient and I examined the patient. There was no change. The surgical site was confirmed by the patient and me. Plan:  The risk, benefits, expected outcome, and alternative to the recommended procedure have been discussed with the patient. Patient understands and wants to proceed with the procedure.     Electronically signed by Valente Griffin MD on 1/24/2022 at 11:44 AM

## 2022-01-24 NOTE — PROGRESS NOTES
Problem: Falls - Risk of  Goal: *Absence of Falls  Description: Document Chanell Wills Fall Risk and appropriate interventions in the flowsheet.   Outcome: Progressing Towards Goal  Note: Fall Risk Interventions:  Mobility Interventions: Patient to call before getting OOB         Medication Interventions: Patient to call before getting OOB,Teach patient to arise slowly    Elimination Interventions: Call light in reach    History of Falls Interventions: Room close to nurse's station,Bed/chair exit alarm         Problem: Patient Education: Go to Patient Education Activity  Goal: Patient/Family Education  Outcome: Progressing Towards Goal     Problem: Patient Education: Go to Patient Education Activity  Goal: Patient/Family Education  Outcome: Progressing Towards Goal

## 2022-02-04 LAB
AVERAGE GLUCOSE: NORMAL
HBA1C MFR BLD: 5.9 %

## 2022-02-21 ENCOUNTER — HOSPITAL ENCOUNTER (EMERGENCY)
Age: 77
Discharge: HOME | End: 2022-02-21
Attending: EMERGENCY MEDICINE
Payer: MEDICARE

## 2022-02-21 VITALS
TEMPERATURE: 98.2 F | HEIGHT: 70 IN | BODY MASS INDEX: 31.5 KG/M2 | WEIGHT: 220 LBS | DIASTOLIC BLOOD PRESSURE: 68 MMHG | HEART RATE: 80 BPM | OXYGEN SATURATION: 98 % | SYSTOLIC BLOOD PRESSURE: 123 MMHG | RESPIRATION RATE: 18 BRPM

## 2022-02-21 DIAGNOSIS — M10.9 ACUTE GOUT OF LEFT ANKLE, UNSPECIFIED CAUSE: Primary | ICD-10-CM

## 2022-02-21 PROCEDURE — 99283 EMERGENCY DEPT VISIT LOW MDM: CPT

## 2022-02-21 PROCEDURE — 96372 THER/PROPH/DIAG INJ SC/IM: CPT

## 2022-02-21 PROCEDURE — 74011250636 HC RX REV CODE- 250/636: Performed by: PHYSICIAN ASSISTANT

## 2022-02-21 RX ORDER — MELOXICAM 7.5 MG/1
7.5 TABLET ORAL
Qty: 30 TABLET | Refills: 0 | Status: SHIPPED | OUTPATIENT
Start: 2022-02-21 | End: 2022-03-23

## 2022-02-21 RX ORDER — HYDROMORPHONE HYDROCHLORIDE 1 MG/ML
1 INJECTION, SOLUTION INTRAMUSCULAR; INTRAVENOUS; SUBCUTANEOUS ONCE
Status: COMPLETED | OUTPATIENT
Start: 2022-02-21 | End: 2022-02-21

## 2022-02-21 RX ORDER — HYDROCODONE BITARTRATE AND ACETAMINOPHEN 5; 325 MG/1; MG/1
1 TABLET ORAL
Qty: 12 TABLET | Refills: 0 | Status: SHIPPED | OUTPATIENT
Start: 2022-02-21 | End: 2022-02-24

## 2022-02-21 RX ADMIN — HYDROMORPHONE HYDROCHLORIDE 1 MG: 1 INJECTION, SOLUTION INTRAMUSCULAR; INTRAVENOUS; SUBCUTANEOUS at 14:00

## 2022-02-21 NOTE — ED TRIAGE NOTES
Pt seen by PCP last week for pain in left foot. Pt was sent home on prednisone. Pt is still having pain in his left foot. Pt denies injury to area. Pt went to  in [place] [address] and given morphine and zofran. Pt was still having pain so they sent him here.

## 2022-02-21 NOTE — DISCHARGE INSTRUCTIONS
Start taking the meloxicam once daily with food in the morning. You may need to take 2 tablets once daily if the 1 tablet is not helping however this will increase the risk for GI bleeding. Take the least amount of narcotic pain medicine possible to control your symptoms. Hold your allopurinol. Follow a purine restricted diet. Risk of opioid analgesics include, but are not limited to: Overdose they can stop or slow your breathing and lead to death; fractures from falls; drowsiness leading to injury; tolerance, dependence and addiction. You should not operate any motorized vehicles or work from a height greater than ground level when taking opioid analgesics as they increase your fall risks.

## 2022-02-21 NOTE — ED NOTES
I have reviewed discharge instructions with the patient. The patient verbalized understanding. Patient left ED via Discharge Method: ambulatory to Home with family   Opportunity for questions and clarification provided. Patient given 2 scripts. To continue your aftercare when you leave the hospital, you may receive an automated call from our care team to check in on how you are doing. This is a free service and part of our promise to provide the best care and service to meet your aftercare needs.  If you have questions, or wish to unsubscribe from this service please call [phone]. Thank you for Choosing our [facility] Emergency Department.

## 2022-02-21 NOTE — ED PROVIDER NOTES
72-year-old well-appearing male presents today for evaluation of left foot and ankle pain. Onset is described as about 10 days ago. Duration is constant. Severity is moderate to severe. Patient denies any known mechanism of injury or trauma. He reports some mild ankle swelling but denies any erythema, warmth, ecchymosis or rashes. No open wounds. No fever, chills, red streaking up the leg. Patient has been evaluated by his primary care provider for his pain and was started on prednisone 40 mg for 1 week without any significant improvement with this treatment. Patient was evaluated at [facility] today and had x-ray of ankle and foot performed that showed some mild soft tissue swelling but no evidence of fracture. Lab work was also performed there which I have reviewed, white blood cell count 16.9, the likely due to recent steroid use. Patient reports a history of type 2 diabetes with peripheral neuropathy for which she is on gabapentin, history of gout and chronic arthritis. He has a scheduled follow-up with rheumatology in 1 week. Also reports that he has had previous surgery on his left foot several years ago though has had no issues related to his surgery.            Past Medical History:   Diagnosis Date    Arthritis     Blurred vision, right eye     BPH (benign prostatic hyperplasia)     Gout     History of Clostridioides difficile colitis 2010    History of pancreatitis     History of renal carcinoma     partial nephrectomy    Hyperlipidemia     Hypertension     Nausea & vomiting     small amount of N/V and pt not sure of it antiemetics work    Neuropathy     Paroxysmal A-fib ([address]     Presence of Watchman left atrial appendage closure device     Sleep apnea     compliant with C-pap    Thromboembolus ([address]     hx of left lower extremity    Type 2 diabetes mellitus (HCC)     insulin reliant; AVG -140; s.s. of hypoglycemia 65-75; last A1c 7.1    WPW (Alba-Parkinson-White syndrome)     ablation x4       Past Surgical History:   Procedure Laterality Date    COLONOSCOPY N/A 1/24/2022    COLONOSCOPY performed by [facility]azo [facility] at [facility] HX AFIB ABLATION      WPW- ablations x3    HX BACK SURGERY      ruptured disc    HX CATARACT REMOVAL      HX CHOLECYSTECTOMY      HX ENDOSCOPY      HX KNEE REPLACEMENT Right     HX LUMBAR LAMINECTOMY      HX NEPHRECTOMY      partial    HX ORTHOPAEDIC Left     foot    HX ORTHOPAEDIC Left     great toe straightened    HX OTHER SURGICAL      placed watchman 2/2020    HX WISDOM TEETH EXTRACTION           Family History:   Problem Relation Age of Onset    Other Mother         ALS    Parkinson's Disease Father     Cancer Sister         colon ca   Kate Bailey No Known Problems Sister     Cancer Sister         ovarian       Social History     Socioeconomic History    Marital status:      Spouse name: Not on file    Number of children: Not on file    Years of education: Not on file    Highest education level: Not on file   Occupational History    Not on file   Tobacco Use    Smoking status: Never Smoker    Smokeless tobacco: Never Used   Substance and Sexual Activity    Alcohol use: Not Currently    Drug use: Never    Sexual activity: Not on file   Other Topics Concern    Not on file   Social History Narrative    Not on file     Social Determinants of Health     Financial Resource Strain:     Difficulty of Paying Living Expenses: Not on file   Food Insecurity:     Worried About [address] in the Last Year: Not on file    [place] of Food in the Last Year: Not on file   Transportation Needs:     Lack of Transportation (Medical): Not on file    Lack of Transportation (Non-Medical):  Not on file   Physical Activity:     Days of Exercise per Week: Not on file    Minutes of Exercise per Session: Not on file   Stress:     Feeling of Stress : Not on file   Social Connections:     Frequency of Communication with Friends and Family: Not on file    Frequency of Social Gatherings with Friends and Family: Not on file    Attends [employer] Services: Not on file    Active Member of Clubs or Organizations: Not on file    Attends Club or Organization Meetings: Not on file    Marital Status: Not on file   Intimate Partner Violence:     Fear of Current or Ex-Partner: Not on file    Emotionally Abused: Not on file    Physically Abused: Not on file    Sexually Abused: Not on file   Housing Stability:     Unable to Pay for Housing in the Last Year: Not on file    Number of [place] in the Last Year: Not on file    Unstable Housing in the Last Year: Not on file         ALLERGIES: Codeine, Fenofibrate, Hydromorphone, Januvia [sitagliptin], and Metformin    Review of Systems   Constitutional: Negative for activity change, chills and fever. Respiratory: Negative for cough and shortness of breath. Gastrointestinal: Negative for abdominal pain. Genitourinary: Negative for dysuria, hematuria, penile pain and urgency. Musculoskeletal: Negative for back pain. Left ankle pain and swelling, left foot pain   Skin: Negative for color change, rash and wound. Neurological: Negative for dizziness and headaches. Hematological: Does not bruise/bleed easily. Vitals:    02/21/22 1255   BP: 123/70   Pulse: 83   Resp: 18   Temp: 98.2 °F (36.8 °C)   SpO2: 98%   Weight: 99.8 kg (220 lb)   Height: 5' 10\" (1.778 m)            Physical Exam  Vitals and nursing note reviewed. Constitutional:       Appearance: Normal appearance. HENT:      Head: Normocephalic and atraumatic. Eyes:      Conjunctiva/sclera: Conjunctivae normal.   Cardiovascular:      Rate and Rhythm: Normal rate and regular rhythm. Pulses:           Dorsalis pedis pulses are 2+ on the right side and 2+ on the left side. Posterior tibial pulses are 2+ on the right side and 2+ on the left side.       Heart sounds: No murmur heard.  No friction rub. No gallop. Comments: Pitting edema of bilateral ankles only. No erythema or warmth. Pulmonary:      Effort: Pulmonary effort is normal.      Breath sounds: No wheezing, rhonchi or rales. Musculoskeletal:      Left ankle: Swelling present. No ecchymosis. Tenderness present over the lateral malleolus. Left foot: Swelling and tenderness present. Skin:     General: Skin is warm and dry. Capillary Refill: Capillary refill takes less than 2 seconds. Neurological:      General: No focal deficit present. Mental Status: He is alert and oriented to person, place, and time. Motor: No weakness. Gait: Gait normal.   Psychiatric:         Mood and Affect: Mood normal.         Thought Content: Thought content normal.         Judgment: Judgment normal.          MDM  Number of Diagnoses or Management Options  Acute gout of left ankle, unspecified cause  Diagnosis management comments: DDX: Arthritis, fracture, osteomyelitis, septic arthritis, gout, venous stasis, DVT, PAD neuropathy    In summary this is a well-appearing 77-year-old male presenting today from urgent care due to severe left foot pain. He was evaluated at urgent care earlier today, had x-rays and lab work performed that were grossly unremarkable other than some mild leukocytosis though this is likely due to patient's daily 40 mg prednisone dose that he has been on for the last week. On physical exam today there is no signs of erythema, significant warmth, or any red streaking that would suggest an infectious etiology. Patient was treated at the urgent care with 4 mg of morphine, Solu-Medrol, and IV fluids without any relief. Discussed this case with Dr. Neymar Agarwal who also evaluated the patient. Patient was treated with a milligram of Dilaudid here. Have patient follow-up with rheumatology as scheduled next week.   In the meantime we will trial short course of NSAIDs though did educate on risks of GI bleed given patient's history. Patient placed in Ace wrap for compression to help with swelling, as well as Aircast for immobilization to see if this provides any additional pain relief. Andrews Mendieta; 2/21/2022 @2:49 PM Voice dictation software was used during the making of this note. This software is not perfect and grammatical and other typographical errors may be present. This note has not been proofread for errors.  ====================================         Amount and/or Complexity of Data Reviewed  Clinical lab tests: reviewed  Tests in the radiology section of CPT®: reviewed  Review and summarize past medical records: yes    Patient Progress  Patient progress: stable    ED Course as of 02/21/22 1446   Mon Feb 21, 2022   1422 I spoke to the patient's son who is an ER physician. He states that this is a recurrent issue with his father when he goes off of his NSAIDs. His GI doctor does not want him taking NSAIDs because of a history of GI bleed however he has noticed a pattern when he stops it the flareups begin. He also notes that his father does not follow a diet to help decrease risk for flareup of gout. [KH]   1440 I talked to the patient and we are going to give him a prescription for meloxicam to see if this helps but I did inform him that this can cause GI bleeding once again. We talked about diet and risk of narcotic use.  [KH]      ED Course User Index  [KH] Luis Alberto Duarte,        Procedures

## 2022-02-28 PROBLEM — M19.011 ARTHRITIS OF RIGHT SHOULDER REGION: Status: ACTIVE | Noted: 2022-02-28

## 2022-03-15 ENCOUNTER — HOSPITAL ENCOUNTER (OUTPATIENT)
Dept: INFUSION THERAPY | Age: 77
Discharge: HOME | End: 2022-03-15
Payer: MEDICARE

## 2022-03-15 VITALS
SYSTOLIC BLOOD PRESSURE: 143 MMHG | HEART RATE: 75 BPM | DIASTOLIC BLOOD PRESSURE: 74 MMHG | OXYGEN SATURATION: 99 % | RESPIRATION RATE: 18 BRPM | TEMPERATURE: 97.7 F

## 2022-03-15 PROCEDURE — 96365 THER/PROPH/DIAG IV INF INIT: CPT

## 2022-03-15 PROCEDURE — 74011250636 HC RX REV CODE- 250/636: Performed by: INTERNAL MEDICINE

## 2022-03-15 RX ORDER — ACETAMINOPHEN 325 MG/1
650 TABLET ORAL AS NEEDED
Status: ACTIVE | OUTPATIENT
Start: 2022-03-15 | End: 2022-03-15

## 2022-03-15 RX ORDER — DIPHENHYDRAMINE HYDROCHLORIDE 50 MG/ML
50 INJECTION, SOLUTION INTRAMUSCULAR; INTRAVENOUS AS NEEDED
Status: ACTIVE | OUTPATIENT
Start: 2022-03-15 | End: 2022-03-15

## 2022-03-15 RX ORDER — ONDANSETRON 2 MG/ML
8 INJECTION INTRAMUSCULAR; INTRAVENOUS AS NEEDED
Status: ACTIVE | OUTPATIENT
Start: 2022-03-15 | End: 2022-03-15

## 2022-03-15 RX ORDER — EPINEPHRINE 1 MG/ML
0.3 INJECTION, SOLUTION, CONCENTRATE INTRAVENOUS AS NEEDED
Status: ACTIVE | OUTPATIENT
Start: 2022-03-15 | End: 2022-03-15

## 2022-03-15 RX ORDER — HYDROCORTISONE SODIUM SUCCINATE 100 MG/2ML
100 INJECTION, POWDER, FOR SOLUTION INTRAMUSCULAR; INTRAVENOUS AS NEEDED
Status: ACTIVE | OUTPATIENT
Start: 2022-03-15 | End: 2022-03-15

## 2022-03-15 RX ORDER — ALBUTEROL SULFATE 0.83 MG/ML
2.5 SOLUTION RESPIRATORY (INHALATION) AS NEEDED
Status: ACTIVE | OUTPATIENT
Start: 2022-03-15 | End: 2022-03-15

## 2022-03-15 RX ADMIN — FERRIC CARBOXYMALTOSE INJECTION 750 MG: 50 INJECTION, SOLUTION INTRAVENOUS at 08:20

## 2022-03-15 NOTE — PROGRESS NOTES
[facility]. Assessment and Injectafer completed. Patient tolerated well. Any issues or concerns during appointment: No.  Patient aware of next infusion appointment on 3/23/22 @0800. Patient instructed to call provider with temperature of 100.4 or greater or nausea/vomiting/ diarrhea or pain not controlled by medications  Discharged ambulatory.

## 2022-03-18 PROBLEM — M25.511 CHRONIC RIGHT SHOULDER PAIN: Status: ACTIVE | Noted: 2022-01-17

## 2022-03-18 PROBLEM — M19.011 ARTHRITIS OF RIGHT SHOULDER REGION: Status: ACTIVE | Noted: 2022-02-28

## 2022-03-18 PROBLEM — N18.32 STAGE 3B CHRONIC KIDNEY DISEASE (HCC): Status: ACTIVE | Noted: 2021-06-11

## 2022-03-18 PROBLEM — D64.9 CHRONIC ANEMIA: Status: ACTIVE | Noted: 2020-01-07

## 2022-03-18 PROBLEM — G89.29 CHRONIC RIGHT SHOULDER PAIN: Status: ACTIVE | Noted: 2022-01-17

## 2022-03-19 PROBLEM — D62 ACUTE BLOOD LOSS ANEMIA: Status: ACTIVE | Noted: 2022-01-15

## 2022-03-19 PROBLEM — Z90.5 HISTORY OF PARTIAL NEPHRECTOMY: Status: ACTIVE | Noted: 2021-11-17

## 2022-03-19 PROBLEM — D72.829 LEUKOCYTOSIS: Status: ACTIVE | Noted: 2022-01-15

## 2022-03-19 PROBLEM — K92.2 ACUTE GI BLEEDING: Status: ACTIVE | Noted: 2022-01-22

## 2022-03-23 ENCOUNTER — HOSPITAL ENCOUNTER (OUTPATIENT)
Dept: INFUSION THERAPY | Age: 77
Discharge: HOME | End: 2022-03-23
Payer: MEDICARE

## 2022-03-23 VITALS
DIASTOLIC BLOOD PRESSURE: 88 MMHG | RESPIRATION RATE: 18 BRPM | TEMPERATURE: 97.5 F | OXYGEN SATURATION: 96 % | SYSTOLIC BLOOD PRESSURE: 154 MMHG | HEART RATE: 80 BPM

## 2022-03-23 PROCEDURE — 74011250636 HC RX REV CODE- 250/636: Performed by: INTERNAL MEDICINE

## 2022-03-23 PROCEDURE — 96365 THER/PROPH/DIAG IV INF INIT: CPT

## 2022-03-23 PROCEDURE — 96361 HYDRATE IV INFUSION ADD-ON: CPT

## 2022-03-23 RX ORDER — SODIUM CHLORIDE 0.9 % (FLUSH) 0.9 %
10 SYRINGE (ML) INJECTION AS NEEDED
Status: ACTIVE | OUTPATIENT
Start: 2022-03-23 | End: 2022-03-23

## 2022-03-23 RX ADMIN — SODIUM CHLORIDE 500 ML: 900 INJECTION, SOLUTION INTRAVENOUS at 08:25

## 2022-03-23 RX ADMIN — FERRIC CARBOXYMALTOSE INJECTION 750 MG: 50 INJECTION, SOLUTION INTRAVENOUS at 08:42

## 2022-03-23 RX ADMIN — Medication 10 ML: at 08:25

## 2022-03-23 NOTE — PROGRESS NOTES
Pt arrived ambulatory today at [phone], to receive IV injectafer. Pt tolerated without difficulty. Patient discharged via ambulatory accompanied by self. Instructed to notify physician of any problems, questions or concerns. Allowed opportunity for patient/family to ask questions. Verbalized understanding.   Next appointment is April 8 at 0900 with MD.

## 2022-05-23 ENCOUNTER — TELEPHONE (OUTPATIENT)
Dept: ORTHOPEDIC SURGERY | Age: 77
End: 2022-05-23

## 2022-05-23 NOTE — TELEPHONE ENCOUNTER
Lennox Lyle    Pt was left a VM on 5/18 and told that he needed to schedule a follow up visit with Dr. Delio Clay to go over CT results. Please call him back and schedule him for a follow up apt whenever works for him. We will talk about scheduling surgery for him when he is here for that apt.      Thank you ,  Alice

## 2022-05-28 ENCOUNTER — APPOINTMENT (OUTPATIENT)
Dept: CT IMAGING | Age: 77
End: 2022-05-28
Payer: MEDICARE

## 2022-05-28 ENCOUNTER — HOSPITAL ENCOUNTER (EMERGENCY)
Dept: CT IMAGING | Age: 77
Discharge: HOME | End: 2022-05-31
Payer: MEDICARE

## 2022-05-28 ENCOUNTER — HOSPITAL ENCOUNTER (EMERGENCY)
Age: 77
Discharge: HOME | End: 2022-05-28
Attending: STUDENT IN AN ORGANIZED HEALTH CARE EDUCATION/TRAINING PROGRAM
Payer: MEDICARE

## 2022-05-28 VITALS
RESPIRATION RATE: 20 BRPM | HEART RATE: 80 BPM | WEIGHT: 225 LBS | TEMPERATURE: 98.5 F | DIASTOLIC BLOOD PRESSURE: 75 MMHG | BODY MASS INDEX: 32.21 KG/M2 | SYSTOLIC BLOOD PRESSURE: 157 MMHG | OXYGEN SATURATION: 97 % | HEIGHT: 70 IN

## 2022-05-28 DIAGNOSIS — K85.90 ACUTE PANCREATITIS, UNSPECIFIED COMPLICATION STATUS, UNSPECIFIED PANCREATITIS TYPE: Primary | ICD-10-CM

## 2022-05-28 LAB
ALBUMIN SERPL-MCNC: 2.9 G/DL (ref 3.2–4.6)
ALBUMIN/GLOB SERPL: 0.7 {RATIO} (ref 1.2–3.5)
ALP SERPL-CCNC: 147 U/L (ref 50–136)
ALT SERPL-CCNC: 29 U/L (ref 12–65)
ANION GAP SERPL CALC-SCNC: 9 MMOL/L (ref 7–16)
APPEARANCE UR: CLEAR
AST SERPL-CCNC: 23 U/L (ref 15–37)
BACTERIA URNS QL MICRO: 0 /HPF
BILIRUB SERPL-MCNC: 0.5 MG/DL (ref 0.2–1.1)
BILIRUB UR QL: NEGATIVE
BUN SERPL-MCNC: 37 MG/DL (ref 8–23)
CALCIUM SERPL-MCNC: 9.7 MG/DL (ref 8.3–10.4)
CASTS URNS QL MICRO: 0 /LPF
CHLORIDE SERPL-SCNC: 108 MMOL/L (ref 98–107)
CO2 SERPL-SCNC: 23 MMOL/L (ref 21–32)
COLOR UR: YELLOW
CREAT SERPL-MCNC: 1.72 MG/DL (ref 0.8–1.5)
DIFFERENTIAL METHOD BLD: ABNORMAL
EOSINOPHIL # BLD: 0.2 K/UL (ref 0–0.8)
EOSINOPHIL NFR BLD MANUAL: 2 % (ref 1–8)
EPI CELLS #/AREA URNS HPF: 0 /HPF
ERYTHROCYTE [DISTWIDTH] IN BLOOD BY AUTOMATED COUNT: 17.2 % (ref 11.9–14.6)
GLOBULIN SER CALC-MCNC: 4.4 G/DL (ref 2.3–3.5)
GLUCOSE SERPL-MCNC: 205 MG/DL (ref 65–100)
GLUCOSE UR STRIP.AUTO-MCNC: NEGATIVE MG/DL
HCT VFR BLD AUTO: 35.7 % (ref 41.1–50.3)
HGB BLD-MCNC: 11.2 G/DL (ref 13.6–17.2)
HGB UR QL STRIP: NEGATIVE
KETONES UR QL STRIP.AUTO: NEGATIVE MG/DL
LACTATE SERPL-SCNC: 0.9 MMOL/L (ref 0.4–2)
LEUKOCYTE ESTERASE UR QL STRIP.AUTO: NEGATIVE
LIPASE SERPL-CCNC: 1216 U/L (ref 73–393)
LYMPHOCYTES # BLD: 2.6 K/UL (ref 0.5–4.6)
LYMPHOCYTES NFR BLD MANUAL: 23 % (ref 16–44)
MCH RBC QN AUTO: 30.4 PG (ref 26.1–32.9)
MCHC RBC AUTO-ENTMCNC: 31.4 G/DL (ref 31.4–35)
MCV RBC AUTO: 96.7 FL (ref 79.6–97.8)
MONOCYTES # BLD: 0.7 K/UL (ref 0.1–1.3)
MONOCYTES NFR BLD MANUAL: 6 % (ref 3–9)
MYELOCYTES NFR BLD MANUAL: 5 %
NEUTS SEG # BLD: 8 K/UL (ref 1.7–8.2)
NEUTS SEG NFR BLD MANUAL: 64 % (ref 47–75)
NITRITE UR QL STRIP.AUTO: NEGATIVE
NRBC # BLD: 0 K/UL (ref 0–0.2)
PH UR STRIP: 5.5 [PH] (ref 5–9)
PLATELET # BLD AUTO: 246 K/UL (ref 150–450)
PLATELET COMMENT: ADEQUATE
PMV BLD AUTO: 9.3 FL (ref 9.4–12.3)
POTASSIUM SERPL-SCNC: 5 MMOL/L (ref 3.5–5.1)
PROT SERPL-MCNC: 7.3 G/DL (ref 6.3–8.2)
PROT UR STRIP-MCNC: 300 MG/DL
RBC # BLD AUTO: 3.69 M/UL (ref 4.23–5.6)
RBC #/AREA URNS HPF: ABNORMAL /HPF
RBC MORPH BLD: ABNORMAL
SODIUM SERPL-SCNC: 140 MMOL/L (ref 136–145)
SP GR UR REFRACTOMETRY: 1.01 (ref 1–1.02)
UROBILINOGEN UR QL STRIP.AUTO: 0.2 EU/DL (ref 0.2–1)
WBC # BLD AUTO: 11.5 K/UL (ref 4.3–11.1)
WBC MORPH BLD: ABNORMAL
WBC URNS QL MICRO: ABNORMAL /HPF

## 2022-05-28 PROCEDURE — 2580000003 HC RX 258: Performed by: STUDENT IN AN ORGANIZED HEALTH CARE EDUCATION/TRAINING PROGRAM

## 2022-05-28 PROCEDURE — 6360000004 HC RX CONTRAST MEDICATION: Performed by: STUDENT IN AN ORGANIZED HEALTH CARE EDUCATION/TRAINING PROGRAM

## 2022-05-28 PROCEDURE — 85025 COMPLETE CBC W/AUTO DIFF WBC: CPT

## 2022-05-28 PROCEDURE — 6360000002 HC RX W HCPCS: Performed by: STUDENT IN AN ORGANIZED HEALTH CARE EDUCATION/TRAINING PROGRAM

## 2022-05-28 PROCEDURE — 96361 HYDRATE IV INFUSION ADD-ON: CPT

## 2022-05-28 PROCEDURE — 93005 ELECTROCARDIOGRAM TRACING: CPT | Performed by: STUDENT IN AN ORGANIZED HEALTH CARE EDUCATION/TRAINING PROGRAM

## 2022-05-28 PROCEDURE — 99284 EMERGENCY DEPT VISIT MOD MDM: CPT

## 2022-05-28 PROCEDURE — 96374 THER/PROPH/DIAG INJ IV PUSH: CPT

## 2022-05-28 PROCEDURE — 96375 TX/PRO/DX INJ NEW DRUG ADDON: CPT

## 2022-05-28 PROCEDURE — 80053 COMPREHEN METABOLIC PANEL: CPT

## 2022-05-28 PROCEDURE — 83690 ASSAY OF LIPASE: CPT

## 2022-05-28 PROCEDURE — 74177 CT ABD & PELVIS W/CONTRAST: CPT

## 2022-05-28 PROCEDURE — 83605 ASSAY OF LACTIC ACID: CPT

## 2022-05-28 RX ORDER — MORPHINE SULFATE 4 MG/ML
4 INJECTION INTRAVENOUS
Status: COMPLETED | OUTPATIENT
Start: 2022-05-28 | End: 2022-05-28

## 2022-05-28 RX ORDER — SODIUM CHLORIDE 9 MG/ML
100 INJECTION, SOLUTION INTRAVENOUS ONCE
Status: COMPLETED | OUTPATIENT
Start: 2022-05-28 | End: 2022-05-28

## 2022-05-28 RX ORDER — SODIUM CHLORIDE 0.9 % (FLUSH) 0.9 %
3 SYRINGE (ML) INJECTION EVERY 8 HOURS
Status: DISCONTINUED | OUTPATIENT
Start: 2022-05-28 | End: 2022-05-28 | Stop reason: HOSPADM

## 2022-05-28 RX ORDER — ONDANSETRON 2 MG/ML
4 INJECTION INTRAMUSCULAR; INTRAVENOUS
Status: COMPLETED | OUTPATIENT
Start: 2022-05-28 | End: 2022-05-28

## 2022-05-28 RX ORDER — SODIUM CHLORIDE 0.9 % (FLUSH) 0.9 %
10 SYRINGE (ML) INJECTION ONCE
Status: COMPLETED | OUTPATIENT
Start: 2022-05-28 | End: 2022-05-28

## 2022-05-28 RX ORDER — 0.9 % SODIUM CHLORIDE 0.9 %
1000 INTRAVENOUS SOLUTION INTRAVENOUS
Status: COMPLETED | OUTPATIENT
Start: 2022-05-28 | End: 2022-05-28

## 2022-05-28 RX ORDER — ONDANSETRON 4 MG/1
4 TABLET, FILM COATED ORAL 3 TIMES DAILY PRN
Qty: 15 TABLET | Refills: 2 | Status: SHIPPED | OUTPATIENT
Start: 2022-05-28

## 2022-05-28 RX ORDER — HYDROCODONE BITARTRATE AND ACETAMINOPHEN 7.5; 325 MG/1; MG/1
1 TABLET ORAL EVERY 6 HOURS PRN
Qty: 10 TABLET | Refills: 0 | Status: SHIPPED | OUTPATIENT
Start: 2022-05-28 | End: 2022-05-31

## 2022-05-28 RX ADMIN — SODIUM CHLORIDE 100 ML: 900 INJECTION, SOLUTION INTRAVENOUS at 03:47

## 2022-05-28 RX ADMIN — IOPAMIDOL 100 ML: 755 INJECTION, SOLUTION INTRAVENOUS at 03:46

## 2022-05-28 RX ADMIN — SODIUM CHLORIDE, PRESERVATIVE FREE 10 ML: 5 INJECTION INTRAVENOUS at 03:47

## 2022-05-28 RX ADMIN — SODIUM CHLORIDE 1000 ML: 9 INJECTION, SOLUTION INTRAVENOUS at 02:44

## 2022-05-28 RX ADMIN — ONDANSETRON 4 MG: 2 INJECTION INTRAMUSCULAR; INTRAVENOUS at 02:45

## 2022-05-28 RX ADMIN — MORPHINE SULFATE 4 MG: 4 INJECTION INTRAVENOUS at 02:44

## 2022-05-28 ASSESSMENT — ENCOUNTER SYMPTOMS
SHORTNESS OF BREATH: 0
CHEST TIGHTNESS: 0
COUGH: 0
WHEEZING: 0
BACK PAIN: 0
NAUSEA: 1
COLOR CHANGE: 0
ABDOMINAL DISTENTION: 0
ABDOMINAL PAIN: 1
CONSTIPATION: 0
VOMITING: 0

## 2022-05-28 ASSESSMENT — PAIN DESCRIPTION - ORIENTATION: ORIENTATION: MID

## 2022-05-28 ASSESSMENT — PAIN DESCRIPTION - FREQUENCY: FREQUENCY: CONTINUOUS

## 2022-05-28 ASSESSMENT — LIFESTYLE VARIABLES: HOW OFTEN DO YOU HAVE A DRINK CONTAINING ALCOHOL: NEVER

## 2022-05-28 ASSESSMENT — PAIN DESCRIPTION - LOCATION
LOCATION: ABDOMEN
LOCATION: ABDOMEN

## 2022-05-28 ASSESSMENT — PAIN SCALES - GENERAL
PAINLEVEL_OUTOF10: 8
PAINLEVEL_OUTOF10: 6

## 2022-05-28 ASSESSMENT — PAIN - FUNCTIONAL ASSESSMENT
PAIN_FUNCTIONAL_ASSESSMENT: ACTIVITIES ARE NOT PREVENTED
PAIN_FUNCTIONAL_ASSESSMENT: 0-10

## 2022-05-28 ASSESSMENT — PAIN DESCRIPTION - PAIN TYPE: TYPE: ACUTE PAIN

## 2022-05-28 ASSESSMENT — PAIN DESCRIPTION - DESCRIPTORS: DESCRIPTORS: ACHING;SHARP

## 2022-05-28 NOTE — ED NOTES
I have reviewed discharge instructions with the patient and spouse. The patient and spouse verbalized understanding. Patient left ED via Discharge Method: ambulatory to Home with self. Opportunity for questions and clarification provided. Patient given 2 scripts. To continue your aftercare when you leave the hospital, you may receive an automated call from our care team to check in on how you are doing. This is a free service and part of our promise to provide the best care and service to meet your aftercare needs.  If you have questions, or wish to unsubscribe from this service please call [phone]. Thank you for Choosing our [facility] Emergency Department.         Sandip Richter RN  05/28/22 3526

## 2022-05-28 NOTE — ED TRIAGE NOTES
Pt c/o severe abd pain that began tonight. States that he believes his pancreatitis has acted up.   Masked on arrival

## 2022-05-28 NOTE — ED PROVIDER NOTES
[facility] Emergency Department Provider Note                   PCP:                Albin Dhaliwal MD               Age: 78 y.o. Sex: male     No diagnosis found. DISPOSITION         New Prescriptions    No medications on file       Orders Placed This Encounter   Procedures    CT ABDOMEN PELVIS W IV CONTRAST Additional Contrast? None    CBC with Diff    CMP    Lipase    Lactic Acid (Select if patient is over 65 to rule out mesenteric ischemia)    Urinalysis w rflx microscopic    Diet NPO    POCT Urine Dipstick    EKG 12 Lead (Select if Upper Abd Pain, or SOB, Diaphoresis or Tachy)    Saline lock IV        MDM  Number of Diagnoses or Management Options  Diagnosis management comments: Orders placed for labs, pain meds, antiemetics, fluid bolus and CT imaging. Amount and/or Complexity of Data Reviewed  Clinical lab tests: ordered and reviewed  Tests in the radiology section of CPT®: ordered and reviewed  Tests in the medicine section of CPT®: ordered and reviewed  Independent visualization of images, tracings, or specimens: yes    Risk of Complications, Morbidity, and/or Mortality  Presenting problems: moderate  Diagnostic procedures: low  Management options: moderate    Patient Progress  Patient progress: stable       Bertram Rees is a 78 y.o. male who presents to the Emergency Department with chief complaint of    Chief Complaint   Patient presents with    Abdominal Pain      81-year-old male patient with history of pancreatitis presents to this department with reports of severe lower epigastric pain and nausea. Patient states symptoms started around 9 PM approximately an hour after eating dinner. He states he ate a meal can chaining/collect an oral and thinks this may have caused his exacerbation. He reports similar symptoms in the past with previous attacks stating he has had approximately 5 episodes of pancreatitis.   Pain is described as constant, located lower epigastrium and nonradiating. He denies associated fever or chills and reports no vomiting. He has had normal bowel movements throughout the day free of black bloody or tarry appearance. He reports no changes in urinary habits. Patient has taken nothing for symptoms thus far. Surgical history includes cholecystectomy and previous surgery on his kidney for a small area of cancer. All other systems reviewed and are negative. Review of Systems   Constitutional: Negative for chills and fatigue. HENT: Negative for congestion. Eyes: Negative for visual disturbance. Respiratory: Negative for cough, chest tightness, shortness of breath and wheezing. Cardiovascular: Negative for chest pain and leg swelling. Gastrointestinal: Positive for abdominal pain and nausea. Negative for abdominal distention, constipation and vomiting. Genitourinary: Negative for difficulty urinating, dysuria, flank pain and hematuria. Musculoskeletal: Negative for back pain, neck pain and neck stiffness. Skin: Negative for color change. Neurological: Negative for dizziness, light-headedness, numbness and headaches. All other systems reviewed and are negative.       Past Medical History:   Diagnosis Date    Arthritis     Blurred vision, right eye     BPH (benign prostatic hyperplasia)     Gout     History of Clostridioides difficile colitis 2010    History of pancreatitis     History of renal carcinoma     partial nephrectomy    Hyperlipidemia     Hypertension     Nausea & vomiting     small amount of N/V and pt not sure of it antiemetics work    Neuropathy     Paroxysmal A-fib ([address]     Presence of Watchman left atrial appendage closure device     Sleep apnea     compliant with C-pap    Thromboembolus ([address]     hx of left lower extremity    Type 2 diabetes mellitus (HCC)     insulin reliant; AVG -140; s.s. of hypoglycemia 65-75; last A1c 7.1    WPW (Robbi-Parkinson-White syndrome)     ablation x4 Past Surgical History:   Procedure Laterality Date    ABLATION OF DYSRHYTHMIC FOCUS      WPW- ablations x3    BACK SURGERY      ruptured disc    CATARACT REMOVAL      CHOLECYSTECTOMY      COLONOSCOPY N/A 1/24/2022    COLONOSCOPY performed by Radha Barboza MD at [facility]      partial    LUMBAR LAMINECTOMY      ORTHOPEDIC SURGERY Left     great toe straightened    ORTHOPEDIC SURGERY Left     foot    OTHER SURGICAL HISTORY      placed watchman 2/2020    TOTAL KNEE ARTHROPLASTY Right     UPPER GASTROINTESTINAL ENDOSCOPY      WISDOM TOOTH EXTRACTION          Family History   Problem Relation Age of Onset    Cancer Sister         ovarian    No Known Problems Sister     Cancer Sister         colon ca    Parkinson's Disease Father     Other Mother         ALS    Crohn's Disease Son     Crohn's Disease Daughter            Social Connections:     Frequency of Communication with Friends and Family: Not on file    Frequency of Social Gatherings with Friends and Family: Not on file    Attends Yarsani Services: Not on file    Active Member of [employer] or Organizations: Not on file    Attends Club or Organization Meetings: Not on file    Marital Status: Not on file        Allergies   Allergen Reactions    Sulfamethoxazole-Trimethoprim Other (See Comments)     Elevated potassium level    Tramadol Diarrhea    Codeine Other (See Comments)     \"makes me a little crazy\"    Fenofibrate Other (See Comments)     \"causes pancreatic attacks\"    Hydromorphone Other (See Comments)     \"gives me craziness\"    Metformin Diarrhea    Sitagliptin Other (See Comments)     Per pt causes pancreatic issues        Vitals signs and nursing note reviewed. Patient Vitals for the past 4 hrs:   Temp Pulse Resp BP SpO2   05/28/22 0134 98.5 °F (36.9 °C) 98 16 136/79 96 %          Physical Exam  Vitals and nursing note reviewed. Constitutional:       General: He is not in acute distress. Appearance: Normal appearance. He is normal weight. He is not ill-appearing or toxic-appearing. Comments: Generally well-appearing, alert and oriented x4. Mild acute distress, speaks in clear, fluid sentences. Appears uncomfortable   HENT:      Head: Normocephalic and atraumatic. Right Ear: External ear normal.      Left Ear: External ear normal.      Nose: Nose normal.      Mouth/Throat:      Mouth: Mucous membranes are moist.   Eyes:      General: No scleral icterus. Right eye: No discharge. Left eye: No discharge. Extraocular Movements: Extraocular movements intact. Cardiovascular:      Rate and Rhythm: Normal rate and regular rhythm. Pulses: Normal pulses. Heart sounds: Normal heart sounds. Pulmonary:      Effort: Pulmonary effort is normal. No respiratory distress. Abdominal:      General: Abdomen is flat. There is no distension. Palpations: There is no mass. Tenderness: There is generalized abdominal tenderness and tenderness in the right lower quadrant and epigastric area. There is no right CVA tenderness, left CVA tenderness, guarding or rebound. Hernia: No hernia is present. Comments: Reports of pain and guarding with palpation of the upper abdominal quadrants. Some discomfort noted over the right lower quadrant as well. Musculoskeletal:         General: No swelling, tenderness or deformity. Normal range of motion. Cervical back: Normal range of motion. Skin:     General: Skin is warm. Capillary Refill: Capillary refill takes less than 2 seconds. Neurological:      General: No focal deficit present. Mental Status: He is alert.    Psychiatric:         Mood and Affect: Mood normal.          Procedures    Labs Reviewed   CBC WITH AUTO DIFFERENTIAL   COMPREHENSIVE METABOLIC PANEL   LIPASE   LACTIC ACID   URINALYSIS        CT ABDOMEN PELVIS W IV CONTRAST Additional Contrast? None    (Results Pending)            Alejandra Coma Scale  Eye Opening: Spontaneous  Best Verbal Response: Oriented  Best Motor Response: Obeys commands  Alejandra Coma Scale Score: 15               ED Course as of 05/28/22 0219   Sat May 28, 2022   0219 EKG interpretation: Sinus rhythm, rate of 92, normal axis, no ischemia. [BR]      ED Course User Index  [BR] Charlotte Isabel DO        Voice dictation software was used during the making of this note. This software is not perfect and grammatical and other typographical errors may be present. This note has not been completely proofread for errors.       Charlotte Isabel DO  05/28/22 0221

## 2022-06-01 LAB
EKG ATRIAL RATE: 92 BPM
EKG DIAGNOSIS: NORMAL
EKG P AXIS: 52 DEGREES
EKG P-R INTERVAL: 128 MS
EKG Q-T INTERVAL: 362 MS
EKG QRS DURATION: 98 MS
EKG QTC CALCULATION (BAZETT): 447 MS
EKG R AXIS: 31 DEGREES
EKG T AXIS: 82 DEGREES
EKG VENTRICULAR RATE: 92 BPM

## 2022-06-02 ENCOUNTER — HOSPITAL ENCOUNTER (OUTPATIENT)
Dept: PHYSICAL THERAPY | Age: 77
Setting detail: RECURRING SERIES
Discharge: HOME | End: 2022-06-05
Payer: MEDICARE

## 2022-06-02 PROCEDURE — 97161 PT EVAL LOW COMPLEX 20 MIN: CPT

## 2022-06-02 PROCEDURE — 97110 THERAPEUTIC EXERCISES: CPT

## 2022-06-02 NOTE — PLAN OF CARE
Latrice Park  : 1945  Primary: Medicare Part A And B  Secondary: Sunny Whitaker 36 @ [address] [facility] [address]  Phone: [phone]  Fax: [phone] Plan Frequency: 2 times per week for 60 days    Plan of Care/Certification Expiration Date: 22      PT Visit Info:    No data recorded    OUTPATIENT PHYSICAL THERAPY:OP NOTE TYPE: Initial Assessment 2022               Episode  Appt Desk         Treatment Diagnosis:  Pain in Right Knee (M25.561)  Stiffness of Right Knee, Not elsewhere classified (M25.661)  Difficulty in walking, Not elsewhere classified (R26.2)  Medical/Referring Diagnosis:  Pain in right knee [M25.561]  Referring Physician:  Salvatore Noriega DO MD Orders:  PT Eval and Treat   Return MD Appt:  10/2/2022  Date of Onset:  Onset Date: 05/15/22 (Pain onset from falling backwards 3 weeks from 2022)     Allergies:  Sulfamethoxazole-trimethoprim, Tramadol, Codeine, Fenofibrate, Hydromorphone, Metformin, and Sitagliptin  Restrictions/Precautions:    No data recordedNo data recorded   Medications Last Reviewed:  2022     SUBJECTIVE   History of Injury/Illness (Reason for Referral):     Mr. Latrice Park has attended 1 physical therapy session including initial evaluation as of 2022. He reports falling backwards three weeks ago when reaching for a chair. He was unable to stand so EMS was called for him. Latrice Park reports x-ray images were negative for fractures. The patient reports feeling pain in his R knee stemming from his fall, he denies radiating symptoms, and complaints of occasional knee instability. The patient ambulates with a rolling walker and presents with increased BLE ER. He  Latrice Park presents with signs and symptoms of increased pain, decreased ROM, decreased strength, decreased functional tolerance.   Latrice Park will benefit from home exercise program, therapeutic and postural strengthening exercises, manual therapeutic techniques (ie. Distraction, SOR, myofascial release/soft tissue mobilization) as appropriate to address Eric Kim current condition. Patient Stated Goal(s):  Patient wishes to return to wood work, walking long distances, standing for longer durations without pain. Initial:     8/10 Post Session:     8/10  Past Medical History/Comorbidities:   Mr. Sonido Messer  has a past medical history of Arthritis, Blurred vision, right eye, BPH (benign prostatic hyperplasia), Gout, History of Clostridioides difficile colitis, History of pancreatitis, History of renal carcinoma, Hyperlipidemia, Hypertension, Nausea & vomiting, Neuropathy, Paroxysmal A-fib ([address] Presence of Watchman left atrial appendage closure device, Sleep apnea, Thromboembolus ([address] Type 2 diabetes mellitus ([address] and WPW (Robbi-Parkinson-White syndrome). Mr. Sonido Messer  has a past surgical history that includes Colonoscopy (N/A, 1/24/2022); [place] tooth extraction; other surgical history; Upper gastrointestinal endoscopy; Kidney removal; orthopedic surgery (Left); Cataract removal; Total knee arthroplasty (Right); orthopedic surgery (Left); ablation of dysrhythmic focus; Cholecystectomy; back surgery; and lumbar laminectomy. 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  Prior level of function: Independent  No data recordedNo data recorded   Learning:   Does the patient/guardian have any barriers to learning?: No barriers  Will there be a co-learner?: No  What is the preferred language of the patient/guardian?: English  Is an  required?: No     Fall Risk Scale:    Total Score: 35  [place] Fall Risk: Medium (25-44)     Dominant Side:  right handed OBJECTIVE       ORTHOSTATIC/POSTURE OBSERVATION:   Date:   6/2/2022   SITTING RESTING POSTURE -Pt sits with forward head and rounded shoulders which indicate tight anterior chest musculature, upper trapezius, and levator scapula and weak posterior scapula musculature and deep cervical flexors. STANDING RESTING POSTURE -Pt displays decreased core motor control indicating weak core and low back musculature.        ROM Date:  6/2/2022   KNEE ROM (TESTED IN SUPINE)    RIGHT LEFT   Flexion 124° 125°   Extension 7° from full extension 1° from full extension     PALPATION/TONE/TISSUE TEXTURE:   Date:   6/2/2022   SOFT TISSUE:   Hamstrings R medial hamstring tender   TFL/IT band R ITB highly tender with increased tone   Skin integrity   unremarkable       STRENGTH   Date: 6/2/2022     Right Left   Knee Extension 3+ 4+   Knee Flexion 4- 4+   Ankle DF 4+ 4+   Ankle PF 4 4   Ankle IV NT NT   Ankle EV NT NT     FUNCTIONAL MOBILITY   Date:   6/2/2022   Transfers High BUE support to stand   Posture Forward head, rounded shoulders, excessive R and L tibial ER (R>L)   Gait deviations Excessive R toe-out, B ankle eversion (R>L)   Assistive device Rolling walker   Bed mobility Min assist     EDEMA Date:  6/2/2022      Activity/Exercise Left  Right   Mid patella  40 cm 46 cm       SPECIAL TESTS: Assessed @ Initial Visit ;   *STABILITY TESTS:    -Lachman's: Negative    -Varus Test (LCL laxity): Negative    -Valgus Test (MCL laxity): Negative     -Posterior drawer: Negative    -Posterior sag: Negative        NEUROLOGICAL SCREEN: Assessed @ Initial Visit    -RADIATING SYMPTOMS: Negative   -DERMATOMES: Normal and equal B    MYOTOMES Date: 6/2/2022     Right Left   L2 & L3   (Hip Flexors) 5/5 5/5   L3-L4  (Knee Extensors) 5/5 5/5   L4  (Ankle DFs) 5/5 5/5   L5  (Hallux Ext) 5/5 5/5   L5-S1  (Ankle PFs) 5/5 5/5   S1-S2  (Ankle EVs) 5/5 5/5     Reflexes Date: 6/2/2022     Right Left   L4  (Quadriceps) 3+ 3+   S1  (Achilles) 3+ 3+     Note: Patient denies any increase of symptoms with cough, sneeze or valsalva. Patient denies any saddle paresthesia or bowel/bladder deficits. ASSESSMENT   Initial Assessment:   Mr. Damari Moore has attended 1 physical therapy session including initial evaluation as of 6/2/2022. He reports falling backwards three weeks ago when reaching for a chair. He was unable to stand so EMS was called for him. Damari Moore reports x-ray images were negative for fractures. The patient reports feeling pain in his R knee stemming from his fall, he denies radiating symptoms, and complaints of occasional knee instability. The patient ambulates with a rolling walker and presents with increased BLE ER. He  Damari Moore presents with signs and symptoms of increased pain, decreased ROM, decreased strength, decreased functional tolerance. Damari Moore will benefit from home exercise program, therapeutic and postural strengthening exercises, manual therapeutic techniques (ie. Distraction, SOR, myofascial release/soft tissue mobilization) as appropriate to address Phoebe Lax current condition. Damari Moore will benefit from skilled PT (medically necessary) to address above deficits affecting participation in basic ADLs and overall functional tolerance. Problem List: (Impacting functional limitations): Body Structures, Functions, Activity Limitations Requiring Skilled Therapeutic Intervention: Decreased functional mobility ; Decreased ADL status; Decreased ROM; Decreased body mechanics; Decreased strength; Decreased balance; Decreased coordination;  Increased pain; Decreased posture     Therapy Prognosis:   Therapy Prognosis: Good     Assessment Complexity:   Low Complexity  PLAN   Effective Dates: 6/2/2022 TO Plan of Care/Certification Expiration Date: 08/01/22     Frequency/Duration: Plan Frequency: 2 times per week for 60 days     Interventions Planned (Treatment may consist of any combination of the following): Current Treatment Recommendations: Strengthening; ROM; Balance training; Functional mobility training; Transfer training; Endurance training; Gait training; Stair training; Neuromuscular re-education; Manual Therapy - Soft Tissue Mobilization; Manual Therapy - Joint Manipulation; Pain management; Home exercise program; Modalities; Integrated dry needling; Therapeutic activities     GOALS: (Goals have been discussed and agreed upon with patient.)  Short Term Goals 4 weeks   1. Montse Gage will be independent with HEP to promote self-management of symptoms. 2. Montse Gage will perform Nu Step x 10 minutes for hip and knee AAROM. Ben Palma will tolerate manual therapy/joint mobilizations to increase knee flexion ROM so pt can ambulate stairs and walk with a more normalized gait pattern. Ben Palma will participate in static and dynamic balance activities for 5 minutes to help improve proprioception and decrease risk of falls. Long Term Goals 12 weeks  1. Montse Gage will be able to perform sit to stand transfers independently with increased knee flexion and decreased use of upper extremities. 2. Montse Gage will ascend/descend 12 steps with reciprocal gait pattern and rail. Ben Palma will improve MMT B LE to >=4+/5 to improve current level of independence and community reintegration  4. Montse Gage will demonstrate R knee extenson >= 0 degrees to improve functional mobility and tolerance of ADLs. Ben Palma will increase their score on the Lower Extremity Functional Scale by 10 points from their initial score to show improvement in areas of difficulty. Medical Necessity:   Patient is expected to demonstrate progress in strength, range of motion, balance, coordination and functional technique to return to prior level of function. Skilled intervention continues to be required due to decreased ROM.  muscle weakness, decreased functional mobility, impaired gait, decreased posture, and increased pain. Reason for Services/other comments:  Patient continues to require skilled intervention due to above deficits affecting participation in basic ADLs and overall functional tolerance. Outcome Measure: Tool Used: Lower Extremity Functional Scale (LEFS)  Score:  Initial: 5/80 Most Recent: X/80 (Date: -- )   Interpretation of Score: 20 questions each scored on a 5 point scale with 0 representing \"extreme difficulty or unable to perform\" and 4 representing \"no difficulty\". The lower the score, the greater the functional disability. 80/80 represents no disability. Minimal detectable change is 9 points. Total Duration:  Time In: 1345  Time Out: 6619    Regarding Rika Chatman's therapy, I certify that the treatment plan above will be carried out by a therapist or under their direction.   Thank you for this referral,  Mahamed Kaufman PT     Referring Physician Signature: Meka Rosales DO _______________________________ Date _____________        Post Session Pain  Charge Capture   POC Link  Treatment Note Link  MD Guidelines  MyChart

## 2022-06-02 NOTE — PROGRESS NOTES
posture and promote proper body mechanics. Progressed resistance, range, repetitions and complexity of movement as indicated. Date:  6/2/2022   Activity/Exercise Parameters   Knee extension 5 min; Heel Prop, RLE, supine   Quad set 3 x10, 5 sec hold, RLE   Heel Slides 2 x10, RLE   Education 10 min; Treatment, home exercises, plan of care, prognosis, and posture   [employer] Portal     MANUAL THERAPY: (0 minutes): May consider Joint mobilization, Soft tissue mobilization and Manipulation was utilized and necessary because of the patient's restricted joint motion, painful spasm, loss of articular motion and restricted motion of soft tissue. Commonly used abbreviations that may be included in this note:  STM- Soft tissue mobilization, R>L or L>R- right greater than left or vice versa, HEP - Home exercise program, CPA/UPA - Central or unilateral posterior-anterior mobilization, SLS- single leg stance, SKTC - Single leg to chest, SNAGS/NAGS- (sustained) Natural apophyseal glides, TKE- Terminal knee extension, ER- External rotation, IR- Internal rotation, B - Bilateral, sec- seconds, Lb- pounds, min - minutes, HA- Headache, OP- Over pressure, tband- theraband, fwd/bwd- Forward/Backward, TA- Transversus Abdominus, dbl- Double      TREATMENT/SESSION SUMMARY:     Response to Treatment:  See initial evaluation   · Communication/Consultation:  Treatment, home exercises, plan of care, prognosis, and posture  · Equipment provided today:  None  · Recommendations/Intent for next treatment session: Next visit will focus on progressing strength and ROM.      Total Treatment Billable Duration:  53 minutes: 30 min lox complexity evaluation, 23 minutes therapeutic exercise  Time In: 1345  Time Out: [address] [facility], PT         Charge Capture  }Post Session Pain  [employer] Portal  MD Guidelines  Scanned Media  Benefits  MyChart    Future Appointments   Date Time Provider Deon Alexander   6/6/2022 10:00 AM Ana Yadav Mary, PT [facility]   6/8/2022 11:00 AM Berkley Bustos, PT [facility]   6/8/2022  3:00 PM Alexei Apodaca MD [facility]   6/13/2022 11:00 AM Kelly Hastings, PTA [facility]   6/17/2022 11:00 AM Kelly Hastings, PTA [facility]   6/20/2022  8:20 AM Suad Collazo MD [facility]   6/22/2022  9:00 AM Berkley Bustos, PT [facility]   6/24/2022 10:00 AM Berkley Bustos, PT [facility]   6/28/2022  9:00 AM Kelly Hastings, PTA [facility]   6/30/2022  9:00 AM Kelly Hastings, PTA [facility]   7/6/2022 10:00 AM Berkley Bustos, PT [facility]   7/8/2022  9:00 AM Berkley Bustos, PT [facility]   7/11/2022 10:00 AM Berkley Bustos, PT [facility]   7/14/2022 10:00 AM Kelly Hastings, PTA [facility]   7/19/2022 10:00 AM Kelly Hastings, PTA [facility]   7/21/2022 10:00 AM Berkley Bustos, PT [facility]   7/26/2022 10:00 AM Kelly Hastings, PTA [facility]   7/28/2022 10:00 AM Berkley Bustos, PT [facility]   10/12/2022  9:40 AM Bradly Beverly MD [facility]

## 2022-06-05 ASSESSMENT — PAIN SCALES - GENERAL: PAINLEVEL_OUTOF10: 8

## 2022-06-06 ENCOUNTER — HOSPITAL ENCOUNTER (OUTPATIENT)
Dept: PHYSICAL THERAPY | Age: 77
Setting detail: RECURRING SERIES
Discharge: HOME | End: 2022-06-09
Payer: MEDICARE

## 2022-06-06 PROCEDURE — 97110 THERAPEUTIC EXERCISES: CPT

## 2022-06-06 ASSESSMENT — PAIN SCALES - GENERAL: PAINLEVEL_OUTOF10: 8

## 2022-06-06 NOTE — PROGRESS NOTES
Silverio Puga  : 1945  Primary: Medicare Part A And B  Secondary: Sunny Whitaker 36 @ [address] [facility] [place] [address]  Phone: [phone]  Fax: [phone] Plan Frequency: 2 times per week for 60 days    Plan of Care/Certification Expiration Date: 22      PT Visit Info:   No data recorded    OUTPATIENT PHYSICAL THERAPY:OP NOTE TYPE: Treatment Note 2022       Episode  }Appt Desk            Treatment Diagnosis:  Pain in Right Knee (M25.561)  Stiffness of Right Knee, Not elsewhere classified (M25.661)  Difficulty in walking, Not elsewhere classified (R26.2)  Medical/Referring Diagnosis:  Pain in right knee [M25.561]  Referring Physician:  Chan Lima DO MD Orders:  PT Eval and Treat   Date of Onset:  Onset Date: 05/15/22 (Pain onset from falling backwards 3 weeks from 2022)     Allergies:   Sulfamethoxazole-trimethoprim, Tramadol, Codeine, Fenofibrate, Hydromorphone, Metformin, and Sitagliptin  Restrictions/Precautions:  No data recordedNo data recorded   Interventions Planned (Treatment may consist of any combination of the following):    Current Treatment Recommendations: Strengthening; ROM; Balance training; Functional mobility training; Transfer training; Endurance training; Gait training; Stair training; Neuromuscular re-education; Manual Therapy - Soft Tissue Mobilization; Manual Therapy - Joint Manipulation; Pain management; Home exercise program; Modalities; Integrated dry needling; Therapeutic activities     Subjective Comments: Patient reports to therapy ambulating with a rolling walker. He complains of R knee pain and R foot pain. He experiences muscle soreness from his last session. 2}     Initial:}    8/10Post Session:       6/10  Medications Last Reviewed:  2022  Updated Objective Findings:  signifcant calussing at the medial plantar arch of R foot. Increased R knee swelling.  R ankle significantly ER during gait. Treatment   THERAPEUTIC EXERCISE: (60 minutes):  Exercises per grid below to improve mobility and strength. Required moderate visual, verbal, manual and tactile cues to promote proper body alignment, promote proper body posture and promote proper body mechanics. Progressed resistance, range, repetitions and complexity of movement as indicated. Date:  6/2/2022 Date:  6/6/2022   Activity/Exercise Parameters Parameters   SciFit  6 min, level 1   Knee extension 5 min; Heel Prop, RLE, supine    Quad set 3 x10, 5 sec hold, RLE 3x10, 5 sec hold, supine   Heel Slides 2 x10, RLE 3x10, RLE, with strap, with ankle plantar flexion at extension   Ankle  Plantarflexion with inversion, 3x20, RLE, therapist applied resistance and yellow band, long sitting   Education 10 min; Treatment, home exercises, plan of care, prognosis, and posture 10 min; Treatment, ankle exercises affecting knee biomechanics   [facility] Portal     MANUAL THERAPY: (0 minutes): May consider Joint mobilization, Soft tissue mobilization and Manipulation was utilized and necessary because of the patient's restricted joint motion, painful spasm, loss of articular motion and restricted motion of soft tissue.           Commonly used abbreviations that may be included in this note:  STM- Soft tissue mobilization, R>L or L>R- right greater than left or vice versa, HEP - Home exercise program, CPA/UPA - Central or unilateral posterior-anterior mobilization, SLS- single leg stance, SKTC - Single leg to chest, SNAGS/NAGS- (sustained) Natural apophyseal glides, TKE- Terminal knee extension, ER- External rotation, IR- Internal rotation, B - Bilateral, sec- seconds, Lb- pounds, min - minutes, HA- Headache, OP- Over pressure, tband- theraband, fwd/bwd- Forward/Backward, TA- Transversus Abdominus, dbl- Double      TREATMENT/SESSION SUMMARY:     Response to Treatment:  Patient presents with significant ER of the R ankle with decreased plantarflexion and inversion. There is significant callusing of his R medial plantar arch consistent with a toe-out gait pattern. Patient reports his medial knee pain gets worse when ambulating. He presents with signs of R knee effusion at and tenderness at the medial and superior knee. The patient will benefit from knee and ankle therapy interventions to improve ROM, mobility, strength, pain modulation, and gait. · Communication/Consultation:  None today  · Equipment provided today:  Yellow tehraband  · Recommendations/Intent for next treatment session: Next visit will focus on progressing with manual therapy, strengthening, and mobility as tolerated.       Total Treatment Billable Duration:  60 min  Time In: 1001  Time Out: [place], PT         Charge Capture  }Post Session Pain  [employer] Portal  MD Guidelines  Scanned Media  Benefits  [employer]    Future Appointments   Date Time Provider Deon Alexander   6/8/2022 11:00 AM Ha June, PT [facility]   6/8/2022  3:00 PM Luke Nevarez MD [facility]   6/13/2022 11:00 AM Symone Lathe, PTA [facility]   6/17/2022 11:00 AM Symone Lathe, PTA [facility]   6/20/2022  8:20 AM Chiquis Broussard MD [facility]   6/22/2022  9:00 AM Ha June, PT [facility]   6/24/2022 10:00 AM Ha June, PT [facility]   6/28/2022  9:00 AM Symone Lathe, PTA [facility]   6/30/2022  9:00 AM Symone Lathe, PTA [facility]   7/6/2022 10:00 AM Ha June, PT [facility]   7/8/2022  9:00 AM Ha June, PT [facility]   7/11/2022 10:00 AM Ha June, PT [facility]   7/14/2022 10:00 AM Symone Lathe, PTA [facility]   7/19/2022 10:00 AM Symone Lathe, PTA [facility]   7/21/2022 10:00 AM Ha June, PT [facility]   7/26/2022 10:00 AM Symone Lathe, PTA [facility]   7/28/2022 10:00 AM Ha June, PT [facility]   10/12/2022  9:40 AM Raquel Oliveira MD [facility]

## 2022-06-08 ENCOUNTER — HOSPITAL ENCOUNTER (OUTPATIENT)
Dept: PHYSICAL THERAPY | Age: 77
Setting detail: RECURRING SERIES
Discharge: HOME | End: 2022-06-11
Payer: MEDICARE

## 2022-06-08 ENCOUNTER — OFFICE VISIT (OUTPATIENT)
Dept: ORTHOPEDIC SURGERY | Age: 77
End: 2022-06-08
Payer: MEDICARE

## 2022-06-08 VITALS — WEIGHT: 225 LBS | BODY MASS INDEX: 32.28 KG/M2

## 2022-06-08 DIAGNOSIS — M19.011 ARTHRITIS OF RIGHT SHOULDER REGION: Primary | ICD-10-CM

## 2022-06-08 PROCEDURE — G8417 CALC BMI ABV UP PARAM F/U: HCPCS | Performed by: ORTHOPAEDIC SURGERY

## 2022-06-08 PROCEDURE — 1036F TOBACCO NON-USER: CPT | Performed by: ORTHOPAEDIC SURGERY

## 2022-06-08 PROCEDURE — 99214 OFFICE O/P EST MOD 30 MIN: CPT | Performed by: ORTHOPAEDIC SURGERY

## 2022-06-08 PROCEDURE — G8427 DOCREV CUR MEDS BY ELIG CLIN: HCPCS | Performed by: ORTHOPAEDIC SURGERY

## 2022-06-08 PROCEDURE — 3017F COLORECTAL CA SCREEN DOC REV: CPT | Performed by: ORTHOPAEDIC SURGERY

## 2022-06-08 PROCEDURE — 97140 MANUAL THERAPY 1/> REGIONS: CPT

## 2022-06-08 PROCEDURE — 1123F ACP DISCUSS/DSCN MKR DOCD: CPT | Performed by: ORTHOPAEDIC SURGERY

## 2022-06-08 PROCEDURE — 97110 THERAPEUTIC EXERCISES: CPT

## 2022-06-08 ASSESSMENT — PAIN SCALES - GENERAL: PAINLEVEL_OUTOF10: 9

## 2022-06-08 NOTE — PROGRESS NOTES
Cecilia Greene  : 1945  Primary: Medicare Part A And B  Secondary: Sunny Whitaker 36 @ [address] [facility] [address]  Phone: [phone]  Fax: [phone] Plan Frequency: 2 times per week for 60 days    Plan of Care/Certification Expiration Date: 22      PT Visit Info:   No data recorded    OUTPATIENT PHYSICAL THERAPY:OP NOTE TYPE: Treatment Note 2022       Episode  }Appt Desk            Treatment Diagnosis:  Pain in Right Knee (M25.561)  Stiffness of Right Knee, Not elsewhere classified (M25.661)  Difficulty in walking, Not elsewhere classified (R26.2)  Medical/Referring Diagnosis:  Pain in right knee [M25.561]  Referring Physician:  Sri Rose DO MD Orders:  PT Eval and Treat   Date of Onset:  Onset Date: 05/15/22 (Pain onset from falling backwards 3 weeks from 2022)     Allergies:   Sulfamethoxazole-trimethoprim, Tramadol, Codeine, Fenofibrate, Hydromorphone, Metformin, and Sitagliptin  Restrictions/Precautions:  No data recordedNo data recorded   Interventions Planned (Treatment may consist of any combination of the following):    Current Treatment Recommendations: Strengthening; ROM; Balance training; Functional mobility training; Transfer training; Endurance training; Gait training; Stair training; Neuromuscular re-education; Manual Therapy - Soft Tissue Mobilization; Manual Therapy - Joint Manipulation; Pain management; Home exercise program; Modalities; Integrated dry needling; Therapeutic activities     Subjective Comments: Patient reports he fell forward yesterday while work outside. He complaints of severe knee pain and muscle soreness from our last session. He arrives with his wife ambulating with a rolling walker.       Initial:}    9/10 Post Session:       8/10  Medications Last Reviewed:  2022  Updated Objective Findings:  R active knee flexion 124°, active knee extension +1° from extension  Treatment   THERAPEUTIC EXERCISE: (45 minutes):  Exercises per grid below to improve mobility and strength. Required moderate visual, verbal, manual and tactile cues to promote proper body alignment, promote proper body posture and promote proper body mechanics. Progressed resistance, range, repetitions and complexity of movement as indicated. Date:  6/2/2022 Date:  6/6/2022 Date:  6/8/2022   Activity/Exercise Parameters Parameters Parameters   SciFit  6 min, level 1 6 min, level 2.0   Knee extension 5 min; Heel Prop, RLE, supine  SAQ: 3x10 with 3 sec hold   Quad set 3 x10, 5 sec hold, RLE 3x10, 5 sec hold, supine 3x10, 5 sec hold, supine   Heel Slides 2 x10, RLE 3x10, RLE, with strap, with ankle plantar flexion at extension    Ankle  Plantarflexion with inversion, 3x20, RLE, therapist applied resistance and yellow band, long sitting    Education 10 min; Treatment, home exercises, plan of care, prognosis, and posture 10 min; Treatment, ankle exercises affecting knee biomechanics    [facility] Portal     MANUAL THERAPY: (10 minutes): May consider Joint mobilization, Soft tissue mobilization and Manipulation was utilized and necessary because of the patient's restricted joint motion, painful spasm, loss of articular motion and restricted motion of soft tissue.           Commonly used abbreviations that may be included in this note:  STM- Soft tissue mobilization, R>L or L>R- right greater than left or vice versa, HEP - Home exercise program, CPA/UPA - Central or unilateral posterior-anterior mobilization, SLS- single leg stance, SKTC - Single leg to chest, SNAGS/NAGS- (sustained) Natural apophyseal glides, TKE- Terminal knee extension, ER- External rotation, IR- Internal rotation, B - Bilateral, sec- seconds, Lb- pounds, min - minutes, HA- Headache, OP- Over pressure, tband- theraband, fwd/bwd- Forward/Backward, TA- Transversus Abdominus, dbl- Double      TREATMENT/SESSION SUMMARY:     Response to Treatment:   Patient shows improvement in R knee extension from 7° to 1° from full extension. Patient is experiencing high levels of pain when performing supine to sit and sit to supine. He states he very motivated to get better. Patient presents with decreased L ankle inversion and excessive tibial ER. The patient moves in a slower pace with ambulation and transfers. · Communication/Consultation:  None today  · Equipment provided today:  Yellow tehraband  · Recommendations/Intent for next treatment session: Next visit will focus on progressing with manual therapy, strengthening, and mobility as tolerated.       Total Treatment Billable Duration:  55 min  Time In: 6120  Time Out: 3900 Merged with [facility] Lopez, PT         Charge Capture  }Post Session Pain  MedBridge Portal  MD Guidelines  Scanned Media  Benefits  Pump [employer]    Future Appointments   Date Time Provider Deon Alexander   6/13/2022 11:00 AM Bobrakel Carrilloe, PTA [facility] [place]   6/17/2022 11:00 AM Stu Carrilloe, PTA [facility] [place]   6/20/2022  8:20 AM Myna Phoenix, MD [facility]   6/22/2022  9:00 AM Ortiz Fill, PT [facility]   6/24/2022 10:00 AM Ortiz Fill, PT [facility]   6/28/2022  9:00 AM Bobrakel Leriche, PTA [facility]   6/30/2022  9:00 AM Bobbye Leriche, PTA [facility]   7/6/2022 10:00 AM Ortiz Fill, PT [facility]   7/6/2022  1:30 PM ELIZABETH Marie MD [facility]   7/8/2022  9:00 AM Ortiz Fill, PT [facility]   7/11/2022 10:00 AM Ortiz Fill, PT [facility]   7/14/2022 10:00 AM Bobbye Leriche, PTA [facility]   7/19/2022 10:00 AM Bobbye Leriche, PTA [facility]   7/21/2022 10:00 AM Ortiz Fill, PT [facility]   7/26/2022 10:00 AM Bobrakel Leriche, PTA [facility]   7/28/2022 10:00 AM Ortiz Fill, PT [facility]   10/12/2022  9:40 AM Grover Mccollum MD [facility]

## 2022-06-08 NOTE — PROGRESS NOTES
Name: Jose Atkins  YOB: 1945  Gender: male  MRN: [id number]    CC:   Chief Complaint   Patient presents with    Follow-up     CT results right shoulder        HPI: Patient presents today for CT results of the right shoulder. Patient has been treated conservatively for right shoulder arthritis last with Synvisc injection under fluoroscopy on 3-9-2022. Patient is interested in proceeding with surgical intervention. Unfortunately, the patient had a fall and has been having some right knee pain. He has a total knee arthroplasty on the right side. He does not have any kind of fracture as he has had a CT on this which was negative. He has an appointment with Dr. Davis Manjarrez on 20 June. He has been doing physical therapy on his knee but he is requiring use of an ambulation device at this time which also requires the use of his shoulder. He is unsure what is going to be needed to be done with the knee and has a lot of questions regarding recovery from the shoulder and the knee.     Allergies   Allergen Reactions    Sulfamethoxazole-Trimethoprim Other (See Comments)     Elevated potassium level    Tramadol Diarrhea    Codeine Other (See Comments)     \"makes me a little crazy\"    Fenofibrate Other (See Comments)     \"causes pancreatic attacks\"    Hydromorphone Other (See Comments)     \"gives me craziness\"    Metformin Diarrhea    Sitagliptin Other (See Comments)     Per pt causes pancreatic issues     Past Medical History:   Diagnosis Date    Arthritis     Blurred vision, right eye     BPH (benign prostatic hyperplasia)     Gout     History of Clostridioides difficile colitis 2010    History of pancreatitis     History of renal carcinoma     partial nephrectomy    Hyperlipidemia     Hypertension     Nausea & vomiting     small amount of N/V and pt not sure of it antiemetics work    Neuropathy     Paroxysmal A-fib ([address]     Presence of Watchman left atrial appendage closure device  Sleep apnea     compliant with C-pap    Thromboembolus (HCC)     hx of left lower extremity    Type 2 diabetes mellitus (HCC)     insulin reliant; AVG -140; s.s. of hypoglycemia 65-75; last A1c 7.1    WPW (Robbi-Parkinson-White syndrome)     ablation x4     Past Surgical History:   Procedure Laterality Date    ABLATION OF DYSRHYTHMIC FOCUS      WPW- ablations x3    BACK SURGERY      ruptured disc    CATARACT REMOVAL      CHOLECYSTECTOMY      COLONOSCOPY N/A 1/24/2022    COLONOSCOPY performed by Zohreh Esquivel MD at [facility]      partial    LUMBAR LAMINECTOMY      ORTHOPEDIC SURGERY Left     great toe straightened    ORTHOPEDIC SURGERY Left     foot    OTHER SURGICAL HISTORY      placed watchman 2/2020    TOTAL KNEE ARTHROPLASTY Right     UPPER GASTROINTESTINAL ENDOSCOPY      WISDOM TOOTH EXTRACTION       Family History   Problem Relation Age of Onset    Cancer Sister         ovarian    No Known Problems Sister     Cancer Sister         colon ca    Parkinson's Disease Father     Other Mother         ALS    Crohn's Disease Son     Crohn's Disease Daughter      Social History     Socioeconomic History    Marital status:      Spouse name: Not on file    Number of children: Not on file    Years of education: Not on file    Highest education level: Not on file   Occupational History    Not on file   Tobacco Use    Smoking status: Never Smoker    Smokeless tobacco: Never Used   Substance and Sexual Activity    Alcohol use: Not Currently    Drug use: Never    Sexual activity: Not on file   Other Topics Concern    Not on file   Social History Narrative    Not on file     Social Determinants of Health     Financial Resource Strain:     Difficulty of Paying Living Expenses: Not on file   Food Insecurity:     Worried About Running Out of Food in the Last Year: Not on file    Tray of Food in the Last Year: Not on file   Transportation Needs:     Lack of Transportation (Medical): Not on file    Lack of Transportation (Non-Medical): Not on file   Physical Activity:     Days of Exercise per Week: Not on file    Minutes of Exercise per Session: Not on file   Stress:     Feeling of Stress : Not on file   Social Connections:     Frequency of Communication with Friends and Family: Not on file    Frequency of Social Gatherings with Friends and Family: Not on file    Attends [employer] Services: Not on file    Active Member of [address] [place] [address] [employer] or Organizations: Not on file    Attends Club or Organization Meetings: Not on file    Marital Status: Not on file   Intimate Partner Violence:     Fear of Current or Ex-Partner: Not on file    Emotionally Abused: Not on file    Physically Abused: Not on file    Sexually Abused: Not on file   Housing Stability:     Unable to Pay for Housing in the Last Year: Not on file    Number of [place] in the Last Year: Not on file    Unstable Housing in the Last Year: Not on file        No flowsheet data found. Review of Systems  Non-contributory    PE right knee:    Right knee has a small abrasion anteriorly. He has a collection of fluid over the superior medial patella. His extensor mechanism is still intact. Tolerates flexion fairly well. Does not appear to have disruption of the quadriceps completely at this time. CT right knee from 5-13-22:  FINDINGS: Erosive osteoarthrosis of the glenohumeral joint noted. Subchondral eburnation with several erosions of the humerus and glenoid  noted. Large marginal osteophyte along the inferior margin of the humerus. Marginal osteophyte formation and marked erosive remodeling deformity of the  glenoid noted. Scattered fissuring of the glenoid margins noted. There is a  large superior glenoid erosion measuring about 9 mm x 1.1 cm x 8 mm; series  2, image 12, coronal series 301, image 46.  A corresponding large humeral  erosion of the 1.3 cm x 1.4 cm x 1 cm also noted on these images. Severe glenohumeral chondromalacia and asymmetric joint narrowing glenoid  version estimated at a degrees retroversion. Glenoid stock estimated 19 mm. Walch type A2 glenoid configuration. Small to moderate-sized joint effusion. Loose bodies noted within the  effusion. The largest measures about 6 mm on axial series 4, image 96  projecting in the superior subscapular recess. Also see sagittal series 300,  image 59. Narrowing of the acromiohumeral interval. No full-thickness tear of the  rotator cuff. No significant muscle belly atrophy. Bulk of the deltoid  intact. Bony hypertrophy undersurface the AC joint. Erosive osteoarthrosis of the AC  joint. The remainder of the bony shoulder girdle and bony thorax demonstrates no  acute suspicious findings. The right lung demonstrates focal areas of scarring and/or atelectasis. A  few granulomas are noted. Glenoid bone stock measured around 19 mm. Severe glenohumeral arthritic change. A/Plan:     ICD-10-CM    1. Arthritis of right shoulder region  M19.011         Given his recent fall and his other medical issues as well as his knee pain which would require use of a walker potentially we are in a holding pattern in regards to proceeding with his shoulder replacement. He has advanced glenohumeral arthritis which has severe erosion of the glenoid component likely requiring reverse shoulder replacement instead of any type of anatomic component. His risks are fairly high at this time because of the other mentioned comorbidities above. We are making decision at this time to hold on any major surgery. He is following up about his knee replacement on the 20th. After that appointment we will bring him back to discuss what we are capable of proceeding with. Return in about 4 weeks (around 7/6/2022).         Nahomy Solo MD  06/08/22

## 2022-06-13 ENCOUNTER — HOSPITAL ENCOUNTER (OUTPATIENT)
Dept: PHYSICAL THERAPY | Age: 77
Setting detail: RECURRING SERIES
Discharge: HOME | End: 2022-06-16
Payer: MEDICARE

## 2022-06-13 PROCEDURE — 97140 MANUAL THERAPY 1/> REGIONS: CPT

## 2022-06-13 PROCEDURE — 97110 THERAPEUTIC EXERCISES: CPT

## 2022-06-13 ASSESSMENT — PAIN SCALES - GENERAL: PAINLEVEL_OUTOF10: 9

## 2022-06-13 NOTE — PROGRESS NOTES
Kat Salmon  : 1945  Primary: Medicare Part A And B  Secondary: Sunny Whitaker 36 @ [address] [facility] [address]  Phone: [phone]  Fax: [phone] Plan Frequency: 2 times per week for 60 days    Plan of Care/Certification Expiration Date: 22      PT Visit Info:   No data recorded    OUTPATIENT PHYSICAL THERAPY:OP NOTE TYPE: Treatment Note 2022       Episode  }Appt Desk            Treatment Diagnosis:  Pain in Right Knee (M25.561)  Stiffness of Right Knee, Not elsewhere classified (M25.661)  Difficulty in walking, Not elsewhere classified (R26.2)  Medical/Referring Diagnosis:  Pain in right knee [M25.561]  Referring Physician:  Amber Fraser DO MD Orders:  PT Eval and Treat   Date of Onset:  Onset Date: 05/15/22 (Pain onset from falling backwards 3 weeks from 2022)     Allergies:   Sulfamethoxazole-trimethoprim, Tramadol, Codeine, Fenofibrate, Hydromorphone, Metformin, and Sitagliptin  Restrictions/Precautions:  No data recordedNo data recorded   Interventions Planned (Treatment may consist of any combination of the following):    Current Treatment Recommendations: Strengthening; ROM; Balance training; Functional mobility training; Transfer training; Endurance training; Gait training; Stair training; Neuromuscular re-education; Manual Therapy - Soft Tissue Mobilization; Manual Therapy - Joint Manipulation; Pain management; Home exercise program; Modalities; Integrated dry needling; Therapeutic activities     Subjective Comments: Patient reported having all over body pain today. Initial:}    9/10 Post Session:       7/10  Medications Last Reviewed:  2022  Updated Objective Findings:  Pt.'s right VMO had significant atrophy.   Right quad extremely tight and had multiple spasms initially and significant decreased pain and tightness in right quad by the end of session  Treatment   THERAPEUTIC EXERCISE: (30  minutes):  Exercises per grid below to improve mobility and strength. Required moderate visual, verbal, manual and tactile cues to promote proper body alignment, promote proper body posture and promote proper body mechanics. Progressed resistance, range, repetitions and complexity of movement as indicated. Date:  6/13/2022 Date:  6/6/2022 Date:  6/8/2022   Activity/Exercise Parameters Parameters Parameters   SciFit Level 4 x 8 mins  6 min, level 1 6 min, level 2.0   Knee extension Seated in chair x 20 reps BLE's   SAQ: 3x10 with 3 sec hold   Quad sets 3 x10, 5 sec hold, RLE 3x10, 5 sec hold, supine 3x10, 5 sec hold, supine   Heel Slides 3 x10, RLE no strap  3x10, RLE, with strap, with ankle plantar flexion at extension    Sit to stand X 10 reps      Hip adduction  Supine yellow ball x 10 reps x 10 sec hold each     Seated marching X 20 reps      Supine hip abduction  X 20 reps      Hamstring stretch  Strap 4x30 sec hold each     Ankle Plantar flexion with inversion 3x20 reps RLE therapist applied resistance and yellow band in lonsitting  Plantarflexion with inversion, 3x20, RLE, therapist applied resistance and yellow band, long sitting    Education 10 min; Treatment, home exercises, plan of care, prognosis, and posture 10 min; Treatment, ankle exercises affecting knee biomechanics    [facility]     MANUAL THERAPY: (25 minutes): May consider Joint mobilization, Soft tissue mobilization and Manipulation was utilized and necessary because of the patient's restricted joint motion, painful spasm, loss of articular motion and restricted motion of soft tissue. Pt. Received soft tissue mobilization to right knee and ankle to decrease tightness. Gentle manual stretching to right external rotators in right ankle. Gentle PROM at end range to right knee in flexion and extension.       Commonly used abbreviations that may be included in this note:  STM- Soft tissue mobilization, R>L or L>R- right greater than left or vice versa, HEP - Home exercise program, CPA/UPA - Central or unilateral posterior-anterior mobilization, SLS- single leg stance, SKTC - Single leg to chest, SNAGS/NAGS- (sustained) Natural apophyseal glides, TKE- Terminal knee extension, ER- External rotation, IR- Internal rotation, B - Bilateral, sec- seconds, Lb- pounds, min - minutes, HA- Headache, OP- Over pressure, tband- theraband, fwd/bwd- Forward/Backward, TA- Transversus Abdominus, dbl- Double      TREATMENT/SESSION SUMMARY:       Response to Treatment:   Patient presented better gait and posture with significant improved posture and less pain . · Communication/Consultation:  None today  · Equipment provided today:  Yellow tehrabancely  · Recommendations/Intent for next treatment session: Next visit will focus on progressing with manual therapy, strengthening, and mobility as tolerated.       Total Treatment Billable Duration:  55 min  Time In: 1100  Time Out: 1200    STEPHANIE MENDEZ PTA         Charge Capture  }Post Session Pain  MedBridge Portal  MD Guidelines  Scanned Media  Benefits  MyChart    Future Appointments   Date Time Provider Deon Alexander   6/17/2022 11:00 AM Rio Angel, PTA [facility]   6/20/2022  8:20 AM Misa Haro MD [facility]   6/22/2022  9:00 AM Pollie Claudia, PT [facility]   6/24/2022 10:00 AM Pollie Claudia, PT [facility]   6/28/2022  9:00 AM Nathanel Clines, PTA [facility]   6/30/2022  9:00 AM Nathanel Clines, PTA [facility]   7/6/2022 10:00 AM Pollie Claudia, PT [facility]   7/6/2022  1:30 PM ELIZABETH Brewer MD [facility]   7/8/2022  9:00 AM Pollie Claudia, PT [facility]   7/11/2022 10:00 AM Pollie Claudia, PT [facility]   7/14/2022 10:00 AM Nathanel Clines, PTA [facility]   7/19/2022 10:00 AM Nathanel Clines, PTA [facility]   7/21/2022 10:00 AM Sandra Taylor, PT [facility]   7/26/2022 10:00 AM Rio Angel, CATINA SFISRA [facility]   7/28/2022 10:00 AM Sandar Taylor, PT [facility]   10/12/2022 9:40 AM Lorrie Haile MD [facility]

## 2022-06-17 ENCOUNTER — HOSPITAL ENCOUNTER (OUTPATIENT)
Dept: PHYSICAL THERAPY | Age: 77
Setting detail: RECURRING SERIES
Discharge: HOME | End: 2022-06-20
Payer: MEDICARE

## 2022-06-17 PROCEDURE — 97140 MANUAL THERAPY 1/> REGIONS: CPT

## 2022-06-17 PROCEDURE — 97110 THERAPEUTIC EXERCISES: CPT

## 2022-06-17 ASSESSMENT — PAIN SCALES - GENERAL: PAINLEVEL_OUTOF10: 6

## 2022-06-17 NOTE — PROGRESS NOTES
Yates Lesch  : 1945  Primary: Medicare Part A And B  Secondary: Sunny Whitaker 36 @ [address] [facility] [address]  Phone: [phone]  Fax: [phone] Plan Frequency: 2 times per week for 60 days    Plan of Care/Certification Expiration Date: 22      PT Visit Info:   No data recorded    OUTPATIENT PHYSICAL THERAPY:OP NOTE TYPE: Treatment Note 2022       Episode  }Appt Desk            Treatment Diagnosis:  Pain in Right Knee (M25.561)  Stiffness of Right Knee, Not elsewhere classified (M25.661)  Difficulty in walking, Not elsewhere classified (R26.2)  Medical/Referring Diagnosis:  Pain in right knee [M25.561]  Referring Physician:  Alex Crump DO MD Orders:  PT Eval and Treat   Date of Onset:  Onset Date: 05/15/22 (Pain onset from falling backwards 3 weeks from 2022)     Allergies:   Sulfamethoxazole-trimethoprim, Tramadol, Codeine, Fenofibrate, Hydromorphone, Metformin, and Sitagliptin  Restrictions/Precautions:  No data recordedNo data recorded   Interventions Planned (Treatment may consist of any combination of the following):    Current Treatment Recommendations: Strengthening; ROM; Balance training; Functional mobility training; Transfer training; Endurance training; Gait training; Stair training; Neuromuscular re-education; Manual Therapy - Soft Tissue Mobilization; Manual Therapy - Joint Manipulation; Pain management; Home exercise program; Modalities; Integrated dry needling; Therapeutic activities     Subjective Comments: Pt. Reported having less pain since last session     Initial:}    6/10 Post Session:       4/10  Medications Last Reviewed:  2022  Updated Objective Findings:  Pt. had better quad strength today and was compliant with all exercises  Treatment   THERAPEUTIC EXERCISE: (45  minutes):  Exercises per grid below to improve mobility and strength.   Required moderate visual, verbal, manual and tactile cues to promote proper body alignment, promote proper body posture and promote proper body mechanics. Progressed resistance, range, repetitions and complexity of movement as indicated. Date:  6/13/2022 Date:  6/17/2022 Date:  6/8/2022   Activity/Exercise Parameters Parameters Parameters   SciFit Level 4 x 8 mins  10 min, level 1 6 min, level 2.0   Knee extension Seated in chair x 20 reps BLE's  Seated on edge of plinth 3x10 reps  SAQ: 3x10 with 3 sec hold   Quad sets 3 x10, 5 sec hold, RLE 3x10, 5 sec hold, supine 3x10, 5 sec hold, supine   Heel Slides 3 x10, RLE no strap  3x10, RLE,  No strap active only    Sit to stand X 10 reps  2x10 reps     Hip adduction  Supine yellow ball x 10 reps x 10 sec hold each Supine yellow ball x 10 reps x 10 sec hold each    Bridging   x20 reps 5 sec hold each     Short arc quads  X 20 reps BLE's    Straight leg raise   With quad sets BLE's x 10 reps each    Seated marching X 20 reps  Standing x20 reps     Supine hip abduction  X 20 reps  X 20 reps     Hamstring stretch  Strap 4x30 sec hold each Strap 4x30 sec hold each BLE's     Ankle Plantar flexion with inversion 3x20 reps RLE therapist applied resistance and yellow band in lonsitting  Plantarflexion with inversion, 3x20, RLE, therapist applied resistance and yellow band, long sitting    Education 10 min; Treatment, home exercises, plan of care, prognosis, and posture Constant verbal and tactile cuing to perform activities and exercises correctly    [facility] Portal     MANUAL THERAPY: (15 minutes): May consider Joint mobilization, Soft tissue mobilization and Manipulation was utilized and necessary because of the patient's restricted joint motion, painful spasm, loss of articular motion and restricted motion of soft tissue. Pt. Received soft tissue mobilization to right knee and ankle to decrease tightness. Gentle manual stretching to right external rotators in right ankle.   Gentle PROM at end range to right knee in flexion and extension. Commonly used abbreviations that may be included in this note:  STM- Soft tissue mobilization, R>L or L>R- right greater than left or vice versa, HEP - Home exercise program, CPA/UPA - Central or unilateral posterior-anterior mobilization, SLS- single leg stance, SKTC - Single leg to chest, SNAGS/NAGS- (sustained) Natural apophyseal glides, TKE- Terminal knee extension, ER- External rotation, IR- Internal rotation, B - Bilateral, sec- seconds, Lb- pounds, min - minutes, HA- Headache, OP- Over pressure, tband- theraband, fwd/bwd- Forward/Backward, TA- Transversus Abdominus, dbl- Double      TREATMENT/SESSION SUMMARY:       Response to Treatment: Pt. Was compliant with verbal and tactile cuing for safety, technique, and posture      · Communication/Consultation:  Assistance with exercises  · Equipment provided today:  Yellow tehraband  · Recommendations/Intent for next treatment session: Next visit will focus on progressing with manual therapy, strengthening, and mobility as tolerated.       Total Treatment Billable Duration:  55 min  Time In: 1100  Time Out: 1200    STEPHANIE MENDEZ PTA         Charge Capture  }Post Session Pain  MedBridge Portal  MD Guidelines  Scanned Media  Benefits  MyChart    Future Appointments   Date Time Provider Deon Parkeri   6/20/2022  8:20 AM Justin Thornton MD [facility]   6/22/2022  9:00 AM Kati Solis, PT [facility]   6/24/2022 10:00 AM Kati Solis, PT [facility]   6/28/2022  9:00 AM Henry Loomis, PTA [facility]   6/30/2022  9:00 AM Henry Loomis, PTA [facility]   7/6/2022 10:00 AM Kati Solis, PT [facility]   7/6/2022  1:30 PM ELIZABETH Randall MD [facility]   7/8/2022  9:00 AM Katiilya Solis, PT [facility]   7/11/2022 10:00 AM Kati Solis, PT [facility]   7/14/2022 10:00 AM Henry Loomis, PTA [facility]   7/19/2022 10:00 AM Henry Loomis PTA [facility]   7/21/2022 10:00 AM Kati Solis PT [facility] 7/26/2022 10:00 AM Vivianne Spatz, PTA [facility]   7/28/2022 10:00 AM Olman Luevano, PT [facility]   10/12/2022  9:40 AM Marcelino Esquivel MD [facility]

## 2022-06-20 ENCOUNTER — OFFICE VISIT (OUTPATIENT)
Dept: ORTHOPEDIC SURGERY | Age: 77
End: 2022-06-20
Payer: MEDICARE

## 2022-06-20 VITALS — HEIGHT: 70 IN | WEIGHT: 225 LBS | BODY MASS INDEX: 32.21 KG/M2

## 2022-06-20 DIAGNOSIS — S86.911A STRAIN OF RIGHT KNEE, INITIAL ENCOUNTER: ICD-10-CM

## 2022-06-20 DIAGNOSIS — Z96.651 PRESENCE OF TOTAL KNEE JOINT PROSTHESIS, RIGHT: ICD-10-CM

## 2022-06-20 DIAGNOSIS — M17.11 PRIMARY OSTEOARTHRITIS OF RIGHT KNEE: ICD-10-CM

## 2022-06-20 DIAGNOSIS — Z96.652 TOTAL KNEE REPLACEMENT STATUS, LEFT: Primary | ICD-10-CM

## 2022-06-20 LAB
BASOPHILS # BLD: 0.1 K/UL (ref 0–0.2)
BASOPHILS NFR BLD: 1 % (ref 0–2)
CRP SERPL-MCNC: 1.3 MG/DL (ref 0–0.9)
DIFFERENTIAL METHOD BLD: ABNORMAL
EOSINOPHIL # BLD: 0.2 K/UL (ref 0–0.8)
EOSINOPHIL NFR BLD: 2 % (ref 0.5–7.8)
ERYTHROCYTE [DISTWIDTH] IN BLOOD BY AUTOMATED COUNT: 16.8 % (ref 11.9–14.6)
HCT VFR BLD AUTO: 49.1 % (ref 41.1–50.3)
HGB BLD-MCNC: 15.3 G/DL (ref 13.6–17.2)
IMM GRANULOCYTES # BLD AUTO: 0.3 K/UL (ref 0–0.5)
IMM GRANULOCYTES NFR BLD AUTO: 3 % (ref 0–5)
LYMPHOCYTES # BLD: 2.7 K/UL (ref 0.5–4.6)
LYMPHOCYTES NFR BLD: 30 % (ref 13–44)
MCH RBC QN AUTO: 30.5 PG (ref 26.1–32.9)
MCHC RBC AUTO-ENTMCNC: 31.2 G/DL (ref 31.4–35)
MCV RBC AUTO: 98 FL (ref 79.6–97.8)
MONOCYTES # BLD: 0.6 K/UL (ref 0.1–1.3)
MONOCYTES NFR BLD: 7 % (ref 4–12)
NEUTS SEG # BLD: 5.3 K/UL (ref 1.7–8.2)
NEUTS SEG NFR BLD: 58 % (ref 43–78)
NRBC # BLD: 0 K/UL (ref 0–0.2)
PLATELET # BLD AUTO: 145 K/UL (ref 150–450)
PMV BLD AUTO: 10.6 FL (ref 9.4–12.3)
RBC # BLD AUTO: 5.01 M/UL (ref 4.23–5.6)
WBC # BLD AUTO: 9.2 K/UL (ref 4.3–11.1)

## 2022-06-20 NOTE — PROGRESS NOTES
Reddie Hinge brace for patients right knee. I explained how the hinge on each side of the brace should line up with the patella. The patient was also instructed to tighten the strap distal to the patella first to insure the brace is anchored and prevents slipping, , then the top strap should be tightened. Patient read and signed documenting they understand and agree to [facility]'s current DME return policy.

## 2022-06-20 NOTE — PROGRESS NOTES
Name: Latrice Park  YOB: 1945  Gender: male  MRN: [id number]    CC: Pain and swelling. HPI: Latrice Park is a 78 y.o. male who presents with a 7-week history of right knee pain and swelling following a fall. He does have a history of right total knee arthroplasty done at [facility] several years ago. He is generally done very well with that knee but has not generally been satisfied with his experiences at [facility] and has not followed up with them in some time for orthopedic care. He describes his chair slipping out from under about 7 weeks ago while working in his wood shop and hyperflexing his knee. He had significant swelling and several subsequent fall and give way episodes. He has been seen here by Dr. Pincus Brunner and Dr. Micah Coleman  for his shoulder as well as Dr. Asael Watt for his foot. With that relationship he wanted to be seen here. Given a delay in scheduling his primary care doctor ordered a CT scan of his right knee and he has been ambulating with the use of a Rollator walker. He states ordinarily does not use an assistive device to get around. Over the past week or so he states his knee has been getting better but is certainly not back to his baseline function. He does have a significant and complex past medical and surgical history which is reviewed via [facility] and care everywhere. History was obtained from patient and he is a  who has come out of care home to work in a [employer]. ROS/Meds/PSH/PMH/FH/SH: I personally reviewed the patients standard intake form. Below are the pertinents    Tobacco:  reports that he has never smoked.  He has never used smokeless tobacco.  Past Medical History:   Diagnosis Date    Arthritis     Blurred vision, right eye     BPH (benign prostatic hyperplasia)     Gout     History of Clostridioides difficile colitis 2010    History of pancreatitis     History of renal carcinoma     partial nephrectomy    Hyperlipidemia     Hypertension     Nausea & vomiting     small amount of N/V and pt not sure of it antiemetics work    Neuropathy     Paroxysmal A-fib ([address]     Presence of Watchman left atrial appendage closure device     Sleep apnea     compliant with C-pap    Thromboembolus ([address]     hx of left lower extremity    Type 2 diabetes mellitus (HCC)     insulin reliant; AVG -140; s.s. of hypoglycemia 65-75; last A1c 7.1    WPW (Robbi-Parkinson-White syndrome)     ablation x4      Past Surgical History:   Procedure Laterality Date    ABLATION OF DYSRHYTHMIC FOCUS      WPW- ablations x3    BACK SURGERY      ruptured disc    CATARACT REMOVAL      CHOLECYSTECTOMY      COLONOSCOPY N/A 1/24/2022    COLONOSCOPY performed by Carissa Austin MD at [facility]      partial    LUMBAR LAMINECTOMY      ORTHOPEDIC SURGERY Left     great toe straightened    ORTHOPEDIC SURGERY Left     foot    OTHER SURGICAL HISTORY      placed watchman 2/2020    TOTAL KNEE ARTHROPLASTY Right     UPPER GASTROINTESTINAL ENDOSCOPY      WISDOM TOOTH EXTRACTION          Physical Examination:  Physical exam: On examination of the patient's gait there is the patient ambulates with a walker. He is able to ambulate with this walker fully weightbearing on the right lower extremity is wearing no brace on either knee. On examination while standing there is decreased flexion in the lumbosacral spine without acute list or spasm. While seated ,  the Bilateral hip is examined there is full range of motion without obvious issue . On examination of the  Right knee : He has full passive extension and flexes 120 degrees. He has about a 15 degree extensor lag. When asked to fully extend his knee actively I can feel no significant defect in the extensor mechanism. When the knee is relaxed there is some palpable step-off anteriorly at the insertion of the quad muscle which is minimally tender.   There is a marked effusion. His knee is stable to varus valgus and anterior posterior drawer testing. His incision is well-healed without sign of concern. On examination of the  Left knee :ROM is 0 to 125 There is a well-healed midline incision with appropriate range of motion and balance. Patient is neurologically intact distally. Skin is intact. Imaging:   Radiographs:    AP standing, flexion lateral, and sunrise view of the right knee was obtained  This demonstrated  Well-positioned total knee arthroplasty with stable fixation. I do not see any abnormality the station of the patella. On the lateral view the soft tissue around the patella appears to be appropriate without defect. CT scan report is enclosed in the chart done at [facility] which confirms an effusion but otherwise well fixed components without sign or mention of extensor mechanism disruption. Radiographic impression: Stable right total knee arthroplasty    Assessment:   Stable right total knee arthroplasty with extensor mechanism strain. He may have a partial tear although on examination his extensor mechanism does appear to be intact. His patella tracks centrally. He does have a 10 to 15 degree lag. He has already started physical therapy with Mo Foster and I would ask him to continue that. I do think that a Redi brace would be appropriate to give him some further support. I would recommend an aspiration for both diagnostic and treatment purposes-please see note below. I have explained to him that if he does have a partial extensor mechanism tear that the best treatment would be bracing and therapy at this point. At this time I do not think any surgical intervention would be appropriate or reliable. I will see him back in 6 weeks for repeat clinical evaluation and further recommendations as appropriate. Plan:     Follow up:   Return in about 6 weeks (around 8/1/2022).      Carrol Richards MD      Procedure note: Under sterile technique the patient's right knee was aspirated 80 cc of blood-tinged thin fluid. It did not appear infectious. Dressing was applied. We will send this fluid for routine microbiological and fluid analysis.     Jeniffer Mora MD

## 2022-06-22 ENCOUNTER — HOSPITAL ENCOUNTER (OUTPATIENT)
Dept: PHYSICAL THERAPY | Age: 77
Setting detail: RECURRING SERIES
Discharge: HOME | End: 2022-06-25
Payer: MEDICARE

## 2022-06-22 PROCEDURE — 97110 THERAPEUTIC EXERCISES: CPT

## 2022-06-22 ASSESSMENT — PAIN SCALES - GENERAL: PAINLEVEL_OUTOF10: 6

## 2022-06-22 NOTE — PROGRESS NOTES
Janine [place]  : 1945  Primary: Medicare Part A And B  Secondary: Sunny Whitaker 36 @ [address] [facility] [id number]  Phone: [phone]  Fax: [phone] Plan Frequency: 2 times per week for 60 days    Plan of Care/Certification Expiration Date: 22      PT Visit Info:   No data recorded    OUTPATIENT PHYSICAL THERAPY:OP NOTE TYPE: Treatment Note 2022       Episode  }Appt Desk            Treatment Diagnosis:  Pain in Right Knee (M25.561)  Stiffness of Right Knee, Not elsewhere classified (M25.661)  Difficulty in walking, Not elsewhere classified (R26.2)  Medical/Referring Diagnosis:  Pain in right knee [M25.561]  Referring Physician:  Vanna Rucker DO MD Orders:  PT Eval and Treat   Date of Onset:  Onset Date: 05/15/22 (Pain onset from falling backwards 3 weeks from 2022)     Allergies:   Sulfamethoxazole-trimethoprim, Tramadol, Codeine, Fenofibrate, Hydromorphone, Metformin, and Sitagliptin  Restrictions/Precautions:  No data recordedNo data recorded   Interventions Planned (Treatment may consist of any combination of the following):    Current Treatment Recommendations: Strengthening; ROM; Balance training; Functional mobility training; Transfer training; Endurance training; Gait training; Stair training; Neuromuscular re-education; Manual Therapy - Soft Tissue Mobilization; Manual Therapy - Joint Manipulation; Pain management; Home exercise program; Modalities; Integrated dry needling; Therapeutic activities     Subjective Comments: Patient arrives to therapy 10 minutes late. He states he is not feeling well and reports severe pain in his R knee, R shoulder, and R ankle. Initial:}    6/10 Post Session:       6/10  Medications Last Reviewed:  2022  Updated Objective Findings:  R Knee active ROM: flexion 120°, extension -3° from full extension'.  Post session: R knee flexion 131°, extension 3°  Treatment   THERAPEUTIC EXERCISE: (48  minutes):  Exercises per grid below to improve mobility and strength. Required moderate visual, verbal, manual and tactile cues to promote proper body alignment, promote proper body posture and promote proper body mechanics. Progressed resistance, range, repetitions and complexity of movement as indicated. Date:  6/13/2022 Date:  6/17/2022 Date:  6/22/2022   Activity/Exercise Parameters Parameters Parameters   SciFit Level 4 x 8 mins  10 min, level 1 6 min, Level 2.0   Knee extension Seated in chair x 20 reps BLE's  Seated on edge of plinth 3x10 reps  Heel Prop: 3 min   Quad sets 3 x10, 5 sec hold, RLE 3x10, 5 sec hold, supine 3x10, 5 sec hold, supine   Heel Slides 3 x10, RLE no strap  3x10, RLE,  No strap active only 3x10, RLE, with strap for overpressure   Sit to stand X 10 reps  2x10 reps  X10, high plinth   Hip adduction  Supine yellow ball x 10 reps x 10 sec hold each Supine yellow ball x 10 reps x 10 sec hold each    Bridging   x20 reps 5 sec hold each     Short arc quads  X 20 reps BLE's    Straight leg raise   With quad sets BLE's x 10 reps each    Seated marching X 20 reps  Standing x20 reps     Supine hip abduction  X 20 reps  X 20 reps     Hamstring stretch  Strap 4x30 sec hold each Strap 4x30 sec hold each BLE's     Ankle Plantar flexion with inversion 3x20 reps RLE therapist applied resistance and yellow band in lonsitting  Plantarflexion with inversion, 3x20, RLE, therapist applied resistance and yellow band, long sitting    Education 10 min; Treatment, home exercises, plan of care, prognosis, and posture Constant verbal and tactile cuing to perform activities and exercises correctly    [facility] Portal     MANUAL THERAPY: (0 minutes):  May consider Joint mobilization, Soft tissue mobilization and Manipulation was utilized and necessary because of the patient's restricted joint motion, painful spasm, loss of articular motion and restricted motion of soft tissue. Pt. Received soft tissue mobilization to right knee and ankle to decrease tightness. Gentle manual stretching to right external rotators in right ankle. Gentle PROM at end range to right knee in flexion and extension. Commonly used abbreviations that may be included in this note:  STM- Soft tissue mobilization, R>L or L>R- right greater than left or vice versa, HEP - Home exercise program, CPA/UPA - Central or unilateral posterior-anterior mobilization, SLS- single leg stance, SKTC - Single leg to chest, SNAGS/NAGS- (sustained) Natural apophyseal glides, TKE- Terminal knee extension, ER- External rotation, IR- Internal rotation, B - Bilateral, sec- seconds, Lb- pounds, min - minutes, HA- Headache, OP- Over pressure, tband- theraband, fwd/bwd- Forward/Backward, TA- Transversus Abdominus, dbl- Double      TREATMENT/SESSION SUMMARY:       Response to Treatment: Patient was very irritable throughout session with complaints of severe R knee pain. He required verbal and tactile cueing to correct form and pacing. The patient experienced pain with all interventions. We were unable to perform manual therapy due to time restrictions but will consider it for next session as tolerated. Patient R knee flexion improved from 120° to 131° at end of session. · Communication/Consultation:  None today  · Equipment provided today:  None Today  · Recommendations/Intent for next treatment session: Next visit will focus on progressing weight bearing exercises.       Total Treatment Billable Duration:  48 min  Time In: 2123  Time Out: 3293    Milla Mascorro PT         Charge Capture  }Post Session Pain  [facility] Portal  MD Guidelines  Scanned Media  Benefits  EQ workshart    Future Appointments   Date Time Provider Deon Alexander   6/24/2022 10:00 AM Milla Mascorro PT [facility]   6/28/2022  9:00 AM Tona Marshall PTA [facility]   6/30/2022  9:00 AM Tona Marshall PTA [facility] ALINE   7/6/2022 10:00 AM Kimberlee Sheppard Mary, PT [facility]   7/6/2022  1:30 PM ELIZABETH Chavez MD [facility]   7/8/2022  9:00 AM Talitha Mcburney, PT [facility]   7/11/2022 10:00 AM Talitha Mcburney, PT [facility]   7/14/2022 10:00 AM De La Torre Million, PTA [facility]   7/19/2022 10:00 AM De La Torre Million, PTA [facility]   7/21/2022 10:00 AM Talitha Mcburney, PT [facility]   7/26/2022 10:00 AM De La Torre Million, PTA [facility]   7/28/2022 10:00 AM Talitha Mcburney, PT [facility]   8/1/2022  8:55 AM Evin Mckenna MD [facility]   10/12/2022  9:40 AM Rajesh Quinones MD [facility]

## 2022-06-23 LAB
BACTERIA SPEC CULT: NORMAL
BODY FLD TYPE: NORMAL
CRYSTALS FLD MICRO: NORMAL
GRAM STN SPEC: NORMAL
GRAM STN SPEC: NORMAL
SERVICE CMNT-IMP: NORMAL

## 2022-06-24 ENCOUNTER — HOSPITAL ENCOUNTER (OUTPATIENT)
Dept: PHYSICAL THERAPY | Age: 77
Setting detail: RECURRING SERIES
Discharge: HOME | End: 2022-06-27
Payer: MEDICARE

## 2022-06-24 PROCEDURE — 97110 THERAPEUTIC EXERCISES: CPT

## 2022-06-24 ASSESSMENT — PAIN SCALES - GENERAL: PAINLEVEL_OUTOF10: 7

## 2022-06-24 NOTE — PROGRESS NOTES
Kulwinder Garza  : 1945  Primary: Medicare Part A And B  Secondary: Sunny Whitaker 36 @ [address] [facility] [address]  Phone: [phone]  Fax: [phone] Plan Frequency: 2 times per week for 60 days    Plan of Care/Certification Expiration Date: 22      PT Visit Info:   No data recorded    OUTPATIENT PHYSICAL THERAPY:OP NOTE TYPE: Treatment Note 2022       Episode  }Appt Desk            Treatment Diagnosis:  Pain in Right Knee (M25.561)  Stiffness of Right Knee, Not elsewhere classified (M25.661)  Difficulty in walking, Not elsewhere classified (R26.2)  Medical/Referring Diagnosis:  Pain in right knee [M25.561]  Referring Physician:  Rin Almaguer DO MD Orders:  PT Eval and Treat   Date of Onset:  Onset Date: 05/15/22 (Pain onset from falling backwards 3 weeks from 2022)     Allergies:   Sulfamethoxazole-trimethoprim, Tramadol, Codeine, Fenofibrate, Hydromorphone, Metformin, and Sitagliptin  Restrictions/Precautions:  No data recordedNo data recorded   Interventions Planned (Treatment may consist of any combination of the following):    Current Treatment Recommendations: Strengthening; ROM; Balance training; Functional mobility training; Transfer training; Endurance training; Gait training; Stair training; Neuromuscular re-education; Manual Therapy - Soft Tissue Mobilization; Manual Therapy - Joint Manipulation; Pain management; Home exercise program; Modalities; Integrated dry needling; Therapeutic activities     Subjective Comments: Patient arrives to therapy 10 minutes late and used the restroom once he arrived. The patient arrives feeling better since his last session, feeling pain in his R knee, ankle, and R shoulder.       Initial:}    7/10 Post Session:       7/10  Medications Last Reviewed:  2022  Updated Objective Findings:  None Today  Treatment   THERAPEUTIC EXERCISE: (43 minutes):  Exercises per grid below to improve mobility and strength. Required moderate visual, verbal, manual and tactile cues to promote proper body alignment, promote proper body posture and promote proper body mechanics. Progressed resistance, range, repetitions and complexity of movement as indicated. Date:  6/17/2022 Date:  6/22/2022 Date:  6/23/2022   Activity/Exercise Parameters Parameters Parameters   SciFit 10 min, level 1 6 min, Level 2.0 10 min, level 2.5   Knee extension Seated on edge of plinth 3x10 reps  Heel Prop: 3 min --   Quad sets 3x10, 5 sec hold, supine 3x10, 5 sec hold, supine --   Heel Slides 3x10, RLE,  No strap active only 3x10, RLE, with strap for overpressure 3x10, RLE, with strap for overpressure  1x10, therapist applied overpressure   Sit to stand 2x10 reps  X10, high plinth --   Hip adduction  Supine yellow ball x 10 reps x 10 sec hold each  --   Bridging  x20 reps 5 sec hold each   --   Short arc quads X 20 reps BLE's  --   Straight leg raise  With quad sets BLE's x 10 reps each  --   Seated marching Standing x20 reps   --   Supine hip abduction  X 20 reps   ---   Hamstring stretch  Strap 4x30 sec hold each BLE's   ---   Ankle Plantarflexion with inversion, 3x20, RLE, therapist applied resistance and yellow band, long sitting  Plantarflexion with inversion, 4x20, RLE, therapist applied resistance and green band, long sitting. Emphasis on promoting improvements in knee alignment in stance   Education Constant verbal and tactile cuing to perform activities and exercises correctly  5 min.; maintaining foot inverted and in neutral position as tolerated. Standing transfers with UE support   [facility] Portal     MANUAL THERAPY: (0 minutes): May consider Joint mobilization, Soft tissue mobilization and Manipulation was utilized and necessary because of the patient's restricted joint motion, painful spasm, loss of articular motion and restricted motion of soft tissue.      Pt. Received soft tissue mobilization to right knee and ankle to decrease tightness. Gentle manual stretching to right external rotators in right ankle. Gentle PROM at end range to right knee in flexion and extension. Commonly used abbreviations that may be included in this note:  STM- Soft tissue mobilization, R>L or L>R- right greater than left or vice versa, HEP - Home exercise program, CPA/UPA - Central or unilateral posterior-anterior mobilization, SLS- single leg stance, SKTC - Single leg to chest, SNAGS/NAGS- (sustained) Natural apophyseal glides, TKE- Terminal knee extension, ER- External rotation, IR- Internal rotation, B - Bilateral, sec- seconds, Lb- pounds, min - minutes, HA- Headache, OP- Over pressure, tband- theraband, fwd/bwd- Forward/Backward, TA- Transversus Abdominus, dbl- Double      TREATMENT/SESSION SUMMARY:       Response to Treatment: Patient presents with improvements in knee ROM. Patient is lacking R ankle supination and remains excessively externally rotated in standing. Patient requires verbal, visual, and tactile cueing to position RLE in neutral position. · Communication/Consultation:  Standing and body positioning   · Equipment provided today:  Green Theraband  · Recommendations/Intent for next treatment session: Next visit will focus on progressing strength, functional mobility, pain tolerance, and ROM as tolerated. Will instruct patient's wife to assist in home exercises.       Total Treatment Billable Duration:  43 min  Time In: 0859  Time Out: [facility]         Charge Capture  }Post Session Pain  Med[facility] Portal  MD Guidelines  Scanned Media  Benefits  MyChart    Future Appointments   Date Time Provider Deon Alexander   6/28/2022  9:00 AM Yael Diaz PTA [facility]   6/30/2022  9:00 AM Yael Diaz PTA [facility]   7/6/2022 10:00 AM Ayla Plane, PT [facility]   7/6/2022  1:30 PM ELIZABETH Jeter MD [facility]   7/8/2022  9:00 AM Ayal Plane, PT [facility]   7/11/2022 10:00 AM Olman Luevano, PT [facility]   7/14/2022 10:00 AM Vivianne Spatz, PTA [facility]   7/19/2022 10:00 AM Vivianne Spatz, PTA [facility]   7/21/2022 10:00 AM Olman Luevano, PT [facility]   7/26/2022 10:00 AM Vivianne Spatz, PTA [facility]   7/28/2022 10:00 AM Olman Dexter, PT [facility]   8/1/2022  8:55 AM Guanako Iqbal MD [facility]   10/12/2022  9:40 AM Marcelino Esquivel MD [facility]

## 2022-06-28 ENCOUNTER — HOSPITAL ENCOUNTER (OUTPATIENT)
Dept: PHYSICAL THERAPY | Age: 77
Setting detail: RECURRING SERIES
Discharge: HOME | End: 2022-07-01
Payer: MEDICARE

## 2022-06-28 LAB
BACTERIA SPEC CULT: NORMAL
SERVICE CMNT-IMP: NORMAL

## 2022-06-28 PROCEDURE — 97140 MANUAL THERAPY 1/> REGIONS: CPT

## 2022-06-28 PROCEDURE — 97110 THERAPEUTIC EXERCISES: CPT

## 2022-06-28 ASSESSMENT — PAIN SCALES - GENERAL: PAINLEVEL_OUTOF10: 6

## 2022-06-28 NOTE — PROGRESS NOTES
Yong Oconnor  : 1945  Primary: Medicare Part A And B  Secondary: Sunny Whitaker 36 @ [address] [facility] [address]  Phone: [phone]  Fax: [phone] Plan Frequency: 2 times per week for 60 days    Plan of Care/Certification Expiration Date: 22      PT Visit Info:    No data recorded    OUTPATIENT PHYSICAL THERAPY:OP NOTE TYPE: Progress Report 2022               Episode  Appt Desk         Treatment Diagnosis:  Pain in Right Knee (M25.561)  Stiffness of Right Knee, Not elsewhere classified (M25.661)  Difficulty in walking, Not elsewhere classified (R26.2)  Medical/Referring Diagnosis:  Pain in right knee [M25.561]  Referring Physician:  Ruben Hyatt DO MD Orders:  PT Eval and Treat   Return MD Appt:  10/2/2022  Date of Onset:  Onset Date: 05/15/22 (Pain onset from falling backwards 3 weeks from 2022)     Allergies:  Sulfamethoxazole-trimethoprim, Tramadol, Codeine, Fenofibrate, Hydromorphone, Metformin, and Sitagliptin  Restrictions/Precautions:    No data recordedNo data recorded   Medications Last Reviewed:  2022           OBJECTIVE       ORTHOSTATIC/POSTURE OBSERVATION:   Date:   2022   SITTING RESTING POSTURE -Pt sits with forward head and rounded shoulders which indicate tight anterior chest musculature, upper trapezius, and levator scapula and weak posterior scapula musculature and deep cervical flexors. STANDING RESTING POSTURE -Pt displays decreased core motor control indicating weak core and low back musculature.        ROM Date:  2022 Date:  2022    KNEE ROM (TESTED IN SUPINE)      RIGHT LEFT RIGHT LEFT   Flexion 124° 125° 128° NT   Extension 7° from full extension 1° from full extension +1° of hyperextension NT     PALPATION/TONE/TISSUE TEXTURE:   Date:   2022   SOFT TISSUE:   Hamstrings R medial hamstring tender   TFL/IT band R ITB highly tender with increased tone Skin integrity   unremarkable       STRENGTH   Date: 6/2/2022  Date: 6/28/2022    Right Left Right   Knee Extension 3+ 4+ 3+   Knee Flexion 4- 4+ 4   Ankle DF 4+ 4+ 4+   Ankle PF 4 4 4     FUNCTIONAL MOBILITY   Date:   6/2/2022   Transfers High BUE support to stand   Posture Forward head, rounded shoulders, excessive R and L tibial ER (R>L)   Gait deviations Excessive R toe-out, B ankle eversion (R>L)   Assistive device Rolling walker   Bed mobility Min assist     EDEMA Date:  6/2/2022     Date:  6/28/2022    Activity/Exercise Left  Right Left Right   Mid patella  40 cm 46 cm 40 cm 43              ASSESSMENT   Initial Assessment:   Mr. Kody Collazo has attended 1 physical therapy session including initial evaluation as of 6/2/2022. He reports falling backwards three weeks ago when reaching for a chair. He was unable to stand so EMS was called for him. Kody Collazo reports x-ray images were negative for fractures. The patient reports feeling pain in his R knee stemming from his fall, he denies radiating symptoms, and complaints of occasional knee instability. The patient ambulates with a rolling walker and presents with increased BLE ER. He  Kody Collazo presents with signs and symptoms of increased pain, decreased ROM, decreased strength, decreased functional tolerance. Kody Collazo will benefit from home exercise program, therapeutic and postural strengthening exercises, manual therapeutic techniques (ie. Distraction, SOR, myofascial release/soft tissue mobilization) as appropriate to address Paulo Garg current condition. Kody Collazo will benefit from skilled PT (medically necessary) to address above deficits affecting participation in basic ADLs and overall functional tolerance. Progress Note:  Patient reports he has improved 30% overall. He is now able to walk 4 ft without an assistive device, his R knee is no longer his primary pain, and is able to move it more efficiently.  He reports \"my pain level in my R knee has greatly diminished but it still needs strength\". The patient has made improvements in ROM, strength, and functional mobility. He has met 3/4 short term goals and 1/5 long term goals. The patient still struggles with decreased ROM, muscle weakness, mobility restrictions, problems with gait and ambulation, and increased pain. Tatiana Arriaga will continue to benefit from skilled PT (medically necessary) to address above deficits affecting participation in basic ADLs and overall functional tolerance. PLAN   Effective Dates: 6/2/2022 TO Plan of Care/Certification Expiration Date: 08/01/22     Frequency/Duration: Plan Frequency: 2 times per week for 60 days     Interventions Planned (Treatment may consist of any combination of the following):    Current Treatment Recommendations: Strengthening; ROM; Balance training; Functional mobility training; Transfer training; Endurance training; Gait training; Stair training; Neuromuscular re-education; Manual Therapy - Soft Tissue Mobilization; Manual Therapy - Joint Manipulation; Pain management; Home exercise program; Modalities; Integrated dry needling; Therapeutic activities     GOALS: (Goals have been discussed and agreed upon with patient.)  Short Term Goals 4 weeks   1. Tatiana Arriaga will be independent with HEP to promote self-management of symptoms. GOAL MET 6/28/2022  2. Tatiana Arriaga will perform Nu Step x 10 minutes for hip and knee AAROM. GOAL MET 6/28/2022  3. Tatiana Arriaga will tolerate manual therapy/joint mobilizations to increase knee flexion ROM so pt can ambulate stairs and walk with a more normalized gait pattern. GOAL MET 6/28/2022  4. Tatiana Arriaga will participate in static and dynamic balance activities for 5 minutes to help improve proprioception and decrease risk of falls. ON GOING 6/28/2022    Long Term Goals 12 weeks  1.  Tatiana Arriaga will be able to perform sit to stand transfers independently with increased

## 2022-06-28 NOTE — PROGRESS NOTES
Rodney lAbert  : 1945  Primary: Medicare Part A And B  Secondary: Sunny Whitaker 36 @ [address] [facility] [address]  Phone: [phone]  Fax: [phone] Plan Frequency: 2 times per week for 60 days    Plan of Care/Certification Expiration Date: 22      PT Visit Info:   No data recorded    OUTPATIENT PHYSICAL THERAPY:OP NOTE TYPE: Treatment Note 2022       Episode  }Appt Desk            Treatment Diagnosis:  Pain in Right Knee (M25.561)  Stiffness of Right Knee, Not elsewhere classified (M25.661)  Difficulty in walking, Not elsewhere classified (R26.2)  Medical/Referring Diagnosis:  Pain in right knee [M25.561]  Referring Physician:  Libia Paul DO MD Orders:  PT Eval and Treat   Date of Onset:  Onset Date: 05/15/22 (Pain onset from falling backwards 3 weeks from 2022)     Allergies:   Sulfamethoxazole-trimethoprim, Tramadol, Codeine, Fenofibrate, Hydromorphone, Metformin, and Sitagliptin  Restrictions/Precautions:  No data recordedNo data recorded   Interventions Planned (Treatment may consist of any combination of the following):    Current Treatment Recommendations: Strengthening; ROM; Balance training; Functional mobility training; Transfer training; Endurance training; Gait training; Stair training; Neuromuscular re-education; Manual Therapy - Soft Tissue Mobilization; Manual Therapy - Joint Manipulation; Pain management; Home exercise program; Modalities; Integrated dry needling; Therapeutic activities     Subjective Comments: Patient arrives to therapy with complaints of pain in his R knee, B ankle, and R shoulder pain. Initial:}    6/10 Post Session:       6/10  Medications Last Reviewed:  2022  Updated Objective Findings:  See progress note from today  Treatment   THERAPEUTIC EXERCISE: (49 minutes):  Exercises per grid below to improve mobility and strength.   Required moderate visual, verbal, manual and tactile cues to promote proper body alignment, promote proper body posture and promote proper body mechanics. Progressed resistance, range, repetitions and complexity of movement as indicated. Date:  6/22/2022 Date:  6/23/2022 Date:  6/28/2022   Activity/Exercise Parameters Parameters Parameters   SciFit 6 min, Level 2.0 10 min, level 2.5 10 min, level 2.5   Knee extension Heel Prop: 3 min -- Heel Prop 10 min   Quad sets 3x10, 5 sec hold, supine --    Heel Slides 3x10, RLE, with strap for overpressure 3x10, RLE, with strap for overpressure  1x10, therapist applied overpressure    Sit to stand X10, high plinth --    Hip adduction   --    Bridging   --    Short arc quads  --    Straight leg raise   -- 4x10   Seated marching  --    Supine hip abduction   ---    Hamstring stretch   --- 6x30 sec   Ankle  Plantarflexion with inversion, 4x20, RLE, therapist applied resistance and green band, long sitting. Emphasis on promoting improvements in knee alignment in stance    Education  5 min.; maintaining foot inverted and in neutral position as tolerated. Standing transfers with UE support 7 min.; home exercises, foot positioning, hamstring stretch modification,   [facility] Portal     MANUAL THERAPY: (12 minutes): May consider Joint mobilization, Soft tissue mobilization and Manipulation was utilized and necessary because of the patient's restricted joint motion, painful spasm, loss of articular motion and restricted motion of soft tissue. Soft tissue mobilization of the R knee: anterior, medial, lateral and posterior, the distal thigh, and proximal gastroc/soleus. Patellar joint mobilization in superior and lateral directions.    Commonly used abbreviations that may be included in this note:  STM- Soft tissue mobilization, R>L or L>R- right greater than left or vice versa, HEP - Home exercise program, CPA/UPA - Central or unilateral posterior-anterior mobilization, SLS- single leg stance, SKTC - Single leg to chest, SNAGS/NAGS- (sustained) Natural apophyseal glides, TKE- Terminal knee extension, ER- External rotation, IR- Internal rotation, B - Bilateral, sec- seconds, Lb- pounds, min - minutes, HA- Headache, OP- Over pressure, tband- theraband, fwd/bwd- Forward/Backward, TA- Transversus Abdominus, dbl- Double      TREATMENT/SESSION SUMMARY:       Response to Treatment: Patient symptoms are highly irritable today. He presents with flexibility restrictions of the R hamstring/ hip extensors whiles seated. Patient reports tenderness to palpation of the medial R popliteal area. He continues to struggle with maintaining his R ankle in a neutral position unless cued to do so. · Communication/Consultation:  Standing and body positioning   · Equipment provided today:  None today  · Recommendations/Intent for next treatment session: Next visit will focus on progressing strength, functional mobility, pain tolerance, and ROM as tolerated. Will instruct patient's wife to assist in home exercises.       Total Treatment Billable Duration:  61 min  Time In: 1000  Time Out: [address], PT         Charge Capture  }Post Session Pain  [facility] Portal  MD Guidelines  Scanned Media  Benefits  MyChart    Future Appointments   Date Time Provider Deon Alexander   6/30/2022  9:00 AM Bjorn Perry PTA [facility]   7/6/2022 10:00 AM Carlos Abbott PT [facility]   7/6/2022  1:30 PM ELIZABETH Pienda MD [facility]   7/8/2022  9:00 AM Carlos Abbott PT [facility]   7/11/2022 10:00 AM Carlos Abbott PT [facility]   7/14/2022 10:00 AM Bjorn Perry PTA [facility]   7/19/2022 10:00 AM CATINA [facility]   7/21/2022 10:00 AM DARSHAN [facility]adoORPGERM[facility]N [facility]   7/25/2022 11:00 AM Monique Finch MD [facility]   7/26/2022 10:00 AM Bjorn Perry PTA [facility]   7/28/2022 10:00 AM Carlos Abbott PT [facility]   8/1/2022  8:55 AM Delfin Lloyd MD [facility]   10/12/2022  9:40 AM Crescencio Garibay

## 2022-06-30 ENCOUNTER — HOSPITAL ENCOUNTER (OUTPATIENT)
Dept: PHYSICAL THERAPY | Age: 77
Setting detail: RECURRING SERIES
Discharge: HOME | End: 2022-07-03
Payer: MEDICARE

## 2022-06-30 PROCEDURE — 97110 THERAPEUTIC EXERCISES: CPT

## 2022-06-30 PROCEDURE — 97140 MANUAL THERAPY 1/> REGIONS: CPT

## 2022-06-30 ASSESSMENT — PAIN SCALES - GENERAL: PAINLEVEL_OUTOF10: 5

## 2022-06-30 NOTE — PROGRESS NOTES
Sravan Car  : 1945  Primary: Medicare Part A And B  Secondary: Sunny Whitaker 36 [address] [facility] [address]  Phone: [phone]  Fax: [phone] Plan Frequency: 2 times per week for 60 days    Plan of Care/Certification Expiration Date: 22      PT Visit Info:   No data recorded    OUTPATIENT PHYSICAL THERAPY:OP NOTE TYPE: Treatment Note 2022       Episode  }Appt Desk            Treatment Diagnosis:  Pain in Right Knee (M25.561)  Stiffness of Right Knee, Not elsewhere classified (M25.661)  Difficulty in walking, Not elsewhere classified (R26.2)  Medical/Referring Diagnosis:  Pain in right knee [M25.561]  Referring Physician:  Perlita Chavez DO MD Orders:  PT Eval and Treat   Date of Onset:  Onset Date: 05/15/22 (Pain onset from falling backwards 3 weeks from 2022)     Allergies:   Sulfamethoxazole-trimethoprim, Tramadol, Codeine, Fenofibrate, Hydromorphone, Metformin, and Sitagliptin  Restrictions/Precautions:  No data recordedNo data recorded   Interventions Planned (Treatment may consist of any combination of the following):    Current Treatment Recommendations: Strengthening; ROM; Balance training; Functional mobility training; Transfer training; Endurance training; Gait training; Stair training; Neuromuscular re-education; Manual Therapy - Soft Tissue Mobilization; Manual Therapy - Joint Manipulation; Pain management; Home exercise program; Modalities; Integrated dry needling; Therapeutic activities     Subjective Comments: Patient arrives to therapy with complaints of pain in his R knee, B ankle, and R shoulder pain. Pt. continues to stiffness in  right knee.    Initial:}    5/10 Post Session:       3/10  Medications Last Reviewed:  2022  Updated Objective Findings:  Pt.'s right knee range of motion was 0-120 degrees   Treatment   THERAPEUTIC EXERCISE: (45 minutes):  Exercises per grid below to improve mobility and strength. Required moderate visual, verbal, manual and tactile cues to promote proper body alignment, promote proper body posture and promote proper body mechanics. Progressed resistance, range, repetitions and complexity of movement as indicated. Date:  6/30/2022 Date:  6/23/2022 Date:  6/28/2022   Activity/Exercise Parameters Parameters Parameters   SciFit Level 3.8 x 10 mins  10 min, level 2.5 10 min, level 2.5   Knee extension Heel Prop: 3 min -- Heel Prop 10 min   Quad sets 3x10, 5 sec hold, supine --    Heel Slides 3x10, RLE, with strap for overpressure 3x10, RLE, with strap for overpressure  1x10, therapist applied overpressure    Sit to stand 3x, high plinth --    Hip adduction  Yellow ball x 10 reps x 10 sec hold each --    Bridging  x20 reps  --    Short arc quads ----- --    Straight leg raise  3x10 reps  -- 4x10   Seated marching Standing x 30 reps at bar --    Supine hip abduction  Blue strap x 30 reps  ---    Hamstring stretch  4x30 sec hold strap --- 6x30 sec   Ankle ----- Plantarflexion with inversion, 4x20, RLE, therapist applied resistance and green band, long sitting. Emphasis on promoting improvements in knee alignment in stance    Education --------- 5 min.; maintaining foot inverted and in neutral position as tolerated. Standing transfers with UE support 7 min.; home exercises, foot positioning, hamstring stretch modification,   [facility] Portal     MANUAL THERAPY: (15 minutes): May consider Joint mobilization, Soft tissue mobilization and Manipulation was utilized and necessary because of the patient's restricted joint motion, painful spasm, loss of articular motion and restricted motion of soft tissue. Soft tissue mobilization of the R knee and surrounding soft tissue. Pt. Received stretching of the external rotators of right lower extremity.         Commonly used abbreviations that may be included in this note:  STM- Soft tissue mobilization, R>L or L>R- right

## 2022-07-06 ENCOUNTER — HOSPITAL ENCOUNTER (OUTPATIENT)
Dept: PHYSICAL THERAPY | Age: 77
Setting detail: RECURRING SERIES
Discharge: HOME | End: 2022-07-09
Payer: MEDICARE

## 2022-07-06 PROCEDURE — 97110 THERAPEUTIC EXERCISES: CPT

## 2022-07-06 ASSESSMENT — PAIN SCALES - GENERAL: PAINLEVEL_OUTOF10: 2

## 2022-07-06 NOTE — PROGRESS NOTES
Lilliana Rueda  : 1945  Primary: Medicare Part A And B  Secondary: Sunny Whitaker [address] @ [address] [facility] [address]  Phone: [phone]  Fax: [phone] Plan Frequency: 2 times per week for 60 days    Plan of Care/Certification Expiration Date: 22      PT Visit Info:   No data recorded    OUTPATIENT PHYSICAL THERAPY:OP NOTE TYPE: Treatment Note 2022       Episode  }Appt Desk            Treatment Diagnosis:  Pain in Right Knee (M25.561)  Stiffness of Right Knee, Not elsewhere classified (M25.661)  Difficulty in walking, Not elsewhere classified (R26.2)  Medical/Referring Diagnosis:  Pain in right knee [M25.561]  Referring Physician:  Blanca Mike DO MD Orders:  PT Eval and Treat   Date of Onset:  Onset Date: 05/15/22 (Pain onset from falling backwards 3 weeks from 2022)     Allergies:   Sulfamethoxazole-trimethoprim, Tramadol, Codeine, Fenofibrate, Hydromorphone, Metformin, and Sitagliptin  Restrictions/Precautions:  No data recordedNo data recorded   Interventions Planned (Treatment may consist of any combination of the following):    Current Treatment Recommendations: Strengthening; ROM; Balance training; Functional mobility training; Transfer training; Endurance training; Gait training; Stair training; Neuromuscular re-education; Manual Therapy - Soft Tissue Mobilization; Manual Therapy - Joint Manipulation; Pain management; Home exercise program; Modalities; Integrated dry needling; Therapeutic activities     Subjective Comments: Patient arrives to therapy 10 minutes late with complaints of pain in his R knee and severe L ankle pain; he expresses he is suffering from gout. Patient will visit his primary care physician following this session for his L ankle.      Initial:}    2/10 Post Session:       010  Medications Last Reviewed:  2022  Updated Objective Findings:  None Today  Treatment   THERAPEUTIC EXERCISE: (46 minutes):  Exercises per grid below to improve mobility and strength. Required moderate visual, verbal, manual and tactile cues to promote proper body alignment, promote proper body posture and promote proper body mechanics. Progressed resistance, range, repetitions and complexity of movement as indicated. Date:  6/30/2022 Date:  7/6/2022   Activity/Exercise Parameters Parameters   NuFit Level 3.8 x 10 mins  7 min, level 2.5   Knee extension Heel Prop: 3 min Knee extension machine : 2x10, single leg eccentric knee flexion    Quad sets 3x10, 5 sec hold, supine --   Heel Slides 3x10, RLE, with strap for overpressure 3x10, RLE, with strap for overpressure  1x10, therapist applied overpressure   Sit to stand 3x, high plinth --   Hip adduction  Yellow ball x 10 reps x 10 sec hold each --   Hip Extension  3x10 R, handrail    Hip Abduction  3x10 R , handrail   Bridging  x20 reps  --   Clamshells   3x10, R, red theraband around knees   Long arc quads ----- 3x15, R   Straight leg raise  3x10 reps  --   Seated marching Standing x 30 reps at bar --   Supine hip abduction  Blue strap x 30 reps  ---   Hamstring stretch  4x30 sec hold strap ---   Ankle ----- ---   Education --------- ---   [facility] Portal     MANUAL THERAPY: (0 minutes): NONE TODAY May consider Joint mobilization, Soft tissue mobilization and Manipulation was utilized and necessary because of the patient's restricted joint motion, painful spasm, loss of articular motion and restricted motion of soft tissue. Soft tissue mobilization of the R knee and surrounding soft tissue. Pt. Received stretching of the external rotators of right lower extremity.         Commonly used abbreviations that may be included in this note:  STM- Soft tissue mobilization, R>L or L>R- right greater than left or vice versa, HEP - Home exercise program, CPA/UPA - Central or unilateral posterior-anterior mobilization, SLS- single leg stance, SKTC - Single leg to chest, SNAGS/NAGS- (sustained) Natural apophyseal glides, TKE- Terminal knee extension, ER- External rotation, IR- Internal rotation, B - Bilateral, sec- seconds, Lb- pounds, min - minutes, HA- Headache, OP- Over pressure, tband- theraband, fwd/bwd- Forward/Backward, TA- Transversus Abdominus, dbl- Double      TREATMENT/SESSION SUMMARY:       Response to Treatment: Patient presents with pain in his L ankle during single leg stance. Patient answered a phone call from his physician during the session and spent 5 minutes which distracted the patient from his exercises. The patient was instructed to minimize phone calls during the session in order to optimize therapy time. The patient expresses fatigue following the clamshells during the initial repetitions, gluteal muscle weakness is present. Patient reports no R knee pain at conclusion of session. · Communication/Consultation:  None today  · Equipment provided today:  Red theraband  · Recommendations/Intent for next treatment session: Next visit will focus on progressing strength, functional mobility, pain tolerance, and ROM as tolerated.        Total Treatment Billable Duration:  46 min  Time In: 1010  Time Out: [address] [place], PT         Charge Capture  }Post Session Pain  [facility] Portal  MD Guidelines  Scanned Media  Benefits  MyChart    Future Appointments   Date Time Provider Deon Alexander   7/8/2022  9:00 AM Davin Troncoso, PT [facility]   7/11/2022 10:00 AM Forcharmainetine Aba, PT [facility]   7/14/2022 10:00 AM Dharmesh Pique, PTA [facility]   7/19/2022 10:00 AM Dharmesh Pique, PTA [facility]   7/21/2022 10:00 AM Forrestine Aba, PT [facility]   7/26/2022 10:00 AM Dharmesh Pique, PTA [facility]   7/28/2022 10:00 AM Forcharmainetine Aba, PT [facility]   7/29/2022  2:00 PM Maura Campbell MD [facility]   8/1/2022  8:55 AM Kena Workman MD [facility]   10/12/2022  9:40 AM Geetha Bullard MD [facility]

## 2022-07-08 ENCOUNTER — HOSPITAL ENCOUNTER (OUTPATIENT)
Dept: PHYSICAL THERAPY | Age: 77
Setting detail: RECURRING SERIES
End: 2022-07-08
Payer: MEDICARE

## 2022-07-11 ENCOUNTER — APPOINTMENT (OUTPATIENT)
Dept: PHYSICAL THERAPY | Age: 77
End: 2022-07-11
Payer: MEDICARE

## 2022-07-12 ENCOUNTER — HOSPITAL ENCOUNTER (EMERGENCY)
Age: 77
Discharge: HOME | End: 2022-07-12
Attending: STUDENT IN AN ORGANIZED HEALTH CARE EDUCATION/TRAINING PROGRAM
Payer: MEDICARE

## 2022-07-12 ENCOUNTER — HOSPITAL ENCOUNTER (EMERGENCY)
Dept: CT IMAGING | Age: 77
Discharge: HOME | End: 2022-07-15
Payer: MEDICARE

## 2022-07-12 VITALS
RESPIRATION RATE: 18 BRPM | DIASTOLIC BLOOD PRESSURE: 74 MMHG | TEMPERATURE: 97.6 F | BODY MASS INDEX: 31.35 KG/M2 | HEIGHT: 70 IN | SYSTOLIC BLOOD PRESSURE: 139 MMHG | HEART RATE: 78 BPM | WEIGHT: 219 LBS | OXYGEN SATURATION: 97 %

## 2022-07-12 DIAGNOSIS — K85.90 ACUTE PANCREATITIS, UNSPECIFIED COMPLICATION STATUS, UNSPECIFIED PANCREATITIS TYPE: Primary | ICD-10-CM

## 2022-07-12 LAB
ALBUMIN SERPL-MCNC: 2.7 G/DL (ref 3.2–4.6)
ALBUMIN/GLOB SERPL: 0.6 {RATIO} (ref 1.2–3.5)
ALP SERPL-CCNC: 142 U/L (ref 50–136)
ALT SERPL-CCNC: 26 U/L (ref 12–65)
ANION GAP SERPL CALC-SCNC: 9 MMOL/L (ref 7–16)
AST SERPL-CCNC: 26 U/L (ref 15–37)
BASOPHILS # BLD: 0 K/UL (ref 0–0.2)
BASOPHILS NFR BLD: 0 % (ref 0–2)
BILIRUB SERPL-MCNC: 0.2 MG/DL (ref 0.2–1.1)
BUN SERPL-MCNC: 57 MG/DL (ref 8–23)
CALCIUM SERPL-MCNC: 9.2 MG/DL (ref 8.3–10.4)
CHLORIDE SERPL-SCNC: 109 MMOL/L (ref 98–107)
CO2 SERPL-SCNC: 22 MMOL/L (ref 21–32)
CREAT SERPL-MCNC: 2 MG/DL (ref 0.8–1.5)
DIFFERENTIAL METHOD BLD: ABNORMAL
EOSINOPHIL # BLD: 0.2 K/UL (ref 0–0.8)
EOSINOPHIL NFR BLD: 2 % (ref 0.5–7.8)
ERYTHROCYTE [DISTWIDTH] IN BLOOD BY AUTOMATED COUNT: 15.6 % (ref 11.9–14.6)
GLOBULIN SER CALC-MCNC: 4.5 G/DL (ref 2.3–3.5)
GLUCOSE SERPL-MCNC: 74 MG/DL (ref 65–100)
HCT VFR BLD AUTO: 33.3 % (ref 41.1–50.3)
HGB BLD-MCNC: 10.5 G/DL (ref 13.6–17.2)
IMM GRANULOCYTES # BLD AUTO: 0.5 K/UL (ref 0–0.5)
IMM GRANULOCYTES NFR BLD AUTO: 4 % (ref 0–5)
LIPASE SERPL-CCNC: 1813 U/L (ref 73–393)
LYMPHOCYTES # BLD: 1.5 K/UL (ref 0.5–4.6)
LYMPHOCYTES NFR BLD: 14 % (ref 13–44)
MCH RBC QN AUTO: 30.5 PG (ref 26.1–32.9)
MCHC RBC AUTO-ENTMCNC: 31.5 G/DL (ref 31.4–35)
MCV RBC AUTO: 96.8 FL (ref 79.6–97.8)
MONOCYTES # BLD: 0.8 K/UL (ref 0.1–1.3)
MONOCYTES NFR BLD: 7 % (ref 4–12)
NEUTS SEG # BLD: 7.6 K/UL (ref 1.7–8.2)
NEUTS SEG NFR BLD: 72 % (ref 43–78)
NRBC # BLD: 0 K/UL (ref 0–0.2)
PLATELET # BLD AUTO: 218 K/UL (ref 150–450)
PMV BLD AUTO: 9.4 FL (ref 9.4–12.3)
POTASSIUM SERPL-SCNC: 4.7 MMOL/L (ref 3.5–5.1)
PROT SERPL-MCNC: 7.2 G/DL (ref 6.3–8.2)
RBC # BLD AUTO: 3.44 M/UL (ref 4.23–5.6)
SODIUM SERPL-SCNC: 140 MMOL/L (ref 136–145)
WBC # BLD AUTO: 10.6 K/UL (ref 4.3–11.1)

## 2022-07-12 PROCEDURE — 96376 TX/PRO/DX INJ SAME DRUG ADON: CPT

## 2022-07-12 PROCEDURE — 74176 CT ABD & PELVIS W/O CONTRAST: CPT

## 2022-07-12 PROCEDURE — 96374 THER/PROPH/DIAG INJ IV PUSH: CPT

## 2022-07-12 PROCEDURE — 2580000003 HC RX 258: Performed by: STUDENT IN AN ORGANIZED HEALTH CARE EDUCATION/TRAINING PROGRAM

## 2022-07-12 PROCEDURE — 6360000002 HC RX W HCPCS: Performed by: STUDENT IN AN ORGANIZED HEALTH CARE EDUCATION/TRAINING PROGRAM

## 2022-07-12 PROCEDURE — 85025 COMPLETE CBC W/AUTO DIFF WBC: CPT

## 2022-07-12 PROCEDURE — 96375 TX/PRO/DX INJ NEW DRUG ADDON: CPT

## 2022-07-12 PROCEDURE — 83690 ASSAY OF LIPASE: CPT

## 2022-07-12 PROCEDURE — 96361 HYDRATE IV INFUSION ADD-ON: CPT

## 2022-07-12 PROCEDURE — 80053 COMPREHEN METABOLIC PANEL: CPT

## 2022-07-12 PROCEDURE — 6360000004 HC RX CONTRAST MEDICATION: Performed by: PHYSICIAN ASSISTANT

## 2022-07-12 PROCEDURE — 99285 EMERGENCY DEPT VISIT HI MDM: CPT

## 2022-07-12 RX ORDER — 0.9 % SODIUM CHLORIDE 0.9 %
1000 INTRAVENOUS SOLUTION INTRAVENOUS
Status: COMPLETED | OUTPATIENT
Start: 2022-07-12 | End: 2022-07-12

## 2022-07-12 RX ORDER — MORPHINE SULFATE 4 MG/ML
4 INJECTION INTRAVENOUS ONCE
Status: COMPLETED | OUTPATIENT
Start: 2022-07-12 | End: 2022-07-12

## 2022-07-12 RX ORDER — ONDANSETRON 2 MG/ML
4 INJECTION INTRAMUSCULAR; INTRAVENOUS
Status: COMPLETED | OUTPATIENT
Start: 2022-07-12 | End: 2022-07-12

## 2022-07-12 RX ORDER — 0.9 % SODIUM CHLORIDE 0.9 %
500 INTRAVENOUS SOLUTION INTRAVENOUS
Status: COMPLETED | OUTPATIENT
Start: 2022-07-12 | End: 2022-07-12

## 2022-07-12 RX ORDER — HYDROCODONE BITARTRATE AND ACETAMINOPHEN 7.5; 325 MG/1; MG/1
1 TABLET ORAL EVERY 6 HOURS PRN
Qty: 10 TABLET | Refills: 0 | Status: ON HOLD | OUTPATIENT
Start: 2022-07-12 | End: 2022-07-17 | Stop reason: HOSPADM

## 2022-07-12 RX ORDER — HYDROMORPHONE HYDROCHLORIDE 1 MG/ML
1 INJECTION, SOLUTION INTRAMUSCULAR; INTRAVENOUS; SUBCUTANEOUS
Status: COMPLETED | OUTPATIENT
Start: 2022-07-12 | End: 2022-07-12

## 2022-07-12 RX ORDER — ONDANSETRON 4 MG/1
4 TABLET, FILM COATED ORAL 3 TIMES DAILY PRN
Qty: 15 TABLET | Refills: 2 | Status: ON HOLD | OUTPATIENT
Start: 2022-07-12 | End: 2022-07-17 | Stop reason: HOSPADM

## 2022-07-12 RX ORDER — 0.9 % SODIUM CHLORIDE 0.9 %
100 INTRAVENOUS SOLUTION INTRAVENOUS ONCE
Status: DISCONTINUED | OUTPATIENT
Start: 2022-07-12 | End: 2022-07-16 | Stop reason: HOSPADM

## 2022-07-12 RX ORDER — ONDANSETRON 4 MG/1
4 TABLET, FILM COATED ORAL 3 TIMES DAILY PRN
Qty: 15 TABLET | Refills: 2 | Status: SHIPPED | OUTPATIENT
Start: 2022-07-12 | End: 2022-07-12 | Stop reason: SDUPTHER

## 2022-07-12 RX ADMIN — MORPHINE SULFATE 4 MG: 4 INJECTION INTRAVENOUS at 16:54

## 2022-07-12 RX ADMIN — HYDROMORPHONE HYDROCHLORIDE 1 MG: 1 INJECTION, SOLUTION INTRAMUSCULAR; INTRAVENOUS; SUBCUTANEOUS at 18:53

## 2022-07-12 RX ADMIN — DIATRIZOATE MEGLUMINE AND DIATRIZOATE SODIUM 15 ML: 660; 100 LIQUID ORAL; RECTAL at 15:45

## 2022-07-12 RX ADMIN — ONDANSETRON 4 MG: 2 INJECTION INTRAMUSCULAR; INTRAVENOUS at 16:53

## 2022-07-12 RX ADMIN — SODIUM CHLORIDE 500 ML: 9 INJECTION, SOLUTION INTRAVENOUS at 18:53

## 2022-07-12 RX ADMIN — MORPHINE SULFATE 4 MG: 4 INJECTION INTRAVENOUS at 17:59

## 2022-07-12 RX ADMIN — SODIUM CHLORIDE 1000 ML: 9 INJECTION, SOLUTION INTRAVENOUS at 16:53

## 2022-07-12 ASSESSMENT — PAIN SCALES - GENERAL
PAINLEVEL_OUTOF10: 8
PAINLEVEL_OUTOF10: 7
PAINLEVEL_OUTOF10: 4
PAINLEVEL_OUTOF10: 7
PAINLEVEL_OUTOF10: 8
PAINLEVEL_OUTOF10: 10

## 2022-07-12 ASSESSMENT — PAIN DESCRIPTION - ONSET: ONSET: SUDDEN

## 2022-07-12 ASSESSMENT — PAIN DESCRIPTION - DIRECTION: RADIATING_TOWARDS: BACK

## 2022-07-12 ASSESSMENT — ENCOUNTER SYMPTOMS
ABDOMINAL PAIN: 1
DIARRHEA: 1
BACK PAIN: 0
COUGH: 0
ABDOMINAL DISTENTION: 0
SHORTNESS OF BREATH: 0
VOMITING: 1
CONSTIPATION: 0
NAUSEA: 1
COLOR CHANGE: 0
CHEST TIGHTNESS: 0
WHEEZING: 0

## 2022-07-12 ASSESSMENT — PAIN DESCRIPTION - DESCRIPTORS
DESCRIPTORS: SHARP;SHOOTING
DESCRIPTORS: SHARP;ACHING

## 2022-07-12 ASSESSMENT — PAIN DESCRIPTION - PAIN TYPE
TYPE: ACUTE PAIN
TYPE: ACUTE PAIN

## 2022-07-12 ASSESSMENT — PAIN - FUNCTIONAL ASSESSMENT
PAIN_FUNCTIONAL_ASSESSMENT: 0-10
PAIN_FUNCTIONAL_ASSESSMENT: ACTIVITIES ARE NOT PREVENTED

## 2022-07-12 ASSESSMENT — PAIN DESCRIPTION - ORIENTATION: ORIENTATION: MID;UPPER

## 2022-07-12 ASSESSMENT — PAIN DESCRIPTION - LOCATION
LOCATION: ABDOMEN

## 2022-07-12 ASSESSMENT — PAIN DESCRIPTION - FREQUENCY
FREQUENCY: CONTINUOUS
FREQUENCY: CONTINUOUS

## 2022-07-12 NOTE — ED TRIAGE NOTES
Pt reports woke up this morning having abd pain with emesis per pt feels like pancreatitis episode. Pt also (+)COVID since last Sunday, reports feeling improvement in covid symptoms.    A&Ox4

## 2022-07-12 NOTE — PROGRESS NOTES
Physical Therapy  Yessi Castillo at [place] [address]  7/8/2022    Patient cancelled his remaining sessions this week due to being positive for Covid.      Maryuri Zurita, PT, DPT, CSCS  7/8/2022

## 2022-07-12 NOTE — ED NOTES
Patient return from CT. States pain eased to a 7  But still severe.        Marcell Schmidt RN  07/12/22 7324

## 2022-07-12 NOTE — ED NOTES
Patient to Bathroom via wheelchair and returned to stretcher. meds and IVF's given. Vitals stable. Wife at bedside. States according to personal monitor. Blood sugar is 68. Provider Jaime notified. Patient to be given meal tray. No further orders.       Elham Valdes RN  07/12/22 4784

## 2022-07-12 NOTE — ED TRIAGE NOTES
COVID +, day 9, but improving. Here for 7/10 sharp/aching abdominal pain and vomiting stomach contents 3x. \"similar to pancreatitis\" that started today. Hx of cholecystecomy and endoscopy for \"bleeder\" repair in stomach. Hx of pancreatitis. PE  Heart - regular rate & rhythm  Lungs - CTAB  Abd - normal bowel sounds, moderate epigastric TTP    Patient evaluated initially in triage. Rapid Medical Evaluation was conducted and necessary orders have been placed. I have performed a medical screening exam.  Care will now be transferred to the provider in the back of the emergency department.   Roberto Toro PA-C 2:18 PM

## 2022-07-12 NOTE — ED PROVIDER NOTES
[facility] Emergency Department Provider Note                   PCP:                Izaiah Knowles DO               Age: 78 y.o. Sex: male     No diagnosis found. DISPOSITION          MDM     Orders Placed This Encounter   Procedures    CT ABDOMEN PELVIS WO CONTRAST Additional Contrast? None    CBC with Diff    CMP    Lipase    Diet NPO    POCT Urine Dipstick    Saline lock IV        Denisse Ribeiro is a 78 y.o. male who presents to the Emergency Department with chief complaint of    Chief Complaint   Patient presents with    Emesis    Abdominal Pain      79-year-old male patient presents to this department with reports of lower epigastric abdominal pain nausea and vomiting. Patient states symptoms have been present progressive for the past 3 days. Pain is localized to lower epigastrium and does not radiate. It has been constant nature and consistent with previous bouts of pancreatitis. Patient states he has been in this hospital 3 times for similar symptoms. He reports recently being diagnosed with COVID-19 9 days ago stating this seemingly improved prior to the onset of his current symptoms. He reports ongoing symptoms of gout in both lower extremities and states he also has pains in his shoulder occasionally. He is taking ibuprofen and Tylenol at home for these issues. He states he is vomited 3 times today. Denies quantified fever but does report subjective chills. He reports normal urinary habits. Surgical history includes cholecystectomy. Review of Systems   Constitutional: Negative for chills and fatigue. HENT: Negative for congestion. Eyes: Negative for visual disturbance. Respiratory: Negative for cough, chest tightness, shortness of breath and wheezing. Cardiovascular: Negative for chest pain and leg swelling. Gastrointestinal: Positive for abdominal pain, diarrhea, nausea and vomiting. Negative for abdominal distention and constipation.    Genitourinary: Negative for difficulty urinating, dysuria, flank pain and hematuria. Musculoskeletal: Positive for arthralgias. Negative for back pain, neck pain and neck stiffness. Skin: Negative for color change. Neurological: Negative for dizziness, light-headedness, numbness and headaches. All other systems reviewed and are negative.       Past Medical History:   Diagnosis Date    Arthritis     Blurred vision, right eye     BPH (benign prostatic hyperplasia)     Gout     History of Clostridioides difficile colitis 2010    History of pancreatitis     History of renal carcinoma     partial nephrectomy    Hyperlipidemia     Hypertension     Nausea & vomiting     small amount of N/V and pt not sure of it antiemetics work    Neuropathy     Paroxysmal A-fib ([address]     Presence of Watchman left atrial appendage closure device     Sleep apnea     compliant with C-pap    Thromboembolus (HCC)     hx of left lower extremity    Type 2 diabetes mellitus (HCC)     insulin reliant; AVG -140; s.s. of hypoglycemia 65-75; last A1c 7.1    WPW (Robbi-Parkinson-White syndrome)     ablation x4        Past Surgical History:   Procedure Laterality Date    ABLATION OF DYSRHYTHMIC FOCUS      WPW- ablations x3    BACK SURGERY      ruptured disc    CATARACT REMOVAL      CHOLECYSTECTOMY      COLONOSCOPY N/A 1/24/2022    COLONOSCOPY performed by Alhaji Loera MD at [address]      partial    LUMBAR LAMINECTOMY      ORTHOPEDIC SURGERY Left     great toe straightened    ORTHOPEDIC SURGERY Left     foot    OTHER SURGICAL HISTORY      placed watchman 2/2020    TOTAL KNEE ARTHROPLASTY Right     UPPER GASTROINTESTINAL ENDOSCOPY      WISDOM TOOTH EXTRACTION          Family History   Problem Relation Age of Onset    Cancer Sister         ovarian    No Known Problems Sister     Cancer Sister         colon ca    Parkinson's Disease Father     Other Mother         ALS    Crohn's Disease Son     Crohn's Disease Daughter            Social Connections:     Frequency of Communication with Friends and Family: Not on file    Frequency of Social Gatherings with Friends and Family: Not on file    Attends Adventism Services: Not on file    Active Member of Clubs or Organizations: Not on file    Attends Club or Organization Meetings: Not on file    Marital Status: Not on file        Allergies   Allergen Reactions    Sulfamethoxazole-Trimethoprim Other (See Comments)     Elevated potassium level    Tramadol Diarrhea    Codeine Other (See Comments)     \"makes me a little crazy\"    Fenofibrate Other (See Comments)     \"causes pancreatic attacks\"    Hydromorphone Other (See Comments)     \"gives me craziness\"    Metformin Diarrhea    Sitagliptin Other (See Comments)     Per pt causes pancreatic issues        Vitals signs and nursing note reviewed. Patient Vitals for the past 4 hrs:   Temp Pulse Resp BP SpO2   07/12/22 1538 98.2 °F (36.8 °C) 90 20 (!) 143/88 98 %   07/12/22 1412 97.9 °F (36.6 °C) 95 17 115/73 95 %          Physical Exam  Vitals and nursing note reviewed. Constitutional:       General: He is not in acute distress. Appearance: Normal appearance. He is normal weight. He is not ill-appearing or toxic-appearing. Comments: Generally well-appearing, appears uncomfortable, alert and oriented x4. No acute distress, speaks in clear, fluid sentences. HENT:      Head: Normocephalic and atraumatic. Right Ear: External ear normal.      Left Ear: External ear normal.      Nose: Nose normal.      Mouth/Throat:      Mouth: Mucous membranes are moist.   Eyes:      General: No scleral icterus. Right eye: No discharge. Left eye: No discharge. Extraocular Movements: Extraocular movements intact. Cardiovascular:      Rate and Rhythm: Normal rate and regular rhythm. Pulses: Normal pulses. Heart sounds: Normal heart sounds. Pulmonary:      Effort: Pulmonary effort is normal. No respiratory distress.    Abdominal: General: Abdomen is flat. There is no distension. Palpations: There is no mass. Tenderness: There is abdominal tenderness in the epigastric area. There is no right CVA tenderness, left CVA tenderness, guarding or rebound. Hernia: No hernia is present. Musculoskeletal:         General: No swelling, tenderness or deformity. Normal range of motion. Cervical back: Normal range of motion. Skin:     General: Skin is warm. Capillary Refill: Capillary refill takes less than 2 seconds. Neurological:      General: No focal deficit present. Mental Status: He is alert. Psychiatric:         Mood and Affect: Mood normal.          Procedures      Labs Reviewed   CBC WITH AUTO DIFFERENTIAL - Abnormal; Notable for the following components:       Result Value    RBC 3.44 (*)     Hemoglobin 10.5 (*)     Hematocrit 33.3 (*)     RDW 15.6 (*)     All other components within normal limits   COMPREHENSIVE METABOLIC PANEL - Abnormal; Notable for the following components:    Chloride 109 (*)     BUN 57 (*)     CREATININE 2.00 (*)     GFR  42 (*)     GFR Non-African American 35 (*)     Alk Phosphatase 142 (*)     Albumin 2.7 (*)     Globulin 4.5 (*)     Albumin/Globulin Ratio 0.6 (*)     All other components within normal limits   LIPASE - Abnormal; Notable for the following components:    Lipase 1,813 (*)     All other components within normal limits        CT ABDOMEN PELVIS WO CONTRAST Additional Contrast? None    (Results Pending)                    ED Course as of 07/26/22 0312   Tue Jul 12, 2022   1833 Patient reassessed, states he feels better and would like to go home if possible. Reports ongoing discomfort. Discussed patient's documented Dilaudid allergy. He denies true allergy and is agreeable to receiving this medication. Will attempt 1 more dose, small, secondary bolus and plan for discharge if patient feels well enough to go. He has had no vomiting in this department. Will p.o. challenge as well. [BR]      ED Course User Index  [BR] Stella Swanson DO        Voice dictation software was used during the making of this note. This software is not perfect and grammatical and other typographical errors may be present. This note has not been completely proofread for errors.      Stella Swanson DO  07/12/22 [address] [place] [address],   07/26/22 4362

## 2022-07-13 ENCOUNTER — HOSPITAL ENCOUNTER (INPATIENT)
Age: 77
LOS: 2 days | Discharge: HOME | DRG: 439 | End: 2022-07-18
Attending: EMERGENCY MEDICINE | Admitting: FAMILY MEDICINE
Payer: MEDICARE

## 2022-07-13 DIAGNOSIS — K85.90 ACUTE PANCREATITIS, UNSPECIFIED COMPLICATION STATUS, UNSPECIFIED PANCREATITIS TYPE: Primary | ICD-10-CM

## 2022-07-13 DIAGNOSIS — K85.91 NECROTIZING PANCREATITIS: ICD-10-CM

## 2022-07-13 PROBLEM — M25.472 EFFUSION OF LEFT ANKLE: Status: ACTIVE | Noted: 2022-04-01

## 2022-07-13 PROBLEM — D72.829 LEUKOCYTOSIS: Status: RESOLVED | Noted: 2022-01-15 | Resolved: 2022-07-13

## 2022-07-13 PROBLEM — R09.02 HYPOXEMIA: Status: ACTIVE | Noted: 2018-09-26

## 2022-07-13 PROBLEM — Z90.5 HISTORY OF PARTIAL NEPHRECTOMY: Status: ACTIVE | Noted: 2021-11-17

## 2022-07-13 PROBLEM — N13.8 HYPERTROPHY OF PROSTATE WITH URINARY OBSTRUCTION: Status: ACTIVE | Noted: 2020-01-16

## 2022-07-13 PROBLEM — Z86.16 HISTORY OF COVID-19: Status: ACTIVE | Noted: 2022-07-13

## 2022-07-13 PROBLEM — N18.32 STAGE 3B CHRONIC KIDNEY DISEASE (HCC): Chronic | Status: ACTIVE | Noted: 2021-06-11

## 2022-07-13 PROBLEM — D64.9 CHRONIC ANEMIA: Status: ACTIVE | Noted: 2020-01-07

## 2022-07-13 PROBLEM — N18.32 STAGE 3B CHRONIC KIDNEY DISEASE (HCC): Status: ACTIVE | Noted: 2021-06-11

## 2022-07-13 PROBLEM — N40.1 HYPERTROPHY OF PROSTATE WITH URINARY OBSTRUCTION: Status: ACTIVE | Noted: 2020-01-16

## 2022-07-13 PROBLEM — D62 ACUTE BLOOD LOSS ANEMIA: Status: RESOLVED | Noted: 2022-01-15 | Resolved: 2022-07-13

## 2022-07-13 LAB
ALBUMIN SERPL-MCNC: 2.6 G/DL (ref 3.2–4.6)
ALBUMIN/GLOB SERPL: 0.5 {RATIO} (ref 1.2–3.5)
ALP SERPL-CCNC: 143 U/L (ref 50–136)
ALT SERPL-CCNC: 26 U/L (ref 12–65)
ANION GAP SERPL CALC-SCNC: 10 MMOL/L (ref 7–16)
AST SERPL-CCNC: 25 U/L (ref 15–37)
BILIRUB SERPL-MCNC: 0.7 MG/DL (ref 0.2–1.1)
BUN SERPL-MCNC: 49 MG/DL (ref 8–23)
CALCIUM SERPL-MCNC: 9.4 MG/DL (ref 8.3–10.4)
CHLORIDE SERPL-SCNC: 111 MMOL/L (ref 98–107)
CO2 SERPL-SCNC: 20 MMOL/L (ref 21–32)
CREAT SERPL-MCNC: 1.76 MG/DL (ref 0.8–1.5)
EKG ATRIAL RATE: 75 BPM
EKG DIAGNOSIS: NORMAL
EKG P AXIS: 52 DEGREES
EKG P-R INTERVAL: 134 MS
EKG Q-T INTERVAL: 384 MS
EKG QRS DURATION: 90 MS
EKG QTC CALCULATION (BAZETT): 428 MS
EKG R AXIS: 13 DEGREES
EKG T AXIS: 83 DEGREES
EKG VENTRICULAR RATE: 75 BPM
ERYTHROCYTE [DISTWIDTH] IN BLOOD BY AUTOMATED COUNT: 15.6 % (ref 11.9–14.6)
GLOBULIN SER CALC-MCNC: 4.8 G/DL (ref 2.3–3.5)
GLUCOSE SERPL-MCNC: 119 MG/DL (ref 65–100)
HCT VFR BLD AUTO: 33 % (ref 41.1–50.3)
HGB BLD-MCNC: 10.4 G/DL (ref 13.6–17.2)
LIPASE SERPL-CCNC: 575 U/L (ref 73–393)
MCH RBC QN AUTO: 30.7 PG (ref 26.1–32.9)
MCHC RBC AUTO-ENTMCNC: 31.5 G/DL (ref 31.4–35)
MCV RBC AUTO: 97.3 FL (ref 79.6–97.8)
NRBC # BLD: 0 K/UL (ref 0–0.2)
PLATELET # BLD AUTO: 213 K/UL (ref 150–450)
PMV BLD AUTO: 9.2 FL (ref 9.4–12.3)
POTASSIUM SERPL-SCNC: 4.9 MMOL/L (ref 3.5–5.1)
PROT SERPL-MCNC: 7.4 G/DL (ref 6.3–8.2)
RBC # BLD AUTO: 3.39 M/UL (ref 4.23–5.6)
SARS-COV-2 RDRP RESP QL NAA+PROBE: DETECTED
SODIUM SERPL-SCNC: 141 MMOL/L (ref 136–145)
SOURCE: ABNORMAL
TROPONIN I SERPL HS-MCNC: 13.9 PG/ML (ref 0–14)
WBC # BLD AUTO: 9.4 K/UL (ref 4.3–11.1)

## 2022-07-13 PROCEDURE — 6370000000 HC RX 637 (ALT 250 FOR IP): Performed by: FAMILY MEDICINE

## 2022-07-13 PROCEDURE — 2500000003 HC RX 250 WO HCPCS: Performed by: INTERNAL MEDICINE

## 2022-07-13 PROCEDURE — 85027 COMPLETE CBC AUTOMATED: CPT

## 2022-07-13 PROCEDURE — 6360000002 HC RX W HCPCS: Performed by: FAMILY MEDICINE

## 2022-07-13 PROCEDURE — 83690 ASSAY OF LIPASE: CPT

## 2022-07-13 PROCEDURE — 99285 EMERGENCY DEPT VISIT HI MDM: CPT

## 2022-07-13 PROCEDURE — 80053 COMPREHEN METABOLIC PANEL: CPT

## 2022-07-13 PROCEDURE — 2580000003 HC RX 258: Performed by: FAMILY MEDICINE

## 2022-07-13 PROCEDURE — 96374 THER/PROPH/DIAG INJ IV PUSH: CPT

## 2022-07-13 PROCEDURE — 2580000003 HC RX 258: Performed by: INTERNAL MEDICINE

## 2022-07-13 PROCEDURE — 93005 ELECTROCARDIOGRAM TRACING: CPT | Performed by: EMERGENCY MEDICINE

## 2022-07-13 PROCEDURE — 96376 TX/PRO/DX INJ SAME DRUG ADON: CPT

## 2022-07-13 PROCEDURE — G0378 HOSPITAL OBSERVATION PER HR: HCPCS

## 2022-07-13 PROCEDURE — A4216 STERILE WATER/SALINE, 10 ML: HCPCS | Performed by: INTERNAL MEDICINE

## 2022-07-13 PROCEDURE — 87635 SARS-COV-2 COVID-19 AMP PRB: CPT

## 2022-07-13 PROCEDURE — 2580000003 HC RX 258: Performed by: EMERGENCY MEDICINE

## 2022-07-13 PROCEDURE — 96372 THER/PROPH/DIAG INJ SC/IM: CPT

## 2022-07-13 PROCEDURE — 96375 TX/PRO/DX INJ NEW DRUG ADDON: CPT

## 2022-07-13 PROCEDURE — 84484 ASSAY OF TROPONIN QUANT: CPT

## 2022-07-13 PROCEDURE — 2580000003 HC RX 258: Performed by: PHYSICIAN ASSISTANT

## 2022-07-13 PROCEDURE — 6360000002 HC RX W HCPCS: Performed by: PHYSICIAN ASSISTANT

## 2022-07-13 PROCEDURE — 6360000002 HC RX W HCPCS: Performed by: EMERGENCY MEDICINE

## 2022-07-13 RX ORDER — SODIUM CHLORIDE 0.9 % (FLUSH) 0.9 %
5-40 SYRINGE (ML) INJECTION PRN
Status: DISCONTINUED | OUTPATIENT
Start: 2022-07-13 | End: 2022-07-18 | Stop reason: HOSPADM

## 2022-07-13 RX ORDER — ACETAMINOPHEN 325 MG/1
650 TABLET ORAL EVERY 4 HOURS PRN
Status: DISCONTINUED | OUTPATIENT
Start: 2022-07-13 | End: 2022-07-14

## 2022-07-13 RX ORDER — FINASTERIDE 5 MG/1
5 TABLET, FILM COATED ORAL DAILY
Status: DISCONTINUED | OUTPATIENT
Start: 2022-07-14 | End: 2022-07-18 | Stop reason: HOSPADM

## 2022-07-13 RX ORDER — POTASSIUM CHLORIDE 7.45 MG/ML
10 INJECTION INTRAVENOUS PRN
Status: DISCONTINUED | OUTPATIENT
Start: 2022-07-13 | End: 2022-07-18 | Stop reason: HOSPADM

## 2022-07-13 RX ORDER — MAGNESIUM SULFATE IN WATER 40 MG/ML
2000 INJECTION, SOLUTION INTRAVENOUS PRN
Status: DISCONTINUED | OUTPATIENT
Start: 2022-07-13 | End: 2022-07-18 | Stop reason: HOSPADM

## 2022-07-13 RX ORDER — GABAPENTIN 300 MG/1
300 CAPSULE ORAL 3 TIMES DAILY
Status: DISCONTINUED | OUTPATIENT
Start: 2022-07-13 | End: 2022-07-18 | Stop reason: HOSPADM

## 2022-07-13 RX ORDER — TAMSULOSIN HYDROCHLORIDE 0.4 MG/1
1 CAPSULE ORAL DAILY
COMMUNITY
Start: 2022-06-24

## 2022-07-13 RX ORDER — POTASSIUM CHLORIDE 20 MEQ/1
40 TABLET, EXTENDED RELEASE ORAL PRN
Status: DISCONTINUED | OUTPATIENT
Start: 2022-07-13 | End: 2022-07-18 | Stop reason: HOSPADM

## 2022-07-13 RX ORDER — ONDANSETRON 2 MG/ML
8 INJECTION INTRAMUSCULAR; INTRAVENOUS
Status: COMPLETED | OUTPATIENT
Start: 2022-07-13 | End: 2022-07-13

## 2022-07-13 RX ORDER — SODIUM CHLORIDE, SODIUM LACTATE, POTASSIUM CHLORIDE, CALCIUM CHLORIDE 600; 310; 30; 20 MG/100ML; MG/100ML; MG/100ML; MG/100ML
INJECTION, SOLUTION INTRAVENOUS CONTINUOUS
Status: ACTIVE | OUTPATIENT
Start: 2022-07-13 | End: 2022-07-14

## 2022-07-13 RX ORDER — 0.9 % SODIUM CHLORIDE 0.9 %
1000 INTRAVENOUS SOLUTION INTRAVENOUS ONCE
Status: COMPLETED | OUTPATIENT
Start: 2022-07-13 | End: 2022-07-13

## 2022-07-13 RX ORDER — HYDROCODONE BITARTRATE AND ACETAMINOPHEN 5; 325 MG/1; MG/1
1 TABLET ORAL EVERY 4 HOURS PRN
Status: DISCONTINUED | OUTPATIENT
Start: 2022-07-13 | End: 2022-07-14

## 2022-07-13 RX ORDER — SODIUM CHLORIDE 9 MG/ML
INJECTION, SOLUTION INTRAVENOUS PRN
Status: DISCONTINUED | OUTPATIENT
Start: 2022-07-13 | End: 2022-07-18 | Stop reason: HOSPADM

## 2022-07-13 RX ORDER — MORPHINE SULFATE 4 MG/ML
4 INJECTION INTRAVENOUS ONCE
Status: COMPLETED | OUTPATIENT
Start: 2022-07-13 | End: 2022-07-13

## 2022-07-13 RX ORDER — MORPHINE SULFATE 4 MG/ML
4 INJECTION, SOLUTION INTRAMUSCULAR; INTRAVENOUS
Status: COMPLETED | OUTPATIENT
Start: 2022-07-13 | End: 2022-07-13

## 2022-07-13 RX ORDER — SODIUM CHLORIDE 0.9 % (FLUSH) 0.9 %
5-40 SYRINGE (ML) INJECTION EVERY 12 HOURS SCHEDULED
Status: DISCONTINUED | OUTPATIENT
Start: 2022-07-13 | End: 2022-07-18 | Stop reason: HOSPADM

## 2022-07-13 RX ORDER — ENOXAPARIN SODIUM 100 MG/ML
40 INJECTION SUBCUTANEOUS EVERY 24 HOURS
Status: DISCONTINUED | OUTPATIENT
Start: 2022-07-13 | End: 2022-07-18 | Stop reason: HOSPADM

## 2022-07-13 RX ORDER — 0.9 % SODIUM CHLORIDE 0.9 %
1000 INTRAVENOUS SOLUTION INTRAVENOUS
Status: COMPLETED | OUTPATIENT
Start: 2022-07-13 | End: 2022-07-13

## 2022-07-13 RX ORDER — LISINOPRIL 30 MG/1
1 TABLET ORAL NIGHTLY
Status: ON HOLD | COMMUNITY
Start: 2022-05-26 | End: 2022-07-17 | Stop reason: SINTOL

## 2022-07-13 RX ORDER — ONDANSETRON 2 MG/ML
4 INJECTION INTRAMUSCULAR; INTRAVENOUS EVERY 6 HOURS PRN
Status: DISCONTINUED | OUTPATIENT
Start: 2022-07-13 | End: 2022-07-18 | Stop reason: HOSPADM

## 2022-07-13 RX ORDER — ONDANSETRON 4 MG/1
4 TABLET, ORALLY DISINTEGRATING ORAL EVERY 8 HOURS PRN
Status: DISCONTINUED | OUTPATIENT
Start: 2022-07-13 | End: 2022-07-18 | Stop reason: HOSPADM

## 2022-07-13 RX ORDER — SODIUM CHLORIDE, SODIUM LACTATE, POTASSIUM CHLORIDE, CALCIUM CHLORIDE 600; 310; 30; 20 MG/100ML; MG/100ML; MG/100ML; MG/100ML
INJECTION, SOLUTION INTRAVENOUS CONTINUOUS
Status: DISCONTINUED | OUTPATIENT
Start: 2022-07-14 | End: 2022-07-17

## 2022-07-13 RX ADMIN — MORPHINE SULFATE 4 MG: 4 INJECTION INTRAVENOUS at 15:20

## 2022-07-13 RX ADMIN — SODIUM CHLORIDE, SODIUM LACTATE, POTASSIUM CHLORIDE, AND CALCIUM CHLORIDE: 600; 310; 30; 20 INJECTION, SOLUTION INTRAVENOUS at 18:21

## 2022-07-13 RX ADMIN — ENOXAPARIN SODIUM 40 MG: 100 INJECTION SUBCUTANEOUS at 18:23

## 2022-07-13 RX ADMIN — LISINOPRIL 30 MG: 20 TABLET ORAL at 20:38

## 2022-07-13 RX ADMIN — FAMOTIDINE 20 MG: 10 INJECTION, SOLUTION INTRAVENOUS at 23:24

## 2022-07-13 RX ADMIN — GABAPENTIN 300 MG: 300 CAPSULE ORAL at 20:38

## 2022-07-13 RX ADMIN — ACETAMINOPHEN 650 MG: 325 TABLET, FILM COATED ORAL at 20:38

## 2022-07-13 RX ADMIN — SODIUM CHLORIDE 1000 ML: 9 INJECTION, SOLUTION INTRAVENOUS at 15:19

## 2022-07-13 RX ADMIN — ONDANSETRON 4 MG: 2 INJECTION INTRAMUSCULAR; INTRAVENOUS at 18:19

## 2022-07-13 RX ADMIN — ONDANSETRON 8 MG: 2 INJECTION INTRAMUSCULAR; INTRAVENOUS at 14:15

## 2022-07-13 RX ADMIN — SODIUM CHLORIDE 1000 ML: 9 INJECTION, SOLUTION INTRAVENOUS at 14:25

## 2022-07-13 RX ADMIN — MORPHINE SULFATE 4 MG: 4 INJECTION INTRAVENOUS at 14:15

## 2022-07-13 ASSESSMENT — PAIN SCALES - GENERAL
PAINLEVEL_OUTOF10: 3
PAINLEVEL_OUTOF10: 10
PAINLEVEL_OUTOF10: 7
PAINLEVEL_OUTOF10: 9
PAINLEVEL_OUTOF10: 2
PAINLEVEL_OUTOF10: 8

## 2022-07-13 ASSESSMENT — PAIN - FUNCTIONAL ASSESSMENT
PAIN_FUNCTIONAL_ASSESSMENT: 0-10
PAIN_FUNCTIONAL_ASSESSMENT: ACTIVITIES ARE NOT PREVENTED

## 2022-07-13 ASSESSMENT — ENCOUNTER SYMPTOMS
FACIAL SWELLING: 0
EYE DISCHARGE: 0
COUGH: 0
NAUSEA: 1
VOMITING: 1
EYE REDNESS: 0
BACK PAIN: 0
COLOR CHANGE: 0
DIARRHEA: 0
ABDOMINAL PAIN: 1
RHINORRHEA: 0
SHORTNESS OF BREATH: 0

## 2022-07-13 ASSESSMENT — PAIN DESCRIPTION - LOCATION
LOCATION: ABDOMEN
LOCATION: ABDOMEN

## 2022-07-13 ASSESSMENT — PAIN DESCRIPTION - DESCRIPTORS
DESCRIPTORS: SHARP
DESCRIPTORS: NAGGING

## 2022-07-13 ASSESSMENT — PAIN DESCRIPTION - ORIENTATION: ORIENTATION: MID

## 2022-07-13 NOTE — ED NOTES
I have reviewed discharge instructions with the patient. The patient verbalized understanding. Patient left ED via Discharge Method: wheelchair to Home with spouse. Opportunity for questions and clarification provided. Patient given 2 scripts. To continue your aftercare when you leave the hospital, you may receive an automated call from our care team to check in on how you are doing. This is a free service and part of our promise to provide the best care and service to meet your aftercare needs.  If you have questions, or wish to unsubscribe from this service please call [phone]. Thank you for Choosing our [facility] Emergency Department.         Evelina Ruiz RN  07/12/22 2031

## 2022-07-13 NOTE — ED TRIAGE NOTES
Was in ED here yesterday and diagnosed with pancreatitis. Sent home with pain medications, nausea medications, and told to drink plenty of fluids and return if pain worsened. Patient states the pain is untolerable and 10/10. No new symptoms since yesterday, just worse. Sharp epigastric pain persisting. PE  Severe epigastric pain to palpation, normal bowel sounds  Heart - regular rate and rhythm  Lungs - CTAB    Patient evaluated initially in triage. Rapid Medical Evaluation was conducted and necessary orders have been placed. I have performed a medical screening exam.  Care will now be transferred to the provider in the back of the emergency department.   Roberto Toro PA-C 12:55 PM

## 2022-07-13 NOTE — PROGRESS NOTES
Patient c/o dry cough on assessment. States he tested positive for covid 4-5 days ago. Rapid test sent to lab. Results came back positive for covid19 on the rapid test. House supervisor and attending MD notified.

## 2022-07-13 NOTE — ED NOTES
TRANSFER - OUT REPORT:    Verbal report given to Bhargav LOPEZ on [facility]  being transferred to  for routine progression of patient care       Report consisted of patient's Situation, Background, Assessment and   Recommendations(SBAR). Information from the following report(s) MAR was reviewed with the receiving nurse. Lines:   Peripheral IV 07/13/22 Left Antecubital (Active)   Site Assessment Clean, dry & intact 07/13/22 1305   Line Status Blood return noted;Normal saline locked;Specimen collected; Flushed 07/13/22 1305   Phlebitis Assessment No symptoms 07/13/22 1305        Opportunity for questions and clarification was provided.       Patient transported with:         Loretta Bobo RN  07/13/22 [id number]

## 2022-07-13 NOTE — FLOWSHEET NOTE
07/13/22 1825   Dual Clinician Skin Assessment   Dual Skin Assessment (4 Eyes) X   Second Clinical  (First and Last Name) susan mckenzie   Skin Integumentary    Skin Integumentary (WDL) X   Skin Color Other (comment)  (appropriate for ethnicity)   Skin Condition/Temp Dry;Flaky; Warm;Swollen/edematous  (LLE edema)   Skin Integrity Blister;Scars (comment)  (left ankle blister, right foot blister)

## 2022-07-13 NOTE — PROGRESS NOTES
TRANSFER - IN REPORT:    Verbal report received from Leigh RN(name) on [facility]  being received from ED(unit) for routine progression of patient care      Report consisted of patients Situation, Background, Assessment and   Recommendations(SBAR). Information from the following report(s) Nurse Handoff Report was reviewed with the receiving nurse. Opportunity for questions and clarification was provided. Assessment completed upon patients arrival to unit and care assumed.

## 2022-07-13 NOTE — PROGRESS NOTES
Per MD, patient is past his quarantine period and does not need to be on isolation even though he tested positive for COVID19 on the rapid test today.

## 2022-07-13 NOTE — ED PROVIDER NOTES
other systems reviewed and are negative. All other systems reviewed and are negative.       Past Medical History:   Diagnosis Date    Arthritis     Blurred vision, right eye     BPH (benign prostatic hyperplasia)     Gout     History of Clostridioides difficile colitis 2010    History of pancreatitis     History of renal carcinoma     partial nephrectomy    Hyperlipidemia     Hypertension     Nausea & vomiting     small amount of N/V and pt not sure of it antiemetics work    Neuropathy     Paroxysmal A-fib ([address]     Presence of Watchman left atrial appendage closure device     Sleep apnea     compliant with C-pap    Thromboembolus ([address]     hx of left lower extremity    Type 2 diabetes mellitus (HCC)     insulin reliant; AVG -140; s.s. of hypoglycemia 65-75; last A1c 7.1    WPW (Robbi-Parkinson-White syndrome)     ablation x4        Past Surgical History:   Procedure Laterality Date    ABLATION OF DYSRHYTHMIC FOCUS      WPW- ablations x3    BACK SURGERY      ruptured disc    CATARACT REMOVAL      CHOLECYSTECTOMY      COLONOSCOPY N/A 1/24/2022    COLONOSCOPY performed by Kelly Puga MD at [facility]      partial    LUMBAR LAMINECTOMY      ORTHOPEDIC SURGERY Left     great toe straightened    ORTHOPEDIC SURGERY Left     foot    OTHER SURGICAL HISTORY      placed watchman 2/2020    TOTAL KNEE ARTHROPLASTY Right     UPPER GASTROINTESTINAL ENDOSCOPY      WISDOM TOOTH EXTRACTION          Family History   Problem Relation Age of Onset    Cancer Sister         ovarian    No Known Problems Sister     Cancer Sister         colon ca    Parkinson's Disease Father     Other Mother         ALS    Crohn's Disease Son     Crohn's Disease Daughter            Social Connections:     Frequency of Communication with Friends and Family: Not on file    Frequency of Social Gatherings with Friends and Family: Not on file    Attends [employer] Services: Not on file  Active Member of Clubs or Organizations: Not on file    Attends Club or Organization Meetings: Not on file    Marital Status: Not on file        Allergies   Allergen Reactions    Sulfamethoxazole-Trimethoprim Other (See Comments)     Elevated potassium level    Tramadol Diarrhea    Codeine Other (See Comments)     \"makes me a little crazy\"    Fenofibrate Other (See Comments)     \"causes pancreatic attacks\"    Hydromorphone Other (See Comments)     \"gives me craziness\"    Metformin Diarrhea    Sitagliptin Other (See Comments)     Per pt causes pancreatic issues        Vitals signs and nursing note reviewed. Patient Vitals for the past 4 hrs:   Temp Pulse Resp BP SpO2   07/13/22 1447 -- 74 -- (!) 161/109 93 %   07/13/22 1417 -- 71 -- (!) 159/79 98 %   07/13/22 1254 98.3 °F (36.8 °C) 80 16 129/69 98 %          Physical Exam  Vitals and nursing note reviewed. Constitutional:       General: He is in acute distress. Appearance: Normal appearance. He is well-developed and normal weight. He is not ill-appearing, toxic-appearing or diaphoretic. HENT:      Head: Normocephalic and atraumatic. Right Ear: External ear normal.      Left Ear: External ear normal.      Nose: Nose normal. No rhinorrhea. Eyes:      General: No scleral icterus. Right eye: No discharge. Left eye: No discharge. Extraocular Movements: Extraocular movements intact. Conjunctiva/sclera: Conjunctivae normal.   Cardiovascular:      Rate and Rhythm: Normal rate and regular rhythm. Pulmonary:      Effort: Pulmonary effort is normal. No respiratory distress. Breath sounds: Normal breath sounds. No wheezing, rhonchi or rales. Chest:      Chest wall: No tenderness. Abdominal:      General: Abdomen is flat. Palpations: Abdomen is soft. Tenderness: There is abdominal tenderness in the epigastric area. There is no guarding or rebound.    Musculoskeletal:         General: No swelling, tenderness or signs of injury. Normal range of motion. Cervical back: Normal range of motion and neck supple. No rigidity. Skin:     General: Skin is warm and dry. Coloration: Skin is not jaundiced or pale. Neurological:      General: No focal deficit present. Mental Status: He is alert and oriented to person, place, and time.       Gait: Gait normal.   Psychiatric:         Behavior: Behavior normal.          MDM  Number of Diagnoses or Management Options  Acute pancreatitis, unspecified complication status, unspecified pancreatitis type: new, needed workup  Diagnosis management comments: Pancreatitis failing outpatient management, intractable pain despite hydrocodone, enzymes were actually better than they were yesterday but pain is worse  Will admit to the hospitalist service       Amount and/or Complexity of Data Reviewed  Clinical lab tests: ordered and reviewed  Tests in the radiology section of CPT®: reviewed  Decide to obtain previous medical records or to obtain history from someone other than the patient: yes  Obtain history from someone other than the patient: yes (Wife at bedside)  Discuss the patient with other providers: yes (Hospitalists)    Risk of Complications, Morbidity, and/or Mortality  Presenting problems: moderate  Diagnostic procedures: low  Management options: low    Patient Progress  Patient progress: improved      Procedures    Labs Reviewed   CBC - Abnormal; Notable for the following components:       Result Value    RBC 3.39 (*)     Hemoglobin 10.4 (*)     Hematocrit 33.0 (*)     RDW 15.6 (*)     MPV 9.2 (*)     All other components within normal limits   COMPREHENSIVE METABOLIC PANEL - Abnormal; Notable for the following components:    Chloride 111 (*)     CO2 20 (*)     Glucose 119 (*)     BUN 49 (*)     CREATININE 1.76 (*)     GFR  49 (*)     GFR Non-African American 40 (*)     Alk Phosphatase 143 (*)     Albumin 2.6 (*)     Globulin 4.8 (*)

## 2022-07-13 NOTE — ED TRIAGE NOTES
Pt states he was seen here in the ED yesterday. Pt c/o abdominal pain and nausea. Pt states he had vomiting yesterday, denies vomiting today. Pt denies diarrhea. Pt in wheelchair from lobby to triage.

## 2022-07-13 NOTE — H&P
Hospitalist History and Physical   Admit Date:  2022  2:09 PM   Name:  Lizzeth Gustafson   Age:  78 y.o. Sex:  male  :  1945   MRN:  [id number]   Room:  [facility]/    Presenting Complaint: Abdominal Pain     Reason(s) for Admission: Pancreatitis, recurrent [K85.90]  Acute pancreatitis, unspecified complication status, unspecified pancreatitis type [K85.90]     History of Present Illness:   Lizzeth Gustafson is a 78 y.o. male with medical history of recurrent pancreatitis, Robbi-Parkinson-White, BPH who presented with abdominal pain consistent with prior episodes of pancreatitis. He has had nausea and pain that is not relieved by his Norco.  He is anorexic. He had presented to the emergency department on the prior day found to have acute pancreatitis on CT. He has had some vomiting. Of note he had COVID about 10 days ago. Review of Systems:  10 systems reviewed and negative except as noted in HPI. Assessment & Plan:   Pancreatitis, recurrent  Has 4-5 episodes per year.   Admission CT confirms acute pancreatitis, lipase ~1800.  -Consult gastroenterology  -Enzo Neil for pain control  -NPO    Hypertrophy of prostate with urinary obstruction  -finasteride    Robbi-Parkinson-White (WPW) syndrome  -controlled since ablation    History of COVID-19  Tested +   -out of quarantine period    Chronic anemia  -Transfuse < 7    Stage 3b chronic kidney disease  -avoid nephrotoxic substances    Primary hypertension  -lisinopril    Type 2 diabetes mellitus ([facility]      History of partial nephrectomy  Noted        Dispo/Discharge Planning:     Pending clinical course    Diet: Diet NPO Exceptions are: Ice Chips  VTE ppx: enoxaparin  Code status: Full Code    Hospital Problems:  Principal Problem:    Pancreatitis, recurrent  Active Problems:    Hypertrophy of prostate with urinary obstruction    Robbi-Parkinson-White (WPW) syndrome    History of COVID-19    Chronic anemia    Stage 3b chronic kidney disease ([facility]    Primary hypertension    Type 2 diabetes mellitus ([address]    History of partial nephrectomy    Paroxysmal atrial fibrillation (HCC)  Resolved Problems:    * No resolved hospital problems.  *       Past History:     Past Medical History:   Diagnosis Date    Acute blood loss anemia 1/15/2022    Arthritis     Blurred vision, right eye     BPH (benign prostatic hyperplasia)     Gout     History of Clostridioides difficile colitis 2010    History of pancreatitis     History of renal carcinoma     partial nephrectomy    Hyperlipidemia     Hypertension     Leukocytosis 1/15/2022    Nausea & vomiting     small amount of N/V and pt not sure of it antiemetics work    Neuropathy     Paroxysmal A-fib ([address]     Presence of Watchman left atrial appendage closure device     Sleep apnea     compliant with C-pap    Thromboembolus ([address]     hx of left lower extremity    Type 2 diabetes mellitus (HCC)     insulin reliant; AVG -140; s.s. of hypoglycemia 65-75; last A1c 7.1    WPW (Robbi-Parkinson-White syndrome)     ablation x4     Past Surgical History:   Procedure Laterality Date    ABLATION OF DYSRHYTHMIC FOCUS      WPW- ablations x3    BACK SURGERY      ruptured disc    CATARACT REMOVAL      CHOLECYSTECTOMY      COLONOSCOPY N/A 1/24/2022    COLONOSCOPY performed by Majo Ray MD at [facility]      partial    LUMBAR LAMINECTOMY      ORTHOPEDIC SURGERY Left     great toe straightened    ORTHOPEDIC SURGERY Left     foot    OTHER SURGICAL HISTORY      placed watchman 2/2020    TOTAL KNEE ARTHROPLASTY Right     UPPER GASTROINTESTINAL ENDOSCOPY      WISDOM TOOTH EXTRACTION        Social History     Tobacco Use    Smoking status: Never Smoker    Smokeless tobacco: Never Used   Substance Use Topics    Alcohol use: Not Currently      Social History     Substance and Sexual Activity   Drug Use Never     Family History   Problem Relation Age of Onset    Cancer Sister         ovarian    No Known Problems Sister     Cancer Sister         colon ca    Parkinson's Disease Father     Other Mother         ALS    Crohn's Disease Son     Crohn's Disease Daughter       Family history reviewed and negative except as noted above.       Immunization History   Administered Date(s) Administered    COVID-19, PFIZER PURPLE top, DILUTE for use, (age 15 y+), 30mcg/0.3mL 01/22/2021, 02/09/2021    Hepatitis A Vaccine 04/01/2007, 06/03/2007    Hepatitis B 04/01/2007, 06/03/2007, 11/04/2007    Influenza Virus Vaccine 10/23/2012, 11/30/2013, 09/20/2016, 11/03/2017, 12/01/2018    Influenza, High Dose (Fluzone 65 yrs and older) 10/23/2012, 11/30/2013, 09/29/2014, 10/08/2015, 09/25/2019, 09/02/2020    Influenza, High-dose, Allan Spine, 65 yrs +, IM (Fluzone) 09/02/2020, 09/28/2021    Influenza, Quadv, IM, PF (6 mo and older Fluzone, Flulaval, Fluarix, and 3 yrs and older Afluria) 09/20/2016    Influenza, Triv, inactivated, subunit, adjuvanted, IM (Fluad 65 yrs and older) 11/03/2017    Influenza, Trivalent, Recombinant, Injectable vaccine, PF 09/28/2014    MMR 04/08/2007    Meningococcal ACWY Vaccine 04/01/2007    Pneumococcal Conjugate 13-valent ([id number]) 09/20/2016    Pneumococcal Polysaccharide ([id number]) 03/21/2010, 10/01/2014    Polio Virus Vaccine 04/01/2007    Tdap (Boostrix, Adacel) 09/19/2014    Typhoid Vaccine 04/01/2007    Yellow Fever (YF-Vax) 03/30/2007    Zoster Live (Zostavax) 01/01/2012     Allergies   Allergen Reactions    Sulfamethoxazole-Trimethoprim Other (See Comments)     Elevated potassium level    Tramadol Diarrhea    Codeine Other (See Comments)     \"makes me a little crazy\"    Fenofibrate Other (See Comments)     \"causes pancreatic attacks\"    Hydromorphone Other (See Comments)     \"gives me craziness\"    Metformin Diarrhea    Sitagliptin Other (See Comments)     Per pt causes pancreatic issues     Prior to Admit Medications:  Current Outpatient Medications   Medication kg/m² as calculated from the following:    Height as of this encounter: 5' 10\" (1.778 m). Weight as of this encounter: 219 lb (99.3 kg). No intake or output data in the 24 hours ending 07/13/22 1802      Blood pressure (!) 177/91, pulse 69, temperature 97.5 °F (36.4 °C), temperature source Oral, resp. rate 18, height 5' 10\" (1.778 m), weight 219 lb (99.3 kg), SpO2 96 %. Physical Exam  Vitals and nursing note reviewed. Constitutional:       General: He is in acute distress. Appearance: He is obese. He is ill-appearing. He is not diaphoretic. HENT:      Head: Normocephalic and atraumatic. Eyes:      Extraocular Movements: Extraocular movements intact. Cardiovascular:      Rate and Rhythm: Normal rate. Pulmonary:      Effort: Pulmonary effort is normal. No respiratory distress. Abdominal:      General: There is no distension. Palpations: Abdomen is soft. Tenderness: There is abdominal tenderness. Musculoskeletal:         General: No deformity. Skin:     Coloration: Skin is not jaundiced or pale. Neurological:      General: No focal deficit present. Mental Status: He is alert and oriented to person, place, and time. Psychiatric:         Mood and Affect: Mood normal.         Behavior: Behavior normal. Behavior is cooperative.          I have reviewed ordered lab tests and independently visualized imaging below:    Last 24hr Labs:  Recent Results (from the past 24 hour(s))   CBC    Collection Time: 07/13/22  1:07 PM   Result Value Ref Range    WBC 9.4 4.3 - 11.1 K/uL    RBC 3.39 (L) 4.23 - 5.6 M/uL    Hemoglobin 10.4 (L) 13.6 - 17.2 g/dL    Hematocrit 33.0 (L) 41.1 - 50.3 %    MCV 97.3 79.6 - 97.8 FL    MCH 30.7 26.1 - 32.9 PG    MCHC 31.5 31.4 - 35.0 g/dL    RDW 15.6 (H) 11.9 - 14.6 %    Platelets 022 406 - 992 K/uL    MPV 9.2 (L) 9.4 - 12.3 FL    nRBC 0.00 0.0 - 0.2 K/uL   Comprehensive Metabolic Panel    Collection Time: 07/13/22  1:07 PM   Result Value Ref Range    Sodium 141 136 - 145 mmol/L    Potassium 4.9 3.5 - 5.1 mmol/L    Chloride 111 (H) 98 - 107 mmol/L    CO2 20 (L) 21 - 32 mmol/L    Anion Gap 10 7 - 16 mmol/L    Glucose 119 (H) 65 - 100 mg/dL    BUN 49 (H) 8 - 23 MG/DL    CREATININE 1.76 (H) 0.8 - 1.5 MG/DL    GFR  49 (L) >60 ml/min/1.73m2    GFR Non- 40 (L) >60 ml/min/1.73m2    Calcium 9.4 8.3 - 10.4 MG/DL    Total Bilirubin 0.7 0.2 - 1.1 MG/DL    ALT 26 12 - 65 U/L    AST 25 15 - 37 U/L    Alk Phosphatase 143 (H) 50 - 136 U/L    Total Protein 7.4 6.3 - 8.2 g/dL    Albumin 2.6 (L) 3.2 - 4.6 g/dL    Globulin 4.8 (H) 2.3 - 3.5 g/dL    Albumin/Globulin Ratio 0.5 (L) 1.2 - 3.5     Lipase    Collection Time: 07/13/22  1:07 PM   Result Value Ref Range    Lipase 575 (H) 73 - 393 U/L   Troponin    Collection Time: 07/13/22  1:07 PM   Result Value Ref Range    Troponin, High Sensitivity 13.9 0 - 14 pg/mL   EKG 12 Lead    Collection Time: 07/13/22  3:26 PM   Result Value Ref Range    Ventricular Rate 75 BPM    Atrial Rate 75 BPM    P-R Interval 134 ms    QRS Duration 90 ms    Q-T Interval 384 ms    QTc Calculation (Bazett) 428 ms    P Axis 52 degrees    R Axis 13 degrees    T Axis 83 degrees    Diagnosis !! AGE AND GENDER SPECIFIC ECG ANALYSIS !!    COVID-19, Rapid    Collection Time: 07/13/22  5:14 PM    Specimen: Nasopharyngeal   Result Value Ref Range    Source NASAL SWAB      SARS-CoV-2, Rapid Detected (AA) NOTD         Other Studies:  CT ABDOMEN PELVIS WO CONTRAST Additional Contrast? None    Result Date: 7/12/2022  HISTORY: Abdominal pain and emesis. Question of pancreatitis. History of Covid Exam: CT abdomen and pelvis without contrast. Technique: Thin section axial CT images are obtained from the lung bases to the pubic symphysis. Oral contrast is given. Radiation dose reduction techniques were used for this study.   Our CT scanners use one or all of the following: Automated exposure control, adjustment of the mA and/or kV according to patient size, use of iterative reconstruction. Comparison: 5/28/2022 FINDINGS: There is persistent bibasilar scarring. CT abdomen:  No contour deforming abnormality involving the liver and spleen. Scattered calcified granulomas present in the liver and spleen. The patient is status post cholecystectomy. There is peripancreatic fat stranding about the head and body of the pancreas, similar to the findings seen previously with atrophy of the remainder of the pancreas. . The adrenal glands are unremarkable. Limited evaluation the bowel loops in the upper abdomen is unremarkable. There is no definite upper abdominal lymphadenopathy. No change in the appearance of the multiple hypodense lesions within the kidneys. CT pelvis: There is extensive diverticulosis. No CT evidence of diverticulitis. The appendix is normal. There is no pelvic adenopathy. There is no free fluid or free air present within the pelvis. Bone window evaluation demonstrates no aggressive osseous lesions. 1. Persistent peripancreatic fat stranding about the head of the pancreas. Findings could represent pancreatitis in the appropriate clinical setting. 2. No additional interval change. Echocardiogram:  No results found for this or any previous visit.       Meds previously ordered:  Orders Placed This Encounter   Medications    0.9 % sodium chloride bolus    ondansetron (ZOFRAN) injection 8 mg    morphine injection 4 mg    0.9 % sodium chloride bolus    morphine injection 4 mg    finasteride (PROSCAR) tablet 5 mg    gabapentin (NEURONTIN) capsule 300 mg    lisinopril (PRINIVIL;ZESTRIL) tablet 30 mg    FOLLOWED BY Linked Order Group     lactated ringers infusion     lactated ringers infusion    sodium chloride flush 0.9 % injection 5-40 mL    sodium chloride flush 0.9 % injection 5-40 mL    0.9 % sodium chloride infusion    OR Linked Order Group     ondansetron (ZOFRAN-ODT) disintegrating tablet 4 mg     ondansetron (ZOFRAN) injection 4 mg    enoxaparin (LOVENOX) injection 40 mg     Order Specific Question:   Indication of Use     Answer:   Prophylaxis-DVT/PE    OR Linked Order Group     potassium chloride (KLOR-CON M) extended release tablet 40 mEq     potassium bicarb-citric acid (EFFER-K) effervescent tablet 40 mEq     potassium chloride 10 mEq/100 mL IVPB (Peripheral Line)    magnesium sulfate 2000 mg in 50 mL IVPB premix    OR Linked Order Group     HYDROcodone-acetaminophen (NORCO) 5-325 MG per tablet 1 tablet     acetaminophen (TYLENOL) tablet 650 mg     Signed:  Cally Murry MD

## 2022-07-14 ENCOUNTER — HOSPITAL ENCOUNTER (OUTPATIENT)
Dept: PHYSICAL THERAPY | Age: 77
Setting detail: RECURRING SERIES
End: 2022-07-14
Payer: MEDICARE

## 2022-07-14 PROBLEM — N18.32 ACUTE RENAL FAILURE WITH ACUTE TUBULAR NECROSIS SUPERIMPOSED ON STAGE 3B CHRONIC KIDNEY DISEASE (HCC): Status: RESOLVED | Noted: 2022-07-13 | Resolved: 2022-07-14

## 2022-07-14 PROBLEM — N17.0 ACUTE RENAL FAILURE WITH ACUTE TUBULAR NECROSIS SUPERIMPOSED ON STAGE 3B CHRONIC KIDNEY DISEASE (HCC): Status: ACTIVE | Noted: 2022-07-13

## 2022-07-14 PROBLEM — N18.32 ACUTE RENAL FAILURE WITH ACUTE TUBULAR NECROSIS SUPERIMPOSED ON STAGE 3B CHRONIC KIDNEY DISEASE (HCC): Status: ACTIVE | Noted: 2022-07-13

## 2022-07-14 PROBLEM — N17.0 ACUTE RENAL FAILURE WITH ACUTE TUBULAR NECROSIS SUPERIMPOSED ON STAGE 3B CHRONIC KIDNEY DISEASE (HCC): Status: RESOLVED | Noted: 2022-07-13 | Resolved: 2022-07-14

## 2022-07-14 LAB
ANION GAP SERPL CALC-SCNC: 6 MMOL/L (ref 7–16)
BUN SERPL-MCNC: 37 MG/DL (ref 8–23)
CALCIUM SERPL-MCNC: 8.5 MG/DL (ref 8.3–10.4)
CHLORIDE SERPL-SCNC: 114 MMOL/L (ref 98–107)
CO2 SERPL-SCNC: 22 MMOL/L (ref 21–32)
CREAT SERPL-MCNC: 1.33 MG/DL (ref 0.8–1.5)
ERYTHROCYTE [DISTWIDTH] IN BLOOD BY AUTOMATED COUNT: 15.3 % (ref 11.9–14.6)
GLUCOSE SERPL-MCNC: 81 MG/DL (ref 65–100)
HCT VFR BLD AUTO: 28.9 % (ref 41.1–50.3)
HGB BLD-MCNC: 8.9 G/DL (ref 13.6–17.2)
LIPASE SERPL-CCNC: 155 U/L (ref 73–393)
MAGNESIUM SERPL-MCNC: 1.5 MG/DL (ref 1.8–2.4)
MCH RBC QN AUTO: 30.5 PG (ref 26.1–32.9)
MCHC RBC AUTO-ENTMCNC: 30.8 G/DL (ref 31.4–35)
MCV RBC AUTO: 99 FL (ref 79.6–97.8)
NRBC # BLD: 0 K/UL (ref 0–0.2)
PHOSPHATE SERPL-MCNC: 3.5 MG/DL (ref 2.3–3.7)
PLATELET # BLD AUTO: 176 K/UL (ref 150–450)
PMV BLD AUTO: 9.4 FL (ref 9.4–12.3)
POTASSIUM SERPL-SCNC: 4.7 MMOL/L (ref 3.5–5.1)
RBC # BLD AUTO: 2.92 M/UL (ref 4.23–5.6)
SODIUM SERPL-SCNC: 142 MMOL/L (ref 136–145)
TRIGL SERPL-MCNC: 290 MG/DL (ref 35–150)
TROPONIN I SERPL HS-MCNC: 20.3 PG/ML (ref 0–14)
WBC # BLD AUTO: 7.9 K/UL (ref 4.3–11.1)

## 2022-07-14 PROCEDURE — 84100 ASSAY OF PHOSPHORUS: CPT

## 2022-07-14 PROCEDURE — G0378 HOSPITAL OBSERVATION PER HR: HCPCS

## 2022-07-14 PROCEDURE — 2580000003 HC RX 258: Performed by: FAMILY MEDICINE

## 2022-07-14 PROCEDURE — 84478 ASSAY OF TRIGLYCERIDES: CPT

## 2022-07-14 PROCEDURE — 2500000003 HC RX 250 WO HCPCS: Performed by: INTERNAL MEDICINE

## 2022-07-14 PROCEDURE — 83690 ASSAY OF LIPASE: CPT

## 2022-07-14 PROCEDURE — 6370000000 HC RX 637 (ALT 250 FOR IP): Performed by: FAMILY MEDICINE

## 2022-07-14 PROCEDURE — A4216 STERILE WATER/SALINE, 10 ML: HCPCS | Performed by: INTERNAL MEDICINE

## 2022-07-14 PROCEDURE — 97165 OT EVAL LOW COMPLEX 30 MIN: CPT

## 2022-07-14 PROCEDURE — 85027 COMPLETE CBC AUTOMATED: CPT

## 2022-07-14 PROCEDURE — 80048 BASIC METABOLIC PNL TOTAL CA: CPT

## 2022-07-14 PROCEDURE — 2580000003 HC RX 258: Performed by: INTERNAL MEDICINE

## 2022-07-14 PROCEDURE — 97530 THERAPEUTIC ACTIVITIES: CPT

## 2022-07-14 PROCEDURE — 6360000002 HC RX W HCPCS: Performed by: FAMILY MEDICINE

## 2022-07-14 PROCEDURE — 97161 PT EVAL LOW COMPLEX 20 MIN: CPT

## 2022-07-14 PROCEDURE — 97535 SELF CARE MNGMENT TRAINING: CPT

## 2022-07-14 PROCEDURE — 83735 ASSAY OF MAGNESIUM: CPT

## 2022-07-14 PROCEDURE — 96365 THER/PROPH/DIAG IV INF INIT: CPT

## 2022-07-14 PROCEDURE — 96372 THER/PROPH/DIAG INJ SC/IM: CPT

## 2022-07-14 PROCEDURE — 36415 COLL VENOUS BLD VENIPUNCTURE: CPT

## 2022-07-14 PROCEDURE — 93005 ELECTROCARDIOGRAM TRACING: CPT | Performed by: FAMILY MEDICINE

## 2022-07-14 PROCEDURE — 96376 TX/PRO/DX INJ SAME DRUG ADON: CPT

## 2022-07-14 PROCEDURE — 84484 ASSAY OF TROPONIN QUANT: CPT

## 2022-07-14 RX ORDER — MORPHINE SULFATE 2 MG/ML
2 INJECTION, SOLUTION INTRAMUSCULAR; INTRAVENOUS
Status: DISCONTINUED | OUTPATIENT
Start: 2022-07-14 | End: 2022-07-15

## 2022-07-14 RX ORDER — MORPHINE SULFATE 2 MG/ML
4 INJECTION, SOLUTION INTRAMUSCULAR; INTRAVENOUS
Status: DISCONTINUED | OUTPATIENT
Start: 2022-07-14 | End: 2022-07-15

## 2022-07-14 RX ORDER — MAGNESIUM SULFATE IN WATER 40 MG/ML
2000 INJECTION, SOLUTION INTRAVENOUS ONCE
Status: COMPLETED | OUTPATIENT
Start: 2022-07-14 | End: 2022-07-14

## 2022-07-14 RX ORDER — MORPHINE SULFATE 2 MG/ML
1 INJECTION, SOLUTION INTRAMUSCULAR; INTRAVENOUS ONCE
Status: COMPLETED | OUTPATIENT
Start: 2022-07-14 | End: 2022-07-14

## 2022-07-14 RX ADMIN — SODIUM CHLORIDE, SODIUM LACTATE, POTASSIUM CHLORIDE, AND CALCIUM CHLORIDE: 600; 310; 30; 20 INJECTION, SOLUTION INTRAVENOUS at 01:00

## 2022-07-14 RX ADMIN — GABAPENTIN 300 MG: 300 CAPSULE ORAL at 21:37

## 2022-07-14 RX ADMIN — MORPHINE SULFATE 2 MG: 2 INJECTION, SOLUTION INTRAMUSCULAR; INTRAVENOUS at 23:48

## 2022-07-14 RX ADMIN — MORPHINE SULFATE 2 MG: 2 INJECTION, SOLUTION INTRAMUSCULAR; INTRAVENOUS at 21:47

## 2022-07-14 RX ADMIN — MORPHINE SULFATE 4 MG: 2 INJECTION, SOLUTION INTRAMUSCULAR; INTRAVENOUS at 12:52

## 2022-07-14 RX ADMIN — ENOXAPARIN SODIUM 40 MG: 100 INJECTION SUBCUTANEOUS at 18:53

## 2022-07-14 RX ADMIN — DICLOFENAC SODIUM 4 G: 10 GEL TOPICAL at 23:49

## 2022-07-14 RX ADMIN — SODIUM CHLORIDE, PRESERVATIVE FREE 10 ML: 5 INJECTION INTRAVENOUS at 21:39

## 2022-07-14 RX ADMIN — MORPHINE SULFATE 1 MG: 2 INJECTION, SOLUTION INTRAMUSCULAR; INTRAVENOUS at 04:54

## 2022-07-14 RX ADMIN — FINASTERIDE 5 MG: 5 TABLET, FILM COATED ORAL at 09:08

## 2022-07-14 RX ADMIN — FAMOTIDINE 20 MG: 10 INJECTION, SOLUTION INTRAVENOUS at 21:37

## 2022-07-14 RX ADMIN — GABAPENTIN 300 MG: 300 CAPSULE ORAL at 14:10

## 2022-07-14 RX ADMIN — SODIUM CHLORIDE: 9 INJECTION, SOLUTION INTRAVENOUS at 10:20

## 2022-07-14 RX ADMIN — SODIUM CHLORIDE, POTASSIUM CHLORIDE, SODIUM LACTATE AND CALCIUM CHLORIDE: 600; 310; 30; 20 INJECTION, SOLUTION INTRAVENOUS at 21:37

## 2022-07-14 RX ADMIN — MAGNESIUM SULFATE HEPTAHYDRATE 2000 MG: 40 INJECTION, SOLUTION INTRAVENOUS at 10:20

## 2022-07-14 RX ADMIN — ONDANSETRON 4 MG: 4 TABLET, ORALLY DISINTEGRATING ORAL at 01:32

## 2022-07-14 RX ADMIN — HYDROCODONE BITARTRATE AND ACETAMINOPHEN 1 TABLET: 5; 325 TABLET ORAL at 09:08

## 2022-07-14 RX ADMIN — ONDANSETRON 4 MG: 2 INJECTION INTRAMUSCULAR; INTRAVENOUS at 09:08

## 2022-07-14 RX ADMIN — SODIUM CHLORIDE, POTASSIUM CHLORIDE, SODIUM LACTATE AND CALCIUM CHLORIDE: 600; 310; 30; 20 INJECTION, SOLUTION INTRAVENOUS at 09:15

## 2022-07-14 RX ADMIN — GABAPENTIN 300 MG: 300 CAPSULE ORAL at 09:08

## 2022-07-14 RX ADMIN — MORPHINE SULFATE 2 MG: 2 INJECTION, SOLUTION INTRAMUSCULAR; INTRAVENOUS at 18:54

## 2022-07-14 ASSESSMENT — PAIN DESCRIPTION - DESCRIPTORS
DESCRIPTORS: DISCOMFORT
DESCRIPTORS: DISCOMFORT;SHOOTING
DESCRIPTORS: DISCOMFORT
DESCRIPTORS: ACHING

## 2022-07-14 ASSESSMENT — PAIN DESCRIPTION - ORIENTATION
ORIENTATION: UPPER
ORIENTATION: UPPER
ORIENTATION: MID

## 2022-07-14 ASSESSMENT — PAIN SCALES - GENERAL
PAINLEVEL_OUTOF10: 9
PAINLEVEL_OUTOF10: 7
PAINLEVEL_OUTOF10: 2
PAINLEVEL_OUTOF10: 7
PAINLEVEL_OUTOF10: 4
PAINLEVEL_OUTOF10: 6
PAINLEVEL_OUTOF10: 6
PAINLEVEL_OUTOF10: 5
PAINLEVEL_OUTOF10: 4

## 2022-07-14 ASSESSMENT — PAIN DESCRIPTION - LOCATION
LOCATION: CHEST
LOCATION: ABDOMEN
LOCATION: ABDOMEN
LOCATION: CHEST
LOCATION: ABDOMEN
LOCATION: ABDOMEN

## 2022-07-14 ASSESSMENT — PAIN DESCRIPTION - FREQUENCY: FREQUENCY: CONTINUOUS

## 2022-07-14 ASSESSMENT — PAIN - FUNCTIONAL ASSESSMENT: PAIN_FUNCTIONAL_ASSESSMENT: ACTIVITIES ARE NOT PREVENTED

## 2022-07-14 ASSESSMENT — PAIN DESCRIPTION - PAIN TYPE: TYPE: ACUTE PAIN

## 2022-07-14 NOTE — PROGRESS NOTES
OCCUPATIONAL THERAPY Initial Assessment and AM       OT Visit Days: 1  Acknowledge Orders  Time  OT Charge Capture  Rehab Caseload Tracker      Reginaldo West is a 78 y.o. male   PRIMARY DIAGNOSIS: Pancreatitis, recurrent  Pancreatitis, recurrent [K85.90]  Acute pancreatitis, unspecified complication status, unspecified pancreatitis type [K85.90]       Reason for Referral: Generalized Muscle Weakness (M62.81)  Other lack of cordination (R27.8)  Difficulty in walking, Not elsewhere classified (R26.2)  Observation: Payor: MEDICARE / Plan: MEDICARE PART A AND B / Product Type: *No Product type* /     ASSESSMENT:     REHAB RECOMMENDATIONS:   Recommendation to date pending progress:  [facility] [place] [address] Therapy    Equipment:     To Be Determined     ASSESSMENT:  Mr. [address] admitted with above diagnosis. Pt presents with generalized weakness, decreased self care and functional mobility. Pt would benefit from skilled OT to increase independence. This am, pt donned gown and shoes with setup. Pt ambulated in room with contact guard assist. Pt returned to recliner and left with needs in reach.       [facility] [address] [id number] [facility]licks Daily Activity Inpatient Short Form:    AM-PAC Daily Activity Inpatient   How much help for putting on and taking off regular lower body clothing?: A Little  How much help for Bathing?: A Little  How much help for Toileting?: A Little  How much help for putting on and taking off regular upper body clothing?: None  How much help for taking care of personal grooming?: None  How much help for eating meals?: None  [facility] Inpatient Daily Activity Raw Score: 21  AM-PAC Inpatient ADL T-Scale Score : 44.27  ADL Inpatient CMS 0-100% Score: 32.79  ADL Inpatient CMS G-Code Modifier : CJ           SUBJECTIVE:     Mr. [address] states, \"I am nauseous\"     Social/Functional Lives With: Spouse  Type of Home: House  Home Layout: One level  Home Access: Stairs to enter with rails  Bathroom Shower/Tub: Walk-in shower  Ambulation Assistance: Independent    OBJECTIVE:     LINES / Beti [place] / AIRWAY: NA    RESTRICTIONS/PRECAUTIONS:       PAIN: VITALS / O2:   Pre Treatment:   Pain Assessment: 0-10      Post Treatment: 0       Vitals          Oxygen            GROSS EVALUATION: INTACT IMPAIRED   (See Comments)   UE AROM [] [] RUE shoulder limted   UE PROM [] []   Strength []  RUE 2/5 shoulder      Posture / Balance [x]     Sensation [x]     Coordination []   RUE limited     Tone [x]       Edema []    Activity Tolerance []   decreased     Hand Dominance R [x] L []      COGNITION/  PERCEPTION: INTACT IMPAIRED   (See Comments)   Orientation [x]     Vision [x]     Hearing [x]     Cognition  [x]     Perception [x]       MOBILITY: I Mod I S SBA CGA Min Mod Max Total  NT x2 Comments:   Bed Mobility    Rolling [] [] [] [] [] [] [] [] [] [] []    Supine to Sit [] [] [] [] [x] [] [] [] [] [] []    Scooting [] [] [] [] [x] [] [] [] [] [] []    Sit to Supine [] [] [] [] [] [] [] [] [] [] []    Transfers    Sit to Stand [] [] [] [] [x] [] [] [] [] [] []    Bed to Chair [] [] [] [] [x] [] [] [] [] [] []    Stand to Sit [] [] [] [] [x] [] [] [] [] [] []    Tub/Shower [] [] [] [] [] [] [] [] [] [] []     Toilet [] [] [] [] [] [] [] [] [] [] []      [] [] [] [] [] [] [] [] [] [] []    I=Independent, Mod I=Modified Independent, S=Supervision/Setup, SBA=Standby Assistance, CGA=Contact Guard Assistance, Min=Minimal Assistance, Mod=Moderate Assistance, Max=Maximal Assistance, Total=Total Assistance, NT=Not Tested    ACTIVITIES OF DAILY LIVING: I Mod I S SBA CGA Min Mod Max Total NT Comments   BASIC ADLs:              Upper Body Bathing  [] [] [] [] [] [] [] [] [] []    Lower Body Bathing [] [] [] [] [] [] [] [] [] []    Toileting [] [] [] [] [] [] [] [] [] []    Upper Body Dressing [] [] [x] [] [] [] [] [] [] []    Lower Body Dressing [] [] [x] [] [] [] [] [] [] [] shoes   Feeding [] [] [x] [] [] [] [] [] [] []    Grooming [] [] [x] [] [] [] [] [] [] []    Personal Device Care [] [] [] [] [] [] [] [] [] []    Functional Mobility [] [] [] [] [x] [] [] [] [] []    I=Independent, Mod I=Modified Independent, S=Supervision/Setup, SBA=Standby Assistance, CGA=Contact Guard Assistance, Min=Minimal Assistance, Mod=Moderate Assistance, Max=Maximal Assistance, Total=Total Assistance, NT=Not Tested    PLAN:     [facility] of Care: 3 times/week for duration of hospital stay or until stated goals are met, whichever comes first.    ACUTE OCCUPATIONAL THERAPY GOALS:   (Developed with and agreed upon by patient and/or caregiver.)  1. Patient will perform grooming with supervision. 2. Patient will perform upper body dressing with supervision. 3. Patient will perform lower body dressing with supervision. 4. Patient will perform bathing with supervision. 5. Patient will perform toileting and toilet transfer with supervision. 6. Patient will perform ADL functional mobility and tranfers in room with supervision. 7. Patient/family to demonstrate knowledge of home safety and DME recommendations. Goals to be achieved in 7 days. PROBLEM LIST:   (Skilled intervention is medically necessary to address:)  Decreased ADL/Functional Activities  Decreased Activity Tolerance  Decreased AROM/PROM  Decreased Balance  Decreased Strength  Decreased Transfer Abilities   INTERVENTIONS PLANNED:  (Benefits and precautions of occupational therapy have been discussed with the patient.)  Self Care Training  Therapeutic Activity  Therapeutic Exercise/HEP  Neuromuscular Re-education  Manual Therapy  Education         TREATMENT:     EVALUATION: LOW COMPLEXITY: (Untimed Charge)    TREATMENT:   Self Care: (15): Procedure(s) (per grid) utilized to improve and/or restore self-care/home management as related to dressing and functional mobility . Required minimal verbal and   cueing to facilitate activities of daily living skills and compensatory activities.     TREATMENT GRID:  N/A    AFTER TREATMENT PRECAUTIONS: Bed/Chair Locked, Call light within reach, Chair, Needs within reach and RN notified    INTERDISCIPLINARY COLLABORATION:  RN/ PCT, PT/ PTA and OT/ GARCIA    EDUCATION:  Education Given To: Patient  Education Provided: Role of Therapy; ADL Adaptive Strategies;Transfer Training  Education Method: Demonstration  Barriers to Learning: None  Education Outcome: Verbalized understanding;Demonstrated understanding    TOTAL TREATMENT DURATION AND TIME:  Time In: 0830  Time Out: 7313  Minutes: [facility], OT

## 2022-07-14 NOTE — PLAN OF CARE
Problem: Discharge Planning  Goal: Discharge to home or other facility with appropriate resources  Outcome: Progressing     Problem: Pain  Goal: Verbalizes/displays adequate comfort level or baseline comfort level  Outcome: Progressing     Problem: Safety - Adult  Goal: Free from fall injury  Outcome: Progressing     Problem: Chronic Conditions and Co-morbidities  Goal: Patient's chronic conditions and co-morbidity symptoms are monitored and maintained or improved  Outcome: Progressing

## 2022-07-14 NOTE — PROGRESS NOTES
Hospitalist Progress Note   Admit Date:  2022  2:09 PM   Name:  Jeramie Wren   Age:  78 y.o. Sex:  male  :  1945   MRN:  [id number]   Room:  [facility]/01    Presenting Complaint: Abdominal Pain     Reason(s) for Admission: Pancreatitis, recurrent [K85.90]  Acute pancreatitis, unspecified complication status, unspecified pancreatitis type [facility] Course & Interval History:   78 y.o. male with medical history of recurrent pancreatitis, Robbi-Parkinson-White, BPH who presented with abdominal pain consistent with prior episodes of pancreatitis. He has had nausea and pain that is not relieved by his Norco.  He is anorexic. He had presented to the emergency department on the prior day found to have acute pancreatitis on CT. He has had some vomiting. Of note he had COVID about 10 days prior. Subjective/24hr Events (22): Dryheaving without vomiting. Pain not controlled. Adjusting pain meds. Renal function improved. Anemia appears dilutional. Lipase markedly reduced. Assessment & Plan:   Pancreatitis, recurrent  Has 4-5 episodes per year.   Admission CT confirms acute pancreatitis, lipase ~1800>>75.  -Consult gastroenterology: avoid lisinopril, pantoprazole  -morphine for pain control  -NPO  2022: switching to morphine for pain control     Hypertrophy of prostate with urinary obstruction  -finasteride     Robbi-Parkinson-White (WPW) syndrome  -controlled since ablation     History of COVID-19  Tested +   -out of quarantine period     Chronic anemia  -Transfuse < 7  2022: Hgb 8.9, though appears dilutional, monitor     Stage 3b chronic kidney disease  -avoid nephrotoxic substances     Primary hypertension  -discontinue lisinopril  2022: GI rec avoid lisinopril, per pubmed ACEI/ARB likely have similar profiles though data is very scant     Type 2 diabetes mellitus   2022: Blood sugar controlled without meds while NPO     History of partial nephrectomy  Noted        Discharge Planning:    Continue inpatient care    Diet:  Diet NPO Exceptions are: Ice Chips  DVT PPx: enoxaparin  Code status: Full Code    Hospital Problems:  Principal Problem:    Pancreatitis, recurrent  Active Problems:    Hypertrophy of prostate with urinary obstruction    Robbi-Parkinson-White (WPW) syndrome    History of COVID-19    Chronic anemia    Stage 3b chronic kidney disease ([facility] [address]    Primary hypertension    Type 2 diabetes mellitus ([facility] [address]    History of partial nephrectomy    Paroxysmal atrial fibrillation (HCC)  Resolved Problems:    Acute renal failure with acute tubular necrosis superimposed on stage 3b chronic kidney disease (HCC)        Objective:     Patient Vitals for the past 24 hrs:   Temp Pulse Resp BP SpO2   07/14/22 0801 97.7 °F (36.5 °C) 70 15 (!) 159/82 98 %   07/14/22 0524 -- -- 12 -- --   07/14/22 0440 -- 58 12 (!) 165/89 99 %   07/14/22 0327 97.7 °F (36.5 °C) 70 16 (!) 155/64 95 %   07/13/22 2357 98.2 °F (36.8 °C) 72 16 133/67 94 %   07/13/22 2009 98.2 °F (36.8 °C) 72 16 139/60 97 %   07/13/22 1705 97.5 °F (36.4 °C) 69 -- (!) 177/91 96 %   07/13/22 1628 98 °F (36.7 °C) 74 18 (!) 163/74 98 %   07/13/22 1547 -- 68 -- (!) 174/80 95 %   07/13/22 1517 -- 67 -- (!) 178/77 96 %   07/13/22 1447 -- 74 -- (!) 161/109 93 %   07/13/22 1417 -- 71 -- (!) 159/79 98 %   07/13/22 1254 98.3 °F (36.8 °C) 80 16 129/69 98 %       Oxygen Therapy  SpO2: 98 %  O2 Device: None (Room air)    Estimated body mass index is 31.42 kg/m² as calculated from the following:    Height as of this encounter: 5' 10\" (1.778 m). Weight as of this encounter: 219 lb (99.3 kg). No intake or output data in the 24 hours ending 07/14/22 0853      Blood pressure (!) 159/82, pulse 70, temperature 97.7 °F (36.5 °C), temperature source Oral, resp. rate 15, height 5' 10\" (1.778 m), weight 219 lb (99.3 kg), SpO2 98 %. Physical Exam  Vitals and nursing note reviewed.    Constitutional:       General: He is in acute distress. Appearance: He is obese. He is ill-appearing. He is not diaphoretic. HENT:      Head: Normocephalic and atraumatic. Eyes:      Extraocular Movements: Extraocular movements intact. Cardiovascular:      Rate and Rhythm: Normal rate. Pulmonary:      Effort: Pulmonary effort is normal. No respiratory distress. Abdominal:      General: There is no distension. Palpations: Abdomen is soft. Tenderness: There is abdominal tenderness. Musculoskeletal:         General: No deformity. Skin:     Coloration: Skin is not jaundiced or pale. Neurological:      General: No focal deficit present. Mental Status: He is alert and oriented to person, place, and time. Psychiatric:         Mood and Affect: Mood normal.         Behavior: Behavior normal. Behavior is cooperative.          I have reviewed ordered lab tests and independently visualized imaging below:    Recent Labs:  Recent Results (from the past 48 hour(s))   CBC with Diff    Collection Time: 07/12/22  2:57 PM   Result Value Ref Range    WBC 10.6 4.3 - 11.1 K/uL    RBC 3.44 (L) 4.23 - 5.6 M/uL    Hemoglobin 10.5 (L) 13.6 - 17.2 g/dL    Hematocrit 33.3 (L) 41.1 - 50.3 %    MCV 96.8 79.6 - 97.8 FL    MCH 30.5 26.1 - 32.9 PG    MCHC 31.5 31.4 - 35.0 g/dL    RDW 15.6 (H) 11.9 - 14.6 %    Platelets 092 009 - 413 K/uL    MPV 9.4 9.4 - 12.3 FL    nRBC 0.00 0.0 - 0.2 K/uL    Differential Type AUTOMATED      Seg Neutrophils 72 43 - 78 %    Lymphocytes 14 13 - 44 %    Monocytes 7 4.0 - 12.0 %    Eosinophils % 2 0.5 - 7.8 %    Basophils 0 0.0 - 2.0 %    Immature Granulocytes 4 0.0 - 5.0 %    Segs Absolute 7.6 1.7 - 8.2 K/UL    Absolute Lymph # 1.5 0.5 - 4.6 K/UL    Absolute Mono # 0.8 0.1 - 1.3 K/UL    Absolute Eos # 0.2 0.0 - 0.8 K/UL    Basophils Absolute 0.0 0.0 - 0.2 K/UL    Absolute Immature Granulocyte 0.5 0.0 - 0.5 K/UL   CMP    Collection Time: 07/12/22  2:57 PM   Result Value Ref Range    Sodium 140 136 - 145 mmol/L Potassium 4.7 3.5 - 5.1 mmol/L    Chloride 109 (H) 98 - 107 mmol/L    CO2 22 21 - 32 mmol/L    Anion Gap 9 7 - 16 mmol/L    Glucose 74 65 - 100 mg/dL    BUN 57 (H) 8 - 23 MG/DL    CREATININE 2.00 (H) 0.8 - 1.5 MG/DL    GFR  42 (L) >60 ml/min/1.73m2    GFR Non- 35 (L) >60 ml/min/1.73m2    Calcium 9.2 8.3 - 10.4 MG/DL    Total Bilirubin 0.2 0.2 - 1.1 MG/DL    ALT 26 12 - 65 U/L    AST 26 15 - 37 U/L    Alk Phosphatase 142 (H) 50 - 136 U/L    Total Protein 7.2 6.3 - 8.2 g/dL    Albumin 2.7 (L) 3.2 - 4.6 g/dL    Globulin 4.5 (H) 2.3 - 3.5 g/dL    Albumin/Globulin Ratio 0.6 (L) 1.2 - 3.5     Lipase    Collection Time: 07/12/22  2:57 PM   Result Value Ref Range    Lipase 1,813 (H) 73 - 393 U/L   CBC    Collection Time: 07/13/22  1:07 PM   Result Value Ref Range    WBC 9.4 4.3 - 11.1 K/uL    RBC 3.39 (L) 4.23 - 5.6 M/uL    Hemoglobin 10.4 (L) 13.6 - 17.2 g/dL    Hematocrit 33.0 (L) 41.1 - 50.3 %    MCV 97.3 79.6 - 97.8 FL    MCH 30.7 26.1 - 32.9 PG    MCHC 31.5 31.4 - 35.0 g/dL    RDW 15.6 (H) 11.9 - 14.6 %    Platelets 668 182 - 515 K/uL    MPV 9.2 (L) 9.4 - 12.3 FL    nRBC 0.00 0.0 - 0.2 K/uL   Comprehensive Metabolic Panel    Collection Time: 07/13/22  1:07 PM   Result Value Ref Range    Sodium 141 136 - 145 mmol/L    Potassium 4.9 3.5 - 5.1 mmol/L    Chloride 111 (H) 98 - 107 mmol/L    CO2 20 (L) 21 - 32 mmol/L    Anion Gap 10 7 - 16 mmol/L    Glucose 119 (H) 65 - 100 mg/dL    BUN 49 (H) 8 - 23 MG/DL    CREATININE 1.76 (H) 0.8 - 1.5 MG/DL    GFR  49 (L) >60 ml/min/1.73m2    GFR Non- 40 (L) >60 ml/min/1.73m2    Calcium 9.4 8.3 - 10.4 MG/DL    Total Bilirubin 0.7 0.2 - 1.1 MG/DL    ALT 26 12 - 65 U/L    AST 25 15 - 37 U/L    Alk Phosphatase 143 (H) 50 - 136 U/L    Total Protein 7.4 6.3 - 8.2 g/dL    Albumin 2.6 (L) 3.2 - 4.6 g/dL    Globulin 4.8 (H) 2.3 - 3.5 g/dL    Albumin/Globulin Ratio 0.5 (L) 1.2 - 3.5     Lipase    Collection Time: 07/13/22  1:07 PM Result Value Ref Range    Lipase 575 (H) 73 - 393 U/L   Troponin    Collection Time: 07/13/22  1:07 PM   Result Value Ref Range    Troponin, High Sensitivity 13.9 0 - 14 pg/mL   EKG 12 Lead    Collection Time: 07/13/22  3:26 PM   Result Value Ref Range    Ventricular Rate 75 BPM    Atrial Rate 75 BPM    P-R Interval 134 ms    QRS Duration 90 ms    Q-T Interval 384 ms    QTc Calculation (Bazett) 428 ms    P Axis 52 degrees    R Axis 13 degrees    T Axis 83 degrees    Diagnosis !! AGE AND GENDER SPECIFIC ECG ANALYSIS !!    COVID-19, Rapid    Collection Time: 07/13/22  5:14 PM    Specimen: Nasopharyngeal   Result Value Ref Range    Source NASAL SWAB      SARS-CoV-2, Rapid Detected (AA) NOTD     Triglyceride    Collection Time: 07/14/22  4:27 AM   Result Value Ref Range    Triglycerides 290 (H) 35 - 150 MG/DL   Basic Metabolic Panel    Collection Time: 07/14/22  4:27 AM   Result Value Ref Range    Sodium 142 136 - 145 mmol/L    Potassium 4.7 3.5 - 5.1 mmol/L    Chloride 114 (H) 98 - 107 mmol/L    CO2 22 21 - 32 mmol/L    Anion Gap 6 (L) 7 - 16 mmol/L    Glucose 81 65 - 100 mg/dL    BUN 37 (H) 8 - 23 MG/DL    CREATININE 1.33 0.8 - 1.5 MG/DL    GFR African American >60 >60 ml/min/1.73m2    GFR Non- 56 (L) >60 ml/min/1.73m2    Calcium 8.5 8.3 - 10.4 MG/DL   CBC    Collection Time: 07/14/22  4:27 AM   Result Value Ref Range    WBC 7.9 4.3 - 11.1 K/uL    RBC 2.92 (L) 4.23 - 5.6 M/uL    Hemoglobin 8.9 (L) 13.6 - 17.2 g/dL    Hematocrit 28.9 (L) 41.1 - 50.3 %    MCV 99.0 (H) 79.6 - 97.8 FL    MCH 30.5 26.1 - 32.9 PG    MCHC 30.8 (L) 31.4 - 35.0 g/dL    RDW 15.3 (H) 11.9 - 14.6 %    Platelets 952 431 - 312 K/uL    MPV 9.4 9.4 - 12.3 FL    nRBC 0.00 0.0 - 0.2 K/uL   Magnesium    Collection Time: 07/14/22  4:27 AM   Result Value Ref Range    Magnesium 1.5 (L) 1.8 - 2.4 mg/dL   Phosphorus    Collection Time: 07/14/22  4:27 AM   Result Value Ref Range    Phosphorus 3.5 2.3 - 3.7 MG/DL   Troponin    Collection Time: 07/14/22  4:27 AM   Result Value Ref Range    Troponin, High Sensitivity 20.3 (H) 0 - 14 pg/mL       Other Studies:  No orders to display       Current Meds:  Current Facility-Administered Medications   Medication Dose Route Frequency    finasteride (PROSCAR) tablet 5 mg  5 mg Oral Daily    gabapentin (NEURONTIN) capsule 300 mg  300 mg Oral TID    lactated ringers infusion   IntraVENous Continuous    sodium chloride flush 0.9 % injection 5-40 mL  5-40 mL IntraVENous 2 times per day    sodium chloride flush 0.9 % injection 5-40 mL  5-40 mL IntraVENous PRN    0.9 % sodium chloride infusion   IntraVENous PRN    ondansetron (ZOFRAN-ODT) disintegrating tablet 4 mg  4 mg Oral Q8H PRN    Or    ondansetron (ZOFRAN) injection 4 mg  4 mg IntraVENous Q6H PRN    enoxaparin (LOVENOX) injection 40 mg  40 mg SubCUTAneous Q24H    potassium chloride (KLOR-CON M) extended release tablet 40 mEq  40 mEq Oral PRN    Or    potassium bicarb-citric acid (EFFER-K) effervescent tablet 40 mEq  40 mEq Oral PRN    Or    potassium chloride 10 mEq/100 mL IVPB (Peripheral Line)  10 mEq IntraVENous PRN    magnesium sulfate 2000 mg in 50 mL IVPB premix  2,000 mg IntraVENous PRN    HYDROcodone-acetaminophen (NORCO) 5-325 MG per tablet 1 tablet  1 tablet Oral Q4H PRN    Or    acetaminophen (TYLENOL) tablet 650 mg  650 mg Oral Q4H PRN    famotidine (PEPCID) 20 mg in sodium chloride (PF) 10 mL injection  20 mg IntraVENous Nightly     Facility-Administered Medications Ordered in Other Encounters   Medication Dose Route Frequency    0.9 % sodium chloride bolus  100 mL IntraVENous Once    iopamidol (ISOVUE-370) 76 % injection 100 mL  100 mL IntraVENous ONCE PRN       Signed:  Grover Sauer MD

## 2022-07-14 NOTE — CONSULTS
[facility] Consult Note       Referring Physician: Dr. Rene Werner Date:  7/13/2022    Admit Date:  7/13/2022    Chief Complaint:  Abdominal pain    Subjective:     History of Present Illness:  Patient is a 78 y.o. male with h/o HTN, GERD, gout, A.fib s/p Watchman, WPW s/p EP study with ablation, RCC, DM, HLD, DVT, MARIANGEL, stage III CKD, CCX, colon polyps, diverticulitis, and necrotizing pancreatitis with subsequent pancreatic insufficency who is seen in consultation at the request of Dr. Tiff Ybarra for chronic pancreatitis. Patient reportedly had severe pancreatitis in 2018. It was documented in [place] records that pancreatitis was medication induced secondary to [address]. Patient denies h/o EtOH and tobacco. IgG4 was 9.3 on 4/1/2019. Triglycerides on 2/4/22 was 501. Patient takes Norco PRN pain at home. This flare of pancreatitis began in last 7-10 days per patient but he exhibited difficulty identifying exact timeframe of onset. +Nausea with non-bloody emesis. Abdominal pain is epigastric, described as a dull ache with some radiation to his back, and is a 8/10 in severity. CT abd/pelvis without contrast on 7/12/22 with persistent peripancreatic fat stranding around the head of pancreas. Atrophy of pancreas noted. Extensive diverticulosis of colon. CCX. Admission Lipase was 1,813 on 7/12/22. On aggressive IVF with LR. PRN pain and nausea meds. NPO. Lovenox for DVT prophylaxis. Hgb 10.4 and is normocytic. Prior iron studies 1/24/22 most c/w anemia of chronic disease with low normal iron level. Patient denies rectal bleeding and melena. Stools are chronically loose. +flatus today. He reports last BM was 1-2 days ago. Negative for fevers/chills. Outpatient meds that can be associated with pancreatitis include lisinopril and pantoprazole. Patient reportedly positive for Covid infection ten days prior to admission. Covid test remains positive on 7/13/22.        MRCP (4/3/2019) at [facility]: negative biliary dilation/stone, walled-off necrosis of pancreatic body and tail, hepatomegaly, bilateral renal cysts. EGD/EUS (6/1/21) at [facility] (Dr. Ari Feliciano): gastritis with trace bleeding, duodenitis, PD stone (3 mm). CCX. CBD clear and measures 3 mm. Pathology negative for H.pylori infection and metaplasia/dysplasia. Colonoscopy (2/19/21) at [facility]: erythema of TI, moderate pan-diverticulosis, 2 mm TA, suboptimal bowel prep. TI and colon biopsies are negative. Colonoscopy (1/2022) with Dr. Emma Ratliff: diverticulosis and IH.     PMH:  Past Medical History:   Diagnosis Date    Acute blood loss anemia 1/15/2022    Arthritis     Blurred vision, right eye     BPH (benign prostatic hyperplasia)     Gout     History of Clostridioides difficile colitis 2010    History of pancreatitis     History of renal carcinoma     partial nephrectomy    Hyperlipidemia     Hypertension     Leukocytosis 1/15/2022    Nausea & vomiting     small amount of N/V and pt not sure of it antiemetics work    Neuropathy     Paroxysmal A-fib ([address]     Presence of Watchman left atrial appendage closure device     Sleep apnea     compliant with C-pap    Thromboembolus ([address]     hx of left lower extremity    Type 2 diabetes mellitus (HCC)     insulin reliant; AVG -140; s.s. of hypoglycemia 65-75; last A1c 7.1    WPW (Robbi-Parkinson-White syndrome)     ablation x4       PSH:  Past Surgical History:   Procedure Laterality Date    ABLATION OF DYSRHYTHMIC FOCUS      WPW- ablations x3    BACK SURGERY      ruptured disc    CATARACT REMOVAL      CHOLECYSTECTOMY      COLONOSCOPY N/A 1/24/2022    COLONOSCOPY performed by Alhaji Loera MD at [facility]      partial    LUMBAR LAMINECTOMY      ORTHOPEDIC SURGERY Left     great toe straightened    ORTHOPEDIC SURGERY Left     foot    OTHER SURGICAL HISTORY      placed watchman 2/2020    TOTAL KNEE ARTHROPLASTY Right     UPPER GASTROINTESTINAL ENDOSCOPY      WISDOM TOOTH EXTRACTION         Allergies: Allergies   Allergen Reactions    Sulfamethoxazole-Trimethoprim Other (See Comments)     Elevated potassium level    Tramadol Diarrhea    Codeine Other (See Comments)     \"makes me a little crazy\"    Fenofibrate Other (See Comments)     \"causes pancreatic attacks\"    Hydromorphone Other (See Comments)     \"gives me craziness\"    Metformin Diarrhea    Sitagliptin Other (See Comments)     Per pt causes pancreatic issues       Home Medications:  Prior to Admission medications    Medication Sig Start Date End Date Taking? Authorizing Provider   lisinopril (PRINIVIL;ZESTRIL) 30 MG tablet Take 1 tablet by mouth nightly 5/26/22   Historical Provider, MD   tamsulosin (FLOMAX) 0.4 MG capsule Take 1 capsule by mouth daily 6/24/22   Historical Provider, MD   HYDROcodone-acetaminophen (NORCO) 7.5-325 MG per tablet Take 1 tablet by mouth every 6 hours as needed for Pain for up to 3 days.  7/12/22 7/15/22  Esvin Morocho, DO   ondansetron (ZOFRAN) 4 MG tablet Take 1 tablet by mouth 3 times daily as needed for Nausea or Vomiting 7/12/22   Esvin Morocho DO   ondansetron (ZOFRAN) 4 MG tablet Take 1 tablet by mouth 3 times daily as needed for Nausea or Vomiting 5/28/22   Esvin Morocho DO   B Complex Vitamins (VITAMIN B COMPLEX PO) Take 1 tablet by mouth daily    Ar Automatic Reconciliation   ZINC SULFATE PO Take by mouth    Ar Automatic Reconciliation   Acetaminophen (TYLENOL DISSOLVE PACKS) 500 MG PACK Take 1,000 mg by mouth 2 times daily    Ar Automatic Reconciliation   acetaminophen (TYLENOL) 500 MG tablet Take 500 mg by mouth every 6 hours as needed    Ar Automatic Reconciliation   allopurinol (ZYLOPRIM) 300 MG tablet Take 300 mg by mouth daily    Ar Automatic Reconciliation   ascorbic acid (VITAMIN C) 250 MG tablet Take 1,000 mg by mouth daily    Ar Automatic Reconciliation   aspirin 325 MG tablet Take 325 mg by mouth    Ar Automatic Reconciliation   vitamin D 25 MCG (1000 UT) CAPS Take 1,000 Units by mouth    Ar Automatic Reconciliation   colchicine (COLCRYS) 0.6 MG tablet Take 0.6 mg by mouth daily 11/9/21 11/9/22  Ar Automatic Reconciliation   finasteride (PROSCAR) 5 MG tablet Take 5 mg by mouth daily    Ar Automatic Reconciliation   gabapentin (NEURONTIN) 300 MG capsule Take 300 mg by mouth 3 times daily. Ar Automatic Reconciliation   glucagon 1 MG injection Inject 1 mg into the muscle as needed 4/7/22   Ar Automatic Reconciliation   insulin aspart (NOVOLOG) 100 UNIT/ML injection pen Inject into the skin 3 times daily (before meals)    Ar Automatic Reconciliation   insulin lispro (HUMALOG) 100 UNIT/ML SOLN injection vial INJECT 100 UNITS ONCE DAILY VIA PUMP 12/17/21   Ar Automatic Reconciliation   oxybutynin (DITROPAN-XL) 10 MG extended release tablet Take 10 mg by mouth daily 2/15/22   Ar Automatic Reconciliation   lipase-protease-amylase (CREON) 28833-120946 units CPEP delayed release capsule Take 4 capsules by mouth    Ar Automatic Reconciliation   pantoprazole (PROTONIX) 20 MG tablet Take 20 mg by mouth daily    Ar Automatic Reconciliation   tadalafil (CIALIS) 20 MG tablet Take 20 mg by mouth as needed 2/15/22   Ar Automatic Reconciliation   traMADol (ULTRAM) 50 MG tablet Take 50 mg by mouth every 6 hours as needed.  1/6/22   Ar Automatic Reconciliation   vancomycin (VANCOCIN) 125 MG capsule Take 125 mg by mouth 2 times daily    Ar Automatic Reconciliation       Hospital Medications:  Current Facility-Administered Medications   Medication Dose Route Frequency    [START ON 7/14/2022] finasteride (PROSCAR) tablet 5 mg  5 mg Oral Daily    gabapentin (NEURONTIN) capsule 300 mg  300 mg Oral TID    lisinopril (PRINIVIL;ZESTRIL) tablet 30 mg  30 mg Oral Nightly    lactated ringers infusion   IntraVENous Continuous    Followed by   Maurice Monique ON 7/14/2022] lactated ringers infusion   IntraVENous Continuous    sodium chloride flush 0.9 % injection 5-40 mL  5-40 mL IntraVENous 2 times per day    sodium chloride flush 0.9 % injection 5-40 mL  5-40 mL IntraVENous PRN    0.9 % sodium chloride infusion   IntraVENous PRN    ondansetron (ZOFRAN-ODT) disintegrating tablet 4 mg  4 mg Oral Q8H PRN    Or    ondansetron (ZOFRAN) injection 4 mg  4 mg IntraVENous Q6H PRN    enoxaparin (LOVENOX) injection 40 mg  40 mg SubCUTAneous Q24H    potassium chloride (KLOR-CON M) extended release tablet 40 mEq  40 mEq Oral PRN    Or    potassium bicarb-citric acid (EFFER-K) effervescent tablet 40 mEq  40 mEq Oral PRN    Or    potassium chloride 10 mEq/100 mL IVPB (Peripheral Line)  10 mEq IntraVENous PRN    magnesium sulfate 2000 mg in 50 mL IVPB premix  2,000 mg IntraVENous PRN    HYDROcodone-acetaminophen (NORCO) 5-325 MG per tablet 1 tablet  1 tablet Oral Q4H PRN    Or    acetaminophen (TYLENOL) tablet 650 mg  650 mg Oral Q4H PRN     Facility-Administered Medications Ordered in Other Encounters   Medication Dose Route Frequency    0.9 % sodium chloride bolus  100 mL IntraVENous Once    iopamidol (ISOVUE-370) 76 % injection 100 mL  100 mL IntraVENous ONCE PRN       Social History:  Social History     Tobacco Use    Smoking status: Never Smoker    Smokeless tobacco: Never Used   Substance Use Topics    Alcohol use: Not Currently     Pt denies any history of IV drug use, blood transfusions, and tattoos. Negative for EtOH use. Negative for tobacco use. Family History:  Family History   Problem Relation Age of Onset    Cancer Sister         ovarian    No Known Problems Sister     Cancer Sister         colon ca    Parkinson's Disease Father     Other Mother         ALS    Crohn's Disease Son     Crohn's Disease Daughter        Review of Systems:  A detailed 10 system ROS is obtained, with pertinent positives as listed above. All others are negative.     Diet:  NPO    Objective:     Physical Exam:  Vitals:  /60   Pulse 72   Temp 98.2 °F (36.8 °C) (Oral)   Resp 16   Ht 5' 10\" (1.778 m)   Wt 219 lb (99.3 kg)   SpO2 97%   BMI 31.42 kg/m²   Gen:  No acute distress  HEENT: Sclerae anicteric. MMM. Cardiovascular: Regular rate and rhythm. Respiratory:  CTA bilaterally  GI:  Soft, +epigastric tenderness, neg rebound, ND, light BS, negative organomegaly  Rectal:  Deferred  Neurological:  Awake and alert  Skin: negative for jaundice    Laboratory:    Recent Labs     07/12/22  1457 07/13/22  1307   WBC 10.6 9.4   HGB 10.5* 10.4*   HCT 33.3* 33.0*    213   MCV 96.8 97.3    141   K 4.7 4.9   * 111*   CO2 22 20*   BUN 57* 49*          Assessment:       Principal Problem:    Pancreatitis, recurrent  Active Problems:    Hypertrophy of prostate with urinary obstruction    Robbi-Parkinson-White (WPW) syndrome    History of COVID-19    Chronic anemia    Stage 3b chronic kidney disease (HCC)    Primary hypertension    Type 2 diabetes mellitus ([facility]    History of partial nephrectomy    Paroxysmal atrial fibrillation (HCC)  Resolved Problems:    * No resolved hospital problems. *      Plan:       1. NPO  2. Aggressive IVF hydration with LR  3. CBC, BMP, Mag, Phos in AM  4. PRN antiemetics and pain meds  5. Will consider ERCP in future for PD stone. It does not appear stone was ever removed based on records and patient's report of prior procedures. 6. Would change outpatient pantoprazole to famotidine  7. Would consider changing lisinopril to alternate BP medication given potential association with pancreatitis  8. Will need to consider TPN vs distal SB feeding tube if unable to advance diet by 7/15/22  9. Restart Creon when tolerating diet  10. Famotidine for SUP  11. Continue Lovenox for DVT prophylaxis.      Will follow    Estefani Eng MD

## 2022-07-14 NOTE — CARE COORDINATION
P screened for d/c planning needs. Pt in [facility] for positive test prior to admission and a still current positive rapid test. Pt reports a recent fall. He was going to [facility] outpt therapy at [facility]. This is on hold but he wants to return when possible. He denies any d/c planning needs. He is active with PCPand has prescription coverage.         07/14/22 1115   Service Assessment   Patient Orientation Alert and Oriented   Cognition Alert   History Provided By Patient   Primary Caregiver Self   Support Systems Spouse/Significant Other   PCP Verified by CM Yes   Last Visit to PCP Within last 3 months   Prior Functional Level Independent in ADLs/IADLs   Can patient return to prior living arrangement Yes   Ability to make needs known: Good   Family able to assist with home care needs: Yes   Social/Functional History   Lives With Spouse   Type of Occupation minstry   Discharge Planning   Living Arrangements Spouse/Significant Other   Jamshid 84 Discharge   Services At/After Discharge Outpatient;PT  (current with [facility]-)   Mode of Transport at Discharge Self

## 2022-07-14 NOTE — PROGRESS NOTES
PHYSICAL THERAPY Initial Assessment and AM  (Link to Caseload Tracking: PT Visit Days : 1  Acknowledge Orders  Time In/Out  PT Charge Capture  Rehab Caseload Tracker    Gwen Syed is a 78 y.o. male   PRIMARY DIAGNOSIS: Pancreatitis, recurrent  Pancreatitis, recurrent [K85.90]  Acute pancreatitis, unspecified complication status, unspecified pancreatitis type [K85.90]       Reason for Referral: Generalized Muscle Weakness (M62.81)  Difficulty in walking, Not elsewhere classified (R26.2)  Observation: Payor: MEDICARE / Plan: MEDICARE PART A AND B / Product Type: *No Product type* /     ASSESSMENT:     REHAB RECOMMENDATIONS:   Recommendation to date pending progress:  [address] [place] [address] Therapy    Equipment:     To Be Determined     ASSESSMENT:  Mr. Antonio Barrett presents with complaints of stomach pain, weakness and nausea and is limited in his functional mobility from his baseline. He says he has been using the rollator for the past couple months following a fall but is normally independent. He transferred supine to sit with CGA and sit to stand with Min assist. Ambulated in the room and was left up in recliner with needs in reach. He will benefit from PT to increase his functional independence and endurance.       [facility] [address] [address] AM-PAC 6 Clicks Basic Mobility Inpatient Short Form  AM-PAC Mobility Inpatient   How much difficulty turning over in bed?: A Little  How much difficulty sitting down on / standing up from a chair with arms?: A Lot  How much difficulty moving from lying on back to sitting on side of bed?: A Little  How much help from another person moving to and from a bed to a chair?: A Little  How much help from another person needed to walk in hospital room?: A Little  How much help from another person for climbing 3-5 steps with a railing?: A Lot  AM-PAC Inpatient Mobility Raw Score : 16  AM-PAC Inpatient T-Scale Score : 40.78  Mobility Inpatient CMS 0-100% Score: 54.16  Mobility Inpatient CMS G-Code Modifier : CK    SUBJECTIVE:   Mr. Sis Zhu states, \"I feel awful\"  Complaining of stomach pain, nausea and being cold.      Social/Functional Lives With: Spouse  Type of Home: House  Home Layout: One level  Home Access: Stairs to enter with rails  Bathroom Shower/Tub: Walk-in shower  Ambulation Assistance: Independent  Type of Occupation: (P) minstry    OBJECTIVE:     PAIN: Servando Ornelas / O2: PRECAUTION / Lambert Sable / DRAINS:   Pre Treatment: 10         Post Treatment: 10 Vitals        Oxygen      IV    RESTRICTIONS/PRECAUTIONS:                    GROSS EVALUATION: Intact Impaired (Comments):   AROM [x]  grossly functional   PROM []    Strength [x]  grossly limited but functional   Balance [] Sitting - Static: Good  Sitting - Dynamic: Good  Standing - Static: Fair  Standing - Dynamic: Fair   Posture []    Sensation [x]     Coordination [x]      Tone [x]     Edema []    Activity Tolerance []      []      COGNITION/  PERCEPTION: Intact Impaired (Comments):   Orientation [x]     Vision [x]     Hearing [x]     Cognition  [x]       MOBILITY: I Mod I S SBA CGA Min Mod Max Total  NT x2 Comments:   Bed Mobility    Rolling [] [] [] [x] [] [] [] [] [] [] []    Supine to Sit [] [] [] [] [x] [] [] [] [] [] []    Scooting [] [] [] [x] [] [] [] [] [] [] []    Sit to Supine [] [] [] [] [x] [] [] [] [] [] []    Transfers    Sit to Stand [] [] [] [] [] [x] [] [] [] [] []    Bed to Chair [] [] [] [] [x] [] [] [] [] [] []    Stand to Sit [] [] [] [] [x] [] [] [] [] [] []     [] [] [] [] [] [] [] [] [] [] []    I=Independent, Mod I=Modified Independent, S=Supervision, SBA=Standby Assistance, CGA=Contact Guard Assistance,   Min=Minimal Assistance, Mod=Moderate Assistance, Max=Maximal Assistance, Total=Total Assistance, NT=Not Tested    GAIT: I Mod I S SBA CGA Min Mod Max Total  NT x2 Comments:   Level of Assistance [] [] [] [] [x] [] [] [] [] [] []    Distance   feet    DME Rolling Walker    Gait Quality Antalgic, Decreased gary , Decreased step clearance, Decreased step length and Shuffling     Weightbearing Status      Stairs      I=Independent, Mod I=Modified Independent, S=Supervision, SBA=Standby Assistance, CGA=Contact Guard Assistance,   Min=Minimal Assistance, Mod=Moderate Assistance, Max=Maximal Assistance, Total=Total Assistance, NT=Not Tested    PLAN:   ACUTE PHYSICAL THERAPY GOALS:   (Developed with and agreed upon by patient and/or caregiver. )    LTG:  (1.)Mr. Laura Oden will move from supine to sit and sit to supine  in bed with INDEPENDENT within 7 treatment day(s). (2.)Mr. Laura Oden will transfer from bed to chair and chair to bed with MODIFIED INDEPENDENCE using the least restrictive device within 7 treatment day(s). (3.)Mr. Laura Oden will ambulate with SUPERVISION for 200 feet with the least restrictive device within 7 treatment day(s). ________________________________________________________________________________________________    FREQUENCY AND DURATION: Daily for duration of hospital stay or until stated goals are met, whichever comes first.    THERAPY PROGNOSIS: Excellent    PROBLEM LIST:   (Skilled intervention is medically necessary to address:)  Decreased Activity Tolerance  Decreased Balance  Decreased Gait Ability  Decreased Safety Awareness  Decreased Strength  Decreased Transfer Abilities  Increased Pain INTERVENTIONS PLANNED:   (Benefits and precautions of physical therapy have been discussed with the patient.)  Therapeutic Activity  Therapeutic Exercise/HEP  Gait Training  Education       TREATMENT:   EVALUATION: LOW COMPLEXITY: (Untimed Charge)    TREATMENT:   Therapeutic Activity (15 Minutes): Therapeutic activity included Rolling, Supine to Sit, Scooting, Transfer Training, Ambulation on level ground, Sitting balance  and Standing balance to improve functional Activity tolerance, Balance, Mobility and Strength.     TREATMENT GRID:  N/A    AFTER TREATMENT PRECAUTIONS: Bed/Chair Locked, Call light within reach, Chair, Needs within reach and RN notified    INTERDISCIPLINARY COLLABORATION:  MD/ PA/ NP , RN/ PCT, PT/ PTA and OT/ GARCIA    EDUCATION:      TIME IN/OUT:  Time In: 0840  Time Out: 0900  Minutes: Nichole Gray, PT

## 2022-07-14 NOTE — PROGRESS NOTES
[facility] Progress Note         Admit Date:  7/13/2022    Today's Date:  7/14/2022    CC:  Abdominal pain    Subjective:     Patient: Patient Covid 19 positive 7/13    *Note scribed by Caprice Medina. Sanya Ch, [facility] in collaboration with  [facility]. Dr. Manuela Marie to see this afternoon. *    Medications:   Current Facility-Administered Medications   Medication Dose Route Frequency    magnesium sulfate 2000 mg in 50 mL IVPB premix  2,000 mg IntraVENous Once    finasteride (PROSCAR) tablet 5 mg  5 mg Oral Daily    gabapentin (NEURONTIN) capsule 300 mg  300 mg Oral TID    lactated ringers infusion   IntraVENous Continuous    sodium chloride flush 0.9 % injection 5-40 mL  5-40 mL IntraVENous 2 times per day    sodium chloride flush 0.9 % injection 5-40 mL  5-40 mL IntraVENous PRN    0.9 % sodium chloride infusion   IntraVENous PRN    ondansetron (ZOFRAN-ODT) disintegrating tablet 4 mg  4 mg Oral Q8H PRN    Or    ondansetron (ZOFRAN) injection 4 mg  4 mg IntraVENous Q6H PRN    enoxaparin (LOVENOX) injection 40 mg  40 mg SubCUTAneous Q24H    potassium chloride (KLOR-CON M) extended release tablet 40 mEq  40 mEq Oral PRN    Or    potassium bicarb-citric acid (EFFER-K) effervescent tablet 40 mEq  40 mEq Oral PRN    Or    potassium chloride 10 mEq/100 mL IVPB (Peripheral Line)  10 mEq IntraVENous PRN    magnesium sulfate 2000 mg in 50 mL IVPB premix  2,000 mg IntraVENous PRN    HYDROcodone-acetaminophen (NORCO) 5-325 MG per tablet 1 tablet  1 tablet Oral Q4H PRN    Or    acetaminophen (TYLENOL) tablet 650 mg  650 mg Oral Q4H PRN    famotidine (PEPCID) 20 mg in sodium chloride (PF) 10 mL injection  20 mg IntraVENous Nightly     Facility-Administered Medications Ordered in Other Encounters   Medication Dose Route Frequency    0.9 % sodium chloride bolus  100 mL IntraVENous Once    iopamidol (ISOVUE-370) 76 % injection 100 mL  100 mL IntraVENous ONCE PRN       Review colon polyps, diverticulitis, and necrotizing pancreatitis with subsequent pancreatic insufficency who we are following for chronic pancreatitis now with acute pancreatitis possibly medication related (I.e.lisinopril, pantoprazole). CT abd/pelvis without contrast on 7/12/22 with persistent peripancreatic fat stranding around the head of pancreas. Atrophy of pancreas noted. Extensive diverticulosis of colon. CCX. EGD/EUS (6/1/21) at [facility] (Dr. Donnelly Service): gastritis with trace bleeding, duodenitis, PD stone (3 mm). CCX. CBD clear and measures 3 mm. Pathology negative for H.pylori infection and metaplasia/dysplasia. Admission Lipase was 1,813 on 7/12/22. On aggressive IVF with LR. Covid 19 positive 7/13    Plan:     -continue IVF and supportive care  -NPO  -Lipase   -consider ERCP in the future for PD stone, but after he has recovered from Covid and current acute pancreatitis  -Creon when tolerating diet    *Note scribed by Ruben Rayo. [facility] in collaboration with  [facility]. Dr. Duane Sin to see pt for evaluation this afternoon with further plan of care. *

## 2022-07-15 ENCOUNTER — APPOINTMENT (OUTPATIENT)
Dept: CT IMAGING | Age: 77
DRG: 439 | End: 2022-07-15
Payer: MEDICARE

## 2022-07-15 LAB
ALBUMIN SERPL-MCNC: 2.1 G/DL (ref 3.2–4.6)
ALBUMIN/GLOB SERPL: 0.5 {RATIO} (ref 1.2–3.5)
ALP SERPL-CCNC: 144 U/L (ref 50–136)
ALT SERPL-CCNC: 19 U/L (ref 12–65)
ANION GAP SERPL CALC-SCNC: 8 MMOL/L (ref 7–16)
AST SERPL-CCNC: 21 U/L (ref 15–37)
BASOPHILS # BLD: 0 K/UL (ref 0–0.2)
BASOPHILS NFR BLD: 1 % (ref 0–2)
BILIRUB SERPL-MCNC: 0.7 MG/DL (ref 0.2–1.1)
BUN SERPL-MCNC: 28 MG/DL (ref 8–23)
CALCIUM SERPL-MCNC: 8.9 MG/DL (ref 8.3–10.4)
CHLORIDE SERPL-SCNC: 110 MMOL/L (ref 98–107)
CO2 SERPL-SCNC: 22 MMOL/L (ref 21–32)
CREAT SERPL-MCNC: 1.23 MG/DL (ref 0.8–1.5)
DIFFERENTIAL METHOD BLD: ABNORMAL
EKG ATRIAL RATE: 64 BPM
EKG DIAGNOSIS: NORMAL
EKG P AXIS: 53 DEGREES
EKG P-R INTERVAL: 128 MS
EKG Q-T INTERVAL: 422 MS
EKG QRS DURATION: 94 MS
EKG QTC CALCULATION (BAZETT): 435 MS
EKG R AXIS: 28 DEGREES
EKG T AXIS: 76 DEGREES
EKG VENTRICULAR RATE: 64 BPM
EOSINOPHIL # BLD: 0.2 K/UL (ref 0–0.8)
EOSINOPHIL NFR BLD: 3 % (ref 0.5–7.8)
ERYTHROCYTE [DISTWIDTH] IN BLOOD BY AUTOMATED COUNT: 15.3 % (ref 11.9–14.6)
GLOBULIN SER CALC-MCNC: 4.1 G/DL (ref 2.3–3.5)
GLUCOSE SERPL-MCNC: 111 MG/DL (ref 65–100)
HCT VFR BLD AUTO: 30.3 % (ref 41.1–50.3)
HGB BLD-MCNC: 9.4 G/DL (ref 13.6–17.2)
IMM GRANULOCYTES # BLD AUTO: 0.3 K/UL (ref 0–0.5)
IMM GRANULOCYTES NFR BLD AUTO: 4 % (ref 0–5)
LACTATE SERPL-SCNC: 1 MMOL/L (ref 0.4–2)
LIPASE SERPL-CCNC: 104 U/L (ref 73–393)
LYMPHOCYTES # BLD: 2.2 K/UL (ref 0.5–4.6)
LYMPHOCYTES NFR BLD: 29 % (ref 13–44)
MCH RBC QN AUTO: 30.4 PG (ref 26.1–32.9)
MCHC RBC AUTO-ENTMCNC: 31 G/DL (ref 31.4–35)
MCV RBC AUTO: 98.1 FL (ref 79.6–97.8)
MONOCYTES # BLD: 0.6 K/UL (ref 0.1–1.3)
MONOCYTES NFR BLD: 8 % (ref 4–12)
NEUTS SEG # BLD: 4.2 K/UL (ref 1.7–8.2)
NEUTS SEG NFR BLD: 56 % (ref 43–78)
NRBC # BLD: 0 K/UL (ref 0–0.2)
PHOSPHATE SERPL-MCNC: 3.5 MG/DL (ref 2.3–3.7)
PLATELET # BLD AUTO: 170 K/UL (ref 150–450)
PMV BLD AUTO: 9.1 FL (ref 9.4–12.3)
POTASSIUM SERPL-SCNC: 4.6 MMOL/L (ref 3.5–5.1)
PROT SERPL-MCNC: 6.2 G/DL (ref 6.3–8.2)
RBC # BLD AUTO: 3.09 M/UL (ref 4.23–5.6)
SODIUM SERPL-SCNC: 140 MMOL/L (ref 136–145)
WBC # BLD AUTO: 7.6 K/UL (ref 4.3–11.1)

## 2022-07-15 PROCEDURE — 2500000003 HC RX 250 WO HCPCS: Performed by: INTERNAL MEDICINE

## 2022-07-15 PROCEDURE — G0378 HOSPITAL OBSERVATION PER HR: HCPCS

## 2022-07-15 PROCEDURE — 83690 ASSAY OF LIPASE: CPT

## 2022-07-15 PROCEDURE — 6370000000 HC RX 637 (ALT 250 FOR IP): Performed by: FAMILY MEDICINE

## 2022-07-15 PROCEDURE — 97530 THERAPEUTIC ACTIVITIES: CPT

## 2022-07-15 PROCEDURE — 2580000003 HC RX 258: Performed by: NURSE PRACTITIONER

## 2022-07-15 PROCEDURE — 6360000004 HC RX CONTRAST MEDICATION: Performed by: NURSE PRACTITIONER

## 2022-07-15 PROCEDURE — 96376 TX/PRO/DX INJ SAME DRUG ADON: CPT

## 2022-07-15 PROCEDURE — 2580000003 HC RX 258: Performed by: FAMILY MEDICINE

## 2022-07-15 PROCEDURE — 96372 THER/PROPH/DIAG INJ SC/IM: CPT

## 2022-07-15 PROCEDURE — A4216 STERILE WATER/SALINE, 10 ML: HCPCS | Performed by: INTERNAL MEDICINE

## 2022-07-15 PROCEDURE — 83605 ASSAY OF LACTIC ACID: CPT

## 2022-07-15 PROCEDURE — 74177 CT ABD & PELVIS W/CONTRAST: CPT

## 2022-07-15 PROCEDURE — 80053 COMPREHEN METABOLIC PANEL: CPT

## 2022-07-15 PROCEDURE — 85025 COMPLETE CBC W/AUTO DIFF WBC: CPT

## 2022-07-15 PROCEDURE — 84100 ASSAY OF PHOSPHORUS: CPT

## 2022-07-15 PROCEDURE — 6360000002 HC RX W HCPCS: Performed by: FAMILY MEDICINE

## 2022-07-15 PROCEDURE — 2580000003 HC RX 258: Performed by: INTERNAL MEDICINE

## 2022-07-15 PROCEDURE — 36415 COLL VENOUS BLD VENIPUNCTURE: CPT

## 2022-07-15 RX ORDER — FENTANYL 12 UG/H
1 PATCH TRANSDERMAL
Status: DISCONTINUED | OUTPATIENT
Start: 2022-07-15 | End: 2022-07-17

## 2022-07-15 RX ORDER — 0.9 % SODIUM CHLORIDE 0.9 %
100 INTRAVENOUS SOLUTION INTRAVENOUS
Status: COMPLETED | OUTPATIENT
Start: 2022-07-15 | End: 2022-07-15

## 2022-07-15 RX ORDER — MORPHINE SULFATE 2 MG/ML
3 INJECTION, SOLUTION INTRAMUSCULAR; INTRAVENOUS
Status: DISCONTINUED | OUTPATIENT
Start: 2022-07-15 | End: 2022-07-17

## 2022-07-15 RX ORDER — MORPHINE SULFATE 2 MG/ML
2 INJECTION, SOLUTION INTRAMUSCULAR; INTRAVENOUS
Status: DISCONTINUED | OUTPATIENT
Start: 2022-07-15 | End: 2022-07-17

## 2022-07-15 RX ORDER — AMLODIPINE BESYLATE 5 MG/1
5 TABLET ORAL DAILY
Status: DISCONTINUED | OUTPATIENT
Start: 2022-07-15 | End: 2022-07-16

## 2022-07-15 RX ORDER — SODIUM CHLORIDE 0.9 % (FLUSH) 0.9 %
10 SYRINGE (ML) INJECTION
Status: COMPLETED | OUTPATIENT
Start: 2022-07-15 | End: 2022-07-15

## 2022-07-15 RX ADMIN — MORPHINE SULFATE 2 MG: 2 INJECTION, SOLUTION INTRAMUSCULAR; INTRAVENOUS at 20:56

## 2022-07-15 RX ADMIN — DIATRIZOATE MEGLUMINE AND DIATRIZOATE SODIUM 15 ML: 660; 100 LIQUID ORAL; RECTAL at 13:57

## 2022-07-15 RX ADMIN — MORPHINE SULFATE 2 MG: 2 INJECTION, SOLUTION INTRAMUSCULAR; INTRAVENOUS at 02:50

## 2022-07-15 RX ADMIN — GABAPENTIN 300 MG: 300 CAPSULE ORAL at 20:57

## 2022-07-15 RX ADMIN — IOPAMIDOL 100 ML: 755 INJECTION, SOLUTION INTRAVENOUS at 15:42

## 2022-07-15 RX ADMIN — SODIUM CHLORIDE, POTASSIUM CHLORIDE, SODIUM LACTATE AND CALCIUM CHLORIDE: 600; 310; 30; 20 INJECTION, SOLUTION INTRAVENOUS at 09:21

## 2022-07-15 RX ADMIN — SODIUM CHLORIDE, PRESERVATIVE FREE 5 ML: 5 INJECTION INTRAVENOUS at 21:04

## 2022-07-15 RX ADMIN — AMLODIPINE BESYLATE 5 MG: 5 TABLET ORAL at 13:26

## 2022-07-15 RX ADMIN — MORPHINE SULFATE 2 MG: 2 INJECTION, SOLUTION INTRAMUSCULAR; INTRAVENOUS at 07:16

## 2022-07-15 RX ADMIN — ENOXAPARIN SODIUM 40 MG: 100 INJECTION SUBCUTANEOUS at 18:34

## 2022-07-15 RX ADMIN — GABAPENTIN 300 MG: 300 CAPSULE ORAL at 13:26

## 2022-07-15 RX ADMIN — FINASTERIDE 5 MG: 5 TABLET, FILM COATED ORAL at 09:21

## 2022-07-15 RX ADMIN — GABAPENTIN 300 MG: 300 CAPSULE ORAL at 09:21

## 2022-07-15 RX ADMIN — SODIUM CHLORIDE, POTASSIUM CHLORIDE, SODIUM LACTATE AND CALCIUM CHLORIDE: 600; 310; 30; 20 INJECTION, SOLUTION INTRAVENOUS at 18:35

## 2022-07-15 RX ADMIN — ONDANSETRON 4 MG: 2 INJECTION INTRAMUSCULAR; INTRAVENOUS at 07:16

## 2022-07-15 RX ADMIN — SODIUM CHLORIDE 100 ML: 9 INJECTION, SOLUTION INTRAVENOUS at 15:42

## 2022-07-15 RX ADMIN — SODIUM CHLORIDE, PRESERVATIVE FREE 10 ML: 5 INJECTION INTRAVENOUS at 15:42

## 2022-07-15 RX ADMIN — MORPHINE SULFATE 2 MG: 2 INJECTION, SOLUTION INTRAMUSCULAR; INTRAVENOUS at 18:15

## 2022-07-15 RX ADMIN — FAMOTIDINE 20 MG: 10 INJECTION, SOLUTION INTRAVENOUS at 20:56

## 2022-07-15 ASSESSMENT — PAIN SCALES - GENERAL
PAINLEVEL_OUTOF10: 6
PAINLEVEL_OUTOF10: 2
PAINLEVEL_OUTOF10: 6

## 2022-07-15 ASSESSMENT — PAIN DESCRIPTION - DESCRIPTORS
DESCRIPTORS: DISCOMFORT;ACHING;TENDER
DESCRIPTORS: ACHING
DESCRIPTORS: ACHING
DESCRIPTORS: THROBBING
DESCRIPTORS: DISCOMFORT;SHARP

## 2022-07-15 ASSESSMENT — PAIN DESCRIPTION - LOCATION
LOCATION: ABDOMEN
LOCATION: FOOT;LEG
LOCATION: ABDOMEN

## 2022-07-15 ASSESSMENT — PAIN DESCRIPTION - PAIN TYPE: TYPE: ACUTE PAIN

## 2022-07-15 ASSESSMENT — PAIN DESCRIPTION - ONSET: ONSET: GRADUAL

## 2022-07-15 ASSESSMENT — PAIN DESCRIPTION - FREQUENCY: FREQUENCY: INTERMITTENT

## 2022-07-15 ASSESSMENT — PAIN DESCRIPTION - ORIENTATION
ORIENTATION: UPPER
ORIENTATION: RIGHT;LEFT

## 2022-07-15 NOTE — PROGRESS NOTES
Shift assessment completed via flow sheet. Patient a/o x 4. Patient is nauseated and vomiting this morning. Per patient, small amounts at a time. Patient c/o upper abdominal pain 6/10 administered PRN morphine, see MAR. Lung sounds diminished bilaterally upon auscultation. Active bowel sounds noted in all 4 quadrants. Patient denies any further needs at this time. Bed low and locked, side rails up x 2, call light within reach. Instructed patient to call for assistance, patient verbalized understanding.

## 2022-07-15 NOTE — PROGRESS NOTES
[facility] Progress Note         Admit Date:  7/13/2022    Today's Date:  7/15/2022    CC: Abdominal pain    Subjective:     Patient: According to nursing, patient has worsening pain this morning and last night that was not improved with 2mg of Morphine. He has vomiting this morning as well. *Note scribed by Donald Navarro. Landy Simon in collaboration with Dr. Trinity Treviño  [facility] [place]. Dr. Megha Trinidad to see this afternoon. *    Medications:   Current Facility-Administered Medications   Medication Dose Route Frequency    morphine injection 2 mg  2 mg IntraVENous Q2H PRN    Or    morphine injection 4 mg  4 mg IntraVENous Q2H PRN    diclofenac sodium (VOLTAREN) 1 % gel 4 g  4 g Topical BID PRN    finasteride (PROSCAR) tablet 5 mg  5 mg Oral Daily    gabapentin (NEURONTIN) capsule 300 mg  300 mg Oral TID    lactated ringers infusion   IntraVENous Continuous    sodium chloride flush 0.9 % injection 5-40 mL  5-40 mL IntraVENous 2 times per day    sodium chloride flush 0.9 % injection 5-40 mL  5-40 mL IntraVENous PRN    0.9 % sodium chloride infusion   IntraVENous PRN    ondansetron (ZOFRAN-ODT) disintegrating tablet 4 mg  4 mg Oral Q8H PRN    Or    ondansetron (ZOFRAN) injection 4 mg  4 mg IntraVENous Q6H PRN    enoxaparin (LOVENOX) injection 40 mg  40 mg SubCUTAneous Q24H    potassium chloride (KLOR-CON M) extended release tablet 40 mEq  40 mEq Oral PRN    Or    potassium bicarb-citric acid (EFFER-K) effervescent tablet 40 mEq  40 mEq Oral PRN    Or    potassium chloride 10 mEq/100 mL IVPB (Peripheral Line)  10 mEq IntraVENous PRN    magnesium sulfate 2000 mg in 50 mL IVPB premix  2,000 mg IntraVENous PRN    famotidine (PEPCID) 20 mg in sodium chloride (PF) 10 mL injection  20 mg IntraVENous Nightly     Facility-Administered Medications Ordered in Other Encounters   Medication Dose Route Frequency    0.9 % sodium chloride bolus  100 mL IntraVENous Once    iopamidol (ISOVUE-370) 76 % injection 100 mL  100 mL IntraVENous ONCE PRN       Review of Systems:  ROS was obtained, with pertinent positives as listed above. No chest pain or SOB. Diet:  NPO    Objective:   Vitals:  BP (!) 159/85   Pulse 68   Temp 97.7 °F (36.5 °C) (Oral)   Resp 16   Ht 5' 10\" (1.778 m)   Wt 219 lb (99.3 kg)   SpO2 97%   BMI 31.42 kg/m²   Intake/Output:  No intake/output data recorded. No intake/output data recorded. Exam:    Patient came out of his room while speaking to nursing stating his pain is worse. General appearance: alert, cooperative, ill appearing  Lungs: clear to auscultation bilaterally anteriorly  Heart: regular rate and rhythm  Abdomen: soft, non-tender.  Bowel sounds normal. No masses, no organomegaly  Extremities: extremities normal, atraumatic, no cyanosis or edema  Neuro:  alert and oriented x4    Data Review (Labs):    Recent Labs     07/12/22  1457 07/13/22  1307 07/14/22  0427 07/14/22  1112 07/15/22  0408   WBC 10.6 9.4 7.9  --  7.6   HGB 10.5* 10.4* 8.9*  --  9.4*   HCT 33.3* 33.0* 28.9*  --  30.3*    213 176  --  170   MCV 96.8 97.3 99.0*  --  98.1*    141 142  --  140   K 4.7 4.9 4.7  --  4.6   * 111* 114*  --  110*   CO2 22 20* 22  --  22   BUN 57* 49* 37*  --  28*   CREATININE 2.00* 1.76* 1.33  --  1.23   CALCIUM 9.2 9.4 8.5  --  8.9   MG  --   --  1.5*  --   --    PHOS  --   --  3.5  --  3.5   GLUCOSE 74 119* 81  --  111*   ALKPHOS 142* 143*  --   --  144*   AST 26 25  --   --  21   ALT 26 26  --   --  19   BILITOT 0.2 0.7  --   --  0.7   ALBUMIN 0.6* 0.5*  --   --  0.5*   PROT 7.2 7.4  --   --  6.2*   LIPASE 1,813* 575*  --  155  --        Assessment:     Principal Problem:    Pancreatitis, recurrent  Active Problems:    Hypertrophy of prostate with urinary obstruction    Robbi-Parkinson-White (WPW) syndrome    History of COVID-19    Chronic anemia    Stage 3b chronic kidney disease (HCC)    Primary hypertension    Type 2 diabetes mellitus ([facility]    History of partial nephrectomy    Paroxysmal atrial fibrillation ([facility] [address]  Resolved Problems:    Acute renal failure with acute tubular necrosis superimposed on stage 3b chronic kidney disease ([facility] [address]  78 y.o. 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 CT abd/pelvis without contrast on 7/12/22 with persistent peripancreatic fat stranding around the head of pancreas. Atrophy of pancreas noted. Extensive diverticulosis of colon. CCX. EGD/EUS (6/1/21) at [facility] (Dr. Ari Feliciano): gastritis with trace bleeding, duodenitis, PD stone (3 mm). CCX. CBD clear and measures 3 mm. Pathology negative for H.pylori infection and metaplasia/dysplasia. Admission Lipase was 1,813 on 7/12/22. On aggressive IVF with LR. Covid 19 positive 7/13    7/15/2022; Patient with increasing abdominal pain overnight and vomiting. Lipase 155 on 7/14    Plan:   -Repeat CT of the abdomen/pelvis with Contrast to evaluate for worsening pancreatitis, abscess, necrosis  -repeat lipase  -Lactic acid  -continue IVF and supportive care with pain management and antiemetics  --consider ERCP in the future for PD stone, but after he has recovered from Covid and current acute pancreatitis    *Note scribed by Medardo Crandall. Sandra Petersen in collaboration with Dr. Masoud Stark. [facility]. Dr. Layla Perez to see pt for evaluation this afternoon with further plan of care. *

## 2022-07-15 NOTE — CARE COORDINATION
Patient care plan reviewed in interdisciplinary rounds with the following disciplines: MD, nursing, case management, therapy, and nutrition services. Pt will likely be ready for discharge in 48 hours. No discharge planning needs noted. CM will continue to follow to assist with any needs that arise.

## 2022-07-15 NOTE — PROGRESS NOTES
Hospitalist Progress Note   Admit Date:  2022  2:09 PM   Name:  Taran Alvarez   Age:  78 y.o. Sex:  male  :  1945   MRN:  [id number]   Room:  [facility]/    Presenting Complaint: Abdominal Pain     Reason(s) for Admission: Pancreatitis, recurrent [K85.90]  Acute pancreatitis, unspecified complication status, unspecified pancreatitis type [facility] Course & Interval History:   78 y.o. male with medical history of recurrent pancreatitis, Robbi-Parkinson-White, BPH who presented with abdominal pain consistent with prior episodes of pancreatitis. He has had nausea and pain that is not relieved by his Norco.  He is anorexic. He had presented to the emergency department on the prior day found to have acute pancreatitis on CT. He has had some vomiting. Of note he had COVID about 10 days prior. Subjective/24hr Events (07/15/22): Still dry-heaving without vomiting. Pain not controlled. Adjusting pain meds. Renal function stable. Anemia improving. Assessment & Plan:   Pancreatitis, recurrent  Has 4-5 episodes per year.   Admission CT confirms acute pancreatitis, lipase ~1800>>75.  -Consult gastroenterology: avoid lisinopril, pantoprazole  -fentanyl, morphine for pain control  -NPO  -CT Abdomen  7/15/2022: adding fentanyl for pain control, CT Abd     Hypertrophy of prostate with urinary obstruction  -finasteride     Robbi-Parkinson-White (WPW) syndrome  -controlled since ablation     History of COVID-19  Tested +   -out of quarantine period     Chronic anemia  -Transfuse < 7  7/15/2022: Hgb 9.4, improving, monitor     Stage 3b chronic kidney disease  -avoid nephrotoxic substances     Primary hypertension  GI rec avoid lisinopril, per pubmed ACEI/ARB likely have similar profiles though data is very scant  -discontinue lisinopril  7/15/2022: starting amlodipine     Type 2 diabetes mellitus   7/15/2022: Blood sugar reamins controlled without meds while NPO     History of partial nephrectomy  Noted        Discharge Planning:    Continue inpatient care    Diet:  Diet NPO Exceptions are: Ice Chips  DVT PPx: enoxaparin  Code status: Full Code    Hospital Problems:  Principal Problem:    Pancreatitis, recurrent  Active Problems:    Hypertrophy of prostate with urinary obstruction    Robbi-Parkinson-White (WPW) syndrome    History of COVID-19    Chronic anemia    Stage 3b chronic kidney disease ([facility] [address]    Primary hypertension    Type 2 diabetes mellitus ([facility]    History of partial nephrectomy    Paroxysmal atrial fibrillation (HCC)  Resolved Problems:    Acute renal failure with acute tubular necrosis superimposed on stage 3b chronic kidney disease (HCC)        Objective:     Patient Vitals for the past 24 hrs:   Temp Pulse Resp BP SpO2   07/15/22 0748 97.7 °F (36.5 °C) 68 16 (!) 159/85 97 %   07/15/22 0716 -- -- 16 -- --   07/15/22 0345 97.8 °F (36.6 °C) 74 14 (!) 157/81 --   07/15/22 0018 -- -- 15 -- --   07/14/22 2348 97.7 °F (36.5 °C) 66 14 (!) 158/79 --   07/14/22 2217 -- -- 15 -- --   07/14/22 2138 98.6 °F (37 °C) 71 16 126/60 96 %   07/14/22 1930 -- 71 -- -- --   07/14/22 1924 -- -- 15 -- --   07/14/22 1854 -- -- 16 -- --   07/14/22 1539 97.8 °F (36.6 °C) 73 16 (!) 160/76 93 %   07/14/22 1252 -- -- 16 -- --   07/14/22 1052 97.5 °F (36.4 °C) 73 16 (!) 146/78 97 %   07/14/22 0908 -- -- 16 -- --         Oxygen Therapy  SpO2: 97 %  O2 Device: None (Room air)    Estimated body mass index is 31.42 kg/m² as calculated from the following:    Height as of this encounter: 5' 10\" (1.778 m). Weight as of this encounter: 219 lb (99.3 kg). No intake or output data in the 24 hours ending 07/15/22 0828      Blood pressure (!) 159/85, pulse 68, temperature 97.7 °F (36.5 °C), temperature source Oral, resp. rate 16, height 5' 10\" (1.778 m), weight 219 lb (99.3 kg), SpO2 97 %. Physical Exam  Vitals and nursing note reviewed. Constitutional:       General: He is in acute distress.       Appearance: He is obese. He is ill-appearing. He is not diaphoretic. HENT:      Head: Normocephalic and atraumatic. Eyes:      Extraocular Movements: Extraocular movements intact. Cardiovascular:      Rate and Rhythm: Normal rate. Pulmonary:      Effort: Pulmonary effort is normal. No respiratory distress. Abdominal:      General: There is no distension. Palpations: Abdomen is soft. Tenderness: There is abdominal tenderness. Musculoskeletal:         General: No deformity. Skin:     Coloration: Skin is not jaundiced or pale. Neurological:      General: No focal deficit present. Mental Status: He is alert and oriented to person, place, and time. Psychiatric:         Mood and Affect: Mood normal.         Behavior: Behavior normal. Behavior is cooperative.        I have reviewed ordered lab tests and independently visualized imaging below:    Recent Labs:  Recent Results (from the past 48 hour(s))   CBC    Collection Time: 07/13/22  1:07 PM   Result Value Ref Range    WBC 9.4 4.3 - 11.1 K/uL    RBC 3.39 (L) 4.23 - 5.6 M/uL    Hemoglobin 10.4 (L) 13.6 - 17.2 g/dL    Hematocrit 33.0 (L) 41.1 - 50.3 %    MCV 97.3 79.6 - 97.8 FL    MCH 30.7 26.1 - 32.9 PG    MCHC 31.5 31.4 - 35.0 g/dL    RDW 15.6 (H) 11.9 - 14.6 %    Platelets 059 581 - 418 K/uL    MPV 9.2 (L) 9.4 - 12.3 FL    nRBC 0.00 0.0 - 0.2 K/uL   Comprehensive Metabolic Panel    Collection Time: 07/13/22  1:07 PM   Result Value Ref Range    Sodium 141 136 - 145 mmol/L    Potassium 4.9 3.5 - 5.1 mmol/L    Chloride 111 (H) 98 - 107 mmol/L    CO2 20 (L) 21 - 32 mmol/L    Anion Gap 10 7 - 16 mmol/L    Glucose 119 (H) 65 - 100 mg/dL    BUN 49 (H) 8 - 23 MG/DL    CREATININE 1.76 (H) 0.8 - 1.5 MG/DL    GFR  49 (L) >60 ml/min/1.73m2    GFR Non- 40 (L) >60 ml/min/1.73m2    Calcium 9.4 8.3 - 10.4 MG/DL    Total Bilirubin 0.7 0.2 - 1.1 MG/DL    ALT 26 12 - 65 U/L    AST 25 15 - 37 U/L    Alk Phosphatase 143 (H) 50 - 136 U/L    Total Protein 7.4 6.3 - 8.2 g/dL    Albumin 2.6 (L) 3.2 - 4.6 g/dL    Globulin 4.8 (H) 2.3 - 3.5 g/dL    Albumin/Globulin Ratio 0.5 (L) 1.2 - 3.5     Lipase    Collection Time: 07/13/22  1:07 PM   Result Value Ref Range    Lipase 575 (H) 73 - 393 U/L   Troponin    Collection Time: 07/13/22  1:07 PM   Result Value Ref Range    Troponin, High Sensitivity 13.9 0 - 14 pg/mL   EKG 12 Lead    Collection Time: 07/13/22  3:26 PM   Result Value Ref Range    Ventricular Rate 75 BPM    Atrial Rate 75 BPM    P-R Interval 134 ms    QRS Duration 90 ms    Q-T Interval 384 ms    QTc Calculation (Bazett) 428 ms    P Axis 52 degrees    R Axis 13 degrees    T Axis 83 degrees    Diagnosis !! AGE AND GENDER SPECIFIC ECG ANALYSIS !!    COVID-19, Rapid    Collection Time: 07/13/22  5:14 PM    Specimen: Nasopharyngeal   Result Value Ref Range    Source NASAL SWAB      SARS-CoV-2, Rapid Detected (AA) NOTD     Triglyceride    Collection Time: 07/14/22  4:27 AM   Result Value Ref Range    Triglycerides 290 (H) 35 - 150 MG/DL   Basic Metabolic Panel    Collection Time: 07/14/22  4:27 AM   Result Value Ref Range    Sodium 142 136 - 145 mmol/L    Potassium 4.7 3.5 - 5.1 mmol/L    Chloride 114 (H) 98 - 107 mmol/L    CO2 22 21 - 32 mmol/L    Anion Gap 6 (L) 7 - 16 mmol/L    Glucose 81 65 - 100 mg/dL    BUN 37 (H) 8 - 23 MG/DL    CREATININE 1.33 0.8 - 1.5 MG/DL    GFR African American >60 >60 ml/min/1.73m2    GFR Non- 56 (L) >60 ml/min/1.73m2    Calcium 8.5 8.3 - 10.4 MG/DL   CBC    Collection Time: 07/14/22  4:27 AM   Result Value Ref Range    WBC 7.9 4.3 - 11.1 K/uL    RBC 2.92 (L) 4.23 - 5.6 M/uL    Hemoglobin 8.9 (L) 13.6 - 17.2 g/dL    Hematocrit 28.9 (L) 41.1 - 50.3 %    MCV 99.0 (H) 79.6 - 97.8 FL    MCH 30.5 26.1 - 32.9 PG    MCHC 30.8 (L) 31.4 - 35.0 g/dL    RDW 15.3 (H) 11.9 - 14.6 %    Platelets 717 944 - 071 K/uL    MPV 9.4 9.4 - 12.3 FL    nRBC 0.00 0.0 - 0.2 K/uL   Magnesium    Collection Time: 07/14/22  4:27 AM   Result Value Ref Range    Magnesium 1.5 (L) 1.8 - 2.4 mg/dL   Phosphorus    Collection Time: 07/14/22  4:27 AM   Result Value Ref Range    Phosphorus 3.5 2.3 - 3.7 MG/DL   Troponin    Collection Time: 07/14/22  4:27 AM   Result Value Ref Range    Troponin, High Sensitivity 20.3 (H) 0 - 14 pg/mL   EKG 12 Lead    Collection Time: 07/14/22  4:49 AM   Result Value Ref Range    Ventricular Rate 64 BPM    Atrial Rate 64 BPM    P-R Interval 128 ms    QRS Duration 94 ms    Q-T Interval 422 ms    QTc Calculation (Bazett) 435 ms    P Axis 53 degrees    R Axis 28 degrees    T Axis 76 degrees    Diagnosis Normal sinus rhythm    Lipase    Collection Time: 07/14/22 11:12 AM   Result Value Ref Range    Lipase 155 73 - 393 U/L   Comprehensive Metabolic Panel w/ Reflex to MG    Collection Time: 07/15/22  4:08 AM   Result Value Ref Range    Sodium 140 136 - 145 mmol/L    Potassium 4.6 3.5 - 5.1 mmol/L    Chloride 110 (H) 98 - 107 mmol/L    CO2 22 21 - 32 mmol/L    Anion Gap 8 7 - 16 mmol/L    Glucose 111 (H) 65 - 100 mg/dL    BUN 28 (H) 8 - 23 MG/DL    CREATININE 1.23 0.8 - 1.5 MG/DL    GFR African American >60 >60 ml/min/1.73m2    GFR Non- >60 >60 ml/min/1.73m2    Calcium 8.9 8.3 - 10.4 MG/DL    Total Bilirubin 0.7 0.2 - 1.1 MG/DL    ALT 19 12 - 65 U/L    AST 21 15 - 37 U/L    Alk Phosphatase 144 (H) 50 - 136 U/L    Total Protein 6.2 (L) 6.3 - 8.2 g/dL    Albumin 2.1 (L) 3.2 - 4.6 g/dL    Globulin 4.1 (H) 2.3 - 3.5 g/dL    Albumin/Globulin Ratio 0.5 (L) 1.2 - 3.5     CBC with Auto Differential    Collection Time: 07/15/22  4:08 AM   Result Value Ref Range    WBC 7.6 4.3 - 11.1 K/uL    RBC 3.09 (L) 4.23 - 5.6 M/uL    Hemoglobin 9.4 (L) 13.6 - 17.2 g/dL    Hematocrit 30.3 (L) 41.1 - 50.3 %    MCV 98.1 (H) 79.6 - 97.8 FL    MCH 30.4 26.1 - 32.9 PG    MCHC 31.0 (L) 31.4 - 35.0 g/dL    RDW 15.3 (H) 11.9 - 14.6 %    Platelets 023 929 - 864 K/uL    MPV 9.1 (L) 9.4 - 12.3 FL    nRBC 0.00 0.0 - 0.2 K/uL    Differential Type AUTOMATED      Seg Neutrophils 56 43 - 78 %    Lymphocytes 29 13 - 44 %    Monocytes 8 4.0 - 12.0 %    Eosinophils % 3 0.5 - 7.8 %    Basophils 1 0.0 - 2.0 %    Immature Granulocytes 4 0.0 - 5.0 %    Segs Absolute 4.2 1.7 - 8.2 K/UL    Absolute Lymph # 2.2 0.5 - 4.6 K/UL    Absolute Mono # 0.6 0.1 - 1.3 K/UL    Absolute Eos # 0.2 0.0 - 0.8 K/UL    Basophils Absolute 0.0 0.0 - 0.2 K/UL    Absolute Immature Granulocyte 0.3 0.0 - 0.5 K/UL   Phosphorus    Collection Time: 07/15/22  4:08 AM   Result Value Ref Range    Phosphorus 3.5 2.3 - 3.7 MG/DL       Other Studies:  No orders to display       Current Meds:  Current Facility-Administered Medications   Medication Dose Route Frequency    morphine injection 2 mg  2 mg IntraVENous Q2H PRN    Or    morphine injection 4 mg  4 mg IntraVENous Q2H PRN    diclofenac sodium (VOLTAREN) 1 % gel 4 g  4 g Topical BID PRN    finasteride (PROSCAR) tablet 5 mg  5 mg Oral Daily    gabapentin (NEURONTIN) capsule 300 mg  300 mg Oral TID    lactated ringers infusion   IntraVENous Continuous    sodium chloride flush 0.9 % injection 5-40 mL  5-40 mL IntraVENous 2 times per day    sodium chloride flush 0.9 % injection 5-40 mL  5-40 mL IntraVENous PRN    0.9 % sodium chloride infusion   IntraVENous PRN    ondansetron (ZOFRAN-ODT) disintegrating tablet 4 mg  4 mg Oral Q8H PRN    Or    ondansetron (ZOFRAN) injection 4 mg  4 mg IntraVENous Q6H PRN    enoxaparin (LOVENOX) injection 40 mg  40 mg SubCUTAneous Q24H    potassium chloride (KLOR-CON M) extended release tablet 40 mEq  40 mEq Oral PRN    Or    potassium bicarb-citric acid (EFFER-K) effervescent tablet 40 mEq  40 mEq Oral PRN    Or    potassium chloride 10 mEq/100 mL IVPB (Peripheral Line)  10 mEq IntraVENous PRN    magnesium sulfate 2000 mg in 50 mL IVPB premix  2,000 mg IntraVENous PRN    famotidine (PEPCID) 20 mg in sodium chloride (PF) 10 mL injection  20 mg IntraVENous Nightly     Facility-Administered Medications Ordered in Other Encounters   Medication Dose Route Frequency    0.9 % sodium chloride bolus  100 mL IntraVENous Once    iopamidol (ISOVUE-370) 76 % injection 100 mL  100 mL IntraVENous ONCE PRN       Signed:  Ryanne Lofton MD

## 2022-07-15 NOTE — PROGRESS NOTES
PHYSICAL THERAPY Initial Assessment and AM  (Link to Caseload Tracking: PT Visit Days : 1  Acknowledge Orders  Time In/Out  PT Charge Capture  Rehab Caseload Tracker    Isabel Rosa is a 78 y.o. male   PRIMARY DIAGNOSIS: Pancreatitis, recurrent  Pancreatitis, recurrent [K85.90]  Acute pancreatitis, unspecified complication status, unspecified pancreatitis type [K85.90]       Reason for Referral: Generalized Muscle Weakness (M62.81)  Difficulty in walking, Not elsewhere classified (R26.2)  Observation: Payor: MEDICARE / Plan: MEDICARE PART A AND B / Product Type: *No Product type* /     ASSESSMENT:     REHAB RECOMMENDATIONS:   Recommendation to date pending progress:  Setting:  Home Health Therapy    Equipment:    To Be Determined     ASSESSMENT:  Mr. Sandra Engel continues to be limited by severe abdominal pain, limited to 100' room-level ambulation with rollator and SBA. He was also able to perform 5 reps of sit to stands before reporting nausea. He will likely be able to return home with assist once medically cleared. [employer] [facility] 6 Clicks Basic Mobility Inpatient Short Form  AM-PAC Mobility Inpatient   How much difficulty turning over in bed?: A Little  How much difficulty sitting down on / standing up from a chair with arms?: A Lot  How much difficulty moving from lying on back to sitting on side of bed?: A Little  How much help from another person moving to and from a bed to a chair?: A Little  How much help from another person needed to walk in hospital room?: A Little  How much help from another person for climbing 3-5 steps with a railing?: A Lot  AM-PAC Inpatient Mobility Raw Score : 16  AM-PAC Inpatient T-Scale Score : 40.78  Mobility Inpatient CMS 0-100% Score: 54.16  Mobility Inpatient CMS G-Code Modifier : CK    SUBJECTIVE:   Mr. Sandra Engel states, \"my stomach is killing me. \"     Social/Functional Lives With: Spouse  Type of Home: House  Home Layout: One level  Home Access: Stairs to enter with rails  Bathroom Shower/Tub: Walk-in shower  Ambulation Assistance: Independent  Type of Occupation: minstry    OBJECTIVE:     PAIN: [place] / O2: PRECAUTION / [place] / DRAINS:   Pre Treatment: 8         Post Treatment: 10 Vitals        Oxygen  O2 Therapy: Room air   IV    RESTRICTIONS/PRECAUTIONS:                    GROSS EVALUATION: Intact Impaired (Comments):   AROM [x]  grossly functional   PROM []    Strength [x]  grossly limited but functional   Balance []     Posture []    Sensation [x]     Coordination [x]      Tone [x]     Edema []    Activity Tolerance []      []      COGNITION/  PERCEPTION: Intact Impaired (Comments):   Orientation [x]     Vision [x]     Hearing [x]     Cognition  [x]       MOBILITY: I Mod I S SBA CGA Min Mod Max Total  NT x2 Comments:   Bed Mobility    Rolling [] [] [] [x] [] [] [] [] [] [] []    Supine to Sit [] [] [] [] [] [x] [] [] [] [] []    Scooting [] [] [] [x] [] [] [] [] [] [] []    Sit to Supine [] [] [] [] [] [] [] [] [] [x] []    Transfers    Sit to Stand [] [] [] [] [] [x] [] [] [] [] []    Bed to Chair [] [] [] [] [x] [] [] [] [] [] []    Stand to Sit [] [] [] [] [x] [] [] [] [] [] []     [] [] [] [] [] [] [] [] [] [] []    I=Independent, Mod I=Modified Independent, S=Supervision, SBA=Standby Assistance, CGA=Contact Guard Assistance,   Min=Minimal Assistance, Mod=Moderate Assistance, Max=Maximal Assistance, Total=Total Assistance, NT=Not Tested    GAIT: I Mod I S SBA CGA Min Mod Max Total  NT x2 Comments:   Level of Assistance [] [] [] [] [x] [] [] [] [] [] []    Distance 100 feet    DME Rolling Walker    Gait Quality Antalgic, Decreased gary , Decreased step clearance, Decreased step length and Shuffling     Weightbearing Status      Stairs      I=Independent, Mod I=Modified Independent, S=Supervision, SBA=Standby Assistance, CGA=Contact Guard Assistance,   Min=Minimal Assistance, Mod=Moderate Assistance, Max=Maximal Assistance, Total=Total Assistance, NT=Not Tested    PLAN:   ACUTE PHYSICAL THERAPY GOALS:   (Developed with and agreed upon by patient and/or caregiver. )    LTG:  (1.)Mr. Andres Knox will move from supine to sit and sit to supine  in bed with INDEPENDENT within 7 treatment day(s). (2.)Mr. Andres Knox will transfer from bed to chair and chair to bed with MODIFIED INDEPENDENCE using the least restrictive device within 7 treatment day(s). (3.)Mr. Andres Knox will ambulate with SUPERVISION for 200 feet with the least restrictive device within 7 treatment day(s). ________________________________________________________________________________________________    FREQUENCY AND DURATION: Daily for duration of hospital stay or until stated goals are met, whichever comes first.    THERAPY PROGNOSIS: Excellent    PROBLEM LIST:   (Skilled intervention is medically necessary to address:)  Decreased Activity Tolerance  Decreased Balance  Decreased Gait Ability  Decreased Safety Awareness  Decreased Strength  Decreased Transfer Abilities  Increased Pain INTERVENTIONS PLANNED:   (Benefits and precautions of physical therapy have been discussed with the patient.)  Therapeutic Activity  Therapeutic Exercise/HEP  Gait Training  Education       TREATMENT:   EVALUATION: LOW COMPLEXITY: (Untimed Charge)    TREATMENT:   Therapeutic Activity (25 Minutes): Therapeutic activity included Rolling, Supine to Sit, Scooting, Transfer Training, Ambulation on level ground, Sitting balance , Standing balance, and HEP to improve functional Activity tolerance, Balance, Mobility, Strength, and safety. Therex: Ankle pumps, quad sets, and glut sets for DVT prophylaxis, to be performed hourly. Performed 5 reps sit to stand with UE support before reporting nausea.       TREATMENT GRID:  N/A    AFTER TREATMENT PRECAUTIONS: Bed/Chair Locked, Call light within reach, Chair, Needs within reach and RN notified    INTERDISCIPLINARY COLLABORATION:  MD/ PA/ NP , RN/ PCT, PT/ PTA and OT/ GARCIA    EDUCATION: TIME IN/OUT:  Time In: 8725  Time Out: 7854  Minutes: [address], PT

## 2022-07-16 PROBLEM — K85.91 NECROTIZING PANCREATITIS: Status: ACTIVE | Noted: 2022-07-16

## 2022-07-16 LAB
ALBUMIN SERPL-MCNC: 2.1 G/DL (ref 3.2–4.6)
ALBUMIN/GLOB SERPL: 0.5 {RATIO} (ref 1.2–3.5)
ALP SERPL-CCNC: 154 U/L (ref 50–136)
ALT SERPL-CCNC: 17 U/L (ref 12–65)
ANION GAP SERPL CALC-SCNC: 8 MMOL/L (ref 7–16)
AST SERPL-CCNC: 21 U/L (ref 15–37)
BASOPHILS # BLD: 0.1 K/UL (ref 0–0.2)
BASOPHILS NFR BLD: 1 % (ref 0–2)
BILIRUB SERPL-MCNC: 0.7 MG/DL (ref 0.2–1.1)
BUN SERPL-MCNC: 24 MG/DL (ref 8–23)
CALCIUM SERPL-MCNC: 9 MG/DL (ref 8.3–10.4)
CHLORIDE SERPL-SCNC: 108 MMOL/L (ref 98–107)
CO2 SERPL-SCNC: 24 MMOL/L (ref 21–32)
CREAT SERPL-MCNC: 1.23 MG/DL (ref 0.8–1.5)
DIFFERENTIAL METHOD BLD: ABNORMAL
EOSINOPHIL # BLD: 0.2 K/UL (ref 0–0.8)
EOSINOPHIL NFR BLD: 3 % (ref 0.5–7.8)
ERYTHROCYTE [DISTWIDTH] IN BLOOD BY AUTOMATED COUNT: 15 % (ref 11.9–14.6)
GLOBULIN SER CALC-MCNC: 3.9 G/DL (ref 2.3–3.5)
GLUCOSE SERPL-MCNC: 98 MG/DL (ref 65–100)
HCT VFR BLD AUTO: 30.1 % (ref 41.1–50.3)
HGB BLD-MCNC: 9.4 G/DL (ref 13.6–17.2)
IMM GRANULOCYTES # BLD AUTO: 0.2 K/UL (ref 0–0.5)
IMM GRANULOCYTES NFR BLD AUTO: 3 % (ref 0–5)
LYMPHOCYTES # BLD: 2 K/UL (ref 0.5–4.6)
LYMPHOCYTES NFR BLD: 28 % (ref 13–44)
MCH RBC QN AUTO: 30.1 PG (ref 26.1–32.9)
MCHC RBC AUTO-ENTMCNC: 31.2 G/DL (ref 31.4–35)
MCV RBC AUTO: 96.5 FL (ref 79.6–97.8)
MONOCYTES # BLD: 0.6 K/UL (ref 0.1–1.3)
MONOCYTES NFR BLD: 8 % (ref 4–12)
NEUTS SEG # BLD: 4.1 K/UL (ref 1.7–8.2)
NEUTS SEG NFR BLD: 57 % (ref 43–78)
NRBC # BLD: 0 K/UL (ref 0–0.2)
PHOSPHATE SERPL-MCNC: 3.7 MG/DL (ref 2.3–3.7)
PLATELET # BLD AUTO: 182 K/UL (ref 150–450)
PMV BLD AUTO: 9.1 FL (ref 9.4–12.3)
POTASSIUM SERPL-SCNC: 4.2 MMOL/L (ref 3.5–5.1)
PROT SERPL-MCNC: 6 G/DL (ref 6.3–8.2)
RBC # BLD AUTO: 3.12 M/UL (ref 4.23–5.6)
SODIUM SERPL-SCNC: 140 MMOL/L (ref 136–145)
WBC # BLD AUTO: 7.2 K/UL (ref 4.3–11.1)

## 2022-07-16 PROCEDURE — 84100 ASSAY OF PHOSPHORUS: CPT

## 2022-07-16 PROCEDURE — 6370000000 HC RX 637 (ALT 250 FOR IP): Performed by: INTERNAL MEDICINE

## 2022-07-16 PROCEDURE — 2580000003 HC RX 258: Performed by: FAMILY MEDICINE

## 2022-07-16 PROCEDURE — 36415 COLL VENOUS BLD VENIPUNCTURE: CPT

## 2022-07-16 PROCEDURE — 1100000000 HC RM PRIVATE

## 2022-07-16 PROCEDURE — 6370000000 HC RX 637 (ALT 250 FOR IP): Performed by: FAMILY MEDICINE

## 2022-07-16 PROCEDURE — 6360000002 HC RX W HCPCS: Performed by: FAMILY MEDICINE

## 2022-07-16 PROCEDURE — G0378 HOSPITAL OBSERVATION PER HR: HCPCS

## 2022-07-16 PROCEDURE — 85025 COMPLETE CBC W/AUTO DIFF WBC: CPT

## 2022-07-16 PROCEDURE — 80053 COMPREHEN METABOLIC PANEL: CPT

## 2022-07-16 RX ORDER — FAMOTIDINE 20 MG/1
20 TABLET, FILM COATED ORAL 2 TIMES DAILY
Status: DISCONTINUED | OUTPATIENT
Start: 2022-07-16 | End: 2022-07-18 | Stop reason: HOSPADM

## 2022-07-16 RX ADMIN — ENOXAPARIN SODIUM 40 MG: 100 INJECTION SUBCUTANEOUS at 18:46

## 2022-07-16 RX ADMIN — GABAPENTIN 300 MG: 300 CAPSULE ORAL at 15:00

## 2022-07-16 RX ADMIN — MORPHINE SULFATE 2 MG: 2 INJECTION, SOLUTION INTRAMUSCULAR; INTRAVENOUS at 11:44

## 2022-07-16 RX ADMIN — FINASTERIDE 5 MG: 5 TABLET, FILM COATED ORAL at 09:08

## 2022-07-16 RX ADMIN — SODIUM CHLORIDE, PRESERVATIVE FREE 5 ML: 5 INJECTION INTRAVENOUS at 21:00

## 2022-07-16 RX ADMIN — AMLODIPINE BESYLATE 5 MG: 5 TABLET ORAL at 09:08

## 2022-07-16 RX ADMIN — SODIUM CHLORIDE, POTASSIUM CHLORIDE, SODIUM LACTATE AND CALCIUM CHLORIDE: 600; 310; 30; 20 INJECTION, SOLUTION INTRAVENOUS at 18:47

## 2022-07-16 RX ADMIN — GABAPENTIN 300 MG: 300 CAPSULE ORAL at 09:08

## 2022-07-16 RX ADMIN — LISINOPRIL 30 MG: 20 TABLET ORAL at 20:59

## 2022-07-16 RX ADMIN — FAMOTIDINE 20 MG: 20 TABLET, FILM COATED ORAL at 21:00

## 2022-07-16 RX ADMIN — MORPHINE SULFATE 2 MG: 2 INJECTION, SOLUTION INTRAMUSCULAR; INTRAVENOUS at 00:52

## 2022-07-16 RX ADMIN — GABAPENTIN 300 MG: 300 CAPSULE ORAL at 21:00

## 2022-07-16 RX ADMIN — FAMOTIDINE 20 MG: 20 TABLET, FILM COATED ORAL at 11:43

## 2022-07-16 RX ADMIN — PANCRELIPASE LIPASE, PANCRELIPASE PROTEASE, PANCRELIPASE AMYLASE 35000 UNITS: 5000; 17000; 24000 CAPSULE, DELAYED RELEASE ORAL at 11:43

## 2022-07-16 RX ADMIN — PANCRELIPASE LIPASE, PANCRELIPASE PROTEASE, PANCRELIPASE AMYLASE 35000 UNITS: 5000; 17000; 24000 CAPSULE, DELAYED RELEASE ORAL at 18:46

## 2022-07-16 ASSESSMENT — PAIN DESCRIPTION - PAIN TYPE: TYPE: ACUTE PAIN

## 2022-07-16 ASSESSMENT — PAIN SCALES - GENERAL
PAINLEVEL_OUTOF10: 2
PAINLEVEL_OUTOF10: 6
PAINLEVEL_OUTOF10: 3
PAINLEVEL_OUTOF10: 5
PAINLEVEL_OUTOF10: 2

## 2022-07-16 ASSESSMENT — PAIN DESCRIPTION - DESCRIPTORS
DESCRIPTORS: ACHING
DESCRIPTORS: DISCOMFORT;DULL

## 2022-07-16 ASSESSMENT — PAIN DESCRIPTION - LOCATION
LOCATION: ABDOMEN

## 2022-07-16 ASSESSMENT — PAIN DESCRIPTION - ORIENTATION: ORIENTATION: UPPER

## 2022-07-16 ASSESSMENT — PAIN DESCRIPTION - ONSET: ONSET: AWAKENED FROM SLEEP

## 2022-07-16 ASSESSMENT — PAIN DESCRIPTION - FREQUENCY: FREQUENCY: INTERMITTENT

## 2022-07-16 NOTE — PROGRESS NOTES
Hospitalist Progress Note   Admit Date:  2022  2:09 PM   Name:  Kody Collazo   Age:  78 y.o. Sex:  male  :  1945   MRN:  [id number]   Room:  [facility]/01    Presenting Complaint: Abdominal Pain     Reason(s) for Admission: Pancreatitis, recurrent [K85.90]  Acute pancreatitis, unspecified complication status, unspecified pancreatitis type [facility] Course & Interval History:   78 y.o. male with medical history of recurrent pancreatitis, Robbi-Parkinson-White, BPH who presented with abdominal pain consistent with prior episodes of pancreatitis. He has had nausea and pain that is not relieved by his Norco.  He is anorexic. He had presented to the emergency department on the prior day found to have acute pancreatitis on CT. He has had some vomiting. Of note he had COVID about 10 days prior. Subjective/24hr Events (22): Fentanyl has gotten his pain under control and he is using less prn medication. He is interested in eating. Advancing diet to CLD. Discussed case with Gastroenterologist and will plan for outpatient follow up for imaging and stent after flare has resolved. Agrees with dosing of creon, will continue to titrate. Assessment & Plan:   Pancreatitis, recurrent  Has 4-5 episodes per year.   Admission CT confirms acute pancreatitis, lipase ~1800>>75.  -Consult gastroenterology: avoid lisinopril, pantoprazole  -fentanyl, morphine for pain control  -NPO  -CT Abdomen  2022: continue fentanyl for pain control, advance diet to CLD     Hypertrophy of prostate with urinary obstruction  -finasteride     Robbi-Parkinson-White (WPW) syndrome  -controlled since ablation     History of COVID-19  Tested +   -out of quarantine period     Chronic anemia  -Transfuse < 7  2022: Again Hgb 9.4, anemia labs in am, monitor     Stage 3b chronic kidney disease  -avoid nephrotoxic substances     Primary hypertension  GI rec avoid lisinopril, per pubmed ACEI/ARB likely have similar profiles though data is very scant  -discontinue lisinopril  7/16/2022: stopping amlodipine, will reevaluate pressures after not in pain     Type 2 diabetes mellitus   7/16/2022: Blood sugar reamins controlled without meds while NPO/CLD     History of partial nephrectomy  Noted        Discharge Planning:    Continue inpatient care    Diet:  ADULT DIET; Full Liquid; Low Fat/Low Chol/High Fiber/JERRELL  DVT PPx: enoxaparin  Code status: Full Code    Hospital Problems:  Principal Problem:    Pancreatitis, recurrent  Active Problems:    Hypertrophy of prostate with urinary obstruction    Robbi-Parkinson-White (WPW) syndrome    History of COVID-19    Chronic anemia    Stage 3b chronic kidney disease (HCC)    Primary hypertension    Type 2 diabetes mellitus (HCC)    History of partial nephrectomy    Paroxysmal atrial fibrillation (HCC)  Resolved Problems:    Acute renal failure with acute tubular necrosis superimposed on stage 3b chronic kidney disease (HCC)        Objective:     Patient Vitals for the past 24 hrs:   Temp Pulse Resp BP SpO2   07/16/22 1227 97.9 °F (36.6 °C) 74 20 (!) 156/90 97 %   07/16/22 1144 -- -- 16 -- --   07/16/22 0816 97.8 °F (36.6 °C) 88 18 109/81 93 %   07/16/22 0335 98 °F (36.7 °C) 73 20 135/75 96 %   07/16/22 0122 -- -- 16 -- --   07/16/22 0050 97.9 °F (36.6 °C) 80 20 (!) 150/84 94 %   07/15/22 2126 -- -- 18 -- --   07/15/22 2032 98.4 °F (36.9 °C) 81 20 (!) 175/84 95 %   07/15/22 1615 97.7 °F (36.5 °C) 63 18 (!) 182/86 97 %   07/15/22 1326 -- -- 16 -- --         Oxygen Therapy  SpO2: 97 %  O2 Device: None (Room air)    Estimated body mass index is 31.42 kg/m² as calculated from the following:    Height as of this encounter: 5' 10\" (1.778 m). Weight as of this encounter: 219 lb (99.3 kg). No intake or output data in the 24 hours ending 07/16/22 1253      Blood pressure (!) 156/90, pulse 74, temperature 97.9 °F (36.6 °C), temperature source Oral, resp.  rate 20, height 5' 10\" (1.778 m), weight 219 lb (99.3 kg), SpO2 97 %. Physical Exam  Vitals and nursing note reviewed. Constitutional:       General: He is not in acute distress. Appearance: He is obese. He is not ill-appearing or diaphoretic. HENT:      Head: Normocephalic and atraumatic. Eyes:      Extraocular Movements: Extraocular movements intact. Cardiovascular:      Rate and Rhythm: Normal rate. Pulmonary:      Effort: Pulmonary effort is normal. No respiratory distress. Abdominal:      General: There is no distension. Palpations: Abdomen is soft. Musculoskeletal:         General: No deformity. Skin:     Coloration: Skin is not jaundiced or pale. Neurological:      General: No focal deficit present. Mental Status: He is alert and oriented to person, place, and time. Psychiatric:         Mood and Affect: Mood normal.         Behavior: Behavior normal. Behavior is cooperative.        I have reviewed ordered lab tests and independently visualized imaging below:    Recent Labs:  Recent Results (from the past 48 hour(s))   Comprehensive Metabolic Panel w/ Reflex to MG    Collection Time: 07/15/22  4:08 AM   Result Value Ref Range    Sodium 140 136 - 145 mmol/L    Potassium 4.6 3.5 - 5.1 mmol/L    Chloride 110 (H) 98 - 107 mmol/L    CO2 22 21 - 32 mmol/L    Anion Gap 8 7 - 16 mmol/L    Glucose 111 (H) 65 - 100 mg/dL    BUN 28 (H) 8 - 23 MG/DL    CREATININE 1.23 0.8 - 1.5 MG/DL    GFR African American >60 >60 ml/min/1.73m2    GFR Non- >60 >60 ml/min/1.73m2    Calcium 8.9 8.3 - 10.4 MG/DL    Total Bilirubin 0.7 0.2 - 1.1 MG/DL    ALT 19 12 - 65 U/L    AST 21 15 - 37 U/L    Alk Phosphatase 144 (H) 50 - 136 U/L    Total Protein 6.2 (L) 6.3 - 8.2 g/dL    Albumin 2.1 (L) 3.2 - 4.6 g/dL    Globulin 4.1 (H) 2.3 - 3.5 g/dL    Albumin/Globulin Ratio 0.5 (L) 1.2 - 3.5     CBC with Auto Differential    Collection Time: 07/15/22  4:08 AM   Result Value Ref Range    WBC 7.6 4.3 - 11.1 K/uL    RBC 3.09 (L) 4.23 - 5.6 M/uL    Hemoglobin 9.4 (L) 13.6 - 17.2 g/dL    Hematocrit 30.3 (L) 41.1 - 50.3 %    MCV 98.1 (H) 79.6 - 97.8 FL    MCH 30.4 26.1 - 32.9 PG    MCHC 31.0 (L) 31.4 - 35.0 g/dL    RDW 15.3 (H) 11.9 - 14.6 %    Platelets 179 213 - 614 K/uL    MPV 9.1 (L) 9.4 - 12.3 FL    nRBC 0.00 0.0 - 0.2 K/uL    Differential Type AUTOMATED      Seg Neutrophils 56 43 - 78 %    Lymphocytes 29 13 - 44 %    Monocytes 8 4.0 - 12.0 %    Eosinophils % 3 0.5 - 7.8 %    Basophils 1 0.0 - 2.0 %    Immature Granulocytes 4 0.0 - 5.0 %    Segs Absolute 4.2 1.7 - 8.2 K/UL    Absolute Lymph # 2.2 0.5 - 4.6 K/UL    Absolute Mono # 0.6 0.1 - 1.3 K/UL    Absolute Eos # 0.2 0.0 - 0.8 K/UL    Basophils Absolute 0.0 0.0 - 0.2 K/UL    Absolute Immature Granulocyte 0.3 0.0 - 0.5 K/UL   Phosphorus    Collection Time: 07/15/22  4:08 AM   Result Value Ref Range    Phosphorus 3.5 2.3 - 3.7 MG/DL   Lipase    Collection Time: 07/15/22  4:08 AM   Result Value Ref Range    Lipase 104 73 - 393 U/L   Lactic Acid    Collection Time: 07/15/22  9:41 AM   Result Value Ref Range    Lactic Acid, Plasma 1.0 0.4 - 2.0 MMOL/L   Comprehensive Metabolic Panel w/ Reflex to MG    Collection Time: 07/16/22  4:16 AM   Result Value Ref Range    Sodium 140 136 - 145 mmol/L    Potassium 4.2 3.5 - 5.1 mmol/L    Chloride 108 (H) 98 - 107 mmol/L    CO2 24 21 - 32 mmol/L    Anion Gap 8 7 - 16 mmol/L    Glucose 98 65 - 100 mg/dL    BUN 24 (H) 8 - 23 MG/DL    CREATININE 1.23 0.8 - 1.5 MG/DL    GFR African American >60 >60 ml/min/1.73m2    GFR Non- >60 >60 ml/min/1.73m2    Calcium 9.0 8.3 - 10.4 MG/DL    Total Bilirubin 0.7 0.2 - 1.1 MG/DL    ALT 17 12 - 65 U/L    AST 21 15 - 37 U/L    Alk Phosphatase 154 (H) 50 - 136 U/L    Total Protein 6.0 (L) 6.3 - 8.2 g/dL    Albumin 2.1 (L) 3.2 - 4.6 g/dL    Globulin 3.9 (H) 2.3 - 3.5 g/dL    Albumin/Globulin Ratio 0.5 (L) 1.2 - 3.5     CBC with Auto Differential    Collection Time: 07/16/22  4:16 AM   Result Value Ref Range    WBC 7.2 4.3 - 11.1 K/uL    RBC 3.12 (L) 4.23 - 5.6 M/uL    Hemoglobin 9.4 (L) 13.6 - 17.2 g/dL    Hematocrit 30.1 (L) 41.1 - 50.3 %    MCV 96.5 79.6 - 97.8 FL    MCH 30.1 26.1 - 32.9 PG    MCHC 31.2 (L) 31.4 - 35.0 g/dL    RDW 15.0 (H) 11.9 - 14.6 %    Platelets 168 172 - 582 K/uL    MPV 9.1 (L) 9.4 - 12.3 FL    nRBC 0.00 0.0 - 0.2 K/uL    Differential Type AUTOMATED      Seg Neutrophils 57 43 - 78 %    Lymphocytes 28 13 - 44 %    Monocytes 8 4.0 - 12.0 %    Eosinophils % 3 0.5 - 7.8 %    Basophils 1 0.0 - 2.0 %    Immature Granulocytes 3 0.0 - 5.0 %    Segs Absolute 4.1 1.7 - 8.2 K/UL    Absolute Lymph # 2.0 0.5 - 4.6 K/UL    Absolute Mono # 0.6 0.1 - 1.3 K/UL    Absolute Eos # 0.2 0.0 - 0.8 K/UL    Basophils Absolute 0.1 0.0 - 0.2 K/UL    Absolute Immature Granulocyte 0.2 0.0 - 0.5 K/UL   Phosphorus    Collection Time: 07/16/22  4:16 AM   Result Value Ref Range    Phosphorus 3.7 2.3 - 3.7 MG/DL       Other Studies:  CT ABDOMEN PELVIS W IV CONTRAST Additional Contrast? None   Final Result   1. Persistent peripancreatic fat stranding about the pancreatic head which is   not significantly changed with an atrophic diminutive pancreas with homogeneous   enhancement within the residual pancreatic tissue. No peripancreatic fluid   collection. 2. Advanced narrowing of the splenic vein at the site of pancreatic head   pancreatitis suspicious for pending thrombosis of the splenic vein although this   is similar in appearance to abdomen CT May 28, 2022.              Current Meds:  Current Facility-Administered Medications   Medication Dose Route Frequency    lisinopril (PRINIVIL;ZESTRIL) tablet 30 mg  30 mg Oral Nightly    lipase-protease-amylase (ZENPEP) delayed release capsule 35,000 Units  35,000 Units Oral TID WC    famotidine (PEPCID) tablet 20 mg  20 mg Oral BID    morphine injection 3 mg  3 mg IntraVENous Q2H PRN    Or    morphine injection 2 mg  2 mg IntraVENous Q2H PRN    fentaNYL (DURAGESIC) 12 MCG/HR 1 patch  1 patch TransDERmal Q72H    diatrizoate meglumine-sodium (GASTROGRAFIN) 66-10 % solution 15 mL  15 mL Oral ONCE PRN    diclofenac sodium (VOLTAREN) 1 % gel 4 g  4 g Topical BID PRN    finasteride (PROSCAR) tablet 5 mg  5 mg Oral Daily    gabapentin (NEURONTIN) capsule 300 mg  300 mg Oral TID    lactated ringers infusion   IntraVENous Continuous    sodium chloride flush 0.9 % injection 5-40 mL  5-40 mL IntraVENous 2 times per day    sodium chloride flush 0.9 % injection 5-40 mL  5-40 mL IntraVENous PRN    0.9 % sodium chloride infusion   IntraVENous PRN    ondansetron (ZOFRAN-ODT) disintegrating tablet 4 mg  4 mg Oral Q8H PRN    Or    ondansetron (ZOFRAN) injection 4 mg  4 mg IntraVENous Q6H PRN    enoxaparin (LOVENOX) injection 40 mg  40 mg SubCUTAneous Q24H    potassium chloride (KLOR-CON M) extended release tablet 40 mEq  40 mEq Oral PRN    Or    potassium bicarb-citric acid (EFFER-K) effervescent tablet 40 mEq  40 mEq Oral PRN    Or    potassium chloride 10 mEq/100 mL IVPB (Peripheral Line)  10 mEq IntraVENous PRN    magnesium sulfate 2000 mg in 50 mL IVPB premix  2,000 mg IntraVENous PRN       Signed:  Diana Chopra MD

## 2022-07-16 NOTE — PROGRESS NOTES
7/16/2022    Admit Date: 7/13/2022    Subjective:   CHIEF COMPLAINT- abd pain  HPI      Overall-better wants to eat           Diet-clear    Appetite-good     Nausea-no   Vomiting-no          Pain-much better            BM-yes   Bleeding-no    Medications-reviewed and adjusted as appropriate   IV FLUIDS-reviewed      FAM HX-per H&P   SH-per H&P   Tob-no            Etoh-no      Past Medical History:   Diagnosis Date    Acute blood loss anemia 1/15/2022    Acute renal failure with acute tubular necrosis superimposed on stage 3b chronic kidney disease ([facility] 7/13/2022    Arthritis     Blurred vision, right eye     BPH (benign prostatic hyperplasia)     Gout     History of Clostridioides difficile colitis 2010    History of pancreatitis     History of renal carcinoma     partial nephrectomy    Hyperlipidemia     Hypertension     Leukocytosis 1/15/2022    Nausea & vomiting     small amount of N/V and pt not sure of it antiemetics work    Neuropathy     Paroxysmal A-fib ([facility]     Presence of Watchman left atrial appendage closure device     Sleep apnea     compliant with C-pap    Thromboembolus ([facility] [facility]     hx of left lower extremity    Type 2 diabetes mellitus (HCC)     insulin reliant; AVG -140; s.s. of hypoglycemia 65-75; last A1c 7.1    WPW (Robbi-Parkinson-White syndrome)     ablation x4               Past Surgical History:   Procedure Laterality Date    ABLATION OF DYSRHYTHMIC FOCUS      WPW- ablations x3    BACK SURGERY      ruptured disc    CATARACT REMOVAL      CHOLECYSTECTOMY      COLONOSCOPY N/A 1/24/2022    COLONOSCOPY performed by Giuliana Badillo MD at [address]      partial    [address] Left     great toe straightened    ORTHOPEDIC SURGERY Left     foot    OTHER SURGICAL HISTORY      placed watchman 2/2020    TOTAL KNEE ARTHROPLASTY Right     UPPER GASTROINTESTINAL ENDOSCOPY      WISDOM TOOTH EXTRACTION         ROS--                 RESP-neg CARDIAC-neg                       -neg             Further ROS as per PMH and PSH- see problem list                            Objective:     Visit Vitals  /81   Pulse 88   Temp 97.8 °F (36.6 °C) (Oral)   Resp 18   Ht 5' 10\" (1.778 m)   Wt 219 lb (99.3 kg)   SpO2 93%   BMI 31.42 kg/m²       No intake or output data in the 24 hours ending 07/16/22 1008    EXAM:     NEURO-a&o    HEENT-wnl    LUNGS-clear       COR-rrr        ABD-soft , min tenderness, no rebound, good bs        EXT-no edema                         LABS-  Lab Results   Component Value Date/Time    WBC 7.2 07/16/2022 04:16 AM    RBC 3.12 07/16/2022 04:16 AM    HGB 9.4 07/16/2022 04:16 AM    HCT 30.1 07/16/2022 04:16 AM     07/16/2022 04:16 AM     Lab Results   Component Value Date/Time     07/16/2022 04:16 AM    K 4.2 07/16/2022 04:16 AM     07/16/2022 04:16 AM    CO2 24 07/16/2022 04:16 AM    BUN 24 07/16/2022 04:16 AM    GFRAA >60 07/16/2022 04:16 AM    MG 1.5 07/14/2022 04:27 AM    PHOS 3.7 07/16/2022 04:16 AM    GLOB 3.9 07/16/2022 04:16 AM    ALT 17 07/16/2022 04:16 AM           RADIOLOGY- CT noted    Assessment:     Principal Problem:    Pancreatitis, recurrent  Active Problems:    Hypertrophy of prostate with urinary obstruction    Robbi-Parkinson-White (WPW) syndrome    History of COVID-19    Chronic anemia    Stage 3b chronic kidney disease ([address]    Primary hypertension    Type 2 diabetes mellitus ([address]    History of partial nephrectomy    Paroxysmal atrial fibrillation ([address]  Resolved Problems:    Acute renal failure with acute tubular necrosis superimposed on stage 3b chronic kidney disease ([address]    Acute flare resolving  He is interested in ERCP for his pancreatic stones and possible stenting  Plan:     Advance to discharge  Will arrange office f/u to discuss further        [address]   Kylee Fonseca MD

## 2022-07-17 PROBLEM — E66.09 CLASS 1 OBESITY DUE TO EXCESS CALORIES WITH SERIOUS COMORBIDITY AND BODY MASS INDEX (BMI) OF 31.0 TO 31.9 IN ADULT: Chronic | Status: ACTIVE | Noted: 2022-07-17

## 2022-07-17 LAB
ALBUMIN SERPL-MCNC: 2.1 G/DL (ref 3.2–4.6)
ALBUMIN/GLOB SERPL: 0.5 {RATIO} (ref 1.2–3.5)
ALP SERPL-CCNC: 170 U/L (ref 50–136)
ALT SERPL-CCNC: 18 U/L (ref 12–65)
ANION GAP SERPL CALC-SCNC: 8 MMOL/L (ref 7–16)
AST SERPL-CCNC: 19 U/L (ref 15–37)
BASOPHILS # BLD: 0.1 K/UL (ref 0–0.2)
BASOPHILS NFR BLD: 1 % (ref 0–2)
BILIRUB SERPL-MCNC: 0.6 MG/DL (ref 0.2–1.1)
BUN SERPL-MCNC: 23 MG/DL (ref 8–23)
CALCIUM SERPL-MCNC: 9 MG/DL (ref 8.3–10.4)
CHLORIDE SERPL-SCNC: 107 MMOL/L (ref 98–107)
CO2 SERPL-SCNC: 26 MMOL/L (ref 21–32)
CREAT SERPL-MCNC: 1.31 MG/DL (ref 0.8–1.5)
DIFFERENTIAL METHOD BLD: ABNORMAL
EOSINOPHIL # BLD: 0.2 K/UL (ref 0–0.8)
EOSINOPHIL NFR BLD: 3 % (ref 0.5–7.8)
ERYTHROCYTE [DISTWIDTH] IN BLOOD BY AUTOMATED COUNT: 14.8 % (ref 11.9–14.6)
GLOBULIN SER CALC-MCNC: 4 G/DL (ref 2.3–3.5)
GLUCOSE SERPL-MCNC: 95 MG/DL (ref 65–100)
HCT VFR BLD AUTO: 29.9 % (ref 41.1–50.3)
HGB BLD-MCNC: 9.3 G/DL (ref 13.6–17.2)
IMM GRANULOCYTES # BLD AUTO: 0.3 K/UL (ref 0–0.5)
IMM GRANULOCYTES NFR BLD AUTO: 3 % (ref 0–5)
LYMPHOCYTES # BLD: 2.4 K/UL (ref 0.5–4.6)
LYMPHOCYTES NFR BLD: 32 % (ref 13–44)
MCH RBC QN AUTO: 30.3 PG (ref 26.1–32.9)
MCHC RBC AUTO-ENTMCNC: 31.1 G/DL (ref 31.4–35)
MCV RBC AUTO: 97.4 FL (ref 79.6–97.8)
MONOCYTES # BLD: 0.6 K/UL (ref 0.1–1.3)
MONOCYTES NFR BLD: 9 % (ref 4–12)
NEUTS SEG # BLD: 3.9 K/UL (ref 1.7–8.2)
NEUTS SEG NFR BLD: 53 % (ref 43–78)
NRBC # BLD: 0 K/UL (ref 0–0.2)
PHOSPHATE SERPL-MCNC: 4.1 MG/DL (ref 2.3–3.7)
PLATELET # BLD AUTO: 191 K/UL (ref 150–450)
PMV BLD AUTO: 8.6 FL (ref 9.4–12.3)
POTASSIUM SERPL-SCNC: 4.2 MMOL/L (ref 3.5–5.1)
PROT SERPL-MCNC: 6.1 G/DL (ref 6.3–8.2)
RBC # BLD AUTO: 3.07 M/UL (ref 4.23–5.6)
SODIUM SERPL-SCNC: 141 MMOL/L (ref 136–145)
WBC # BLD AUTO: 7.3 K/UL (ref 4.3–11.1)

## 2022-07-17 PROCEDURE — 6360000002 HC RX W HCPCS: Performed by: FAMILY MEDICINE

## 2022-07-17 PROCEDURE — 80053 COMPREHEN METABOLIC PANEL: CPT

## 2022-07-17 PROCEDURE — 6370000000 HC RX 637 (ALT 250 FOR IP): Performed by: INTERNAL MEDICINE

## 2022-07-17 PROCEDURE — 2580000003 HC RX 258: Performed by: FAMILY MEDICINE

## 2022-07-17 PROCEDURE — 1100000000 HC RM PRIVATE

## 2022-07-17 PROCEDURE — 84100 ASSAY OF PHOSPHORUS: CPT

## 2022-07-17 PROCEDURE — 6370000000 HC RX 637 (ALT 250 FOR IP): Performed by: FAMILY MEDICINE

## 2022-07-17 PROCEDURE — 36415 COLL VENOUS BLD VENIPUNCTURE: CPT

## 2022-07-17 PROCEDURE — 85025 COMPLETE CBC W/AUTO DIFF WBC: CPT

## 2022-07-17 PROCEDURE — 97530 THERAPEUTIC ACTIVITIES: CPT

## 2022-07-17 RX ORDER — LOPERAMIDE HYDROCHLORIDE 2 MG/1
2 CAPSULE ORAL 4 TIMES DAILY PRN
Status: DISCONTINUED | OUTPATIENT
Start: 2022-07-17 | End: 2022-07-18 | Stop reason: HOSPADM

## 2022-07-17 RX ORDER — OXYCODONE HYDROCHLORIDE 5 MG/1
2.5 TABLET ORAL EVERY 6 HOURS PRN
Qty: 6 TABLET | Refills: 0 | Status: SHIPPED | OUTPATIENT
Start: 2022-07-17 | End: 2022-07-20

## 2022-07-17 RX ORDER — OXYCODONE HYDROCHLORIDE 5 MG/1
5 TABLET ORAL EVERY 4 HOURS PRN
Status: DISCONTINUED | OUTPATIENT
Start: 2022-07-17 | End: 2022-07-18 | Stop reason: HOSPADM

## 2022-07-17 RX ORDER — OXYCODONE HYDROCHLORIDE 5 MG/1
2.5 TABLET ORAL EVERY 4 HOURS PRN
Status: DISCONTINUED | OUTPATIENT
Start: 2022-07-17 | End: 2022-07-18 | Stop reason: HOSPADM

## 2022-07-17 RX ADMIN — SODIUM CHLORIDE, PRESERVATIVE FREE 5 ML: 5 INJECTION INTRAVENOUS at 20:46

## 2022-07-17 RX ADMIN — FAMOTIDINE 20 MG: 20 TABLET, FILM COATED ORAL at 20:45

## 2022-07-17 RX ADMIN — GABAPENTIN 300 MG: 300 CAPSULE ORAL at 09:36

## 2022-07-17 RX ADMIN — PANCRELIPASE LIPASE, PANCRELIPASE PROTEASE, PANCRELIPASE AMYLASE 35000 UNITS: 5000; 17000; 24000 CAPSULE, DELAYED RELEASE ORAL at 09:35

## 2022-07-17 RX ADMIN — PANCRELIPASE LIPASE, PANCRELIPASE PROTEASE, PANCRELIPASE AMYLASE 35000 UNITS: 5000; 17000; 24000 CAPSULE, DELAYED RELEASE ORAL at 18:09

## 2022-07-17 RX ADMIN — LOPERAMIDE HYDROCHLORIDE 2 MG: 2 CAPSULE ORAL at 16:34

## 2022-07-17 RX ADMIN — FINASTERIDE 5 MG: 5 TABLET, FILM COATED ORAL at 09:38

## 2022-07-17 RX ADMIN — PANCRELIPASE LIPASE, PANCRELIPASE PROTEASE, PANCRELIPASE AMYLASE 35000 UNITS: 5000; 17000; 24000 CAPSULE, DELAYED RELEASE ORAL at 12:57

## 2022-07-17 RX ADMIN — FAMOTIDINE 20 MG: 20 TABLET, FILM COATED ORAL at 09:36

## 2022-07-17 RX ADMIN — LOPERAMIDE HYDROCHLORIDE 2 MG: 2 CAPSULE ORAL at 10:37

## 2022-07-17 RX ADMIN — ENOXAPARIN SODIUM 40 MG: 100 INJECTION SUBCUTANEOUS at 18:09

## 2022-07-17 RX ADMIN — GABAPENTIN 300 MG: 300 CAPSULE ORAL at 14:21

## 2022-07-17 RX ADMIN — GABAPENTIN 300 MG: 300 CAPSULE ORAL at 20:45

## 2022-07-17 NOTE — PROGRESS NOTES
Hospitalist Progress Note   Admit Date:  2022  2:09 PM   Name:  Ed Fierro   Age:  78 y.o. Sex:  male  :  1945   MRN:  [id number]   Room:  [facility]/01    Presenting Complaint: Abdominal Pain     Reason(s) for Admission: Pancreatitis, recurrent [K85.90]  Acute pancreatitis, unspecified complication status, unspecified pancreatitis type [K85.90]  Necrotizing pancreatitis [facility] Course & Interval History:   78 y.o. male with medical history of recurrent pancreatitis, Robbi-Parkinson-White, BPH who presented with abdominal pain consistent with prior episodes of pancreatitis. He has had nausea and pain that is not relieved by his Norco.  He is anorexic. He had presented to the emergency department on the prior day found to have acute pancreatitis on CT. He has had some vomiting. Of note he had COVID about 10 days prior. Subjective/24hr Events (22): Has now resumed diet with good pain control overall. Off fentanyl. Using very little morphine. Having diarrhea. Anxious about discharge. Had originally planned to discharge this afternoon but has concerns about stability at home. Will reevaluate for discharge tomorrow. Assessment & Plan:   Pancreatitis, recurrent  Has 4-5 episodes per year.   Admission CT confirms acute pancreatitis, lipase ~1800>>75.  -Consult gastroenterology: avoid lisinopril, pantoprazole  -fentanyl, morphine for pain control  -NPO  -CT Abdomen  2022: discontinue fentanyl for pain control, advance diet to regular     Hypertrophy of prostate with urinary obstruction  -finasteride     Robbi-Parkinson-White (WPW) syndrome  -controlled since ablation     History of COVID-19  Tested +   -out of quarantine period     Chronic anemia  -Transfuse < 7  2022: Hgb 9.3     Stage 3b chronic kidney disease  -avoid nephrotoxic substances     Primary hypertension  GI rec avoid lisinopril, per pubmed ACEI/ARB likely have similar profiles though data is very scant  -discontinue lisinopril  7/17/2022: blood pressure controlled off meds     Type 2 diabetes mellitus   7/17/2022: Blood sugar reamins controlled without meds      History of partial nephrectomy  Noted        Discharge Planning:    Continue inpatient care    Diet:  ADULT DIET; Regular; Low Fat/Low Chol/High Fiber/2 gm Na  DVT PPx: enoxaparin  Code status: Full Code    Hospital Problems:  Principal Problem:    Pancreatitis, recurrent  Active Problems:    Hypertrophy of prostate with urinary obstruction    Robbi-Parkinson-White (WPW) syndrome    History of COVID-19    Necrotizing pancreatitis    Class 1 obesity due to excess calories with serious comorbidity and body mass index (BMI) of 31.0 to 31.9 in adult    Chronic anemia    Stage 3b chronic kidney disease (HCC)    Primary hypertension    Type 2 diabetes mellitus (HCC)    History of partial nephrectomy    Paroxysmal atrial fibrillation (HCC)  Resolved Problems:    Acute renal failure with acute tubular necrosis superimposed on stage 3b chronic kidney disease (HCC)        Objective:     Patient Vitals for the past 24 hrs:   Temp Pulse Resp BP SpO2   07/17/22 1141 97.9 °F (36.6 °C) 81 17 118/73 98 %   07/17/22 0754 97.7 °F (36.5 °C) 91 18 (!) 172/77 97 %   07/17/22 0305 98.2 °F (36.8 °C) 80 17 (!) 144/73 94 %   07/16/22 2300 98.3 °F (36.8 °C) 79 18 136/80 94 %   07/16/22 1932 98.2 °F (36.8 °C) 85 19 136/80 95 %         Oxygen Therapy  SpO2: 98 %  O2 Device: None (Room air)    Estimated body mass index is 31.42 kg/m² as calculated from the following:    Height as of this encounter: 5' 10\" (1.778 m). Weight as of this encounter: 219 lb (99.3 kg). No intake or output data in the 24 hours ending 07/17/22 1749      Blood pressure 118/73, pulse 81, temperature 97.9 °F (36.6 °C), temperature source Oral, resp. rate 17, height 5' 10\" (1.778 m), weight 219 lb (99.3 kg), SpO2 98 %. Physical Exam  Vitals and nursing note reviewed.    Constitutional: General: He is not in acute distress. Appearance: He is obese. He is not ill-appearing or diaphoretic. HENT:      Head: Normocephalic and atraumatic. Eyes:      Extraocular Movements: Extraocular movements intact. Cardiovascular:      Rate and Rhythm: Normal rate. Pulmonary:      Effort: Pulmonary effort is normal. No respiratory distress. Abdominal:      General: There is no distension. Palpations: Abdomen is soft. Musculoskeletal:         General: No deformity. Skin:     Coloration: Skin is not jaundiced or pale. Neurological:      General: No focal deficit present. Mental Status: He is alert and oriented to person, place, and time. Psychiatric:         Mood and Affect: Mood normal.         Behavior: Behavior normal. Behavior is cooperative.        I have reviewed ordered lab tests and independently visualized imaging below:    Recent Labs:  Recent Results (from the past 48 hour(s))   Comprehensive Metabolic Panel w/ Reflex to MG    Collection Time: 07/16/22  4:16 AM   Result Value Ref Range    Sodium 140 136 - 145 mmol/L    Potassium 4.2 3.5 - 5.1 mmol/L    Chloride 108 (H) 98 - 107 mmol/L    CO2 24 21 - 32 mmol/L    Anion Gap 8 7 - 16 mmol/L    Glucose 98 65 - 100 mg/dL    BUN 24 (H) 8 - 23 MG/DL    CREATININE 1.23 0.8 - 1.5 MG/DL    GFR African American >60 >60 ml/min/1.73m2    GFR Non- >60 >60 ml/min/1.73m2    Calcium 9.0 8.3 - 10.4 MG/DL    Total Bilirubin 0.7 0.2 - 1.1 MG/DL    ALT 17 12 - 65 U/L    AST 21 15 - 37 U/L    Alk Phosphatase 154 (H) 50 - 136 U/L    Total Protein 6.0 (L) 6.3 - 8.2 g/dL    Albumin 2.1 (L) 3.2 - 4.6 g/dL    Globulin 3.9 (H) 2.3 - 3.5 g/dL    Albumin/Globulin Ratio 0.5 (L) 1.2 - 3.5     CBC with Auto Differential    Collection Time: 07/16/22  4:16 AM   Result Value Ref Range    WBC 7.2 4.3 - 11.1 K/uL    RBC 3.12 (L) 4.23 - 5.6 M/uL    Hemoglobin 9.4 (L) 13.6 - 17.2 g/dL    Hematocrit 30.1 (L) 41.1 - 50.3 %    MCV 96.5 79.6 - 97.8 FL MCH 30.1 26.1 - 32.9 PG    MCHC 31.2 (L) 31.4 - 35.0 g/dL    RDW 15.0 (H) 11.9 - 14.6 %    Platelets 938 139 - 084 K/uL    MPV 9.1 (L) 9.4 - 12.3 FL    nRBC 0.00 0.0 - 0.2 K/uL    Differential Type AUTOMATED      Seg Neutrophils 57 43 - 78 %    Lymphocytes 28 13 - 44 %    Monocytes 8 4.0 - 12.0 %    Eosinophils % 3 0.5 - 7.8 %    Basophils 1 0.0 - 2.0 %    Immature Granulocytes 3 0.0 - 5.0 %    Segs Absolute 4.1 1.7 - 8.2 K/UL    Absolute Lymph # 2.0 0.5 - 4.6 K/UL    Absolute Mono # 0.6 0.1 - 1.3 K/UL    Absolute Eos # 0.2 0.0 - 0.8 K/UL    Basophils Absolute 0.1 0.0 - 0.2 K/UL    Absolute Immature Granulocyte 0.2 0.0 - 0.5 K/UL   Phosphorus    Collection Time: 07/16/22  4:16 AM   Result Value Ref Range    Phosphorus 3.7 2.3 - 3.7 MG/DL   Comprehensive Metabolic Panel w/ Reflex to MG    Collection Time: 07/17/22  4:56 AM   Result Value Ref Range    Sodium 141 136 - 145 mmol/L    Potassium 4.2 3.5 - 5.1 mmol/L    Chloride 107 98 - 107 mmol/L    CO2 26 21 - 32 mmol/L    Anion Gap 8 7 - 16 mmol/L    Glucose 95 65 - 100 mg/dL    BUN 23 8 - 23 MG/DL    CREATININE 1.31 0.8 - 1.5 MG/DL    GFR African American >60 >60 ml/min/1.73m2    GFR Non- 57 (L) >60 ml/min/1.73m2    Calcium 9.0 8.3 - 10.4 MG/DL    Total Bilirubin 0.6 0.2 - 1.1 MG/DL    ALT 18 12 - 65 U/L    AST 19 15 - 37 U/L    Alk Phosphatase 170 (H) 50 - 136 U/L    Total Protein 6.1 (L) 6.3 - 8.2 g/dL    Albumin 2.1 (L) 3.2 - 4.6 g/dL    Globulin 4.0 (H) 2.3 - 3.5 g/dL    Albumin/Globulin Ratio 0.5 (L) 1.2 - 3.5     CBC with Auto Differential    Collection Time: 07/17/22  4:56 AM   Result Value Ref Range    WBC 7.3 4.3 - 11.1 K/uL    RBC 3.07 (L) 4.23 - 5.6 M/uL    Hemoglobin 9.3 (L) 13.6 - 17.2 g/dL    Hematocrit 29.9 (L) 41.1 - 50.3 %    MCV 97.4 79.6 - 97.8 FL    MCH 30.3 26.1 - 32.9 PG    MCHC 31.1 (L) 31.4 - 35.0 g/dL    RDW 14.8 (H) 11.9 - 14.6 %    Platelets 302 024 - 073 K/uL    MPV 8.6 (L) 9.4 - 12.3 FL    nRBC 0.00 0.0 - 0.2 K/uL times per day    sodium chloride flush 0.9 % injection 5-40 mL  5-40 mL IntraVENous PRN    0.9 % sodium chloride infusion   IntraVENous PRN    ondansetron (ZOFRAN-ODT) disintegrating tablet 4 mg  4 mg Oral Q8H PRN    Or    ondansetron (ZOFRAN) injection 4 mg  4 mg IntraVENous Q6H PRN    enoxaparin (LOVENOX) injection 40 mg  40 mg SubCUTAneous Q24H    potassium chloride (KLOR-CON M) extended release tablet 40 mEq  40 mEq Oral PRN    Or    potassium bicarb-citric acid (EFFER-K) effervescent tablet 40 mEq  40 mEq Oral PRN    Or    potassium chloride 10 mEq/100 mL IVPB (Peripheral Line)  10 mEq IntraVENous PRN    magnesium sulfate 2000 mg in 50 mL IVPB premix  2,000 mg IntraVENous PRN       Signed:  Sary Salas MD

## 2022-07-17 NOTE — DISCHARGE INSTRUCTIONS
Tegan Urbina MD. Schedule an appointment as soon as possible for  a visit in 2 week(s). Specialty: Gastroenterology  Why: Evaluation for stent placement  Contact information:  [facility] [id number]  [address] [place] [address], DO. Schedule an appointment as soon as possible for a  visit in 1 week(s).     Specialty: Internal Medicine  Why: Transition of Care Management  Contact information:  [address] [place] [address]  [phone]

## 2022-07-17 NOTE — DISCHARGE INSTR - DIET

## 2022-07-17 NOTE — CARE COORDINATION
78 yr-old M with pancreatitis. PT noted pt ambulated 150 ft around the room with a Rollator with Supervision. Pt is safe to be up in his room by himself. No need for home health.

## 2022-07-17 NOTE — PROGRESS NOTES
PHYSICAL THERAPY Initial Assessment and AM  (Link to Caseload Tracking: PT Visit Days : 3  Acknowledge Orders  Time In/Out  PT Charge Capture  Rehab Caseload Tracker    Kody Collazo is a 78 y.o. male   PRIMARY DIAGNOSIS: Pancreatitis, recurrent  Pancreatitis, recurrent [K85.90]  Acute pancreatitis, unspecified complication status, unspecified pancreatitis type [K85.90]  Necrotizing pancreatitis [K85.91]       Reason for Referral: Generalized Muscle Weakness (M62.81)  Difficulty in walking, Not elsewhere classified (R26.2)  Inpatient: Payor: MEDICARE / Plan: MEDICARE PART A AND B / Product Type: *No Product type* /     ASSESSMENT:     REHAB RECOMMENDATIONS:   Recommendation to date pending progress:  Setting:  Home Health Therapy    Equipment:    To Be Determined     ASSESSMENT:  Mr. Rick Saleh participated well. Supine upon arrival.  Independent with all bed mobility. Ambulated 150 ft around the room with a Rollator with Supervision. Rested few min then stood @ the sink and performs B LE exercises with good technique and no LOB. Left in supine with needs in reach. Pt is safe to be up in his room by himself.      [address] [facility] [address] [id number] AM-PAC 6 Clicks Basic Mobility Inpatient Short Form  AM-PAC Mobility Inpatient   How much difficulty turning over in bed?: A Little  How much difficulty sitting down on / standing up from a chair with arms?: A Lot  How much difficulty moving from lying on back to sitting on side of bed?: A Little  How much help from another person moving to and from a bed to a chair?: A Little  How much help from another person needed to walk in hospital room?: A Little  How much help from another person for climbing 3-5 steps with a railing?: A Lot  AM-PAC Inpatient Mobility Raw Score : 16  AM-PAC Inpatient T-Scale Score : 40.78  Mobility Inpatient CMS 0-100% Score: 54.16  Mobility Inpatient CMS G-Code Modifier : CK    SUBJECTIVE:   Mr. Rick Saleh states, \"I wish I could walk in the gonsalez\"    Social/Functional Lives With: Spouse  Type of Home: House  Home Layout: One level  Home Access: Stairs to enter with rails  Bathroom Shower/Tub: Walk-in shower  Ambulation Assistance: Independent  Type of Occupation: minstry    OBJECTIVE:     PAIN: [employer] / O2: PRECAUTION / Clifm [place] / [place]:   Pre Treatment: 8         Post Treatment: 10 Vitals        Oxygen      IV    RESTRICTIONS/PRECAUTIONS:                    GROSS EVALUATION: Intact Impaired (Comments):   AROM [x]  grossly functional   PROM []    Strength [x]  grossly limited but functional   Balance []     Posture []    Sensation [x]     Coordination [x]      Tone [x]     Edema []    Activity Tolerance []      []      COGNITION/  PERCEPTION: Intact Impaired (Comments):   Orientation [x]     Vision [x]     Hearing [x]     Cognition  [x]       MOBILITY: I Mod I S SBA CGA Min Mod Max Total  NT x2 Comments:   Bed Mobility    Rolling [x] [] [] [] [] [] [] [] [] [] []    Supine to Sit [x] [] [] [] [] [] [] [] [] [] []    Scooting [x] [] [] [] [] [] [] [] [] [] []    Sit to Supine [x] [] [] [] [] [] [] [] [] [] []    Transfers    Sit to Stand [] [] [x] [] [] [] [] [] [] [] []    Bed to Chair [] [] [x] [] [] [] [] [] [] [] []    Stand to Sit [] [] [x] [] [] [] [] [] [] [] []     [] [] [] [] [] [] [] [] [] [] []    I=Independent, Mod I=Modified Independent, S=Supervision, SBA=Standby Assistance, CGA=Contact Guard Assistance,   Min=Minimal Assistance, Mod=Moderate Assistance, Max=Maximal Assistance, Total=Total Assistance, NT=Not Tested    GAIT: I Mod I S SBA CGA Min Mod Max Total  NT x2 Comments:   Level of Assistance [] [] [x] [] [] [] [] [] [] [] [] With therapy, but independent in his room   Distan 150 feet    DME Rollator    Gait Quality Antalgic, Decreased gary , Decreased step clearance, Decreased step length, and Shuffling     Weightbearing Status      Stairs      I=Independent, Mod I=Modified Independent, S=Supervision, SBA=Standby Assistance, CGA=Contact Guard Assistance,   Min=Minimal Assistance, Mod=Moderate Assistance, Max=Maximal Assistance, Total=Total Assistance, NT=Not Tested    PLAN:   ACUTE PHYSICAL THERAPY GOALS:   (Developed with and agreed upon by patient and/or caregiver. )    LTG:  (1.)Mr. Liz Shipley will move from supine to sit and sit to supine  in bed with INDEPENDENT within 7 treatment day(s). -GOAL MET 7/17/22    (2.)Mr. Liz Shipley will transfer from bed to chair and chair to bed with MODIFIED INDEPENDENCE using the least restrictive device within 7 treatment day(s). -GOAL MET 7/17/22    (3.)Mr. Liz Shipley will ambulate with SUPERVISION for 200 feet with the least restrictive device within 7 treatment day(s). ________________________________________________________________________________________________    FREQUENCY AND DURATION: Daily for duration of hospital stay or until stated goals are met, whichever comes first.    THERAPY PROGNOSIS: Excellent    PROBLEM LIST:   (Skilled intervention is medically necessary to address:)  Decreased Activity Tolerance  Decreased Balance  Decreased Gait Ability  Decreased Safety Awareness  Decreased Strength  Decreased Transfer Abilities  Increased Pain INTERVENTIONS PLANNED:   (Benefits and precautions of physical therapy have been discussed with the patient.)  Therapeutic Activity  Therapeutic Exercise/HEP  Gait Training  Education       TREATMENT:   EVALUATION: LOW COMPLEXITY: (Untimed Charge)    TREATMENT:   Therapeutic Activity (30 Minutes): Therapeutic activity included Rolling, Supine to Sit, Scooting, Transfer Training, Ambulation on level ground, Sitting balance , and Standing balance to improve functional Activity tolerance, Balance, Mobility, and Strength.       .      TREATMENT GRID:  In standing B Date:  7/17 Date:   Date:     Activity/Exercise Parameters Parameters Parameters   Marching in place 35     Hp abd/ad 35     Hip ext 35     Knee bends 35                           AFTER TREATMENT PRECAUTIONS: Bed/Chair Locked, Call light within reach, Chair, Needs within reach and RN notified    INTERDISCIPLINARY COLLABORATION:  RN/ PCT and PT/ PTA    EDUCATION: Education Given To: Patient  Education Provided: Role of Therapy;Home Exercise Program  Education Method: Verbal;Demonstration    TIME IN/OUT:  Time In: 0730  Time Out: 0800  Minutes: [address], PTA

## 2022-07-17 NOTE — DISCHARGE SUMMARY
Hospitalist Discharge Summary   Admit Date:  2022  2:09 PM   DC Note date: 2022  Name:  Isabel Rosa   Age:  78 y.o. Sex:  male  :  1945   MRN:  [id number]   Room:  [facility]  PCP:  Ava Holloway DO    Presenting Complaint: Abdominal Pain     Initial Admission Diagnosis: Pancreatitis, recurrent [K85.90]  Acute pancreatitis, unspecified complication status, unspecified pancreatitis type [K85.90]  Necrotizing pancreatitis [K85.91]     Problem List for this Hospitalization (present on admission):    Principal Problem:    Pancreatitis, recurrent  Active Problems:    Hypertrophy of prostate with urinary obstruction    Robbi-Parkinson-White (WPW) syndrome    History of COVID-19    Necrotizing pancreatitis    Class 1 obesity due to excess calories with serious comorbidity and body mass index (BMI) of 31.0 to 31.9 in adult    Chronic anemia    Stage 3b chronic kidney disease (HCC)    Primary hypertension    Type 2 diabetes mellitus ([address]    History of partial nephrectomy    Paroxysmal atrial fibrillation (HCC)  Resolved Problems:    Acute renal failure with acute tubular necrosis superimposed on stage 3b chronic kidney disease ([address]    Did Patient have Sepsis (YES OR NO): No    Hospital Course:  78 y.o. male with medical history of recurrent pancreatitis, Robbi-Parkinson-White, BPH who presented with abdominal pain consistent with prior episodes of pancreatitis. He has had nausea and pain that is not relieved by his Norco.  He is anorexic. He had presented to the emergency department on the prior day found to have acute pancreatitis on CT. He has had some vomiting. Of note he had COVID about 10 days prior. He was made NPO. His pain was treated. Gastroenterology was consulted. His diet was resumed. He was found appropriate for discharge  with home health services and close outpatient follow up. Disposition: home with home health  Diet: ADULT DIET;  Regular; Low Fat/Low Chol/High Fiber/2 gm 6 hours as needed      allopurinol (ZYLOPRIM) 300 MG tablet Take 300 mg by mouth daily      ascorbic acid (VITAMIN C) 250 MG tablet Take 1,000 mg by mouth daily      aspirin 325 MG tablet Take 325 mg by mouth      vitamin D 25 MCG (1000 UT) CAPS Take 1,000 Units by mouth      colchicine (COLCRYS) 0.6 MG tablet Take 0.6 mg by mouth daily      finasteride (PROSCAR) 5 MG tablet Take 5 mg by mouth daily      gabapentin (NEURONTIN) 300 MG capsule Take 300 mg by mouth 3 times daily. glucagon 1 MG injection Inject 1 mg into the muscle as needed      insulin aspart (NOVOLOG) 100 UNIT/ML injection pen Inject into the skin 3 times daily (before meals)      insulin lispro (HUMALOG) 100 UNIT/ML SOLN injection vial INJECT 100 UNITS ONCE DAILY VIA PUMP      oxybutynin (DITROPAN-XL) 10 MG extended release tablet Take 10 mg by mouth daily      lipase-protease-amylase (CREON) 30632-304898 units CPEP delayed release capsule Take 4 capsules by mouth      pantoprazole (PROTONIX) 20 MG tablet Take 20 mg by mouth daily      tadalafil (CIALIS) 20 MG tablet Take 20 mg by mouth as needed      traMADol (ULTRAM) 50 MG tablet Take 50 mg by mouth every 6 hours as needed. STOP taking these medications       HYDROcodone-acetaminophen (NORCO) 7.5-325 MG per tablet Comments:   Reason for Stopping:         lisinopril (PRINIVIL;ZESTRIL) 20 MG tablet Comments:   Reason for Stopping:         predniSONE (DELTASONE) 10 MG tablet Comments:   Reason for Stopping:         vancomycin (VANCOCIN) 125 MG capsule Comments:   Reason for Stopping:               Procedures done this admission:  * No surgery found *    Consults this admission:  IP CONSULT TO GI    Echocardiogram results:  No results found for this or any previous visit. Diagnostic Imaging/Tests:   CT ABDOMEN PELVIS WO CONTRAST Additional Contrast? None    Result Date: 7/12/2022  1. Persistent peripancreatic fat stranding about the head of the pancreas.  Findings could represent Component Value Date/Time    TRIG 290 07/14/2022 04:27 AM      Thyroid  No results found for: Bee Cheek     Most Recent UA Lab Results   Component Value Date/Time    COLORU YELLOW 05/28/2022 02:50 AM    APPEARANCE CLEAR 05/28/2022 02:50 AM    SPECGRAV 1.014 05/28/2022 02:50 AM    LABPH 5.5 05/28/2022 02:50 AM    PROTEINU 300 05/28/2022 02:50 AM    GLUCOSEU Negative 05/28/2022 02:50 AM    KETUA Negative 05/28/2022 02:50 AM    BILIRUBINUR Negative 05/28/2022 02:50 AM    BLOODU Negative 05/28/2022 02:50 AM    UROBILINOGEN 0.2 05/28/2022 02:50 AM    NITRU Negative 05/28/2022 02:50 AM    LEUKOCYTESUR Negative 05/28/2022 02:50 AM    WBCUA 0-3 05/28/2022 02:50 AM    RBCUA 0-3 05/28/2022 02:50 AM    EPITHUA 0 05/28/2022 02:50 AM    BACTERIA 0 05/28/2022 02:50 AM    LABCAST 0 05/28/2022 02:50 AM          All Labs from Last 24 Hrs:  No results found for this or any previous visit (from the past 24 hour(s)).       Allergies   Allergen Reactions    Sulfamethoxazole-Trimethoprim Other (See Comments)     Elevated potassium level    Tramadol Diarrhea    Ace Inhibitors      pancreatitis    Codeine Other (See Comments)     \"makes me a little crazy\"    Fenofibrate Other (See Comments)     \"causes pancreatic attacks\"    Hydromorphone Other (See Comments)     \"gives me craziness\"    Metformin Diarrhea    Sitagliptin Other (See Comments)     Per pt causes pancreatic issues     Immunization History   Administered Date(s) Administered    COVID-19, PFIZER PURPLE top, DILUTE for use, (age 15 y+), 30mcg/0.3mL 01/22/2021, 02/09/2021    Hepatitis A Vaccine 04/01/2007, 06/03/2007    Hepatitis B 04/01/2007, 06/03/2007, 11/04/2007    Influenza Virus Vaccine 10/23/2012, 11/30/2013, 09/20/2016, 11/03/2017, 12/01/2018    Influenza, High Dose (Fluzone 65 yrs and older) 10/23/2012, 11/30/2013, 09/29/2014, 10/08/2015, 09/25/2019, 09/02/2020    Influenza, High-dose, Aleksey Palacio, 65 yrs +, IM (Fluzone) 09/02/2020, 09/28/2021    Influenza, Quadv, IM, PF (6 mo and older Fluzone, Flulaval, Fluarix, and 3 yrs and older Afluria) 09/20/2016    Influenza, Triv, inactivated, subunit, adjuvanted, IM (Fluad 65 yrs and older) 11/03/2017    Influenza, Trivalent, Recombinant, Injectable vaccine, PF 09/28/2014    MMR 04/08/2007    Meningococcal ACWY Vaccine 04/01/2007    Pneumococcal Conjugate 13-valent (Dwjqtdw00) 09/20/2016    Pneumococcal Polysaccharide ([id number]) 03/21/2010, 10/01/2014    Polio Virus Vaccine 04/01/2007    Tdap (Boostrix, Adacel) 09/19/2014    Typhoid Vaccine 04/01/2007    Yellow Fever (YF-Vax) 03/30/2007    Zoster Live (Zostavax) 01/01/2012       Recent Vital Data:  Patient Vitals for the past 24 hrs:   Temp Pulse Resp BP SpO2   07/18/22 0746 97.6 °F (36.4 °C) 75 18 137/76 95 %   07/18/22 0344 97.9 °F (36.6 °C) 81 18 (!) 152/89 95 %   07/18/22 0141 -- 88 18 -- 94 %   07/17/22 2304 97.9 °F (36.6 °C) 87 18 (!) 145/81 94 %   07/17/22 1916 98.2 °F (36.8 °C) 95 18 (!) 148/89 93 %   07/17/22 1141 97.9 °F (36.6 °C) 81 17 118/73 98 %       Oxygen Therapy  SpO2: 95 %  Pulse Oximeter Device Mode: Intermittent  Pulse Oximeter Device Location: Right, Finger  O2 Device: PAP (positive airway pressure)  O2 Delivery Method: CPAP/Bi-PAP mask    Estimated body mass index is 31.42 kg/m² as calculated from the following:    Height as of this encounter: 5' 10\" (1.778 m). Weight as of this encounter: 219 lb (99.3 kg). No intake or output data in the 24 hours ending 07/18/22 1100    Physical Exam  Vitals and nursing note reviewed. Constitutional:       General: He is not in acute distress. Appearance: He is obese. He is not ill-appearing or diaphoretic. HENT:      Head: Normocephalic and atraumatic. Eyes:      Extraocular Movements: Extraocular movements intact. Cardiovascular:      Rate and Rhythm: Normal rate. Pulmonary:      Effort: Pulmonary effort is normal. No respiratory distress. Abdominal:      General: There is no distension.       Palpations: Abdomen is soft. Musculoskeletal:         General: No deformity. Skin:     Coloration: Skin is not jaundiced or pale. Neurological:      General: No focal deficit present. Mental Status: He is alert and oriented to person, place, and time. Psychiatric:         Mood and Affect: Mood normal.         Behavior: Behavior normal. Behavior is cooperative.        Signed:  Radha Rushing MD

## 2022-07-18 PROCEDURE — 6370000000 HC RX 637 (ALT 250 FOR IP): Performed by: FAMILY MEDICINE

## 2022-07-18 PROCEDURE — 94760 N-INVAS EAR/PLS OXIMETRY 1: CPT

## 2022-07-18 PROCEDURE — 6370000000 HC RX 637 (ALT 250 FOR IP): Performed by: INTERNAL MEDICINE

## 2022-07-18 RX ADMIN — PANCRELIPASE LIPASE, PANCRELIPASE PROTEASE, PANCRELIPASE AMYLASE 35000 UNITS: 5000; 17000; 24000 CAPSULE, DELAYED RELEASE ORAL at 08:42

## 2022-07-18 RX ADMIN — GABAPENTIN 300 MG: 300 CAPSULE ORAL at 08:45

## 2022-07-18 RX ADMIN — FAMOTIDINE 20 MG: 20 TABLET, FILM COATED ORAL at 08:45

## 2022-07-18 RX ADMIN — FINASTERIDE 5 MG: 5 TABLET, FILM COATED ORAL at 08:43

## 2022-07-18 NOTE — PROGRESS NOTES
Discharge instructions provided to patient, all questions answered. Pt verbalized understanding of discharge instructions.

## 2022-07-19 ENCOUNTER — CARE COORDINATION (OUTPATIENT)
Dept: OTHER | Facility: CLINIC | Age: 77
End: 2022-07-19

## 2022-07-19 NOTE — CARE COORDINATION
[address] Transitions Initial Follow Up Call    Call within 2 business days of discharge: Yes    Patient: Kody Collazo Patient : 1945   MRN: [id number]  Reason for Admission: Acute pancreatitis  Discharge Date: 22 RARS: Readmission Risk Score: 17.8      Last Discharge [facility]       Date Complaint Diagnosis Description Type Department Provider    22 Abdominal Pain Acute pancreatitis, unspecified complication status, unspecified pancreatitis type . .. ED to Hosp-Admission (Discharged) (ADMITTED) Kashif Arriola MD; Chano Quintero .. CTN attempted to outreach per hospital discharge on 2022 for [place] call. Unable to reach patient. HIPPA compliant message left with contact information requesting a return call. Will continue to outreach per protocol.     Follow Up  Future Appointments   Date Time Provider Deon Alexander   2022 10:00 AM [facility]   2022 10:00 AM [facility]   2022 10:00 AM [facility]   2022  2:00 PM [facility]   2022  8:55 AM Marylu Tate MD [facility]   10/12/2022  9:40 AM Geetha Bullard MD [facility] AM[facility]       Tala Huerta, RN

## 2022-07-20 ENCOUNTER — CARE COORDINATION (OUTPATIENT)
Dept: OTHER | Facility: CLINIC | Age: 77
End: 2022-07-20

## 2022-07-20 NOTE — CARE COORDINATION
[address] Transitions Initial Follow Up Call    Call within 2 business days of discharge: Yes    Patient: Katelyn Obrien Patient : 1945   MRN: [id number]  Reason for Admission: Acute pancreatitis  Discharge Date: 22 RARS: Readmission Risk Score: 17.8      Last Discharge [facility]       Date Complaint Diagnosis Description Type Department Provider    22 Abdominal Pain Acute pancreatitis, unspecified complication status, unspecified pancreatitis type . .. ED to Hosp-Admission (Discharged) (ADMITTED) Cruz Barrientos MD; Gladys Tran. .. Transitions of Care Initial Call    Was this an external facility discharge? No Discharge Facility: [facility]    Challenges to be reviewed by the provider   Additional needs identified to be addressed with provider: No  none             Method of communication with provider : none    Advance Care Planning:   Does patient have an Advance Directive: not on file. Care Transition Nurse contacted the patient by telephone to perform post hospital discharge assessment. Verified name and  with patient as identifiers. Provided introduction to self, and explanation of the CTN role. CTN reviewed discharge instructions, medical action plan and red flags with patient who verbalized understanding. Patient given an opportunity to ask questions and does not have any further questions or concerns at this time. Were discharge instructions available to patient? Yes. Reviewed appropriate site of care based on symptoms and resources available to patient including: PCP  Specialist  When to call [address]  Condition related references. The patient agrees to contact the PCP office for questions related to their healthcare. Medication reconciliation was performed with patient, who verbalizes understanding of administration of home medications.    Was patient discharged with a pulse oximeter? no     Non-face-to-face services provided:  Scheduled appointment with PCP-CTN called for follow up but office is closed at this time; patient prefers to call when office reopens today. Scheduled appointment with Specialist-CTN left a message with [facility] requesting a follow up for patient. Staff states nurse will contact patient with a follow up appointment. Patient notified. Obtained and reviewed discharge summary and/or continuity of care documents  Education of patient/family/caregiver/guardian to support self-management-medical conditions, medication management. Patient encouraged to take all medications as directed, proper nutrition and fluid intake, monitor FSBS. Patient states he has Dexcom 6 at home. Patient encouraged to follow up with PCP and GI. Patient states he has support from his spouse at home. Care Transitions 24 Hour Call    Schedule Follow Up Appointment with PCP: Completed  Do you have a copy of your discharge instructions?: Yes  Do you have all of your prescriptions and are they filled?: Yes  Have you been contacted by a [address]?: No  Have you scheduled your follow up appointment?: Yes  How are you going to get to your appointment?: Car - family or friend to transport  Do you have support at home?: Partner/Spouse/SO  Do you feel like you have everything you need to keep you well at home?: Yes  Are you an active caregiver in your home?: No  Care Transitions Interventions         Follow Up  Future Appointments   Date Time Provider Deon Alexander   7/21/2022 10:00 AM [facility]   7/26/2022 10:00 AM Melvi Damon PTA [facility]   7/28/2022 10:00 AM [facility]   7/29/2022  2:00 PM Amaryllis Koyanagi., MD [facility]   8/1/2022  8:55 AM Arabella Negrete MD [facility]   10/12/2022  9:40 AM Blue Guillory III, MD [facility] AM[facility]     CTN provided contact information. Plan for follow-up call in 5-7 days based on severity of symptoms and risk factors.   Plan for next call: self management-of medical conditions and follow up appointments.   Herminia Kaufman RN

## 2022-07-21 ENCOUNTER — HOSPITAL ENCOUNTER (OUTPATIENT)
Dept: PHYSICAL THERAPY | Age: 77
Setting detail: RECURRING SERIES
Discharge: HOME | End: 2022-07-24
Payer: MEDICARE

## 2022-07-21 VITALS
HEART RATE: 75 BPM | RESPIRATION RATE: 18 BRPM | BODY MASS INDEX: 31.35 KG/M2 | WEIGHT: 219 LBS | DIASTOLIC BLOOD PRESSURE: 76 MMHG | TEMPERATURE: 97.6 F | SYSTOLIC BLOOD PRESSURE: 137 MMHG | HEIGHT: 70 IN | OXYGEN SATURATION: 95 %

## 2022-07-21 PROCEDURE — 97140 MANUAL THERAPY 1/> REGIONS: CPT

## 2022-07-21 PROCEDURE — 97110 THERAPEUTIC EXERCISES: CPT

## 2022-07-21 ASSESSMENT — PAIN SCALES - GENERAL: PAINLEVEL_OUTOF10: 0

## 2022-07-21 NOTE — PROGRESS NOTES
Lorenzo Bangura  : 1945  Primary: Medicare Part A And B  Secondary: Sunny Whitaker 36 @ [address] [facility] [address]  Phone: [phone]  Fax: [phone] Plan Frequency: 2 times per week for 60 days    Plan of Care/Certification Expiration Date: 22      PT Visit Info:   No data recorded    OUTPATIENT PHYSICAL THERAPY:OP NOTE TYPE: Treatment Note 2022       Episode  }Appt Desk            Treatment Diagnosis:  Pain in Right Knee (M25.561)  Stiffness of Right Knee, Not elsewhere classified (M25.661)  Difficulty in walking, Not elsewhere classified (R26.2)  Medical/Referring Diagnosis:  Pain in right knee [M25.561]  Referring Physician:  Gadiel Lowery DO MD Orders:  PT Eval and Treat   Date of Onset:  Onset Date: 05/15/22 (Pain onset from falling backwards 3 weeks from 2022)     Allergies:   Sulfamethoxazole-trimethoprim, Tramadol, Ace inhibitors, Codeine, Fenofibrate, Hydromorphone, Metformin, and Sitagliptin  Restrictions/Precautions:  No data recordedNo data recorded   Interventions Planned (Treatment may consist of any combination of the following):    Current Treatment Recommendations: Strengthening; ROM; Balance training; Functional mobility training; Transfer training; Endurance training; Gait training; Stair training; Neuromuscular re-education; Manual Therapy - Soft Tissue Mobilization; Manual Therapy - Joint Manipulation; Pain management; Home exercise program; Modalities; Integrated dry needling; Therapeutic activities     Subjective Comments: Patient returns to therapy stating he spent 6 days in the hospital because of pancreatitis. He ambulates with a single point cane. Initial:}    010 Post Session:        /10  Medications Last Reviewed:  2022  Updated Objective Findings:  None Today  Treatment   THERAPEUTIC EXERCISE: (43 minutes):  Exercises per grid below to improve mobility and strength. Required moderate visual, verbal, manual and tactile cues to promote proper body alignment, promote proper body posture and promote proper body mechanics. Progressed resistance, range, repetitions and complexity of movement as indicated. Date:  6/30/2022 Date:  7/6/2022 Date:  7/21/22   Activity/Exercise Parameters Parameters Parameters   NuFit Level 3.8 x 10 mins  7 min, level 2.5 7 min, level 4   Knee extension Heel Prop: 3 min Knee extension machine : 2x10, single leg eccentric knee flexion     Quad sets 3x10, 5 sec hold, supine --    Heel Slides 3x10, RLE, with strap for overpressure 3x10, RLE, with strap for overpressure  1x10, therapist applied overpressure    Sit to stand 3x, high plinth --    Hip adduction  Yellow ball x 10 reps x 10 sec hold each --    Hip Extension  3x10 R, handrail     Hip Abduction  3x10 R , handrail    Bridging  x20 reps  --    Clamshells   3x10, R, red theraband around knees    Long arc quads ----- 3x15, R    Straight leg raise  3x10 reps  -- 3x10, RLE   Seated marching Standing x 30 reps at bar --    Supine hip abduction  Blue strap x 30 reps  ---    Hamstring stretch  4x30 sec hold strap ---    Gait training    5 minutes: emphasis on internally rotating R knee   Ankle ----- --- 2x5 METs into inversion, R ankle   Education --------- ---    [facility]     MANUAL THERAPY: (15 minutes): May consider Joint mobilization, Soft tissue mobilization and Manipulation was utilized and necessary because of the patient's restricted joint motion, painful spasm, loss of articular motion and restricted motion of soft tissue. Lateral glide of the R talocrural joint to promote inversion of the ankle, patient in R sidelying.    PA glides of the R talocrural joint to promote Dorsiflexion      Commonly used abbreviations that may be included in this note:  STM- Soft tissue mobilization, R>L or L>R- right greater than left or vice versa, HEP - Home exercise program, CPA/UPA - Central or unilateral posterior-anterior mobilization, SLS- single leg stance, SKTC - Single leg to chest, SNAGS/NAGS- (sustained) Natural apophyseal glides, TKE- Terminal knee extension, ER- External rotation, IR- Internal rotation, B - Bilateral, sec- seconds, Lb- pounds, min - minutes, HA- Headache, OP- Over pressure, tband- theraband, fwd/bwd- Forward/Backward, TA- Transversus Abdominus, dbl- Double      TREATMENT/SESSION SUMMARY:       Response to Treatment: Patient progresses to ambulating with a single point cane from rolling walker. He presents with severe R knee ER during gait, pushing off the medial midfoot. Quad lag is noted during the stratight leg raises. Patient requires verbal and tactile cueing for quad activation and for ankle inversion/IR. The patient shows improvements in maintaining a neutral ankle position following th manual therapy. The patient denies any aggravation of his symptoms. Communication/Consultation:  None today  Equipment provided today:  None  Recommendations/Intent for next treatment session: Next visit will focus on progressing strength, functional mobility, pain tolerance, and ROM as tolerated.        Total Treatment Billable Duration:  58 minutes  Time In: 1000  Time Out: [facility],          Charge Capture  }Post Session Pain  Med[employer] Portal  MD Guidelines  Scanned Media  Benefits  MyChart    Future Appointments   Date Time Provider Deon Alexander   7/26/2022 10:00 AM [facility]   7/28/2022 10:00 AM [facility] Britt[facility]   7/29/2022  2:00 PM [facility]   8/1/2022  8:55 AM Therese Apodaca MD [facility]   10/12/2022  9:40 AM Aden Gallegos MD [facility]

## 2022-07-26 ENCOUNTER — HOSPITAL ENCOUNTER (OUTPATIENT)
Dept: PHYSICAL THERAPY | Age: 77
Setting detail: RECURRING SERIES
Discharge: HOME | End: 2022-07-29
Payer: MEDICARE

## 2022-07-26 PROCEDURE — 97110 THERAPEUTIC EXERCISES: CPT

## 2022-07-26 ASSESSMENT — PAIN SCALES - GENERAL: PAINLEVEL_OUTOF10: 1

## 2022-07-26 NOTE — PROGRESS NOTES
Gomez Areli  : 1945  Primary: Medicare Part A And B  Secondary: Sunny Whitaker 36 @ [address] [facility] [address]  Phone: [phone]  Fax: [phone] Plan Frequency: 2 times per week for 60 days    Plan of Care/Certification Expiration Date: 22      PT Visit Info:   No data recorded    OUTPATIENT PHYSICAL THERAPY:OP NOTE TYPE: Treatment Note 2022       Episode  }Appt Desk            Treatment Diagnosis:  Pain in Right Knee (M25.561)  Stiffness of Right Knee, Not elsewhere classified (M25.661)  Difficulty in walking, Not elsewhere classified (R26.2)  Medical/Referring Diagnosis:  Pain in right knee [M25.561]  Referring Physician:  Nolberto Vazquez DO MD Orders:  PT Eval and Treat   Date of Onset:  Onset Date: 05/15/22 (Pain onset from falling backwards 3 weeks from 2022)     Allergies:   Sulfamethoxazole-trimethoprim, Tramadol, Ace inhibitors, Codeine, Fenofibrate, Hydromorphone, Metformin, and Sitagliptin  Restrictions/Precautions:  No data recordedNo data recorded   Interventions Planned (Treatment may consist of any combination of the following):    Current Treatment Recommendations: Strengthening; ROM; Balance training; Functional mobility training; Transfer training; Endurance training; Gait training; Stair training; Neuromuscular re-education; Manual Therapy - Soft Tissue Mobilization; Manual Therapy - Joint Manipulation; Pain management; Home exercise program; Modalities; Integrated dry needling; Therapeutic activities     Subjective Comments: Patient reported minimal right knee pain. Left  foot pain is an 8/10. Right shoulder pain is a 5/10.       Initial:}    1/10 Post Session:       1/10  Medications Last Reviewed:  2022  Updated Objective Findings:   pt. Had unsteady gait with cane and much safer with rolling walker     THERAPEUTIC EXERCISE: (55 minutes):  Exercises per grid below to improve chest, SNAGS/NAGS- (sustained) Natural apophyseal glides, TKE- Terminal knee extension, ER- External rotation, IR- Internal rotation, B - Bilateral, sec- seconds, Lb- pounds, min - minutes, HA- Headache, OP- Over pressure, tband- theraband, fwd/bwd- Forward/Backward, TA- Transversus Abdominus, dbl- Double      TREATMENT/SESSION SUMMARY:       Response to Treatment: Patient required verbal and tactile cuing for performing exercises correctly. Pt. Also ambulated better with rolling walker and since so many recent falls was recommended to start using his rollator at home. Communication/Consultation:  None today  Equipment provided today:  None  Recommendations/Intent for next treatment session: Next visit will focus on progressing strength, functional mobility, pain tolerance, and ROM as tolerated.        Total Treatment Billable Duration:  55 minutes  Time In: 1000  Time Out: 80    STEPHANIE MENDEZ, CATINA         Charge Capture  }Post Session Pain  MedBridge Portal  MD Guidelines  Scanned Media  Benefits  MyChart    Future Appointments   Date Time Provider Deon Alexander   7/28/2022 10:00 AM Carlos Abbott PT [facility]   7/29/2022  2:00 PM Arlene Tran MD [facility]   8/1/2022  8:55 AM Delfin Lloyd MD [facility]   10/12/2022  9:40 AM Danae Ramirez MD [facility]

## 2022-07-27 ENCOUNTER — CARE COORDINATION (OUTPATIENT)
Dept: OTHER | Facility: CLINIC | Age: 77
End: 2022-07-27

## 2022-07-27 NOTE — CARE COORDINATION
[facility] Transitions Follow Up Call    2022    Patient: Mohamud Oneil  Patient : 1945   MRN: [id number]  Reason for Admission: Acute pancreatitis  Discharge Date: 22 RARS: Readmission Risk Score: 17.8       Care Transitions Follow Up Call    Needs to be reviewed by the provider   Additional needs identified to be addressed with provider: No  none             Method of communication with provider : none      Care Transition Nurse contacted the patient by telephone to follow up after admission on 2022. Verified name and  with patient as identifiers. Addressed changes since last contact:  Patient reports pancreatitis and covid symptoms have resolved. Patient states he sees Ortho for joint issues. Patient states he continues to have pain in his left ankle and have scheduled appointment with Ortho for evaluation. Patient encouraged to continue with pain med as ordered as well as non pharmacological interventions. Patient states he is also engaged with outpatient PT at [facility]. Discussed follow-up appointments. If no appointment was previously scheduled, appointment scheduling offered: Yes. CTN reviewed discharge instructions, medical action plan and red flags with patient and discussed any barriers to care and/or understanding of plan of care after discharge. Discussed appropriate site of care based on symptoms and resources available to patient including: PCP  Specialist  When to call [address] [place] [address] Dr related references  Outpatient clinic . The patient agrees to contact the PCP office for questions related to their healthcare.      Patients top risk factors for readmission: medical condition-Left ankle pain, Pancreatitis, CKD, HTN, Obesity, Anemia, DM, Afib, Hypertrophy of prostate, WPW Syndrome, MARIANGEL  Interventions to address risk factors: Scheduled appointment with PCP-on 22, Scheduled appointment with [facility] on 22, Ortho on 22 and 8/12/22, GI on 8/1/22, Obtained and reviewed discharge summary and/or continuity of care documents, and Education of patient/family/caregiver/guardian to support self-management-of symptoms and medical conditions. Care Transitions Subsequent and Final Call    Schedule Follow Up Appointment with PCP: Completed  Subsequent and Final Calls  Care Transitions Interventions  Other Interventions: Follow Up  Future Appointments   Date Time Provider Deon Alexander   7/28/2022 10:00 AM Ammy Estrella PT [facility]   7/29/2022  2:00 PM Seema Mcfarland MD [facility]   8/1/2022  8:55 AM Madeleine Molina MD [facility]   8/12/2022 10:40 AM MD SANJEEV Velasco [facility]   10/12/2022  9:40 AM Fatou Oquendo MD [facility]     Non-[facility] follow up appointment(s):    AUG 11   2022 09:20 AM - Office Visit  [facility] - DO RICO Ramirez provided contact information for future needs. Plan for follow-up call in 10-14 days based on severity of symptoms and risk factors. Plan for next call: self management-of medical conditions.   Tam Rodrigez RN

## 2022-07-28 ENCOUNTER — HOSPITAL ENCOUNTER (OUTPATIENT)
Dept: PHYSICAL THERAPY | Age: 77
Setting detail: RECURRING SERIES
Discharge: HOME | End: 2022-07-31
Payer: MEDICARE

## 2022-07-28 PROCEDURE — 97110 THERAPEUTIC EXERCISES: CPT

## 2022-07-28 ASSESSMENT — PAIN SCALES - GENERAL: PAINLEVEL_OUTOF10: 0

## 2022-07-28 NOTE — THERAPY RECERTIFICATION
Jules Meza  : 1945  Primary: Medicare Part A And B  Secondary: Sunny Whitaker 36 @ [address] [facility] [address]  Phone: [phone]  Fax: [phone] Plan Frequency: 2 times per week for 60 days    Plan of Care/Certification Expiration Date: 10/29/22      PT Visit Info:    No data recorded    OUTPATIENT PHYSICAL THERAPY:OP NOTE TYPE: Recertification                Episode  Appt Desk         Treatment Diagnosis:  Pain in Right Knee (M25.561)  Stiffness of Right Knee, Not elsewhere classified (M25.661)  Difficulty in walking, Not elsewhere classified (R26.2)  Medical/Referring Diagnosis:  Pain in right knee [M25.561]  Referring Physician:  Hailey Camacho DO MD Orders:  PT Eval and Treat   Return MD Appt:  10/2/2022  Date of Onset:  Onset Date: 05/15/22 (Pain onset from falling backwards 3 weeks from 2022)     Allergies:  Sulfamethoxazole-trimethoprim, Tramadol, Ace inhibitors, Codeine, Fenofibrate, Hydromorphone, Metformin, and Sitagliptin  Restrictions/Precautions:    No data recordedNo data recorded   Medications Last Reviewed:  2022           OBJECTIVE       ORTHOSTATIC/POSTURE OBSERVATION:   Date:   2022   SITTING RESTING POSTURE -Pt sits with forward head and rounded shoulders which indicate tight anterior chest musculature, upper trapezius, and levator scapula and weak posterior scapula musculature and deep cervical flexors. STANDING RESTING POSTURE -Pt displays decreased core motor control indicating weak core and low back musculature.        ROM Date:  2022 Date:  2022  Date:  2022   KNEE ROM (TESTED IN SUPINE)       RIGHT LEFT RIGHT LEFT RIGHT   Flexion 124° 125° 128° °   Extension 7° from full extension 1° from full extension +1° of hyperextension NT 0°     PALPATION/TONE/TISSUE TEXTURE:   Date:   2022 Date:  2022   SOFT TISSUE:    Hamstrings R medial hamstring tender No longer tender   TFL/IT band R ITB highly tender with increased tone unremarkable       STRENGTH   Date: 6/2/2022  Date: 6/28/2022 Date:  7/28/22    Right Left Right Right   Knee Extension 3+ 4+ 3+ 4   Knee Flexion 4- 4+ 4 4   Ankle DF 4+ 4+ 4+ 4+   Ankle PF 4 4 4 4+     FUNCTIONAL MOBILITY   Date:   6/2/2022 7/28/2022     Transfers Improvements noted with decreased BUE support to stand Improvements noted with decreased BUE support to stand   Gait deviations Excessive R toe-out, B ankle eversion (R>L) Patient demonstrates mild improvements in actively maintaining ankle in a more neutral position   Assistive device Rolling walker Rolling walker, patient still reports multiple falls   Bed mobility Min assist Independent but slow                ASSESSMENT   Initial Assessment:   Mr. Chente Mistry has attended 1 physical therapy session including initial evaluation as of 6/2/2022. He reports falling backwards three weeks ago when reaching for a chair. He was unable to stand so EMS was called for him. Chente Mistry reports x-ray images were negative for fractures. The patient reports feeling pain in his R knee stemming from his fall, he denies radiating symptoms, and complaints of occasional knee instability. The patient ambulates with a rolling walker and presents with increased BLE ER. He  Chente Mistry presents with signs and symptoms of increased pain, decreased ROM, decreased strength, decreased functional tolerance. Chente Mistry will benefit from home exercise program, therapeutic and postural strengthening exercises, manual therapeutic techniques (ie. Distraction, SOR, myofascial release/soft tissue mobilization) as appropriate to address Unice Filiberto current condition. Chente Mistry will benefit from skilled PT (medically necessary) to address above deficits affecting participation in basic ADLs and overall functional tolerance.      Progress Note:  Patient reports he has improved 50% overall. He is now able to walk with a single point cane but reports falls, his R knee is no longer painful. Patient elian presents with quadriceps and gluteal weakness. The patient has made improvements in ROM, strength, and functional mobility demonstrated by performing stair training and gait training with minimal cueing. He has met his outcome measure goal of improving his score by 10 points. He has met 4 /4 short term goals and 2/5 long term goals. The patient still struggles with decreased ROM, muscle weakness, mobility restrictions, problems with gait and ambulation, and increased pain. Reno Graves will continue to benefit from skilled PT (medically necessary) to address above deficits affecting participation in basic ADLs and overall functional tolerance. PLAN   Effective Dates: 6/2/2022 TO Plan of Care/Certification Expiration Date: 10/29/22     Frequency/Duration: Plan Frequency: 2 times per week for 60 days     Interventions Planned (Treatment may consist of any combination of the following):    Current Treatment Recommendations: Strengthening; ROM; Balance training; Functional mobility training; Transfer training; Endurance training; Gait training; Stair training; Neuromuscular re-education; Manual Therapy - Soft Tissue Mobilization; Manual Therapy - Joint Manipulation; Pain management; Home exercise program; Modalities; Integrated dry needling; Therapeutic activities     GOALS: (Goals have been discussed and agreed upon with patient.)  Short Term Goals 4 weeks   Reno Graves will be independent with HEP to promote self-management of symptoms. GOAL MET 6/28/2022  Reno Warnerb will perform Nu Step x 10 minutes for hip and knee AAROM. GOAL MET 6/28/2022  Reno Warnerb will tolerate manual therapy/joint mobilizations to increase knee flexion ROM so pt can ambulate stairs and walk with a more normalized gait pattern.  GOAL MET 6/28/2022  Reno Warnerb will participate in static and dynamic balance activities for 5 minutes to help improve proprioception and decrease risk of falls. GOAL MET 7/28/2022  Long Term Goals 12 weeks  Gwen Syed will be able to perform sit to stand transfers independently with increased knee flexion and decreased use of upper extremities. ONGOING 7/28/2022  Gwen Syed will ascend/descend 12 steps with reciprocal gait pattern and rail. ONGOING 7/28/2022  Gwen Syed will improve MMT B LE to >=4+/5 to improve current level of independence and community reintegration ON GOING. 7/28/2022  Gwen Syed will demonstrate R knee extenson >= 0 degrees to improve functional mobility and tolerance of ADLs. GOAL MET 6/28/2022  Gwen Syed will increase their score on the Lower Extremity Functional Scale by 10 points from their initial score to show improvement in areas of difficulty. GOAL MET 7/28/2022         Outcome Measure: Tool Used: Lower Extremity Functional Scale (LEFS)  Score:  Initial: 5/80 Most Recent: 15/80 (Date: 7/28/2022 )   Interpretation of Score: 20 questions each scored on a 5 point scale with 0 representing \"extreme difficulty or unable to perform\" and 4 representing \"no difficulty\". The lower the score, the greater the functional disability. 80/80 represents no disability. Minimal detectable change is 9 points. Total Duration:  Time In: 1000  Time Out: 80    Regarding Lc Chatman's therapy, I certify that the treatment plan above will be carried out by a therapist or under their direction.   Thank you for this referral,  Shay Jeffrey, PT     Referring Physician Signature: Adrian Albert DO _______________________________ Date _____________        Post Session Pain  Charge Capture   POC Link  Treatment Note Link  MD Guidelines  MyChart

## 2022-07-28 NOTE — PROGRESS NOTES
Gwen Syed  : 1945  Primary: Medicare Part A And B  Secondary: Sunny Whitaker [address] @ [address] [facility] [address]  Phone: [phone]  Fax: [phone] Plan Frequency: 2 times per week for 60 days    Plan of Care/Certification Expiration Date: 22      PT Visit Info:   No data recorded    OUTPATIENT PHYSICAL THERAPY:OP NOTE TYPE: Treatment Note 2022       Episode  }Appt Desk            Treatment Diagnosis:  Pain in Right Knee (M25.561)  Stiffness of Right Knee, Not elsewhere classified (M25.661)  Difficulty in walking, Not elsewhere classified (R26.2)  Medical/Referring Diagnosis:  Pain in right knee [M25.561]  Referring Physician:  Adrian Albert DO MD Orders:  PT Eval and Treat   Date of Onset:  Onset Date: 05/15/22 (Pain onset from falling backwards 3 weeks from 2022)     Allergies:   Sulfamethoxazole-trimethoprim, Tramadol, Ace inhibitors, Codeine, Fenofibrate, Hydromorphone, Metformin, and Sitagliptin  Restrictions/Precautions:  No data recordedNo data recorded   Interventions Planned (Treatment may consist of any combination of the following):    Current Treatment Recommendations: Strengthening; ROM; Balance training; Functional mobility training; Transfer training; Endurance training; Gait training; Stair training; Neuromuscular re-education; Manual Therapy - Soft Tissue Mobilization; Manual Therapy - Joint Manipulation; Pain management; Home exercise program; Modalities; Integrated dry needling; Therapeutic activities     Subjective Comments: Patient arrives to therapy with complaints of L ankle pain. Initial:}    0/10 Post Session:       0/10  Medications Last Reviewed:  2022  Updated Objective Findings:   See progress note from today     THERAPEUTIC EXERCISE: (57 minutes):  Exercises per grid below to improve mobility and strength.   Required moderate visual, verbal, manual and tactile cues to promote proper body alignment, promote proper body posture and promote proper body mechanics. Progressed resistance, range, repetitions and complexity of movement as indicated. Date:  7/26/2022 Date:  7/28/2022 Date:  7/21/22   Activity/Exercise Parameters Parameters Parameters   NuFit Level 4 x 10 mins  7 min, level 2.5 7 min, level 4   Knee extension Heel Prop: 3 min Terminal knee extension: 3x10     Quad sets 3x10, 5 sec hold, supine --    Heel Slides 3x10, RLE, with strap for overpressure ---    Sit to stand X 25 reps 3x10, high plinth, verbal cueing for neurtral R ankle     Hip adduction  Yellow ball x 10 reps x 10 sec hold each ---    Hip Extension X 20 reps BLE's at bar ---    Hip Abduction 2x10 reps at bar 3x10 R , handrail    Bridging  ------ --    Clamshells  -------- --    Long arc quads X 30 reps  3x15, R    Straight leg raise  -------- 2x15, R 3x10, RLE   Seated marching Standing x 30 reps at bar --    Supine hip abduction  --------- ---    Hamstring stretch  ---------- ---    Gait training  With rolling walker 3 laps  3 laps: emphasis on posture and maintaining the R LE in neutral 5 minutes: emphasis on internally rotating R knee   Ankle ----- --- 2x5 METs into inversion, R ankle   Measurements --------- ROM, strength, functional mobility    [facility]     MANUAL THERAPY: (0 minutes): May consider Joint mobilization, Soft tissue mobilization and Manipulation was utilized and necessary because of the patient's restricted joint motion, painful spasm, loss of articular motion and restricted motion of soft tissue.    NONE TODAY      Commonly used abbreviations that may be included in this note:  STM- Soft tissue mobilization, R>L or L>R- right greater than left or vice versa, HEP - Home exercise program, CPA/UPA - Central or unilateral posterior-anterior mobilization, SLS- single leg stance, SKTC - Single leg to chest, SNAGS/NAGS- (sustained) Natural apophyseal glides, TKE- Terminal knee extension, ER- External rotation, IR- Internal rotation, B - Bilateral, sec- seconds, Lb- pounds, min - minutes, HA- Headache, OP- Over pressure, tband- theraband, fwd/bwd- Forward/Backward, TA- Transversus Abdominus, dbl- Double      TREATMENT/SESSION SUMMARY:       Response to Treatment: Patient still presents with mild quad lag. The patient presents with improvements with posture and stability while ambulating with wheeled walker. He is unable to actively reach 0° of knee extension in sitting. Patient also requires tactile cueing to fully contract the quadriceps. Patient reports no pain in his R knee and most of his pain is coming from his L ankle. Communication/Consultation:  None today  Equipment provided today:  None  Recommendations/Intent for next treatment session: Next visit will focus on progressing strength, functional mobility, pain tolerance, and ROM as tolerated.        Total Treatment Billable Duration:  57 minutes  Time In: 1000  Time Out: [address] [facility], PT         Charge Capture  }Post Session Pain  MedBridge Portal  MD Guidelines  Scanned Media  Benefits  MyChart    Future Appointments   Date Time Provider Doen Alexander   8/12/2022 10:40 AM Santo Zepeda III, MD [facility]   8/22/2022 11:00 AM Josie Elmore MD [facility]   10/12/2022  9:40 AM Patel Holt MD [facility]

## 2022-07-29 NOTE — PROGRESS NOTES
Physician Progress Note      PATIENT:               Michelle Torres  [facility] #:                  [id number]  :                       1945  ADMIT DATE:       2022 2:09 PM  [address] [place] DATE:        2022 12:00 PM  RESPONDING  PROVIDER #:        Brit Jarquin MD          QUERY TEXT:    Patient admitted with Pancreatitis. Patient with recent Covid infection and   noted positive Covid test on  (date). If possible, please document in   progress notes and discharge summary if patient has an active Covid-19   infection or if patient is testing positive for Covid-19 without a current   active infection: The medical record reflects the following:  Risk Factors: Recent COVID 10 days prior to admit  Clinical Indicators: Tested positive 10 days prior to admission with another +   COVID test on admission, Isolation cancelled d/t documentation of outside   quarantine days  Treatment: IVF, Isolation cancelled  Isis De Los Santos BSN  [phone]  Options provided:  -- Active Covid-19 infection is being treated and/or evaluated on current   encounter  -- Positive Covid test result without an active current Covid-19 infection  -- Other - I will add my own diagnosis  -- Disagree - Not applicable / Not valid  -- Disagree - Clinically unable to determine / Unknown  -- Refer to Clinical Documentation Reviewer    PROVIDER RESPONSE TEXT:    Patient is testing positive for Covid without an active Covid-19 infection. Query created by: Nisreen Mccloud on 2022 12:44 PM      QUERY TEXT:    Patient admitted with Pancreatitis. Noted documentation of Acute Kidney   Injury w/ATN. In order to support the diagnosis of LANDON, please include   additional clinical indicators in your documentation. ? Or please document if   the diagnosis of LANDON has been ruled out after further study.     The medical record reflects the following:  Risk Factors: CKD3b,  Clinical Indicators: unable to eat, abd pain, Creatinine 1.7 down to 1.31

## 2022-08-01 ENCOUNTER — PREP FOR PROCEDURE (OUTPATIENT)
Dept: ADMINISTRATIVE | Age: 77
End: 2022-08-01

## 2022-08-01 RX ORDER — SODIUM CHLORIDE 0.9 % (FLUSH) 0.9 %
5-40 SYRINGE (ML) INJECTION PRN
OUTPATIENT
Start: 2022-08-01

## 2022-08-01 RX ORDER — SODIUM CHLORIDE 0.9 % (FLUSH) 0.9 %
5-40 SYRINGE (ML) INJECTION EVERY 12 HOURS SCHEDULED
OUTPATIENT
Start: 2022-08-01

## 2022-08-01 RX ORDER — INDOMETHACIN 50 MG/1
100 SUPPOSITORY RECTAL ONCE
OUTPATIENT
Start: 2022-08-01 | End: 2022-08-01

## 2022-08-01 RX ORDER — SODIUM CHLORIDE 9 MG/ML
INJECTION, SOLUTION INTRAVENOUS PRN
OUTPATIENT
Start: 2022-08-01

## 2022-08-02 ENCOUNTER — HOSPITAL ENCOUNTER (OUTPATIENT)
Dept: PHYSICAL THERAPY | Age: 77
Setting detail: RECURRING SERIES
Discharge: HOME | End: 2022-08-05
Payer: MEDICARE

## 2022-08-02 PROCEDURE — 97140 MANUAL THERAPY 1/> REGIONS: CPT

## 2022-08-02 PROCEDURE — 97110 THERAPEUTIC EXERCISES: CPT

## 2022-08-02 ASSESSMENT — PAIN SCALES - GENERAL: PAINLEVEL_OUTOF10: 0

## 2022-08-02 NOTE — PROGRESS NOTES
Timbo Monroy  : 1945  Primary: Medicare Part A And B  Secondary: Sunny Whitaker 34 @ [address] [facility] [address]  Phone: [phone]  Fax: [phone] Plan Frequency: 2 times per week for 60 days    Plan of Care/Certification Expiration Date: 10/29/22      PT Visit Info:   No data recorded    OUTPATIENT PHYSICAL THERAPY:OP NOTE TYPE: Treatment Note 2022       Episode  }Appt Desk            Treatment Diagnosis:  Pain in Right Knee (M25.561)  Stiffness of Right Knee, Not elsewhere classified (M25.661)  Difficulty in walking, Not elsewhere classified (R26.2)  Medical/Referring Diagnosis:  Pain in right knee [M25.561]  Referring Physician:  Shaun Mcintosh DO MD Orders:  PT Eval and Treat   Date of Onset:  Onset Date: 05/15/22 (Pain onset from falling backwards 3 weeks from 2022)     Allergies:   Sulfamethoxazole-trimethoprim, Tramadol, Ace inhibitors, Codeine, Fenofibrate, Hydromorphone, Metformin, and Sitagliptin  Restrictions/Precautions:  No data recordedNo data recorded   Interventions Planned (Treatment may consist of any combination of the following):    Current Treatment Recommendations: Strengthening; ROM; Balance training; Functional mobility training; Transfer training; Endurance training; Gait training; Stair training; Neuromuscular re-education; Manual Therapy - Soft Tissue Mobilization; Manual Therapy - Joint Manipulation; Pain management; Home exercise program; Modalities; Integrated dry needling; Therapeutic activities     Subjective Comments: Patient arrives to therapy ambulating with single point cane on L side. He denies any knee pain.       Initial:}    0/10 Post Session:        (Not assessed)/10  Medications Last Reviewed:  2022  Updated Objective Findings:   See table below  STRENGTH    Date: 2022 Date:  22     Right Right   Knee Extension 3+ 4-   Knee Flexion 4 4   Ankle DF 4+ 4+ Ankle PF 4 4+        THERAPEUTIC EXERCISE: (50 minutes):  Exercises per grid below to improve mobility and strength. Required moderate visual, verbal, manual and tactile cues to promote proper body alignment, promote proper body posture and promote proper body mechanics. Progressed resistance, range, repetitions and complexity of movement as indicated. Date:  7/26/2022 Date:  7/28/2022 Date:  8/2/22   Activity/Exercise Parameters Parameters Parameters   NuFit Level 4 x 10 mins  7 min, level 2.5 5 min, level 4   Knee extension Heel Prop: 3 min Terminal knee extension: 3x10  Leg extension machine, 2x10, 2 up 1 down, slow tempo on descent, see 11 lbs. Quad sets 3x10, 5 sec hold, supine -- 10x in long sitting   Heel Slides 3x10, RLE, with strap for overpressure --- ---   Sit to stand X 25 reps 3x10, high plinth, verbal cueing for neurtral R ankle  3x10, high plinth, blocking of knee  by therapist to avoid knee valgus   Hip adduction  Yellow ball x 10 reps x 10 sec hold each --- ---   Hip Extension X 20 reps BLE's at bar --- ---   Hip Abduction 2x10 reps at bar 3x10 R , handrail ---   Bridging  ------ -- ---   Clamshells  -------- -- ---   Short arc quads   2x10, R    Long arc quads X 30 reps  3x15, R 3x15, R   Straight leg raise  -------- 2x15, R 3x10, RLE   Seated marching Standing x 30 reps at bar -- ---   Hamstring stretch  ---------- --- 3 min. , passive stretch in long sitting   Gait training  With rolling walker 3 laps  3 laps: emphasis on posture and maintaining the R LE in neutral 2 minutes: emphasis on heel to toe gait mechanics with single point cane. Ankle ----- ---    Measurements --------- ROM, strength, functional mobility ---   [facility]     MANUAL THERAPY: (8 minutes):   May consider Joint mobilization, Soft tissue mobilization and Manipulation was utilized and necessary because of the patient's restricted joint motion, painful spasm, loss of articular motion and restricted motion of soft tissue. Soft tissue mobilization of distal quad, patellar area, popliteal, and proximal gastroc; in long sitting; medium pressure      Commonly used abbreviations that may be included in this note:  STM- Soft tissue mobilization, R>L or L>R- right greater than left or vice versa, HEP - Home exercise program, CPA/UPA - Central or unilateral posterior-anterior mobilization, SLS- single leg stance, SKTC - Single leg to chest, SNAGS/NAGS- (sustained) Natural apophyseal glides, TKE- Terminal knee extension, ER- External rotation, IR- Internal rotation, B - Bilateral, sec- seconds, Lb- pounds, min - minutes, HA- Headache, OP- Over pressure, tband- theraband, fwd/bwd- Forward/Backward, TA- Transversus Abdominus, dbl- Double      TREATMENT/SESSION SUMMARY:       Response to Treatment: Patient presents with crepitus in L ankle when standing from plinth. He requires tactile cueing and blocking of knee to maintain neutral alignment of knee and ankle. Patient reports L foot pain when standing up. The patient presents with quad weakness and unable to reach 0° actively. The patient requires frequent cueing to maintain his R ankle in neutral as much is tolerable. The patient had a fall following the leg extension machine exercise. Patient was ambulating with single point cane when his R ankle turned laterally and experienced a loss of balance. The patient fell and struck his R knee with a noticeable abrasion to the skin. The patient was transferred to a chair and provided water until the remainder of the session. Patient was provided a rolling walker and was escorted by therapist to his car. His wife was there to pick him up and she and patient were highly encouraged to return to using the rolling walker, they were both agreeable. Communication/Consultation:  Spoke with Mr. Pugaon Abilio in regards to returning to and utilizing a rolling walker for additional stability and safety while ambulating.   Equipment provided today: None  Recommendations/Intent for next treatment session: Next visit will focus on assessing gait with rolling walker and progressing strength, functional mobility, pain tolerance, and ROM as tolerated.        Total Treatment Billable Duration:  58 minutes  Time In: 1000  Time Out: [place], PT         Charge Capture  }Post Session Pain  MedBridge Portal  MD Guidelines  Scanned Media  Benefits  MyChart    Future Appointments   Date Time Provider Deon Alexander   8/4/2022 10:00 AM Rosa Foley, PT [facility]   8/8/2022  9:00 AM Alvena Nyhan, PTA [facility]   8/12/2022 10:40 AM lAan Cooley III, MD [facility]   8/12/2022  2:30 PM Alvena Nyhan, PTA [facility]   8/16/2022 10:00 AM Alvena Nyhan, PTA [facility]   8/18/2022 10:00 AM Alvena Nyhan, PTA [facility]   8/22/2022 11:00 AM Marylu Patel MD [facility]   8/23/2022  1:30 PM Rosaleah Foley, PT [facility]   8/25/2022 11:00 AM Rosaleah Chatterjeet, PT [facility]   8/30/2022 10:00 AM Rosa Foley, PT [facility]   10/12/2022  9:40 AM Isabel Godwin MD [facility]

## 2022-08-02 NOTE — H&P
Patient:   Chuck Salgado  YOB: 1945   Date:                        08/01/2022 8:15 AM   Visit Type:                  Office Visit  Provider:   Kylee Fonseca MD  Primary Care Provider: [address] [place] [address] Internal Medicine    This 78year old male presents for recurrent pancreatitis. History of Present Illness:  1.  recurrent pancreatitis   Mr. Katie Arrington is a 78year old male who presents for hospital follow up and evaluation for ERCP. Patient was last seen in office on 1/19/22 and was recommended to go to the ER for evaluation of confusion, shortness of breath, tachycardia, and possible blood transfusion. ER note by Dr. Evgeny Coto on 1/19/22 reviewed. Progress note by Dr. Pao Lam 1/22/22. Colonoscopy by Dr. Pao Lam on 1/24/22 showed hemorrhoids and diverticulosis, otherwise normal.  Discharge summary by Dr. Jackson on 7/18/22 reviewed: Patient presented with abdominal pain consistent with prior episodes of recurrent pancreatitis. CT ABD/PELV showed persistent peripancreatic fat stranding about the head of the pancreas, could suggest pancreatitis. Advanced narrowing of the splenic vein at the site of pancreatic head pancreatitis. LFTs on 7/17/22 normal besides alk phos 170. Patient reports multiple orthopedic issues that bother him daily. He thinks his pancreas is doing well, with no further pain. He has had 3-5 bouts of pancreatitis a year for the past several years with 8-10 hospitalizations since 2019. Past Medical/Surgical History:   (Detailed)  Additional Medical History  WPW s/p ablation  chronic pancreatitis  insulin dependent diabetes    Family History:  (Detailed)    Social History:  (Detailed)  Tobacco use reviewed. Preferred language is [place]. Tobacco use status: Current non-smoker. Smoking status: Never smoker.     SMOKING STATUS  Type Smoking Status Usage Per Day Years Used Total Pack Years    Never smoker        COMMENTS  No history of tattoos, piercings, Hep A/B vaccines, blood transfusions, or IVDU    Current Medications:  Medication Generic Name Directions   allopurinol 100 mg tablet allopurinol take 1 tablet by oral route  every day   aspirin 325 mg tablet aspirin take 1 Tablet by oral route  every day   cephalexin 500 mg capsule cephalexin take 1 capsule by oral route 4 times a  day   colchicine 0.6 mg capsule colchicine take 1 capsule by oral route  every day   Creon lipase/protease/amylase take 1 Capsule by oral route 3 times every day with meals and 1 capsule with each snack   finasteride 5 mg tablet finasteride take 1 tablet by oral route  every day   gabapentin 300 mg capsule gabapentin take 1 capsule by oral route 5 times every day   hydrocodone 5 mg-acetaminophen 325 mg tablet hydrocodone/acetaminophen take 1 tablet by oral route  every 6 hours as needed for pain   lisinopril 10 mg tablet lisinopril take 1 tablet by oral route  every day   multivitamin tablet multivitamin take 1 tablet by oral route  every day with food   Novolin 70-30 FlexPen U-100 Insulin 100 unit/mL (70-30) subcutaneous insulin NPH hum/reg insulin hm inject by subcutaneous route as per insulin protocol   Omega 3-6-9 1,200 mg capsule fish oil/borage/flax/om3,6,9 1 takes 1 by mouth every day   ondansetron 8 mg disintegrating tablet ondansetron take 1 tablet by oral route  every 8 hours and place on top of the tongue where it will dissolve, then swallow   oxybutynin chloride ER 10 mg tablet,extended release 24 hr oxybutynin chloride take 1 tablet by oral route  every day   pantoprazole 20 mg tablet,delayed release pantoprazole sodium take 1 Tablet by oral route  every day   PATIENT UNSURE OF DRUG NAME PATIENT UNSURE OF DRUG NAME For neurology   prednisone 2.5 mg tablet prednisone take 1 tablet by oral route  every day   simvastatin 10 mg tablet simvastatin take 1 tablet by oral route  every day at night   Tylenol Extra Strength 500 mg tablet acetaminophen take 2 tablet by oral route  every 6 hours as needed   Vitamin C 1,000 mg tablet ascorbic acid takes 1 by mouth every day   Vitamin D3 25 mcg (1,000 unit) tablet cholecalciferol (vitamin D3) takes 1 by mouth every day     Allergies:  Ingredient Reaction (Severity) Medication Name Comment   OXYCODONE   vomiting, nausea   Reviewed, updated. Review of Systems:  System Neg/Pos Details   ENMT Negative Hearing deficit. Eyes Negative Vision changes. Respiratory Negative Dyspnea. Cardio Negative Chest pain and Irregular heartbeat/palpitations.  Negative Dysuria and Urinary frequency. Endocrine Negative Cold intolerance and Heat intolerance. Neuro Negative Confusion/disorientation, Dizziness, Headache and Seizures. Hema/Lymph Negative Easy bleeding. Vital Signs:  BP mm/Hg Pulse Resp Pulse Ox Temp F Ht (Total in.) Weight (lbs.) Weight (oz.) BMI   132/76 92 16 96 97.30 70.00 227.20  32.60     Physical Exam:  Weight has remained stable. Patient is healthy-appearing in no acute distress. Further exam deferred at this time. Assessment/Plan:  # Detail Type Description    1. Assessment Other chronic pancreatitis (K86.1). 2. Assessment Calculus of bile duct without cholecystitis and without obstruction (K80.50). Provider Plan Since 2018, and a near death experience in hospitals, patient has had recurrent on chronic pancreatitis with a 3mm PD stone diagnosed in 2019. Patient would like to proceed with ERCP with attempt at pancreatic duct stone removal and possible stenting because of his multiple hospitalizations. Patient has no underlying lung disease. He has had ablation for WPW. Has IDDM. We will proceed with ERCP if possible next week. Is up to date on colon cancer screening. Plan Orders Further evaluations ordered today include ERCP to be performed, Next Lab Date is on 08/09/2022.             Counseling / Educational Factors:  Items reviewed / discussed during today's visit: prior testing / procedures were reviewed; testing was discussed in detail; old records were reviewed; indications and risks of the procedure were discussed; symptomatic care was discussed; advised to continue current treatment; patient instructed to call for results of diagnostic testing. Patient expressed understanding of instructions. The patient was checked out at 8:40 AM by Kody Chacon. I personally performed the services described in this documentation. Salud Erickson (Scrib) assisted in my presence with documentation, which I have reviewed and validated. Provider:    Jay Miles  08/01/2022 9:32 AM   Document generated by: Gisele Tam MD 08/01/2022     Providers:  [address] [place] [address] Internal Medicine  [address] [place] [address],[address]    ___________________________________________________________________________________________________________________________    Electronically signed by Gisele Tam MD on 08/01/2022 09:32 AM

## 2022-08-04 ENCOUNTER — HOSPITAL ENCOUNTER (OUTPATIENT)
Dept: PHYSICAL THERAPY | Age: 77
Setting detail: RECURRING SERIES
Discharge: HOME | End: 2022-08-07
Payer: MEDICARE

## 2022-08-04 PROCEDURE — 97110 THERAPEUTIC EXERCISES: CPT

## 2022-08-04 NOTE — PROGRESS NOTES
Lilliana Rueda  : 1945  Primary: Medicare Part A And B  Secondary: uSnny Whitaker [address] @ [address] [facility] [address]  Phone: [phone]  Fax: [phone] Plan Frequency: 2 times per week for 60 days    Plan of Care/Certification Expiration Date: 10/29/22      PT Visit Info:   No data recorded    OUTPATIENT PHYSICAL THERAPY:OP NOTE TYPE: Treatment Note 2022       Episode  }Appt Desk            Treatment Diagnosis:  Pain in Right Knee (M25.561)  Stiffness of Right Knee, Not elsewhere classified (M25.661)  Difficulty in walking, Not elsewhere classified (R26.2)  Medical/Referring Diagnosis:  Pain in right knee [M25.561]  Referring Physician:  Blanca Mike DO MD Orders:  PT Eval and Treat   Date of Onset:  Onset Date: 05/15/22 (Pain onset from falling backwards 3 weeks from 2022)     Allergies:   Sulfamethoxazole-trimethoprim, Tramadol, Ace inhibitors, Codeine, Fenofibrate, Hydromorphone, Metformin, and Sitagliptin  Restrictions/Precautions: FALL RISK  No data recordedNo data recorded   Interventions Planned (Treatment may consist of any combination of the following):    Current Treatment Recommendations: Strengthening; ROM; Balance training; Functional mobility training; Transfer training; Endurance training; Gait training; Stair training; Neuromuscular re-education; Manual Therapy - Soft Tissue Mobilization; Manual Therapy - Joint Manipulation; Pain management; Home exercise program; Modalities; Integrated dry needling; Therapeutic activities     Subjective Comments: Patient arrives to therapy ambulating with rolling walker and L ankle pain.       Initial:}Left Ankle 2/10 Post Session:  Left  Ankle 2/10  Medications Last Reviewed:  2022  Updated Objective Findings:   See table below  STRENGTH    Date:  2022     Right   Knee Extension 4-   Knee Flexion 4   Ankle DF 4+   Ankle PF 4+       Knee ROM    Flexion 131° Extension +2° hyperextension        THERAPEUTIC EXERCISE: (56 minutes):  Exercises per grid below to improve mobility and strength. Required moderate visual, verbal, manual and tactile cues to promote proper body alignment, promote proper body posture and promote proper body mechanics. Progressed resistance, range, repetitions and complexity of movement as indicated. Date:  8/4/2022 Date:  7/28/2022 Date:  8/2/22   Activity/Exercise Parameters Parameters Parameters   NuFit Level 4, 10 mins  7 min, level 2.5 5 min, level 4   Quad sets 2x10, 3 sec hold, supine -- 10x in long sitting   Heel Slides -- --- ---   Sit to stand --- 3x10, high plinth, verbal cueing for neurtral R ankle  3x10, high plinth, blocking of knee  by therapist to avoid knee valgus   Hip adduction  --- --- ---   Hip Extension --- --- ---   Hip Abduction 3x10 reps at bar 3x10 R , handrail ---   Bridging  ------ -- ---   Clamshells  -------- -- ---   Short arc quads 2x15  2x10, R    Long arc quads 3x10 3x15, R 3x15, R   Straight leg raise  -------- 2x15, R 3x10, RLE   Seated marching --- -- ---   Hamstring stretch  ---------- --- 3 min. , passive stretch in long sitting   Gait training  2 laps with rolling walker, gait belt, emphasis on neutral knee and ankle position during gait 3 laps: emphasis on posture and maintaining the R LE in neutral 2 minutes: emphasis on heel to toe gait mechanics with single point cane. Ankle ----- ---    Measurements --------- ROM, strength, functional mobility ---   Education ---     [facility]     MANUAL THERAPY: (0 minutes): May consider Joint mobilization, Soft tissue mobilization and Manipulation was utilized and necessary because of the patient's restricted joint motion, painful spasm, loss of articular motion and restricted motion of soft tissue. Soft tissue mobilization of distal quad, patellar area, popliteal, and proximal gastroc; in long sitting; medium pressure.  NOT TODAY      Commonly used abbreviations that may be included in this note:  STM- Soft tissue mobilization, R>L or L>R- right greater than left or vice versa, HEP - Home exercise program, CPA/UPA - Central or unilateral posterior-anterior mobilization, SLS- single leg stance, SKTC - Single leg to chest, SNAGS/NAGS- (sustained) Natural apophyseal glides, TKE- Terminal knee extension, ER- External rotation, IR- Internal rotation, B - Bilateral, sec- seconds, Lb- pounds, min - minutes, HA- Headache, OP- Over pressure, tband- theraband, fwd/bwd- Forward/Backward, TA- Transversus Abdominus, dbl- Double      TREATMENT/SESSION SUMMARY:       Response to Treatment: Patient quad lag persists with knee extension. Patient gradually improves in knee extension. The patient denies aggravation of knee symptoms following interventions. Communication/Consultation:  None today  Equipment provided today:  None  Recommendations/Intent for next treatment session: Next visit will focus on assessing gait with rolling walker and progressing strength, functional mobility, pain tolerance, and ROM as tolerated.        Total Treatment Billable Duration:  56 minutes  Time In: 1000  Time Out: [facility], PT         Charge Capture  }Post Session Pain  MedBridge Portal  MD Guidelines  Scanned Media  Benefits  MyChart    Future Appointments   Date Time Provider Deon Alexander   8/8/2022  9:00 AM Bjorn Perry PTA [facility]   8/12/2022 10:40 AM Crescencio Garibay III, MD [facility]   8/12/2022  2:30 PM Bjorn Perry PTA [facility]   8/16/2022 10:00 AM Bjorn Perry PTA [facility]   8/18/2022 10:00 AM Bjorn Perry PTA [facility]   8/22/2022 11:00 AM Delfin Lloyd MD [facility]   8/23/2022  1:30 PM Carlos Abbott PT [facility]   8/25/2022 11:00 AM Carlos Abbott PT [facility]   8/30/2022 10:00 AM Carlos Abbott PT [facility]   10/12/2022  9:40 AM Danae Ramirez MD [facility]

## 2022-08-05 ASSESSMENT — PAIN DESCRIPTION - ORIENTATION: ORIENTATION: LEFT

## 2022-08-05 ASSESSMENT — PAIN DESCRIPTION - LOCATION: LOCATION: ANKLE

## 2022-08-05 ASSESSMENT — PAIN SCALES - GENERAL: PAINLEVEL_OUTOF10: 2

## 2022-08-08 ENCOUNTER — HOSPITAL ENCOUNTER (OUTPATIENT)
Dept: PHYSICAL THERAPY | Age: 77
Setting detail: RECURRING SERIES
Discharge: HOME | End: 2022-08-11
Payer: MEDICARE

## 2022-08-08 PROCEDURE — 97110 THERAPEUTIC EXERCISES: CPT

## 2022-08-08 RX ORDER — IPRATROPIUM BROMIDE AND ALBUTEROL SULFATE 2.5; .5 MG/3ML; MG/3ML
1 SOLUTION RESPIRATORY (INHALATION)
Status: CANCELLED | OUTPATIENT
Start: 2022-08-08 | End: 2022-08-08

## 2022-08-08 ASSESSMENT — PAIN SCALES - GENERAL: PAINLEVEL_OUTOF10: 0

## 2022-08-08 NOTE — PERIOP NOTE
Patient was found to have a recent positive Covid-19 history, the below timetable is used for the earliest OR date for their elective procedure. Patient states had no respiratory symptoms including cough and/or dyspnea, was not hospitalized. The date of the test was 7/7/22. The earliest OR date is 8/4/22. Respiratory Symptoms Hospitalized Need for ICU Earliest OR Date   No No No 4 weeks from test date   Yes No No 6 weeks from test date   Yes Yes No 8 weeks from date of discharge   Yes Yes Yes 12 weeks from date of discharge       The patient chart will be reviewed by anesthesia and the surgeon office will be notified with the above recommendation.

## 2022-08-08 NOTE — PERIOP NOTE
Patient verified name, , and procedure. Type: 1a; abbreviated assessment per anesthesia guidelines  Labs per surgeon: none  Labs per anesthesia: poc glucose      Instructed pt that they will be notified by the Gi Lab for time of arrival. If any questions please call the GI lab at [phone]. Follow diet and prep instructions per office. May have clear liquids until 4 hours prior to time of arrival.    Trevorton Roxo or shower the night before and the am of surgery with antibacterial soap. No lotions, oils, powders, cologne on skin. No make up, eye make up or jewelry. Wear loose fitting comfortable, clean clothing. Must have adult present in building the entire time . Medications for the day of procedure Tramadol (Ultram) if needed, gabapentin (Neurontin), Allopurinol (Zyloprim), colchicine, Finasteride (Proscar), Tamsulosin (Flomax), Pantoprazole (Protonix)    The following discharge instructions reviewed with patient: medication given during procedure may cause drowsiness for several hours, therefore, do not drive or operate machinery for remainder of the day, no alcohol on the day of your procedure, resume regular diet and activity unless otherwise directed, for mild sore throat you may use Cepacol throat lozenges or warm salt water gargles as needed, call your physician for any problems or questions. Patient verbalizes understanding.

## 2022-08-08 NOTE — PROGRESS NOTES
Knee Flexion 4   Ankle DF 4+   Ankle PF 4+       Knee ROM    Flexion 131°   Extension +2° hyperextension        THERAPEUTIC EXERCISE: (55 minutes):  Exercises per grid below to improve mobility and strength. Required moderate visual, verbal, manual and tactile cues to promote proper body alignment, promote proper body posture and promote proper body mechanics. Progressed resistance, range, repetitions and complexity of movement as indicated. Date:  8/4/2022 Date:  8/8/2022 Date:  8/2/22   Activity/Exercise Parameters Parameters Parameters   Nu step Level 4, 10 mins  X 10 minsl evel 4 5 min, level 4   Quad sets 2x10, 3 sec hold, supine -- 10x in long sitting   Heel Slides -- X 20 reps  ---   Sit to stand --- 3x10, rep with elevated plinth  3x10, high plinth, blocking of knee  by therapist to avoid knee valgus   Straight leg raises  2x10 reps     Hip adduction  --- Yellow ball x 20 reps 5 sec hold  ---   Hip Extension --- --- ---   Hip Abduction 3x10 reps at bar 3x10 R , handrail ---   Bridging  ------ 2x10 reps 5 sec hold  ---   Clamshells  -------- -- ---   Short arc quads 2x15 2x10 reps 5 sec hold  2x10, R    Long arc quads 3x10 3x15 reps 5 sec hold  3x15, R   Straight leg raise  -------- 2x15, R 3x10, RLE   Seated marching --- -- ---   Hamstring stretch  ---------- --- 3 min. , passive stretch in long sitting   Gait training  2 laps with rolling walker, gait belt, emphasis on neutral knee and ankle position during gait 3 laps: emphasis on posture and maintaining the R LE in neutral 2 minutes: emphasis on heel to toe gait mechanics with single point cane. Ankle ----- ---    Measurements --------- ROM, strength, functional mobility ---   Education ---     [employer] [place]     MANUAL THERAPY: (0 minutes):   May consider Joint mobilization, Soft tissue mobilization and Manipulation was utilized and necessary because of the patient's restricted joint motion, painful spasm, loss of articular motion and restricted motion of soft tissue. Soft tissue mobilization of distal quad, patellar area, popliteal, and proximal gastroc; in long sitting; medium pressure. NOT TODAY      Commonly used abbreviations that may be included in this note:  STM- Soft tissue mobilization, R>L or L>R- right greater than left or vice versa, HEP - Home exercise program, CPA/UPA - Central or unilateral posterior-anterior mobilization, SLS- single leg stance, SKTC - Single leg to chest, SNAGS/NAGS- (sustained) Natural apophyseal glides, TKE- Terminal knee extension, ER- External rotation, IR- Internal rotation, B - Bilateral, sec- seconds, Lb- pounds, min - minutes, HA- Headache, OP- Over pressure, tband- theraband, fwd/bwd- Forward/Backward, TA- Transversus Abdominus, dbl- Double      TREATMENT/SESSION SUMMARY:       Response to Treatment: Patient required constant verbal cuing to perform exercises correctly and gait belt was kept on patient for the entire session due to fall precautions. Communication/Consultation:  None today  Equipment provided today:  None  Recommendations/Intent for next treatment session: Next visit will focus on assessing gait with rolling walker and progressing strength, functional mobility, pain tolerance, and ROM as tolerated.        Total Treatment Billable Duration:  55 minutes  Time In: 0900  Time Out: 1000    STEPHANIE MENDEZ PTA         Charge Capture  }Post Session Pain  MedBridge Portal  MD Guidelines  Scanned Media  Benefits  MyChart    Future Appointments   Date Time Provider Deon Alexander   8/12/2022 10:40 AM Lizbeth Collazo III, MD [facility]   8/12/2022  2:30 PM Kathy Smith PTA [facility]   8/16/2022 10:00 AM Kathy Smith PTA [facility]   8/18/2022 10:00 AM Kathy Smith PTA [facility]   8/22/2022 11:00 AM Suzanne Yusuf MD [facility]   8/23/2022  1:30 PM Riley Jaramillo PT [facility]   8/25/2022 11:00 AM Riley Jaramillo PT [facility]   8/30/2022 10:00 AM Riley Jaramillo PT [facility]   10/12/2022  9:40 AM Filipe Thomas MD [facility]

## 2022-08-09 ENCOUNTER — HOSPITAL ENCOUNTER (OUTPATIENT)
Age: 77
Setting detail: OUTPATIENT SURGERY
Discharge: HOME | End: 2022-08-09
Attending: INTERNAL MEDICINE | Admitting: INTERNAL MEDICINE
Payer: MEDICARE

## 2022-08-09 ENCOUNTER — ANESTHESIA (OUTPATIENT)
Dept: ENDOSCOPY | Age: 77
End: 2022-08-09
Payer: MEDICARE

## 2022-08-09 ENCOUNTER — ANESTHESIA EVENT (OUTPATIENT)
Dept: ENDOSCOPY | Age: 77
End: 2022-08-09
Payer: MEDICARE

## 2022-08-09 ENCOUNTER — APPOINTMENT (OUTPATIENT)
Dept: GENERAL RADIOLOGY | Age: 77
End: 2022-08-09
Attending: INTERNAL MEDICINE
Payer: MEDICARE

## 2022-08-09 VITALS
HEART RATE: 76 BPM | DIASTOLIC BLOOD PRESSURE: 64 MMHG | SYSTOLIC BLOOD PRESSURE: 123 MMHG | WEIGHT: 215 LBS | RESPIRATION RATE: 16 BRPM | TEMPERATURE: 98 F | HEIGHT: 70 IN | BODY MASS INDEX: 30.78 KG/M2 | OXYGEN SATURATION: 96 %

## 2022-08-09 LAB
GLUCOSE BLD STRIP.AUTO-MCNC: 130 MG/DL (ref 65–100)
SERVICE CMNT-IMP: ABNORMAL

## 2022-08-09 PROCEDURE — 2709999900 HC NON-CHARGEABLE SUPPLY: Performed by: INTERNAL MEDICINE

## 2022-08-09 PROCEDURE — 74330 X-RAY BILE/PANC ENDOSCOPY: CPT

## 2022-08-09 PROCEDURE — 7100000000 HC PACU RECOVERY - FIRST 15 MIN: Performed by: INTERNAL MEDICINE

## 2022-08-09 PROCEDURE — 6370000000 HC RX 637 (ALT 250 FOR IP): Performed by: INTERNAL MEDICINE

## 2022-08-09 PROCEDURE — 6360000002 HC RX W HCPCS: Performed by: ANESTHESIOLOGY

## 2022-08-09 PROCEDURE — 7100000001 HC PACU RECOVERY - ADDTL 15 MIN: Performed by: INTERNAL MEDICINE

## 2022-08-09 PROCEDURE — 3609014900 HC ERCP W/SPHINCTEROTOMY &/OR PAPILLOTOMY: Performed by: INTERNAL MEDICINE

## 2022-08-09 PROCEDURE — 2720000010 HC SURG SUPPLY STERILE: Performed by: INTERNAL MEDICINE

## 2022-08-09 PROCEDURE — C1769 GUIDE WIRE: HCPCS | Performed by: INTERNAL MEDICINE

## 2022-08-09 PROCEDURE — 82962 GLUCOSE BLOOD TEST: CPT

## 2022-08-09 PROCEDURE — 3700000001 HC ADD 15 MINUTES (ANESTHESIA): Performed by: INTERNAL MEDICINE

## 2022-08-09 PROCEDURE — 7100000011 HC PHASE II RECOVERY - ADDTL 15 MIN: Performed by: INTERNAL MEDICINE

## 2022-08-09 PROCEDURE — 2580000003 HC RX 258: Performed by: ANESTHESIOLOGY

## 2022-08-09 PROCEDURE — 2580000003 HC RX 258: Performed by: NURSE ANESTHETIST, CERTIFIED REGISTERED

## 2022-08-09 PROCEDURE — 7100000010 HC PHASE II RECOVERY - FIRST 15 MIN: Performed by: INTERNAL MEDICINE

## 2022-08-09 PROCEDURE — 2500000003 HC RX 250 WO HCPCS: Performed by: NURSE ANESTHETIST, CERTIFIED REGISTERED

## 2022-08-09 PROCEDURE — 6360000002 HC RX W HCPCS: Performed by: NURSE ANESTHETIST, CERTIFIED REGISTERED

## 2022-08-09 PROCEDURE — 6360000004 HC RX CONTRAST MEDICATION: Performed by: INTERNAL MEDICINE

## 2022-08-09 PROCEDURE — 3700000000 HC ANESTHESIA ATTENDED CARE: Performed by: INTERNAL MEDICINE

## 2022-08-09 RX ORDER — SODIUM CHLORIDE 0.9 % (FLUSH) 0.9 %
5-40 SYRINGE (ML) INJECTION PRN
Status: DISCONTINUED | OUTPATIENT
Start: 2022-08-09 | End: 2022-08-09 | Stop reason: HOSPADM

## 2022-08-09 RX ORDER — SODIUM CHLORIDE 0.9 % (FLUSH) 0.9 %
5-40 SYRINGE (ML) INJECTION EVERY 12 HOURS SCHEDULED
Status: DISCONTINUED | OUTPATIENT
Start: 2022-08-09 | End: 2022-08-09 | Stop reason: HOSPADM

## 2022-08-09 RX ORDER — SODIUM CHLORIDE 9 MG/ML
INJECTION, SOLUTION INTRAVENOUS PRN
Status: DISCONTINUED | OUTPATIENT
Start: 2022-08-09 | End: 2022-08-09 | Stop reason: HOSPADM

## 2022-08-09 RX ORDER — HALOPERIDOL 5 MG/ML
1 INJECTION INTRAMUSCULAR
Status: DISCONTINUED | OUTPATIENT
Start: 2022-08-09 | End: 2022-08-09 | Stop reason: HOSPADM

## 2022-08-09 RX ORDER — ONDANSETRON 2 MG/ML
INJECTION INTRAMUSCULAR; INTRAVENOUS PRN
Status: DISCONTINUED | OUTPATIENT
Start: 2022-08-09 | End: 2022-08-09 | Stop reason: SDUPTHER

## 2022-08-09 RX ORDER — LIDOCAINE HYDROCHLORIDE 20 MG/ML
INJECTION, SOLUTION EPIDURAL; INFILTRATION; INTRACAUDAL; PERINEURAL PRN
Status: DISCONTINUED | OUTPATIENT
Start: 2022-08-09 | End: 2022-08-09 | Stop reason: SDUPTHER

## 2022-08-09 RX ORDER — OXYCODONE HYDROCHLORIDE 5 MG/1
5 TABLET ORAL
Status: DISCONTINUED | OUTPATIENT
Start: 2022-08-09 | End: 2022-08-09 | Stop reason: HOSPADM

## 2022-08-09 RX ORDER — IPRATROPIUM BROMIDE AND ALBUTEROL SULFATE 2.5; .5 MG/3ML; MG/3ML
1 SOLUTION RESPIRATORY (INHALATION)
Status: DISCONTINUED | OUTPATIENT
Start: 2022-08-09 | End: 2022-08-09 | Stop reason: HOSPADM

## 2022-08-09 RX ORDER — PROCHLORPERAZINE EDISYLATE 5 MG/ML
5 INJECTION INTRAMUSCULAR; INTRAVENOUS
Status: COMPLETED | OUTPATIENT
Start: 2022-08-09 | End: 2022-08-09

## 2022-08-09 RX ORDER — EPHEDRINE SULFATE/0.9% NACL/PF 50 MG/5 ML
SYRINGE (ML) INTRAVENOUS PRN
Status: DISCONTINUED | OUTPATIENT
Start: 2022-08-09 | End: 2022-08-09 | Stop reason: SDUPTHER

## 2022-08-09 RX ORDER — SUCCINYLCHOLINE CHLORIDE 20 MG/ML
INJECTION INTRAMUSCULAR; INTRAVENOUS PRN
Status: DISCONTINUED | OUTPATIENT
Start: 2022-08-09 | End: 2022-08-09 | Stop reason: SDUPTHER

## 2022-08-09 RX ORDER — LIDOCAINE HYDROCHLORIDE 10 MG/ML
1 INJECTION, SOLUTION INFILTRATION; PERINEURAL
Status: DISCONTINUED | OUTPATIENT
Start: 2022-08-09 | End: 2022-08-09 | Stop reason: HOSPADM

## 2022-08-09 RX ORDER — SODIUM CHLORIDE, SODIUM LACTATE, POTASSIUM CHLORIDE, CALCIUM CHLORIDE 600; 310; 30; 20 MG/100ML; MG/100ML; MG/100ML; MG/100ML
INJECTION, SOLUTION INTRAVENOUS CONTINUOUS
Status: DISCONTINUED | OUTPATIENT
Start: 2022-08-09 | End: 2022-08-09 | Stop reason: HOSPADM

## 2022-08-09 RX ORDER — PROPOFOL 10 MG/ML
INJECTION, EMULSION INTRAVENOUS PRN
Status: DISCONTINUED | OUTPATIENT
Start: 2022-08-09 | End: 2022-08-09 | Stop reason: SDUPTHER

## 2022-08-09 RX ADMIN — PHENYLEPHRINE HYDROCHLORIDE 250 MCG: 10 INJECTION INTRAVENOUS at 13:57

## 2022-08-09 RX ADMIN — SODIUM CHLORIDE, SODIUM LACTATE, POTASSIUM CHLORIDE, AND CALCIUM CHLORIDE: 600; 310; 30; 20 INJECTION, SOLUTION INTRAVENOUS at 14:11

## 2022-08-09 RX ADMIN — GLUCAGON HYDROCHLORIDE 0.25 MG: 1 INJECTION, POWDER, FOR SOLUTION INTRAMUSCULAR; INTRAVENOUS; SUBCUTANEOUS at 13:50

## 2022-08-09 RX ADMIN — ONDANSETRON 4 MG: 2 INJECTION INTRAMUSCULAR; INTRAVENOUS at 13:30

## 2022-08-09 RX ADMIN — PROCHLORPERAZINE EDISYLATE 5 MG: 5 INJECTION INTRAMUSCULAR; INTRAVENOUS at 15:18

## 2022-08-09 RX ADMIN — LIDOCAINE HYDROCHLORIDE 100 MG: 20 INJECTION, SOLUTION EPIDURAL; INFILTRATION; INTRACAUDAL; PERINEURAL at 13:12

## 2022-08-09 RX ADMIN — PHENYLEPHRINE HYDROCHLORIDE 250 MCG: 10 INJECTION INTRAVENOUS at 13:35

## 2022-08-09 RX ADMIN — Medication 15 MG: at 13:42

## 2022-08-09 RX ADMIN — PHENYLEPHRINE HYDROCHLORIDE 200 MCG: 10 INJECTION INTRAVENOUS at 13:30

## 2022-08-09 RX ADMIN — Medication 200 MG: at 13:12

## 2022-08-09 RX ADMIN — SODIUM CHLORIDE, SODIUM LACTATE, POTASSIUM CHLORIDE, AND CALCIUM CHLORIDE: 600; 310; 30; 20 INJECTION, SOLUTION INTRAVENOUS at 11:48

## 2022-08-09 RX ADMIN — PROPOFOL 140 MG: 10 INJECTION, EMULSION INTRAVENOUS at 13:12

## 2022-08-09 RX ADMIN — PHENYLEPHRINE HYDROCHLORIDE 250 MCG: 10 INJECTION INTRAVENOUS at 14:03

## 2022-08-09 RX ADMIN — PHENYLEPHRINE HYDROCHLORIDE 100 MCG: 10 INJECTION INTRAVENOUS at 13:21

## 2022-08-09 RX ADMIN — PHENYLEPHRINE HYDROCHLORIDE 200 MCG: 10 INJECTION INTRAVENOUS at 13:27

## 2022-08-09 ASSESSMENT — PAIN - FUNCTIONAL ASSESSMENT
PAIN_FUNCTIONAL_ASSESSMENT: 0-10
PAIN_FUNCTIONAL_ASSESSMENT: NONE - DENIES PAIN
PAIN_FUNCTIONAL_ASSESSMENT: NONE - DENIES PAIN

## 2022-08-09 NOTE — ANESTHESIA PRE PROCEDURE
Department of Anesthesiology  Preprocedure Note       Name:  Tatiana Arriaga   Age:  78 y.o.  :  1945                                          MRN:  [id number]         Date:  2022      Surgeon: Emery Peralta):  Chuy Hearn MD    Procedure: Procedure(s):  ERCP ENDOSCOPIC RETROGRADE CHOLANGIOPANCREATOGRAPHY/ 33    Medications prior to admission:   Prior to Admission medications    Medication Sig Start Date End Date Taking? Authorizing Provider   tamsulosin (FLOMAX) 0.4 MG capsule Take 1 capsule by mouth daily 22   Historical Provider, MD   ondansetron (ZOFRAN) 4 MG tablet Take 1 tablet by mouth 3 times daily as needed for Nausea or Vomiting 22   Albaro Morocho DO   B Complex Vitamins (VITAMIN B COMPLEX PO) Take 1 tablet by mouth daily    Ar Automatic Reconciliation   ZINC SULFATE PO Take by mouth    Ar Automatic Reconciliation   Acetaminophen (TYLENOL DISSOLVE PACKS) 500 MG PACK Take 1,000 mg by mouth 2 times daily    Ar Automatic Reconciliation   acetaminophen (TYLENOL) 500 MG tablet Take 500 mg by mouth every 6 hours as needed    Ar Automatic Reconciliation   allopurinol (ZYLOPRIM) 300 MG tablet Take 300 mg by mouth daily    Ar Automatic Reconciliation   ascorbic acid (VITAMIN C) 250 MG tablet Take 1,000 mg by mouth daily    Ar Automatic Reconciliation   aspirin 325 MG tablet Take 325 mg by mouth Daily with supper    Ar Automatic Reconciliation   vitamin D 25 MCG (1000 UT) CAPS Take 1,000 Units by mouth    Ar Automatic Reconciliation   colchicine (COLCRYS) 0.6 MG tablet Take 0.6 mg by mouth daily 21  Ar Automatic Reconciliation   finasteride (PROSCAR) 5 MG tablet Take 5 mg by mouth daily    Ar Automatic Reconciliation   gabapentin (NEURONTIN) 300 MG capsule Take 300 mg by mouth 3 times daily.      Ar Automatic Reconciliation   glucagon 1 MG injection Inject 1 mg into the muscle as needed 22   Ar Automatic Reconciliation   insulin aspart (NOVOLOG) 100 UNIT/ML injection pen Inject into the skin 3 times daily (before meals)    Ar Automatic Reconciliation   insulin lispro (HUMALOG) 100 UNIT/ML SOLN injection vial INJECT 100 UNITS ONCE DAILY VIA PUMP  Patient not taking: Reported on 8/9/2022 12/17/21   Ar Automatic Reconciliation   oxybutynin (DITROPAN-XL) 10 MG extended release tablet Take 10 mg by mouth daily 2/15/22   Ar Automatic Reconciliation   lipase-protease-amylase (CREON) 52699-681670 units CPEP delayed release capsule Take 4 capsules by mouth 3 times daily (with meals)    Ar Automatic Reconciliation   pantoprazole (PROTONIX) 20 MG tablet Take 20 mg by mouth daily    Ar Automatic Reconciliation   tadalafil (CIALIS) 20 MG tablet Take 20 mg by mouth as needed 2/15/22   Ar Automatic Reconciliation   traMADol (ULTRAM) 50 MG tablet Take 50 mg by mouth every 6 hours as needed. Patient not taking: No sig reported 1/6/22   Ar Automatic Reconciliation       Current medications:    Current Facility-Administered Medications   Medication Dose Route Frequency Provider Last Rate Last Admin    lidocaine 1 % injection 1 mL  1 mL IntraDERmal Once PRN Mehul Patel MD        lactated ringers infusion   IntraVENous Continuous Mehul Patel  mL/hr at 08/09/22 1148 New Bag at 08/09/22 1148    sodium chloride flush 0.9 % injection 5-40 mL  5-40 mL IntraVENous 2 times per day Mehul Patel MD        sodium chloride flush 0.9 % injection 5-40 mL  5-40 mL IntraVENous PRN Mehul Patel MD        0.9 % sodium chloride infusion   IntraVENous PRN Mehul Patel MD           Allergies:     Allergies   Allergen Reactions    Sulfamethoxazole-Trimethoprim Other (See Comments)     Elevated potassium level    Tramadol Diarrhea    Ace Inhibitors      pancreatitis    Codeine Other (See Comments)     \"makes me a little crazy\"    Fenofibrate Other (See Comments)     \"causes pancreatic attacks\"    Hydromorphone Other (See Comments)     \"gives me craziness\"    Metformin Diarrhea    Sitagliptin Other (See Comments)     Per pt causes pancreatic issues       Problem List:    Patient Active Problem List   Diagnosis Code    Chronic anemia D64.9    Chronic right shoulder pain M25.511, G89.29    Stage 3b chronic kidney disease ([facility] [address] N18.32    Arthritis of right shoulder region M19.011    Primary hypertension I10    Gout M10.9    Type 2 diabetes mellitus ([facility] [address] E11.9    History of partial nephrectomy Z90.5    Acute GI bleeding K92.2    Paroxysmal atrial fibrillation (HCC) I48.0    Effusion of left ankle M25.472    Esophageal reflux K21.9    Hammer toe M20.40    Hand joint pain M25.549    Hypertrophy of prostate with urinary obstruction N40.1, N13.8    Hypogonadism in male E29.1    Hypoxemia R09.02    Robbi-Parkinson-White (WPW) syndrome I45.6    Pancreatitis, recurrent K85.90    History of COVID-19 Z86.16    Necrotizing pancreatitis K85.91    Class 1 obesity due to excess calories with serious comorbidity and body mass index (BMI) of 31.0 to 31.9 in adult E66.09, Z68.31       Past Medical History:        Diagnosis Date    Acute blood loss anemia 01/15/2022    Acute renal failure with acute tubular necrosis superimposed on stage 3b chronic kidney disease ([address] 07/13/2022    Arthritis     Blurred vision, right eye     BPH (benign prostatic hyperplasia)     Gout     History of Clostridioides difficile colitis 2010    History of pancreatitis     History of renal carcinoma     partial nephrectomy    Hyperlipidemia     Hypertension     Leukocytosis 01/15/2022    Nausea & vomiting     small amount of N/V and pt not sure of it antiemetics work    Neuropathy     Paroxysmal A-fib ([address]     Presence of Watchman left atrial appendage closure device     Sleep apnea     compliant with C-pap    Thromboembolus ([facility] [address]     hx of left lower extremity    Type 2 diabetes mellitus (HCC)     insulin reliant; AVG BS ; s.s. of hypoglycemia 65-75; last A1c 7.1    WPW (Robbi-Parkinson-White syndrome)     ablation x4       Past Surgical History:        Procedure Laterality Date    ABLATION OF DYSRHYTHMIC FOCUS      WPW- ablations x3    BACK SURGERY      ruptured disc    CATARACT REMOVAL      CHOLECYSTECTOMY      COLONOSCOPY N/A 1/24/2022    COLONOSCOPY performed by Faith Eason MD at [address]      partial    LUMBAR LAMINECTOMY      ORTHOPEDIC SURGERY Left     great toe straightened    ORTHOPEDIC SURGERY Left     foot    OTHER SURGICAL HISTORY      placed watchman 2/2020    TOTAL KNEE ARTHROPLASTY Bilateral     UPPER GASTROINTESTINAL ENDOSCOPY      WISDOM TOOTH EXTRACTION         Social History:    Social History     Tobacco Use    Smoking status: Never    Smokeless tobacco: Never   Substance Use Topics    Alcohol use: Not Currently                                Counseling given: Not Answered      Vital Signs (Current):   Vitals:    08/08/22 1047 08/09/22 1146   BP:  139/71   Pulse:  84   Resp:  18   Temp:  98.1 °F (36.7 °C)   TempSrc:  Oral   SpO2:  97%   Weight: 215 lb (97.5 kg) 215 lb (97.5 kg)   Height: 5' 10\" (1.778 m) 5' 10\" (1.778 m)                                              BP Readings from Last 3 Encounters:   08/09/22 139/71   07/18/22 137/76   07/12/22 139/74       NPO Status: Time of last liquid consumption: 0730                        Time of last solid consumption: 1800                        Date of last liquid consumption: 08/09/22                        Date of last solid food consumption: 08/08/22    BMI:   Wt Readings from Last 3 Encounters:   08/09/22 215 lb (97.5 kg)   07/13/22 219 lb (99.3 kg)   07/12/22 219 lb (99.3 kg)     Body mass index is 30.85 kg/m².     CBC:   Lab Results   Component Value Date/Time    WBC 7.3 07/17/2022 04:56 AM    RBC 3.07 07/17/2022 04:56 AM    HGB 9.3 07/17/2022 04:56 AM    HCT 29.9 07/17/2022 04:56 AM    MCV 97.4 07/17/2022 04:56 AM    RDW 14.8 07/17/2022 04:56 AM     07/17/2022 04:56 AM       CMP:   Lab Results   Component Value Date/Time     07/17/2022 04:56 AM    K 4.2 07/17/2022 04:56 AM     07/17/2022 04:56 AM    CO2 26 07/17/2022 04:56 AM    BUN 23 07/17/2022 04:56 AM    CREATININE 1.31 07/17/2022 04:56 AM    GFRAA >60 07/17/2022 04:56 AM    AGRATIO 0.5 01/23/2022 03:49 AM    LABGLOM 57 07/17/2022 04:56 AM    GLUCOSE 95 07/17/2022 04:56 AM    PROT 6.1 07/17/2022 04:56 AM    CALCIUM 9.0 07/17/2022 04:56 AM    BILITOT 0.6 07/17/2022 04:56 AM    ALKPHOS 170 07/17/2022 04:56 AM    ALKPHOS 122 01/23/2022 03:49 AM    AST 19 07/17/2022 04:56 AM    ALT 18 07/17/2022 04:56 AM       POC Tests: No results for input(s): POCGLU, POCNA, POCK, POCCL, POCBUN, POCHEMO, POCHCT in the last 72 hours.     Coags:   Lab Results   Component Value Date/Time    PROTIME 12.9 01/15/2022 01:11 AM    INR 0.9 01/15/2022 01:11 AM       HCG (If Applicable): No results found for: PREGTESTUR, PREGSERUM, HCG, HCGQUANT     ABGs: No results found for: PHART, PO2ART, UJP6ROP, JCU8ZJP, BEART, K2ULJADI     Type & Screen (If Applicable):  No results found for: LABABO, LABRH    Drug/Infectious Status (If Applicable):  No results found for: HIV, HEPCAB    COVID-19 Screening (If Applicable):   Lab Results   Component Value Date/Time    COVID19 Detected 07/13/2022 05:14 PM    COVID19 Not Detected 09/06/2021 12:00 AM           Anesthesia Evaluation  Patient summary reviewed and Nursing notes reviewed no history of anesthetic complications:   Airway: Mallampati: II          Dental: normal exam         Pulmonary: breath sounds clear to auscultation  (+) sleep apnea: on CPAP,                             Cardiovascular:  Exercise tolerance: good (>4 METS),   (+) hypertension:, dysrhythmias (WPW- s/p ablation; s/p Watchman 2020): atrial fibrillation,             Echocardiogram reviewed               ROS comment: [facility] MRI 2021- EF 60%, no signif valve abnl     Neuro/Psych:   Negative Neuro/Psych ROS GI/Hepatic/Renal:   (+) GERD: well controlled, renal disease: CRI,           Endo/Other:    (+) DiabetesType II DM, well controlled, using insulin, blood dyscrasia: anemia:., .                 Abdominal:             Vascular: negative vascular ROS. Other Findings:           Anesthesia Plan      general     ASA 3       Induction: intravenous. Anesthetic plan and risks discussed with patient and spouse.                         Ese Nino MD   8/9/2022

## 2022-08-09 NOTE — OP NOTE
ENDOSCOPIC  RETROGRADE CHOLANGIOPANCREATOGRAPHY    DATE of PROCEDURE: 8/9/2022    PT NAME: Ed Fierro     [id number]    PRE OP - pancreatic stone    MEDICATION: general; Glucagon 0.25 mg iv    INSTRUMENT:  [id number] VF    SPECIAL PROCEDURE:dual papillotomy w/ balloon sweep- 9/12 mm  BLOOD LOSS- 0 to min. SPEC- no  IMPLANT- none    PROCEDURE: After informed consent, the patient was placed under anesthesia in the semi-prone position. The duodenoscope was passed without difficulty to the area of the ampulla. A standard cannulation and ERCP was performed with the findings as outlined and described below. Patient tolerated the procedure well.     ASSESSMENT:  Tight bilary orifice with small ducts- cloudy bile extracted with 12 mm balloon  Pancreas with anser loop- able to perform papillotomy and sweep proximal duct with 9/12 balloon- white debris on balloon; unable to pass guidewire to tail; no stent   GB absent  Some bleeding with biliary papillotomy - controlled with balloon tamponade- good hemostasis    PLAN:  Follow up in 3 months    Sri Singh MD

## 2022-08-09 NOTE — DISCHARGE INSTRUCTIONS
Endoscopic Retrograde Cholangiopancreatogram (ERCP): What to Expect at [facility]  After you have an endoscopic retrograde cholangiopancreatogram (ERCP). You will be able to go home after your doctor or a nurse checks to make sure you are not having any problems. If you stay in the hospital overnight, you may go home the next day. You may have a sore throat for a day or two after the procedure. This care sheet gives you a general idea about how long it will take for you to recover. But each person recovers at a different pace. Follow the steps below to get better as quickly as possible. How can you care for yourself at home? Activity  Rest as much as you need to after you go home. You should be able to go back to your usual activities the day after the procedure. Diet  Follow your doctor's directions for eating after the procedure. Drink plenty of fluids (unless your doctor tells you not to). Medicines  If you have a sore throat the next day, use an over-the-counter spray to numb your throat. Medication Interaction:  During your procedure you potentially received a medication or medications which may reduce the effectiveness of oral contraceptives. Please consider other forms of contraception for 1 month following your procedure if you are currently using oral contraceptives as your primary form of birth control. In addition to this, we recommend continuing your oral contraceptive as prescribed, unless otherwise instructed by your physician, during this time. Follow-up care is a key part of your treatment and safety. Be sure to make and go to all appointments, and call your doctor if you are having problems. Its also a good idea to know your test results and keep a list of the medicines you take. When should you call for help? Call 911 anytime you think you may need emergency care. For example, call if:  You vomit blood or what looks like coffee grounds.   You pass maroon or bloody stools. Call your doctor now or go to the emergency room if:  You have trouble swallowing. You have belly pain. Your stools are black and tarlike or have streaks of blood. You are sick to your stomach and cannot drink fluids. You have a fever. You have pain that does not get better after you take your pain medicine. Watch closely for changes in your health, and be sure to contact your doctor if:  Your throat still hurts after a day or two. You do not get better as expected. After general anesthesia or intravenous sedation, for 24 hours or while taking prescription Narcotics:  Limit your activities  A responsible adult needs to be with you for the next 24 hours  Do not drive and operate hazardous machinery  Do not make important personal or business decisions  Do not drink alcoholic beverages  If you have not urinated within 8 hours after discharge, and you are experiencing discomfort from urinary retention, please go to the nearest ED. If you have sleep apnea and have a CPAP machine, please use it for all naps and sleeping. Please use caution when taking narcotics and any of your home medications that may cause drowsiness. *  Please give a list of your current medications to your Primary Care Provider. *  Please update this list whenever your medications are discontinued, doses are      changed, or new medications (including over-the-counter products) are added. *  Please carry medication information at all times in case of emergency situations. These are general instructions for a healthy lifestyle:  No smoking/ No tobacco products/ Avoid exposure to second hand smoke  Surgeon General's Warning:  Quitting smoking now greatly reduces serious risk to your health.   Obesity, smoking, and sedentary lifestyle greatly increases your risk for illness  A healthy diet, regular physical exercise & weight monitoring are important for maintaining a healthy lifestyle    You may be retaining fluid if you have a history of heart failure or if you experience any of the following symptoms:  Weight gain of 3 pounds or more overnight or 5 pounds in a week, increased swelling in our hands or feet or shortness of breath while lying flat in bed. Please call your doctor as soon as you notice any of these symptoms; do not wait until your next office visit.

## 2022-08-09 NOTE — ANESTHESIA POSTPROCEDURE EVALUATION
Department of Anesthesiology  Postprocedure Note    Patient: Katelyn Obrien  MRN: [id number]  YOB: 1945  Date of evaluation: 8/9/2022      Procedure Summary     Date: 08/09/22 Room / Location: [facility] ENDO FLOURO 1 / [facility] ENDOSCOPY    Anesthesia Start: 1302 Anesthesia Stop: 6595    Procedure: ERCP SPHINCTEROTOMY WITH BALLOON SWEEP (Upper GI Region) Diagnosis:       Calculus of bile duct without cholecystitis and without obstruction      (Calculus of bile duct without cholecystitis and without obstruction [K80.50])    Surgeons: Anastasiya Scott MD Responsible Provider: Rachell Aguiar MD    Anesthesia Type: general ASA Status: 3          Anesthesia Type: No value filed.     Dav Phase I: Dav Score: 8    Dav Phase II:        Anesthesia Post Evaluation    Patient location during evaluation: PACU  Patient participation: complete - patient participated  Level of consciousness: awake  Airway patency: patent  Nausea & Vomiting: no nausea  Complications: no  Cardiovascular status: hemodynamically stable  Respiratory status: acceptable and nonlabored ventilation  Hydration status: stable  Multimodal analgesia pain management approach

## 2022-08-09 NOTE — INTERVAL H&P NOTE
Update History & Physical    The patient's History and Physical of August 1, 2022 was reviewed with the patient and I examined the patient. There was no change. The surgical site was confirmed by the patient and me. Plan: The risks, benefits, expected outcome, and alternative to the recommended procedure have been discussed with the patient. Patient understands and wants to proceed with the procedure.      Electronically signed by Luan San MD on 8/9/2022 at 12:48 PM

## 2022-08-09 NOTE — ANESTHESIA PROCEDURE NOTES
Airway  Date/Time: 8/9/2022 1:12 PM  Urgency: elective    Airway not difficult    General Information and Staff    Patient location during procedure: procedure area  Anesthesiologist: Rachell Aguiar MD  Resident/CRNA: EDWIN Ramírez - CRNA    Indications and Patient Condition  Indications for airway management: anesthesia  Spontaneous ventilation: present  Sedation level: deep  Preoxygenated: yes  Patient position: sniffing  MILS maintained throughout  Mask difficulty assessment: not attempted    Final Airway Details  Final airway type: endotracheal airway      Successful airway: ETT  Cuffed: yes   Successful intubation technique: direct laryngoscopy  Facilitating devices/methods: intubating stylet  Endotracheal tube insertion site: oral  Blade: Yi  Blade size: #4  ETT size (mm): 7.0  Cormack-Lehane Classification: grade I - full view of glottis  Placement verified by: chest auscultation, capnometry and palpation of cuff   Inital cuff pressure (cm H2O): 8  Measured from: teeth  ETT to teeth (cm): 23  Number of attempts at approach: 1  Ventilation between attempts: bag mask  Number of other approaches attempted: 0    no

## 2022-08-10 ENCOUNTER — CARE COORDINATION (OUTPATIENT)
Dept: OTHER | Facility: CLINIC | Age: 77
End: 2022-08-10

## 2022-08-11 RX ORDER — METHYLPREDNISOLONE ACETATE 40 MG/ML
40 INJECTION, SUSPENSION INTRA-ARTICULAR; INTRALESIONAL; INTRAMUSCULAR; SOFT TISSUE ONCE
Status: CANCELLED | OUTPATIENT
Start: 2022-08-12

## 2022-08-12 ENCOUNTER — TELEPHONE (OUTPATIENT)
Dept: ORTHOPEDIC SURGERY | Age: 77
End: 2022-08-12

## 2022-08-12 ENCOUNTER — OFFICE VISIT (OUTPATIENT)
Dept: ORTHOPEDIC SURGERY | Age: 77
End: 2022-08-12
Payer: MEDICARE

## 2022-08-12 ENCOUNTER — HOSPITAL ENCOUNTER (OUTPATIENT)
Dept: PHYSICAL THERAPY | Age: 77
Setting detail: RECURRING SERIES
Discharge: HOME | End: 2022-08-15
Payer: MEDICARE

## 2022-08-12 DIAGNOSIS — M19.072 PRIMARY OSTEOARTHRITIS OF LEFT ANKLE: Primary | ICD-10-CM

## 2022-08-12 DIAGNOSIS — M19.079 ANKLE ARTHRITIS: ICD-10-CM

## 2022-08-12 PROCEDURE — 1036F TOBACCO NON-USER: CPT | Performed by: ORTHOPAEDIC SURGERY

## 2022-08-12 PROCEDURE — G8428 CUR MEDS NOT DOCUMENT: HCPCS | Performed by: ORTHOPAEDIC SURGERY

## 2022-08-12 PROCEDURE — G8417 CALC BMI ABV UP PARAM F/U: HCPCS | Performed by: ORTHOPAEDIC SURGERY

## 2022-08-12 PROCEDURE — 97110 THERAPEUTIC EXERCISES: CPT

## 2022-08-12 PROCEDURE — 99213 OFFICE O/P EST LOW 20 MIN: CPT | Performed by: ORTHOPAEDIC SURGERY

## 2022-08-12 PROCEDURE — 3017F COLORECTAL CA SCREEN DOC REV: CPT | Performed by: ORTHOPAEDIC SURGERY

## 2022-08-12 PROCEDURE — 1111F DSCHRG MED/CURRENT MED MERGE: CPT | Performed by: ORTHOPAEDIC SURGERY

## 2022-08-12 PROCEDURE — 1123F ACP DISCUSS/DSCN MKR DOCD: CPT | Performed by: ORTHOPAEDIC SURGERY

## 2022-08-12 ASSESSMENT — PAIN SCALES - GENERAL: PAINLEVEL_OUTOF10: 9

## 2022-08-12 NOTE — PROGRESS NOTES
Gomez Singleton  : 1945  Primary: Medicare Part A And B  Secondary: Sunny Whitaker [address] @ [address] [facility] [address]  Phone: [phone]  Fax: [phone] Plan Frequency: 2 times per week for 60 days    Plan of Care/Certification Expiration Date: 10/29/22      PT Visit Info:   No data recorded    OUTPATIENT PHYSICAL THERAPY:OP NOTE TYPE: Treatment Note 2022       Episode  }Appt Desk            Treatment Diagnosis:  Pain in Right Knee (M25.561)  Stiffness of Right Knee, Not elsewhere classified (M25.661)  Difficulty in walking, Not elsewhere classified (R26.2)  Medical/Referring Diagnosis:  Pain in right knee [M25.561]  Referring Physician:  Nolberto Vazquez DO MD Orders:  PT Eval and Treat   Date of Onset:  Onset Date: 05/15/22 (Pain onset from falling backwards 3 weeks from 2022)     Allergies:   Sulfamethoxazole-trimethoprim, Tramadol, Ace inhibitors, Codeine, Fenofibrate, Hydromorphone, Metformin, and Sitagliptin  Restrictions/Precautions: FALL RISK  No data recordedNo data recorded   Interventions Planned (Treatment may consist of any combination of the following):    Current Treatment Recommendations: Strengthening; ROM; Balance training; Functional mobility training; Transfer training; Endurance training; Gait training; Stair training; Neuromuscular re-education; Manual Therapy - Soft Tissue Mobilization; Manual Therapy - Joint Manipulation; Pain management; Home exercise program; Modalities; Integrated dry needling; Therapeutic activities     Subjective Comments:   Left ankle still a painful issue and pt reported Dr. King Agarwal said no surgery to get custom made brace instead. Initial:}    9/10 Post Session:       9/10  Medications Last Reviewed:  2022  Updated Objective Findings:   Pt.  Continues to ambulate with rollater bilateral feet everted   STRENGTH    Date:  2022     Right   Knee Extension -4   Knee Flexion 4   Ankle DF 4+   Ankle PF 4+       Knee ROM    Flexion 131°   Extension +2° hyperextension        THERAPEUTIC EXERCISE: (55 minutes):  Exercises per grid below to improve mobility and strength. Required moderate visual, verbal, manual and tactile cues to promote proper body alignment, promote proper body posture and promote proper body mechanics. Progressed resistance, range, repetitions and complexity of movement as indicated. Date:  8/4/2022 Date:  8/8/2022 Date:  8/12/22   Activity/Exercise Parameters Parameters Parameters   Nu step Level 4, 10 mins  X 10 minsl evel 4 10 min, level 4   Quad sets 2x10, 3 sec hold, supine -- X 20 reps 5 sec hold    Heel Slides -- X 20 reps  X 20 reps    Sit to stand --- 3x10, rep with elevated plinth  3x10 reps chair with one cushion    Straight leg raises  2x10 reps  2x10 reps    shuttle   100 lbs. X 20 reps then 20 reps calf raises   Hip adduction  --- Yellow ball x 20 reps 5 sec hold  Yellow ball x 20 reps 5 sec hold   Hip Extension --- --- ---   Hip Abduction 3x10 reps at bar 3x10 R , handrail 3x10 reps standing at handrail    Bridging  ------ 2x10 reps 5 sec hold  Unable to perform due to increased heel pain   Clamshells  -------- -- ---   Short arc quads 2x15 2x10 reps 5 sec hold  2x10, R    Long arc quads 3x10 3x15 reps 5 sec hold  3x15, R   Straight leg raise  -------- 2x15, R 3x10, RLE   Seated marching --- -- ---   Hamstring stretch  ---------- --- Supine with strap 4x30 sec hold   Gait training  2 laps with rolling walker, gait belt, emphasis on neutral knee and ankle position during gait 3 laps: emphasis on posture and maintaining the R LE in neutral 2 minutes with rolling walker   Ankle ----- --- -----   Measurements --------- ROM, strength, functional mobility ---   Education ---     [facility]     MANUAL THERAPY: (0 minutes):   May consider Joint mobilization, Soft tissue mobilization and Manipulation was utilized and necessary because of the patient's restricted joint motion, painful spasm, loss of articular motion and restricted motion of soft tissue. Soft tissue mobilization of distal quad, patellar area, popliteal, and proximal gastroc; in long sitting; medium pressure. NOT TODAY      Commonly used abbreviations that may be included in this note:  STM- Soft tissue mobilization, R>L or L>R- right greater than left or vice versa, HEP - Home exercise program, CPA/UPA - Central or unilateral posterior-anterior mobilization, SLS- single leg stance, SKTC - Single leg to chest, SNAGS/NAGS- (sustained) Natural apophyseal glides, TKE- Terminal knee extension, ER- External rotation, IR- Internal rotation, B - Bilateral, sec- seconds, Lb- pounds, min - minutes, HA- Headache, OP- Over pressure, tband- theraband, fwd/bwd- Forward/Backward, TA- Transversus Abdominus, dbl- Double      TREATMENT/SESSION SUMMARY:       Response to Treatment: Patient required constant verbal cuing to perform exercises correctly and gait belt was kept on patient for the entire session due to fall precautions. Pt. Loved shuttle and wants to do stairs as soon as his legs get stronger. Communication/Consultation:  None today  Equipment provided today:  None  Recommendations/Intent for next treatment session: Next visit will focus on assessing gait with rolling walker and progressing strength, functional mobility, pain tolerance, and ROM as tolerated.        Total Treatment Billable Duration:  55 minutes  Time In: 3075  Time Out: [address], [facility]         Charge Capture  }Post Session Pain  MedDavid Pierre MD Guidelines  Scanned Media  Benefits  MyChart    Future Appointments   Date Time Provider Deon Alexander   8/16/2022 10:00 AM Tu Pelayo PTA [facility]   8/18/2022 10:00 AM Tu Pelayo PTA [facility]   8/22/2022 11:00 AM Norberto Pickett MD [facility]   8/23/2022  1:30 PM Brian Mendoza PT [facility]   8/25/2022 11:00 AM Brian Mendoza PT [facility]   8/30/2022 10:00 AM Lidia Cobb PT [facility]   10/12/2022  9:40 AM Maico Shah MD [facility]

## 2022-08-12 NOTE — PROGRESS NOTES
Name: Lacy Mora  YOB: 1945  Gender: male  MRN: [id number]    Summary:     Left ankle arthritis with history of right stage III posterior tibial tendon sufficiency     CC: Foot Pain (Bilateral foot pain)       HPI: Lacy Mora is a 78 y.o. male who presents with Foot Pain (Bilateral foot pain)  . Patient presents back to the office today for continued and increasing pain in his left ankle. His right foot is actually pretty stable right now with his inserts. History was obtained by Patient and his wife    ROS/Meds/PSH/PMH/FH/SH: I personally reviewed the patients standard intake form. Below are the pertinents    Tobacco:  reports that he has never smoked. He has never used smokeless tobacco.  Diabetes: Diabetic - non insulin      Physical Examination:  Exam of the left ankle shows complete stiffness of the ankle and hindfoot joints. He has swelling in this area. He has a right planovalgus fixed hindfoot with abduction of the forefoot as well. He does have palpable pulses. 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 I told him Baljit Gomez would not recommend having any surgery at all given his multiple medical comorbidities. I do not think you do very well the fusion whatsoever. I recommend a double upright brace on the left side can help alleviate some of his pain. He is understanding and wishes to try this.

## 2022-08-16 ENCOUNTER — HOSPITAL ENCOUNTER (OUTPATIENT)
Dept: PHYSICAL THERAPY | Age: 77
Setting detail: RECURRING SERIES
Discharge: HOME | End: 2022-08-19
Payer: MEDICARE

## 2022-08-16 ENCOUNTER — OFFICE VISIT (OUTPATIENT)
Dept: ORTHOPEDIC SURGERY | Age: 77
End: 2022-08-16
Payer: MEDICARE

## 2022-08-16 ENCOUNTER — TELEPHONE (OUTPATIENT)
Dept: ORTHOPEDIC SURGERY | Age: 77
End: 2022-08-16

## 2022-08-16 DIAGNOSIS — M19.011 ARTHRITIS OF RIGHT SHOULDER REGION: Primary | ICD-10-CM

## 2022-08-16 PROCEDURE — 97110 THERAPEUTIC EXERCISES: CPT

## 2022-08-16 PROCEDURE — 99213 OFFICE O/P EST LOW 20 MIN: CPT | Performed by: ORTHOPAEDIC SURGERY

## 2022-08-16 PROCEDURE — 3017F COLORECTAL CA SCREEN DOC REV: CPT | Performed by: ORTHOPAEDIC SURGERY

## 2022-08-16 PROCEDURE — G8417 CALC BMI ABV UP PARAM F/U: HCPCS | Performed by: ORTHOPAEDIC SURGERY

## 2022-08-16 PROCEDURE — 1111F DSCHRG MED/CURRENT MED MERGE: CPT | Performed by: ORTHOPAEDIC SURGERY

## 2022-08-16 PROCEDURE — 1123F ACP DISCUSS/DSCN MKR DOCD: CPT | Performed by: ORTHOPAEDIC SURGERY

## 2022-08-16 PROCEDURE — G8428 CUR MEDS NOT DOCUMENT: HCPCS | Performed by: ORTHOPAEDIC SURGERY

## 2022-08-16 PROCEDURE — 1036F TOBACCO NON-USER: CPT | Performed by: ORTHOPAEDIC SURGERY

## 2022-08-16 ASSESSMENT — PAIN SCALES - GENERAL: PAINLEVEL_OUTOF10: 0

## 2022-08-16 NOTE — PROGRESS NOTES
Gali Harrington  : 1945  Primary: Medicare Part A And B  Secondary: Sunny Whitaker 36 @ [address] [facility] [address]  Phone: [phone]  Fax: [phone] Plan Frequency: 2 times per week for 60 days    Plan of Care/Certification Expiration Date: 10/29/22      PT Visit Info:   No data recorded    OUTPATIENT PHYSICAL THERAPY:OP NOTE TYPE: Treatment Note 2022       Episode  }Appt Desk            Treatment Diagnosis:  Pain in Right Knee (M25.561)  Stiffness of Right Knee, Not elsewhere classified (M25.661)  Difficulty in walking, Not elsewhere classified (R26.2)  Medical/Referring Diagnosis:  Pain in right knee [M25.561]  Referring Physician:  Jamari Sanders DO MD Orders:  PT Eval and Treat   Date of Onset:  Onset Date: 05/15/22 (Pain onset from falling backwards 3 weeks from 2022)     Allergies:   Sulfamethoxazole-trimethoprim, Tramadol, Ace inhibitors, Codeine, Fenofibrate, Hydromorphone, Metformin, and Sitagliptin  Restrictions/Precautions: FALL RISK  No data recordedNo data recorded   Interventions Planned (Treatment may consist of any combination of the following):    Current Treatment Recommendations: Strengthening; ROM; Balance training; Functional mobility training; Transfer training; Endurance training; Gait training; Stair training; Neuromuscular re-education; Manual Therapy - Soft Tissue Mobilization; Manual Therapy - Joint Manipulation; Pain management; Home exercise program; Modalities; Integrated dry needling; Therapeutic activities     Subjective Comments:   Pt. reported left ankle pain 9/10  Initial:}    0/10 Post Session:       0/10  Medications Last Reviewed:  2022  Updated Objective Findings:  Pt.  Continues to ambulate with bilateral feet externally rotated  STRENGTH    Date:  2022     Right   Knee Extension -4   Knee Flexion 4   Ankle DF 4+   Ankle PF 4+       Knee ROM    Flexion 131° Extension +2° hyperextension        THERAPEUTIC EXERCISE: (55 minutes):  Exercises per grid below to improve mobility and strength. Required moderate visual, verbal, manual and tactile cues to promote proper body alignment, promote proper body posture and promote proper body mechanics. Progressed resistance, range, repetitions and complexity of movement as indicated. Date:  8/16/2022 Date:  8/8/2022 Date:  8/12/22   Activity/Exercise Parameters Parameters Parameters   Nu step Level 4, 10 mins  X 10 minsl evel 4 10 min, level 4   Quad sets 2x10, 3 sec hold, supine -- X 20 reps 5 sec hold    Heel Slides X 20 reps  X 20 reps  X 20 reps    Sit to stand 3x10 reps chair plus one cushion  3x10, rep with elevated plinth  3x10 reps chair with one cushion    Straight leg raises 2x10 reps  2x10 reps  2x10 reps    shuttle X 20 reps at 100 lbs  100 lbs. X 20 reps then 20 reps calf raises   Hip adduction  -yellow ball x 20 reps x 10 sec hold each  Yellow ball x 20 reps 5 sec hold  Yellow ball x 20 reps 5 sec hold   Hip Extension --- --- ---   Hip Abduction 3x10 reps at bar 3x10 R , handrail 3x10 reps standing at handrail    Bridging  2x10 reps  2x10 reps 5 sec hold  Unable to perform due to increased heel pain   Clamshells  -------- -- ---   Short arc quads 2x15 2x10 reps 5 sec hold  2x10, R    Long arc quads 3x10 3x15 reps 5 sec hold  3x15, R   Straight leg raise  3x10 RLE 2x15, R 3x10, RLE   Seated marching --- -- ---   Hamstring stretch  Supine with strap 4x30  --- Supine with strap 4x30 sec hold   Gait training  2 laps with rolling walker, gait belt, emphasis on neutral knee and ankle position during gait 3 laps: emphasis on posture and maintaining the R LE in neutral 2 minutes with rolling walker   Ankle ----- --- -----   Measurements --------- ROM, strength, functional mobility ---   Education ---     [facility]     MANUAL THERAPY: (0 minutes):   May consider Joint mobilization, Soft tissue mobilization and Manipulation was utilized and necessary because of the patient's restricted joint motion, painful spasm, loss of articular motion and restricted motion of soft tissue. Soft tissue mobilization of distal quad, patellar area, popliteal, and proximal gastroc; in long sitting; medium pressure. NOT TODAY      Commonly used abbreviations that may be included in this note:  STM- Soft tissue mobilization, R>L or L>R- right greater than left or vice versa, HEP - Home exercise program, CPA/UPA - Central or unilateral posterior-anterior mobilization, SLS- single leg stance, SKTC - Single leg to chest, SNAGS/NAGS- (sustained) Natural apophyseal glides, TKE- Terminal knee extension, ER- External rotation, IR- Internal rotation, B - Bilateral, sec- seconds, Lb- pounds, min - minutes, HA- Headache, OP- Over pressure, tband- theraband, fwd/bwd- Forward/Backward, TA- Transversus Abdominus, dbl- Double      TREATMENT/SESSION SUMMARY:       Response to Treatment: Patient was compliant with all exercise and continues to get stronger and improved endurance. Communication/Consultation:  None today  Equipment provided today:  None  Recommendation/Intent for next treatment session: Next visit will focus on assessing gait with rolling walker and progressing strength, functional mobility, pain tolerance, and ROM as tolerated.        Total Treatment Billable Duration:  55 minutes  Time In: 1000  Time Out: 80    STEPHANIE MENDEZ PTA         Charge Capture  }Post Session Pain  MedBridge Portal  MD Guidelines  Scanned Media  Benefits  MyChart    Future Appointments   Date Time Provider Deon Alexander   8/16/2022  3:30 PM MD ROSELYN Balderas [facility]   8/18/2022 10:00 AM Maylin Bernard PTA [facility]   8/22/2022 11:00 AM Holly Fernández MD [facility]   8/23/2022  1:30 PM Miranda Shaw PT [facility]   8/25/2022 11:00 [facility]   8/30/2022 10:00 AM Miranda Shaw PT [facility]   10/12/2022  9:40 AM Gustavo Fonseca [facility]

## 2022-08-16 NOTE — PROGRESS NOTES
Name: Sravan Car  YOB: 1945  Gender: male  MRN: [id number]    CC:   Chief Complaint   Patient presents with    Shoulder Pain     Discuss right shoulder surgery        HPI: Patient presents today with right shoulder pain. He comes in today to discuss surgery. Recall we went over CT scan results on 06/08/2022 but the patient had a fall and was having some right knee pain at that time. He has a total knee arthroplasty on the right side. He was doing physical therapy on his knee and using an on exam still restricted active flexion with the right shoulder. ambulation device at that time which also required the use of his shoulder. We discussed at that time to hold on any major surgery. He is here complaining mainly of his left ankle pain. He was told that he should not have surgery by Dr. Shani Zheng but is working on getting a brace hopefully on [id number] he has his first appointment with them.     Allergies   Allergen Reactions    Sulfamethoxazole-Trimethoprim Other (See Comments)     Elevated potassium level    Tramadol Diarrhea    Ace Inhibitors      pancreatitis    Codeine Other (See Comments)     \"makes me a little crazy\"    Fenofibrate Other (See Comments)     \"causes pancreatic attacks\"    Hydromorphone Other (See Comments)     \"gives me craziness\"    Metformin Diarrhea    Sitagliptin Other (See Comments)     Per pt causes pancreatic issues     Past Medical History:   Diagnosis Date    Acute blood loss anemia 01/15/2022    Acute renal failure with acute tubular necrosis superimposed on stage 3b chronic kidney disease ([facility] [address] 07/13/2022    Arthritis     Blurred vision, right eye     BPH (benign prostatic hyperplasia)     Gout     History of Clostridioides difficile colitis 2010    History of pancreatitis     History of renal carcinoma     partial nephrectomy    Hyperlipidemia     Hypertension     Leukocytosis 01/15/2022    Nausea & vomiting     small amount of N/V and pt not sure of it antiemetics work    Neuropathy     Paroxysmal A-fib ([facility] [address]     Presence of Watchman left atrial appendage closure device     Sleep apnea     compliant with C-pap    Thromboembolus (HCC)     hx of left lower extremity    Type 2 diabetes mellitus (HCC)     insulin reliant; AVG BS ; s.s. of hypoglycemia 65-75; last A1c 7.1    WPW (Robbi-Parkinson-White syndrome)     ablation x4     Past Surgical History:   Procedure Laterality Date    ABLATION OF DYSRHYTHMIC FOCUS      WPW- ablations x3    BACK SURGERY      ruptured disc    CATARACT REMOVAL      CHOLECYSTECTOMY      COLONOSCOPY N/A 1/24/2022    COLONOSCOPY performed by Sincere Sr MD at [address] [place] [address]    ERCP N/A 8/9/2022    ERCP SPHINCTEROTOMY WITH BALLOON SWEEP performed by Ramiro Liu MD at [facility] ENDOSCOPY    ERCP W/ SPHICTEROTOMY N/A 08/09/2022    KIDNEY REMOVAL      partial    LUMBAR LAMINECTOMY      ORTHOPEDIC SURGERY Left     great toe straightened    ORTHOPEDIC SURGERY Left     foot    OTHER SURGICAL HISTORY      placed watchman 2/2020    TOTAL KNEE ARTHROPLASTY Bilateral     UPPER GASTROINTESTINAL ENDOSCOPY      WISDOM TOOTH EXTRACTION       Family History   Problem Relation Age of Onset    Cancer Sister         ovarian    No Known Problems Sister     Cancer Sister         colon ca    Parkinson's Disease Father     Other Mother         ALS    Crohn's Disease Son     Crohn's Disease Daughter      Social History     Socioeconomic History    Marital status:      Spouse name: Not on file    Number of children: Not on file    Years of education: Not on file    Highest education level: Not on file   Occupational History    Not on file   Tobacco Use    Smoking status: Never    Smokeless tobacco: Never   Substance and Sexual Activity    Alcohol use: Not Currently    Drug use: Never    Sexual activity: Not on file   Other Topics Concern    Not on file   Social History Narrative    Not on file     Social Determinants of Health     Financial Resource Strain: Not on file   Food Insecurity: Not on file   Transportation Needs: Not on file   Physical Activity: Not on file   Stress: Not on file   Social Connections: Not on file   Intimate Partner Violence: Not on file   Housing Stability: Not on file        No flowsheet data found. Review of Systems  Significant for stage III kidney disease, A. fib, Robbi-Parkinson-White, type 2 diabetes, recurrent pancreatitis, history of partial nephrectomy    PE:    No major change in right shoulder exam with limited active flexion pseudoparalysis and crepitance. Weakness as well    CT scan right shoulder from 5-13-22:  FINDINGS: Erosive osteoarthrosis of the glenohumeral joint noted. Subchondral eburnation with several erosions of the humerus and glenoid  noted. Large marginal osteophyte along the inferior margin of the humerus. Marginal osteophyte formation and marked erosive remodeling deformity of the  glenoid noted. Scattered fissuring of the glenoid margins noted. There is a  large superior glenoid erosion measuring about 9 mm x 1.1 cm x 8 mm; series  2, image 12, coronal series 301, image 46. A corresponding large humeral  erosion of the 1.3 cm x 1.4 cm x 1 cm also noted on these images. Severe glenohumeral chondromalacia and asymmetric joint narrowing glenoid  version estimated at a degrees retroversion. Glenoid stock estimated 19 mm. Walch type A2 glenoid configuration. Small to moderate-sized joint effusion. Loose bodies noted within the  effusion. The largest measures about 6 mm on axial series 4, image 96  projecting in the superior subscapular recess. Also see sagittal series 300,  image 59. Narrowing of the acromiohumeral interval. No full-thickness tear of the  rotator cuff. No significant muscle belly atrophy. Bulk of the deltoid  intact. Bony hypertrophy undersurface the AC joint. Erosive osteoarthrosis of the AC  joint.      The remainder of the bony shoulder girdle and bony thorax demonstrates no  acute suspicious findings. The right lung demonstrates focal areas of scarring and/or atelectasis. A  few granulomas are noted. Glenoid bone stock measured around 19 mm. Severe glenohumeral arthritic change. A/Plan:     ICD-10-CM    1. Arthritis of right shoulder region  M19.011            Discussed currently his shoulder is a secondary issue to his foot pain. I like to see how he does with the brace because of his balance and pain are improved there then may be we can consider proceeding with replacement of the shoulder. Would need a follow-up CT. He has a fairly significant anterior defect requiring posterior superior augmented reverse glenoid. Significant for stage III kidney disease, A. fib, Robbi-Parkinson-White, type 2 diabetes, recurrent pancreatitis, history of partial nephrectomy  Return in about 4 weeks (around 9/13/2022).         Doris Asif MD  08/16/22

## 2022-08-17 ENCOUNTER — TELEPHONE (OUTPATIENT)
Dept: ORTHOPEDIC SURGERY | Age: 77
End: 2022-08-17

## 2022-08-18 ENCOUNTER — HOSPITAL ENCOUNTER (OUTPATIENT)
Dept: PHYSICAL THERAPY | Age: 77
Setting detail: RECURRING SERIES
Discharge: HOME | End: 2022-08-21
Payer: MEDICARE

## 2022-08-18 ENCOUNTER — CARE COORDINATION (OUTPATIENT)
Dept: OTHER | Facility: CLINIC | Age: 77
End: 2022-08-18

## 2022-08-18 PROCEDURE — 97110 THERAPEUTIC EXERCISES: CPT

## 2022-08-18 ASSESSMENT — PAIN SCALES - GENERAL: PAINLEVEL_OUTOF10: 0

## 2022-08-18 NOTE — CARE COORDINATION
Patient has graduated from the Care Transitions program on 8/18/2022. Patient/family has the ability to self-manage at this time. Patient has no further care management needs, no referral to the [facility] team for further management. Goals Addressed                   This Visit's Progress     Conditions and Symptoms   On track     I will schedule office visits, as directed by my provider. I will keep my appointment or reschedule if I have to cancel. I will notify my provider of any barriers to my plan of care. I will follow my Zone Management tool to seek urgent or emergent care. I will notify my provider of any symptoms that indicate a worsening of my condition. Barriers: none  Plan for overcoming my barriers: N/A  Confidence: 10/10  Anticipated Goal Completion Date: 8/18/2022               Patient has Care Transition Nurse's contact information for any further questions, concerns, or needs.   Patients upcoming visits:    Future Appointments   Date Time Provider Deon Alexander   8/18/2022 10:00 AM Anne Bullard PTA [facility]   8/22/2022 11:00 AM Claudette Bueno MD [facility]   8/23/2022  1:30 PM David Martin, PT [facility]   8/25/2022 11:00 AM David Martin, PT [facility] [place]   8/30/2022 10:00 AM David Martin, PT [facility]   9/13/2022  2:45 PM MD ROSELYN Gomes [facility] AM[facility]   10/12/2022  9:40 AM Waldo Riley MD [facility] AM[facility]

## 2022-08-18 NOTE — PROGRESS NOTES
Loren Ardon  : 1945  Primary: Medicare Part A And B  Secondary: Sunny Whitaker [address] [facility] [address]  Phone: [phone]  Fax: [phone] Plan Frequency: 2 times per week for 60 days    Plan of Care/Certification Expiration Date: 10/29/22      PT Visit Info:   No data recorded    OUTPATIENT PHYSICAL THERAPY:OP NOTE TYPE: Treatment Note 2022       Episode  }Appt Desk            Treatment Diagnosis:  Pain in Right Knee (M25.561)  Stiffness of Right Knee, Not elsewhere classified (M25.661)  Difficulty in walking, Not elsewhere classified (R26.2)  Medical/Referring Diagnosis:  Pain in right knee [M25.561]  Referring Physician:  Connie Stephenson DO MD Orders:  PT Eval and Treat   Date of Onset:  Onset Date: 05/15/22 (Pain onset from falling backwards 3 weeks from 2022)     Allergies:   Sulfamethoxazole-trimethoprim, Tramadol, Ace inhibitors, Codeine, Fenofibrate, Hydromorphone, Metformin, and Sitagliptin  Restrictions/Precautions: FALL RISK  No data recordedNo data recorded   Interventions Planned (Treatment may consist of any combination of the following):    Current Treatment Recommendations: Strengthening; ROM; Balance training; Functional mobility training; Transfer training; Endurance training; Gait training; Stair training; Neuromuscular re-education; Manual Therapy - Soft Tissue Mobilization; Manual Therapy - Joint Manipulation; Pain management; Home exercise program; Modalities; Integrated dry needling; Therapeutic activities     Subjective Comments: Pt. Reported having 2 episodes where his right knee buckled and did not have present and grabbed furniture to recover. Pt. reported no right knee pain, right shoulder 4/10 and left ankle is an 8/10  Initial:}    0/10 Post Session:       0/10  Medications Last Reviewed:  2022  Updated Objective Findings:  Pt.  Demonstrated good balance today with gait and exercises  STRENGTH    Date:  8/18/2022     Right   Knee Extension -4   Knee Flexion 4   Ankle DF 4+   Ankle PF 4+       Knee ROM    Flexion 131°   Extension +2° hyperextension        THERAPEUTIC EXERCISE: (55 minutes):  Exercises per grid below to improve mobility and strength. Required moderate visual, verbal, manual and tactile cues to promote proper body alignment, promote proper body posture and promote proper body mechanics. Progressed resistance, range, repetitions and complexity of movement as indicated. Date:  8/16/2022 Date:  8/18/2022 Date:  8/12/22   Activity/Exercise Parameters Parameters Parameters   Nu step Level 4, 10 mins  X 10 mins level 4 10 min, level 4   Quad sets 2x10, 3 sec hold, supine --x 30 sec hold  X 20 reps 5 sec hold    Heel Slides X 20 reps  X 20 reps  X 20 reps    Sit to stand 3x10 reps chair plus one cushion  3x10, rep with elevated plinth  3x10 reps chair with one cushion    Straight leg raises 2x10 reps  2x10 reps  2x10 reps    shuttle X 20 reps at 100 lbs  100 lbs.  X 20 reps then 20 reps calf raises   Hip adduction  -yellow ball x 20 reps x 10 sec hold each  Yellow ball x 20 reps 5 sec hold  Yellow ball x 20 reps 5 sec hold   Hip Extension --- --- ---   Hip Abduction 3x10 reps at bar 3x10 R , handrail 3x10 reps standing at handrail    Bridging  2x10 reps  2x10 reps 5 sec hold  Unable to perform due to increased heel pain   Clamshells  -------- -- ---   Short arc quads 2x15 2x10 reps 5 sec hold  2x10, R    Long arc quads 3x10 3x15 reps 5 sec hold  3x15, R   Straight leg raise  3x10 RLE 2x15, R 3x10, RLE   Seated marching --- -- ---   Hamstring stretch  Supine with strap 4x30  --- Supine with strap 4x30 sec hold   Gait training  2 laps with rolling walker, gait belt, emphasis on neutral knee and ankle position during gait 3 laps: emphasis on posture and maintaining the R LE in neutral 2 minutes with rolling walker   Ankle ----- --- -----   Measurements --------- ROM, strength, functional mobility ---   Education ---     [place] Portal     MANUAL THERAPY: (0 minutes): May consider Joint mobilization, Soft tissue mobilization and Manipulation was utilized and necessary because of the patient's restricted joint motion, painful spasm, loss of articular motion and restricted motion of soft tissue. Soft tissue mobilization of distal quad, patellar area, popliteal, and proximal gastroc; in long sitting; medium pressure. NOT TODAY      Commonly used abbreviations that may be included in this note:  STM- Soft tissue mobilization, R>L or L>R- right greater than left or vice versa, HEP - Home exercise program, CPA/UPA - Central or unilateral posterior-anterior mobilization, SLS- single leg stance, SKTC - Single leg to chest, SNAGS/NAGS- (sustained) Natural apophyseal glides, TKE- Terminal knee extension, ER- External rotation, IR- Internal rotation, B - Bilateral, sec- seconds, Lb- pounds, min - minutes, HA- Headache, OP- Over pressure, tband- theraband, fwd/bwd- Forward/Backward, TA- Transversus Abdominus, dbl- Double      TREATMENT/SESSION SUMMARY:       Response to Treatment: Patient continues to show improvements but requires gait belt and rolling walker due to knee buckling and fall precautions       Communication/Consultation:  None today  Equipment provided today:  None  Recommendation/Intent for next treatment session: Next visit will focus on assessing gait with rolling walker and progressing strength, functional mobility, pain tolerance, and ROM as tolerated.        Total Treatment Billable Duration:  55 minutes  Time In: 1000  Time Out: 80    STEPHANIE MENDEZ, CATINA         Charge Capture  }Post Session Pain  MedBridge Portal  MD Guidelines  Scanned Media  Benefits  MyChart    Future Appointments   Date Time Provider Deon Alexander   8/22/2022 11:00 AM Omega Benson MD [facility]   8/23/2022  1:30 PM Pineda Garcia PT [facility]   8/25/2022 11:00 AM Pineda Garcia

## 2022-08-22 ENCOUNTER — OFFICE VISIT (OUTPATIENT)
Dept: ORTHOPEDIC SURGERY | Age: 77
End: 2022-08-22
Payer: MEDICARE

## 2022-08-22 DIAGNOSIS — Z96.651 PRESENCE OF TOTAL KNEE JOINT PROSTHESIS, RIGHT: Primary | ICD-10-CM

## 2022-08-22 DIAGNOSIS — M25.519 ACUTE SHOULDER PAIN, UNSPECIFIED LATERALITY: ICD-10-CM

## 2022-08-22 DIAGNOSIS — T84.092D OTHER MECHANICAL COMPLICATION OF INTERNAL RIGHT KNEE PROSTHESIS, SUBSEQUENT ENCOUNTER: ICD-10-CM

## 2022-08-22 PROCEDURE — G8417 CALC BMI ABV UP PARAM F/U: HCPCS | Performed by: ORTHOPAEDIC SURGERY

## 2022-08-22 PROCEDURE — 99214 OFFICE O/P EST MOD 30 MIN: CPT | Performed by: ORTHOPAEDIC SURGERY

## 2022-08-22 PROCEDURE — 1123F ACP DISCUSS/DSCN MKR DOCD: CPT | Performed by: ORTHOPAEDIC SURGERY

## 2022-08-22 PROCEDURE — G8427 DOCREV CUR MEDS BY ELIG CLIN: HCPCS | Performed by: ORTHOPAEDIC SURGERY

## 2022-08-22 PROCEDURE — 1036F TOBACCO NON-USER: CPT | Performed by: ORTHOPAEDIC SURGERY

## 2022-08-22 PROCEDURE — 3017F COLORECTAL CA SCREEN DOC REV: CPT | Performed by: ORTHOPAEDIC SURGERY

## 2022-08-23 ENCOUNTER — HOSPITAL ENCOUNTER (OUTPATIENT)
Dept: PHYSICAL THERAPY | Age: 77
Setting detail: RECURRING SERIES
End: 2022-08-23
Payer: MEDICARE

## 2022-08-23 NOTE — PROGRESS NOTES
Physical Therapy  [id number] [facility] at [facility]  8/23/2022    Patient cancelled session due to not feeling well. Confirmed he will present next session.      Henrry Alvarenga, PT, DPT, CSCS  8/23/2022

## 2022-08-25 ENCOUNTER — HOSPITAL ENCOUNTER (OUTPATIENT)
Dept: PHYSICAL THERAPY | Age: 77
Setting detail: RECURRING SERIES
Discharge: HOME | End: 2022-08-28
Payer: MEDICARE

## 2022-08-25 PROCEDURE — 97110 THERAPEUTIC EXERCISES: CPT

## 2022-08-25 ASSESSMENT — PAIN SCALES - GENERAL: PAINLEVEL_OUTOF10: 0

## 2022-08-25 NOTE — PROGRESS NOTES
Daniel Zhu  : 1945  Primary: Medicare Part A And B  Secondary: Sunny Whitaker 34 @ [address] [facility] [address]  Phone: [phone]  Fax: [phone] Plan Frequency: 2 times per week for 60 days    Plan of Care/Certification Expiration Date: 10/29/22      PT Visit Info:   No data recorded    OUTPATIENT PHYSICAL THERAPY:OP NOTE TYPE: Treatment Note 2022       Episode  }Appt Desk            Treatment Diagnosis:  Pain in Right Knee (M25.561)  Stiffness of Right Knee, Not elsewhere classified (M25.661)  Difficulty in walking, Not elsewhere classified (R26.2)  Medical/Referring Diagnosis:  Pain in right knee [M25.561]  Referring Physician:  Guzman Healy DO MD Orders:  PT Eval and Treat   Date of Onset:  Onset Date: 05/15/22 (Pain onset from falling backwards 3 weeks from 2022)     Allergies:   Sulfamethoxazole-trimethoprim, Tramadol, Ace inhibitors, Codeine, Fenofibrate, Hydromorphone, Metformin, and Sitagliptin  Restrictions/Precautions: FALL RISK  No data recordedNo data recorded   Interventions Planned (Treatment may consist of any combination of the following):    Current Treatment Recommendations: Strengthening; ROM; Balance training; Functional mobility training; Transfer training; Endurance training; Gait training; Stair training; Neuromuscular re-education; Manual Therapy - Soft Tissue Mobilization; Manual Therapy - Joint Manipulation; Pain management; Home exercise program; Modalities; Integrated dry needling; Therapeutic activities     Subjective Comments: Patient reports no pain in his R knee, but he does express pain in his L ankle. He states he will have a brace made for the L ankle. Initial:}    0/10 Post Session:       0/10  Medications Last Reviewed:  2022  Updated Objective Findings:  Pt.  Demonstrated good balance today with gait and exercises  STRENGTH    Date:  2022     Right   Knee Extension -4, weakness at end 10° of full extension   Knee Flexion 4   Ankle DF 4+   Ankle PF 4+       Knee ROM    Flexion 131°   Extension +2° hyperextension        THERAPEUTIC EXERCISE: (59 minutes):  Exercises per grid below to improve mobility and strength. Required moderate visual, verbal, manual and tactile cues to promote proper body alignment, promote proper body posture and promote proper body mechanics. Progressed resistance, range, repetitions and complexity of movement as indicated. Date:  8/18/2022 Date:  8/25/22   Activity/Exercise Parameters Parameters   Nu step X 10 mins level 4 10 min, level 4   Quad sets --x 30 sec hold     Heel Slides X 20 reps     Sit to stand 3x10, rep with elevated plinth     Straight leg raises 2x10 reps     shuttle  Next Session   Hip adduction  Yellow ball x 20 reps 5 sec hold     Hip Extension --- ---   Hip Abduction 3x10 R , handrail ---   Bridging  2x10 reps 5 sec hold  3x10 reps, feet elevated    Clamshells  -- 3x10, L, red band around knees, sidelying   Short arc quads 2x10 reps 5 sec hold  3x10, eccentric, therapist assists knee to full extension, 1.5 lb. ankle weight   Long arc quads 3x15 reps 5 sec hold  3x15, R   Straight leg raise  2x15, R ---   Seated marching -- ---   Hamstring stretch  --- Supine with strap 2x60    Gait training  3 laps: emphasis on posture and maintaining the R LE in neutral ---   Ankle --- -----   Measurements ROM, strength, functional mobility ---   Education  6 minutes: Reviewign HEP and body mechanics for patient. Included Assisted eccentric SAQ, clamshells, and hamstring stretch   [facility] Portal     MANUAL THERAPY: (0 minutes): May consider Joint mobilization, Soft tissue mobilization and Manipulation was utilized and necessary because of the patient's restricted joint motion, painful spasm, loss of articular motion and restricted motion of soft tissue.    Soft tissue mobilization of distal quad, patellar area, popliteal, and proximal gastroc; in long sitting; medium pressure. NOT TODAY      Commonly used abbreviations that may be included in this note:  STM- Soft tissue mobilization, R>L or L>R- right greater than left or vice versa, HEP - Home exercise program, CPA/UPA - Central or unilateral posterior-anterior mobilization, SLS- single leg stance, SKTC - Single leg to chest, SNAGS/NAGS- (sustained) Natural apophyseal glides, TKE- Terminal knee extension, ER- External rotation, IR- Internal rotation, B - Bilateral, sec- seconds, Lb- pounds, min - minutes, HA- Headache, OP- Over pressure, tband- theraband, fwd/bwd- Forward/Backward, TA- Transversus Abdominus, dbl- Double      TREATMENT/SESSION SUMMARY:       Response to Treatment: Patient able to reach full hip extension after modifying bridges to feet elevated. He presents with R quad weakness and is unable to actively fully extend his knee in the final 10°. The patient's HEP was updated and reviewed with him. The patient continues to ambulate with a rolling walker. Communication/Consultation:  None today  Equipment provided today:  None  Recommendation/Intent for next treatment session: Next visit will focus on assessing gait with rolling walker and progressing strength, functional mobility, pain tolerance, and ROM as tolerated. Will return to gait training with AD if tolerable.     Total Treatment Billable Duration:  59 minutes  Time In: 1101  Time Out: [address], PT         Charge Capture  }Post Session Pain  Med[employer] Portal  MD Guidelines  Scanned Media  Benefits  MyChart    Future Appointments   Date Time Provider Deon Alexander   8/30/2022 10:00 AM Prabhakar Sanders, DARSHAN [facility]   8/31/2022  8:00 AM MD SANJEEV Billings III [facility]   9/13/2022  2:45 PM MD ROSELYN Nuñez [facility]   10/12/2022  9:40 AM Patel Holt MD [facility] AM[facility]

## 2022-08-30 ENCOUNTER — HOSPITAL ENCOUNTER (OUTPATIENT)
Dept: PHYSICAL THERAPY | Age: 77
Setting detail: RECURRING SERIES
Discharge: HOME | End: 2022-09-02
Payer: MEDICARE

## 2022-08-30 PROCEDURE — 97110 THERAPEUTIC EXERCISES: CPT

## 2022-08-30 NOTE — THERAPY DISCHARGE
Yovany Oakes  : 1945  Primary: Medicare Part A And B  Secondary: Sunny Whitaker 36 @ [address] [facility] [address]  Phone: [phone]  Fax: [phone] Plan Frequency: to be discharged at this time    Plan of Care/Certification Expiration Date: 10/29/22      PT Visit Info:    No data recorded    OUTPATIENT PHYSICAL THERAPY:OP NOTE TYPE: Discharge Summary 2022               Episode  Appt Desk         Treatment Diagnosis:  Pain in Right Knee (M25.561)  Stiffness of Right Knee, Not elsewhere classified (M25.661)  Difficulty in walking, Not elsewhere classified (R26.2)  Medical/Referring Diagnosis:  Pain in right knee [M25.561]  Referring Physician:  Noreen Short DO MD Orders:  PT Eval and Treat   Return MD Appt:  10/2/2022  Date of Onset:  Onset Date: 05/15/22 (Pain onset from falling backwards 3 weeks from 2022)     Allergies:  Sulfamethoxazole-trimethoprim, Tramadol, Ace inhibitors, Codeine, Fenofibrate, Hydromorphone, Metformin, and Sitagliptin  Restrictions/Precautions:    No data recordedNo data recorded   Medications Last Reviewed:  2022           OBJECTIVE         ROM Date:  2022 Date:  2022 Date:  2022 Date:  2022    KNEE ROM (TESTED IN SUPINE)       RIGHT LEFT RIGHT RIGHT RIGHT   Flexion 124° 125° 128° 130° 132°   Extension 7° from full extension 1° from full extension +1° of hyperextension 0° 0         STRENGTH   Date: 2022  Date: 2022 Date:  22 Date:  2022     Right Left Right Right Right   Knee Extension 3+ 4+ 3+ 4 4   Knee Flexion 4- 4+ 4 4 5   Ankle DF 4+ 4+ 4+ 4+ 5   Ankle PF 4 4 4 4+ 4+     FUNCTIONAL MOBILITY   Date:   2022    Transfers Improvements noted with decreased BUE support to stand Improvements noted with decreased BUE support to stand Independent with UE upport to stand up    Gait deviations Excessive R toe-out, B ankle eversion (R>L) Patient demonstrates mild improvements in actively maintaining ankle in a more neutral position Excessive R toe-out, B ankle eversion but with noted improvements   Assistive device Rolling walker Rolling walker, patient still reports multiple falls Rolling walker, with frequent falls    Bed mobility Min assist Independent but slow Independent but slow pace       Patient reports inability to support himself on R SLS. ASSESSMENT   Initial Assessment:   Mr. Jules Meza has attended 1 physical therapy session including initial evaluation as of 6/2/2022. He reports falling backwards three weeks ago when reaching for a chair. He was unable to stand so EMS was called for him. Jules Meza reports x-ray images were negative for fractures. The patient reports feeling pain in his R knee stemming from his fall, he denies radiating symptoms, and complaints of occasional knee instability. The patient ambulates with a rolling walker and presents with increased BLE ER. He  Jules Meza presents with signs and symptoms of increased pain, decreased ROM, decreased strength, decreased functional tolerance. Jules Meza will benefit from home exercise program, therapeutic and postural strengthening exercises, manual therapeutic techniques (ie. Distraction, SOR, myofascial release/soft tissue mobilization) as appropriate to address Shirley Gunning current condition. Jules Meza will benefit from skilled PT (medically necessary) to address above deficits affecting participation in basic ADLs and overall functional tolerance. Progress Note:  Patient reports he has improved 50% overall. He is now able to walk with a single point cane but reports falls, his R knee is no longer painful. Patient elian presents with quadriceps and gluteal weakness.  The patient has made improvements in ROM, strength, and functional mobility demonstrated by performing stair training and gait training with minimal cueing. He has met his outcome measure goal of improving his score by 10 points. He has met 4 /4 short term goals and 2/5 long term goals. The patient still struggles with decreased ROM, muscle weakness, mobility restrictions, problems with gait and ambulation, and increased pain. Ed Fierro will continue to benefit from skilled PT (medically necessary) to address above deficits affecting participation in basic ADLs and overall functional tolerance. Discharge (8/30/2022):   Mr. Ed Fierro has attended 21 physical therapy sessions including initial evaluation as of 6/2/2022. He reports feeling 60% better overall since the initial evaluation. The patient has made improvements in strength, ROM, functional mobility, pain tolerance, and balance. The patient still presents with signs of weakness, impaired balance, pain, and decreased mobility. The patient reports less pain, swelling, and improved mobility of his affected knee. The patient has met 6 of his goal and did not meet 3 goals. The patient was educated and he understood his independent HEP to be completed at home as prescribed. The patient has reached his session limit due to insurance and will be discharged at this time. PLAN   Effective Dates: 6/2/2022 TO Plan of Care/Certification Expiration Date: 10/29/22     Frequency/Duration: Plan Frequency: to be discharged at this time        GOALS: (Goals have been discussed and agreed upon with patient.)  Short Term Goals 4 weeks   Ed Fierro will be independent with HEP to promote self-management of symptoms. GOAL MET 6/28/2022  Ed Fierro will perform Nu Step x 10 minutes for hip and knee AAROM. GOAL MET 6/28/2022  Ed Fierro will tolerate manual therapy/joint mobilizations to increase knee flexion ROM so pt can ambulate stairs and walk with a more normalized gait pattern.  GOAL MET 6/28/2022  Ed Fierro will participate in static and dynamic balance activities for 5 minutes to help improve proprioception and decrease risk of falls. GOAL MET 7/28/2022  Long Term Goals 12 weeks  Jules Meza will be able to perform sit to stand transfers independently with increased knee flexion and decreased use of upper extremities. NOT MET  Jules Meza will ascend/descend 12 steps with reciprocal gait pattern and rail. NOT MET  Jules Candelariog will improve MMT B LE to >=4+/5 to improve current level of independence and community reintegration. NOT MET   Jules Candelariog will demonstrate R knee extenson >= 0 degrees to improve functional mobility and tolerance of ADLs. GOAL MET 6/28/2022 (in supine)   Jules Meza will increase their score on the Lower Extremity Functional Scale by 10 points from their initial score to show improvement in areas of difficulty. GOAL MET 7/28/2022         Outcome Measure: Tool Used: Lower Extremity Functional Scale (LEFS)  Score:  Initial: 5/80 Most Recent: 15/80 (Date: 7/28/2022 )   Interpretation of Score: 20 questions each scored on a 5 point scale with 0 representing \"extreme difficulty or unable to perform\" and 4 representing \"no difficulty\". The lower the score, the greater the functional disability. 80/80 represents no disability. Minimal detectable change is 9 points. Total Duration:  Time In: 1000  Time Out: 80    Regarding Radha Lynchry's therapy, I certify that the treatment plan above will be carried out by a therapist or under their direction. Thank you for this referral,  Pineda Garcia PT     Referring Physician Signature: Hailey Camacho DO No Signature is Required for this note.         Post Session Pain  Charge Capture   POC Link  Treatment Note Link  MD Guidelines  Jeffrey

## 2022-08-30 NOTE — PROGRESS NOTES
Lorenzo Bangura  : 1945  Primary: Medicare Part A And B  Secondary: Sunny Whitaker 36 @ [address] [facility] [address]  Phone: [phone]  Fax: [phone] Plan Frequency: 2 times per week for 60 days    Plan of Care/Certification Expiration Date: 10/29/22      PT Visit Info:   No data recorded    OUTPATIENT PHYSICAL THERAPY:OP NOTE TYPE: Treatment Note 2022       Episode  }Appt Desk            Treatment Diagnosis:  Pain in Right Knee (M25.561)  Stiffness of Right Knee, Not elsewhere classified (M25.661)  Difficulty in walking, Not elsewhere classified (R26.2)  Medical/Referring Diagnosis:  Pain in right knee [M25.561]  Referring Physician:  Gadiel Lowery DO MD Orders:  PT Eval and Treat   Date of Onset:  Onset Date: 05/15/22 (Pain onset from falling backwards 3 weeks from 2022)     Allergies:   Sulfamethoxazole-trimethoprim, Tramadol, Ace inhibitors, Codeine, Fenofibrate, Hydromorphone, Metformin, and Sitagliptin  Restrictions/Precautions: FALL RISK  No data recordedNo data recorded   Interventions Planned (Treatment may consist of any combination of the following):    Current Treatment Recommendations: Strengthening; ROM; Balance training; Functional mobility training; Transfer training; Endurance training; Gait training; Stair training; Neuromuscular re-education; Manual Therapy - Soft Tissue Mobilization; Manual Therapy - Joint Manipulation; Pain management; Home exercise program; Modalities; Integrated dry needling; Therapeutic activities     Subjective Comments: Patient reports no pain in his R knee, but he does express pain in his L ankle. He states he will have a brace made for the L ankle. Initial:}     /10 Post Session:        /10  Medications Last Reviewed:  2022  Updated Objective Findings:  Pt.  Demonstrated good balance today with gait and exercises  STRENGTH    Date:  2022     Right   Knee minutes): May consider Joint mobilization, Soft tissue mobilization and Manipulation was utilized and necessary because of the patient's restricted joint motion, painful spasm, loss of articular motion and restricted motion of soft tissue. Soft tissue mobilization of distal quad, patellar area, popliteal, and proximal gastroc; in long sitting; medium pressure. NOT TODAY      Commonly used abbreviations that may be included in this note:  STM- Soft tissue mobilization, R>L or L>R- right greater than left or vice versa, HEP - Home exercise program, CPA/UPA - Central or unilateral posterior-anterior mobilization, SLS- single leg stance, SKTC - Single leg to chest, SNAGS/NAGS- (sustained) Natural apophyseal glides, TKE- Terminal knee extension, ER- External rotation, IR- Internal rotation, B - Bilateral, sec- seconds, Lb- pounds, min - minutes, HA- Headache, OP- Over pressure, tband- theraband, fwd/bwd- Forward/Backward, TA- Transversus Abdominus, dbl- Double      TREATMENT/SESSION SUMMARY:       Response to Treatment: Patient presents with R quad weakness at end range. He reports no pain during or following the session. Patient also presents with balance instability in standing and while ambulating. Patient is to be discharged due to hitting medicare visit limit. Communication/Consultation:  None today  Equipment provided today:  Updated HEP  Recommendation/Intent for next treatment session: Next visit will focus on assessing gait with rolling walker and progressing strength, functional mobility, pain tolerance, and ROM as tolerated. Will return to gait training with AD if tolerable.     Total Treatment Billable Duration:  55 minutes  Time In: 1000  Time Out: [address] [facility], PT         Charge Capture  }Post Session Pain  MedBridge Portal  MD Guidelines  Scanned Media  Benefits  MyChart    Future Appointments   Date Time Provider Deon Catherine   8/31/2022  8:00 AM Zulay Berumen III, MD [facility] AMB 9/13/2022  2:45 PM B MD ELAN Liz [facility] AMB   10/12/2022  9:40 AM Mariela Mercedes MD [facility] AMB

## 2022-08-31 ENCOUNTER — OFFICE VISIT (OUTPATIENT)
Dept: ORTHOPEDIC SURGERY | Age: 77
End: 2022-08-31
Payer: MEDICARE

## 2022-08-31 DIAGNOSIS — M19.079 ANKLE ARTHRITIS: ICD-10-CM

## 2022-08-31 DIAGNOSIS — M76.822 TIBIALIS TENDINITIS OF LEFT LOWER EXTREMITY: ICD-10-CM

## 2022-08-31 DIAGNOSIS — M19.072 PRIMARY OSTEOARTHRITIS OF LEFT ANKLE: Primary | ICD-10-CM

## 2022-08-31 PROCEDURE — 1036F TOBACCO NON-USER: CPT | Performed by: ORTHOPAEDIC SURGERY

## 2022-08-31 PROCEDURE — G8428 CUR MEDS NOT DOCUMENT: HCPCS | Performed by: ORTHOPAEDIC SURGERY

## 2022-08-31 PROCEDURE — 3017F COLORECTAL CA SCREEN DOC REV: CPT | Performed by: ORTHOPAEDIC SURGERY

## 2022-08-31 PROCEDURE — 1123F ACP DISCUSS/DSCN MKR DOCD: CPT | Performed by: ORTHOPAEDIC SURGERY

## 2022-08-31 PROCEDURE — 99214 OFFICE O/P EST MOD 30 MIN: CPT | Performed by: ORTHOPAEDIC SURGERY

## 2022-08-31 PROCEDURE — 20605 DRAIN/INJ JOINT/BURSA W/O US: CPT | Performed by: ORTHOPAEDIC SURGERY

## 2022-08-31 PROCEDURE — G8417 CALC BMI ABV UP PARAM F/U: HCPCS | Performed by: ORTHOPAEDIC SURGERY

## 2022-08-31 RX ORDER — METHYLPREDNISOLONE ACETATE 40 MG/ML
40 INJECTION, SUSPENSION INTRA-ARTICULAR; INTRALESIONAL; INTRAMUSCULAR; SOFT TISSUE ONCE
Status: COMPLETED | OUTPATIENT
Start: 2022-08-31 | End: 2022-08-31

## 2022-08-31 RX ADMIN — METHYLPREDNISOLONE ACETATE 40 MG: 40 INJECTION, SUSPENSION INTRA-ARTICULAR; INTRALESIONAL; INTRAMUSCULAR; SOFT TISSUE at 08:07

## 2022-08-31 NOTE — PROGRESS NOTES
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 male who presents with Ankle Pain (Left ankle requesting injection today )  . This patient returns back to the office today with continued complaints of left ankle pain. He is he has a double upright brace on order but is yet to get it. He continues to have pain located in his ankle as well as his flatfoot. He has multiple medical comorbidities which make surgery essentially impossible. History was obtained by Patient     ROS/Meds/PSH/PMH/FH/SH: I personally reviewed the patients standard intake form. Below are the pertinents    Tobacco:  reports that he has never smoked. He has never used smokeless tobacco.  Diabetes: Diabetic - Insulin dependent      Physical Examination:  He has a fixed planovalgus deformity of the hindfoot with limited to 0 subtalar joint range of motion. He also has crepitus with ankle range of motion and limited tibiotalar joint motion. 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 After sterile prep of the area, the Left ankle joint was injected with 3 cc. of Xylocaine and 3 cc. of steroid. . 40mg Depo Medrol was the steroid used. Patient tolerated it well. I discussed the risk of infection and skin blanching. I told the be patient be careful about the symptoms of hyperglycemia such as GI distress, polyuria, excessive thirst and lethargy.   If these symptoms occur they should present to an primary care doctor or urgent facility  Studies ordered: NO XR needed @ Next Visit    Weight-bearing status: WBAT        Return to work/work restrictions: none  No medications given    Hopefully will get his double upright bracing. It would be best to manage this with serial injections if needed over surgery as he has multiple medical issues that would really preclude a successful operation.

## 2022-09-14 ENCOUNTER — HOSPITAL ENCOUNTER (EMERGENCY)
Dept: CT IMAGING | Age: 77
Discharge: HOME | End: 2022-09-17
Payer: MEDICARE

## 2022-09-14 ENCOUNTER — HOSPITAL ENCOUNTER (EMERGENCY)
Age: 77
Discharge: HOME | End: 2022-09-14
Attending: EMERGENCY MEDICINE | Admitting: EMERGENCY MEDICINE
Payer: MEDICARE

## 2022-09-14 ENCOUNTER — HOSPITAL ENCOUNTER (EMERGENCY)
Dept: GENERAL RADIOLOGY | Age: 77
Discharge: HOME | End: 2022-09-17
Payer: MEDICARE

## 2022-09-14 VITALS
DIASTOLIC BLOOD PRESSURE: 79 MMHG | TEMPERATURE: 97.7 F | SYSTOLIC BLOOD PRESSURE: 164 MMHG | HEART RATE: 87 BPM | WEIGHT: 215 LBS | OXYGEN SATURATION: 95 % | RESPIRATION RATE: 13 BRPM | HEIGHT: 70 IN | BODY MASS INDEX: 30.78 KG/M2

## 2022-09-14 DIAGNOSIS — R10.13 DYSPEPSIA: ICD-10-CM

## 2022-09-14 DIAGNOSIS — K31.89 GASTRIC DISTENTION: Primary | ICD-10-CM

## 2022-09-14 DIAGNOSIS — R10.13 ABDOMINAL PAIN, EPIGASTRIC: ICD-10-CM

## 2022-09-14 LAB
ALBUMIN SERPL-MCNC: 2.9 G/DL (ref 3.2–4.6)
ALBUMIN/GLOB SERPL: 0.6 {RATIO} (ref 1.2–3.5)
ALP SERPL-CCNC: 158 U/L (ref 50–136)
ALT SERPL-CCNC: 28 U/L (ref 12–65)
ANION GAP SERPL CALC-SCNC: 9 MMOL/L (ref 4–13)
AST SERPL-CCNC: 27 U/L (ref 15–37)
BASOPHILS # BLD: 0.1 K/UL (ref 0–0.2)
BASOPHILS NFR BLD: 1 % (ref 0–2)
BILIRUB SERPL-MCNC: 0.4 MG/DL (ref 0.2–1.1)
BUN SERPL-MCNC: 42 MG/DL (ref 8–23)
CALCIUM SERPL-MCNC: 9.7 MG/DL (ref 8.3–10.4)
CHLORIDE SERPL-SCNC: 109 MMOL/L (ref 101–110)
CO2 SERPL-SCNC: 23 MMOL/L (ref 21–32)
CREAT SERPL-MCNC: 1.72 MG/DL (ref 0.8–1.5)
DIFFERENTIAL METHOD BLD: ABNORMAL
EKG ATRIAL RATE: 82 BPM
EKG DIAGNOSIS: NORMAL
EKG P AXIS: 47 DEGREES
EKG P-R INTERVAL: 134 MS
EKG Q-T INTERVAL: 376 MS
EKG QRS DURATION: 98 MS
EKG QTC CALCULATION (BAZETT): 439 MS
EKG R AXIS: 24 DEGREES
EKG T AXIS: 90 DEGREES
EKG VENTRICULAR RATE: 82 BPM
EOSINOPHIL # BLD: 0.2 K/UL (ref 0–0.8)
EOSINOPHIL NFR BLD: 2 % (ref 0.5–7.8)
ERYTHROCYTE [DISTWIDTH] IN BLOOD BY AUTOMATED COUNT: 16.2 % (ref 11.9–14.6)
GLOBULIN SER CALC-MCNC: 4.7 G/DL (ref 2.3–3.5)
GLUCOSE SERPL-MCNC: 167 MG/DL (ref 65–100)
HCT VFR BLD AUTO: 37.4 % (ref 41.1–50.3)
HGB BLD-MCNC: 11.6 G/DL (ref 13.6–17.2)
IMM GRANULOCYTES # BLD AUTO: 0.2 K/UL (ref 0–0.5)
IMM GRANULOCYTES NFR BLD AUTO: 2 % (ref 0–5)
LACTATE SERPL-SCNC: 1.3 MMOL/L (ref 0.5–2)
LIPASE SERPL-CCNC: 30 U/L (ref 73–393)
LYMPHOCYTES # BLD: 2.6 K/UL (ref 0.5–4.6)
LYMPHOCYTES NFR BLD: 25 % (ref 13–44)
MCH RBC QN AUTO: 31 PG (ref 26.1–32.9)
MCHC RBC AUTO-ENTMCNC: 31 G/DL (ref 31.4–35)
MCV RBC AUTO: 100 FL (ref 79.6–97.8)
MONOCYTES # BLD: 0.9 K/UL (ref 0.1–1.3)
MONOCYTES NFR BLD: 9 % (ref 4–12)
NEUTS SEG # BLD: 6.3 K/UL (ref 1.7–8.2)
NEUTS SEG NFR BLD: 62 % (ref 43–78)
NRBC # BLD: 0 K/UL (ref 0–0.2)
PLATELET # BLD AUTO: 187 K/UL (ref 150–450)
PMV BLD AUTO: 9.8 FL (ref 9.4–12.3)
POTASSIUM SERPL-SCNC: 4.5 MMOL/L (ref 3.5–5.1)
PROT SERPL-MCNC: 7.6 G/DL (ref 6.3–8.2)
RBC # BLD AUTO: 3.74 M/UL (ref 4.23–5.6)
SODIUM SERPL-SCNC: 141 MMOL/L (ref 136–145)
WBC # BLD AUTO: 10.3 K/UL (ref 4.3–11.1)

## 2022-09-14 PROCEDURE — 74176 CT ABD & PELVIS W/O CONTRAST: CPT

## 2022-09-14 PROCEDURE — 80053 COMPREHEN METABOLIC PANEL: CPT

## 2022-09-14 PROCEDURE — 2580000003 HC RX 258: Performed by: EMERGENCY MEDICINE

## 2022-09-14 PROCEDURE — 83605 ASSAY OF LACTIC ACID: CPT

## 2022-09-14 PROCEDURE — 6370000000 HC RX 637 (ALT 250 FOR IP): Performed by: EMERGENCY MEDICINE

## 2022-09-14 PROCEDURE — 96375 TX/PRO/DX INJ NEW DRUG ADDON: CPT

## 2022-09-14 PROCEDURE — 99285 EMERGENCY DEPT VISIT HI MDM: CPT

## 2022-09-14 PROCEDURE — 6360000004 HC RX CONTRAST MEDICATION: Performed by: EMERGENCY MEDICINE

## 2022-09-14 PROCEDURE — 96374 THER/PROPH/DIAG INJ IV PUSH: CPT

## 2022-09-14 PROCEDURE — 83690 ASSAY OF LIPASE: CPT

## 2022-09-14 PROCEDURE — 6360000002 HC RX W HCPCS: Performed by: EMERGENCY MEDICINE

## 2022-09-14 PROCEDURE — 85025 COMPLETE CBC W/AUTO DIFF WBC: CPT

## 2022-09-14 RX ORDER — LIDOCAINE HYDROCHLORIDE 20 MG/ML
15 SOLUTION OROPHARYNGEAL ONCE
Status: COMPLETED | OUTPATIENT
Start: 2022-09-14 | End: 2022-09-14

## 2022-09-14 RX ORDER — MAGNESIUM HYDROXIDE/ALUMINUM HYDROXICE/SIMETHICONE 120; 1200; 1200 MG/30ML; MG/30ML; MG/30ML
30 SUSPENSION ORAL
Status: COMPLETED | OUTPATIENT
Start: 2022-09-14 | End: 2022-09-14

## 2022-09-14 RX ORDER — ONDANSETRON 2 MG/ML
4 INJECTION INTRAMUSCULAR; INTRAVENOUS
Status: COMPLETED | OUTPATIENT
Start: 2022-09-14 | End: 2022-09-14

## 2022-09-14 RX ORDER — SODIUM CHLORIDE, SODIUM LACTATE, POTASSIUM CHLORIDE, AND CALCIUM CHLORIDE .6; .31; .03; .02 G/100ML; G/100ML; G/100ML; G/100ML
500 INJECTION, SOLUTION INTRAVENOUS
Status: COMPLETED | OUTPATIENT
Start: 2022-09-14 | End: 2022-09-14

## 2022-09-14 RX ADMIN — FENTANYL CITRATE 50 MCG: 50 INJECTION, SOLUTION INTRAMUSCULAR; INTRAVENOUS at 04:33

## 2022-09-14 RX ADMIN — ONDANSETRON 4 MG: 2 INJECTION INTRAMUSCULAR; INTRAVENOUS at 04:33

## 2022-09-14 RX ADMIN — LIDOCAINE HYDROCHLORIDE 15 ML: 20 SOLUTION ORAL; TOPICAL at 07:22

## 2022-09-14 RX ADMIN — LIDOCAINE HYDROCHLORIDE 15 ML: 20 SOLUTION ORAL; TOPICAL at 08:25

## 2022-09-14 RX ADMIN — ALUMINUM HYDROXIDE, MAGNESIUM HYDROXIDE, DIMETHICONE 30 ML: 200; 200; 20 LIQUID ORAL at 08:25

## 2022-09-14 RX ADMIN — SODIUM CHLORIDE, POTASSIUM CHLORIDE, SODIUM LACTATE AND CALCIUM CHLORIDE 500 ML: 600; 310; 30; 20 INJECTION, SOLUTION INTRAVENOUS at 05:12

## 2022-09-14 RX ADMIN — DIATRIZOATE MEGLUMINE AND DIATRIZOATE SODIUM 15 ML: 660; 100 LIQUID ORAL; RECTAL at 05:13

## 2022-09-14 ASSESSMENT — ENCOUNTER SYMPTOMS
DIARRHEA: 0
NAUSEA: 1
VOMITING: 0
BLOOD IN STOOL: 0
FACIAL SWELLING: 0
SHORTNESS OF BREATH: 0
ABDOMINAL PAIN: 1

## 2022-09-14 ASSESSMENT — PAIN DESCRIPTION - LOCATION: LOCATION: ABDOMEN

## 2022-09-14 ASSESSMENT — PAIN SCALES - GENERAL
PAINLEVEL_OUTOF10: 6
PAINLEVEL_OUTOF10: 10

## 2022-09-14 ASSESSMENT — PAIN - FUNCTIONAL ASSESSMENT
PAIN_FUNCTIONAL_ASSESSMENT: ACTIVITIES ARE NOT PREVENTED
PAIN_FUNCTIONAL_ASSESSMENT: 0-10

## 2022-09-14 ASSESSMENT — PAIN DESCRIPTION - ORIENTATION: ORIENTATION: MID

## 2022-09-14 ASSESSMENT — PAIN DESCRIPTION - DESCRIPTORS: DESCRIPTORS: SHARP

## 2022-09-14 NOTE — ED NOTES
I have reviewed discharge instructions with the patient. The patient verbalized understanding. Patient left ED via Discharge Method: ambulatory to Home with spouse    Opportunity for questions and clarification provided. Patient given 0 scripts. To continue your aftercare when you leave the hospital, you may receive an automated call from our care team to check in on how you are doing. This is a free service and part of our promise to provide the best care and service to meet your aftercare needs.  If you have questions, or wish to unsubscribe from this service please call [phone]. Thank you for Choosing our [facility] Emergency Department.         [address] [place] [address]  09/14/22 1301

## 2022-09-14 NOTE — ED PROVIDER NOTES
Emergency Department Provider Note                   PCP:                Elian Siu DO               Age: 78 y.o. Sex: male       ICD-10-CM    1. Gastric distention  K31.89       2. Abdominal pain, epigastric  R10.13       3. Dyspepsia  R10.13           DISPOSITION Discharge - Pending Orders Complete 09/14/2022 07:21:48 AM       MDM  Number of Diagnoses or Management Options  Abdominal pain, epigastric  Dyspepsia  Gastric distention  Diagnosis management comments: I wore appropriate PPE throughout this patient's ED visit. Claudeen Cuff, MD, 4:27 AM      6:49 AM  Normal lipase. Acute kidney injury. Given fluids. Pain improved, but not resolved. 7:23 AM  Significant gastric distention with food contents and air. Suspect this is the cause of patient's pain. Will place NG tube and plan to discharge after symptomatic improvement. No surgical cause for pain.        Amount and/or Complexity of Data Reviewed  Clinical lab tests: ordered and reviewed  Tests in the radiology section of CPT®: ordered and reviewed  Tests in the medicine section of CPT®: ordered and reviewed  Review and summarize past medical records: yes  Independent visualization of images, tracings, or specimens: yes         Orders Placed This Encounter   Procedures    CT ABDOMEN PELVIS WO CONTRAST Additional Contrast? Oral    CBC with Diff    CMP    Lipase    Lactate, Sepsis (Select if patient is over 65 to rule out mesenteric ischemia)    Diet NPO    POCT Urine Dipstick    NG TUBE, INSERTION    EKG 12 Lead (Select if Upper Abd Pain, or SOB, Diaphoresis or Tachy)    Saline lock IV        Medications   fentaNYL (SUBLIMAZE) injection 50 mcg (50 mcg IntraVENous Given 9/14/22 0433)   ondansetron (ZOFRAN) injection 4 mg (4 mg IntraVENous Given 9/14/22 0433)   diatrizoate meglumine-sodium (GASTROGRAFIN) 66-10 % solution 15 mL (15 mLs Oral Given 9/14/22 0513)   lactated ringers bolus (500 mLs IntraVENous New Bag 9/14/22 0512)   lidocaine viscous hcl (XYLOCAINE) 2 % solution 15 mL (15 mLs Nasal Given 9/14/22 0641)       New Prescriptions    No medications on file        Bella Nathan is a 78 y.o. male who presents to the Emergency Department with chief complaint of    Chief Complaint   Patient presents with    Abdominal Pain      53-year-old male with history of diabetes on insulin pump, pancreatitis, cholecystectomy, partial nephrectomy for renal cell carcinoma, paroxysmal atrial fibrillation status post watchman presents with sudden onset severe upper abdominal pain and nausea that woke him from sleep tonight. Pain feels similar to prior episodes of pancreatitis. He denies vomiting or diarrhea. No blood in stools. No documented fevers. All other systems reviewed and are negative unless otherwise stated in the history of present illness section. Review of Systems   Constitutional:  Negative for fever. HENT:  Negative for facial swelling. Eyes:  Negative for visual disturbance. Respiratory:  Negative for shortness of breath. Cardiovascular:  Negative for chest pain. Gastrointestinal:  Positive for abdominal pain and nausea. Negative for blood in stool, diarrhea and vomiting. Genitourinary:  Negative for dysuria. Musculoskeletal:  Negative for joint swelling. Skin:  Negative for rash. Neurological:  Negative for speech difficulty. Psychiatric/Behavioral:  Negative for confusion. All other systems reviewed and are negative.     Past Medical History:   Diagnosis Date    Acute blood loss anemia 01/15/2022    Acute renal failure with acute tubular necrosis superimposed on stage 3b chronic kidney disease ([facility] [address] 07/13/2022    Arthritis     Blurred vision, right eye     BPH (benign prostatic hyperplasia)     Gout     History of Clostridioides difficile colitis 2010    History of pancreatitis     History of renal carcinoma     partial nephrectomy    Hyperlipidemia     Hypertension     Leukocytosis 01/15/2022    Nausea & ACETAMINOPHEN (TYLENOL DISSOLVE PACKS) 500 MG PACK    Take 1,000 mg by mouth 2 times daily    ACETAMINOPHEN (TYLENOL) 500 MG TABLET    Take 500 mg by mouth every 6 hours as needed    ALLOPURINOL (ZYLOPRIM) 300 MG TABLET    Take 300 mg by mouth daily    ASCORBIC ACID (VITAMIN C) 250 MG TABLET    Take 1,000 mg by mouth daily    ASPIRIN 325 MG TABLET    Take 325 mg by mouth Daily with supper    B COMPLEX VITAMINS (VITAMIN B COMPLEX PO)    Take 1 tablet by mouth daily    COLCHICINE (COLCRYS) 0.6 MG TABLET    Take 0.6 mg by mouth daily    FINASTERIDE (PROSCAR) 5 MG TABLET    Take 5 mg by mouth daily    GABAPENTIN (NEURONTIN) 300 MG CAPSULE    Take 300 mg by mouth 3 times daily. GLUCAGON 1 MG INJECTION    Inject 1 mg into the muscle as needed    INSULIN ASPART (NOVOLOG) 100 UNIT/ML INJECTION PEN    Inject into the skin 3 times daily (before meals)    INSULIN LISPRO (HUMALOG) 100 UNIT/ML SOLN INJECTION VIAL    INJECT 100 UNITS ONCE DAILY VIA PUMP    LIPASE-PROTEASE-AMYLASE (CREON) 67881-187494 UNITS CPEP DELAYED RELEASE CAPSULE    Take 4 capsules by mouth 3 times daily (with meals)    ONDANSETRON (ZOFRAN) 4 MG TABLET    Take 1 tablet by mouth 3 times daily as needed for Nausea or Vomiting    OXYBUTYNIN (DITROPAN-XL) 10 MG EXTENDED RELEASE TABLET    Take 10 mg by mouth daily    PANTOPRAZOLE (PROTONIX) 20 MG TABLET    Take 20 mg by mouth daily    TADALAFIL (CIALIS) 20 MG TABLET    Take 20 mg by mouth as needed    TAMSULOSIN (FLOMAX) 0.4 MG CAPSULE    Take 1 capsule by mouth daily    TRAMADOL (ULTRAM) 50 MG TABLET    Take 50 mg by mouth every 6 hours as needed. VITAMIN D 25 MCG (1000 UT) CAPS    Take 1,000 Units by mouth    ZINC SULFATE PO    Take by mouth        Vitals signs and nursing note reviewed.    Patient Vitals for the past 4 hrs:   Temp Pulse Resp BP SpO2   09/14/22 0638 -- 87 13 -- 95 %   09/14/22 0629 -- 87 19 (!) 164/79 98 %   09/14/22 0559 -- 85 17 (!) 163/81 93 %   09/14/22 0547 -- 83 21 -- 97 % 09/14/22 0538 -- 89 18 (!) 161/77 98 %   09/14/22 0520 -- 96 16 -- 98 %   09/14/22 0459 -- 90 19 (!) 171/92 93 %   09/14/22 0440 -- 83 14 -- 99 %   09/14/22 0430 -- 81 12 (!) 152/87 100 %   09/14/22 0415 97.7 °F (36.5 °C) 87 20 (!) 167/77 100 %          Physical Exam  Vitals and nursing note reviewed. Constitutional:       Appearance: Normal appearance. HENT:      Head: Normocephalic and atraumatic. Nose: Nose normal.      Mouth/Throat:      Mouth: Mucous membranes are moist.   Eyes:      Extraocular Movements: Extraocular movements intact. Pupils: Pupils are equal, round, and reactive to light. Cardiovascular:      Rate and Rhythm: Normal rate and regular rhythm. Pulmonary:      Effort: Pulmonary effort is normal. No respiratory distress. Abdominal:      General: Abdomen is flat. There is no distension. Tenderness: There is abdominal tenderness in the epigastric area. There is no guarding. Musculoskeletal:         General: No deformity. Normal range of motion. Cervical back: Normal range of motion and neck supple. Skin:     General: Skin is warm and dry. Neurological:      General: No focal deficit present. Mental Status: He is alert. Mental status is at baseline. Psychiatric:         Mood and Affect: Mood normal.        Procedures    ED EKG Interpretation  EKG was interpreted in the absence of a cardiologist.    Rate: Rate: Normal  EKG Interpretation: EKG Interpretation: sinus rhythm  ST Segments: Normal ST segments - NO STEMI    Results for orders placed or performed during the hospital encounter of 09/14/22   CT ABDOMEN PELVIS WO CONTRAST Additional Contrast? Oral    Narrative    EXAM: CT ABDOMEN PELVIS WO CONTRAST    HISTORY: severe abd pain. TECHNIQUE: Axial images of the abdomen and pelvis were performed without the  administration of intravenous contrast. Images were obtained in the axial plane  and coronal reformatted images were created and reviewed. Dose reduction technique used: Automated exposure control/Adjustment of the mA  and/or kV according to patient size/Use of iterative reconstruction technique. COMPARISON: 7/15/2022    FINDINGS:   The visualized lung bases and mediastinum are unremarkable. The liver, spleen, adrenal glands are within normal limits. The gallbladder has  been removed. As on the previous examination the pancreas appears atrophic. There is no  peripancreatic fluid or fatty stranding. There appears to be an adjacent  duodenal diverticulum containing a small amount of gas. The kidneys show bilateral cystic appearing lesions. Some of these are too  small to characterize. Otherwise the kidneys appear unremarkable. The bladder  appears grossly normal.    Distal esophagus appears unremarkable. There is moderate gaseous distention  with fluid and gas. Small and large bowel show no evidence of obstruction. There is a normal  appendix in the right lower quadrant. Diverticuli are present throughout the  entire colon with no evidence of acute diverticulitis. No evidence of free fluid or free air. No pathologic adenopathy. No abdominal  aortic aneurysm. Osseous structures show no evidence of acute fracture or suspicious lesion. Impression    Atrophic pancreas. There is no associated fatty stranding or pancreatic  enlargement to suggest acute pancreatitis. Calcifications in this region are  likely in the pancreatic head. There is a small duodenal diverticulum immediately abutting the head of the  pancreas containing a small amount of gas. Diverticulosis throughout the entire colon with no acute diverticulitis. Moderate distention of the stomach with fluid and gas.        CBC with Diff   Result Value Ref Range    WBC 10.3 4.3 - 11.1 K/uL    RBC 3.74 (L) 4.23 - 5.6 M/uL    Hemoglobin 11.6 (L) 13.6 - 17.2 g/dL    Hematocrit 37.4 (L) 41.1 - 50.3 %    .0 (H) 79.6 - 97.8 FL    MCH 31.0 26.1 - 32.9 PG MCHC 31.0 (L) 31.4 - 35.0 g/dL    RDW 16.2 (H) 11.9 - 14.6 %    Platelets 961 756 - 521 K/uL    MPV 9.8 9.4 - 12.3 FL    nRBC 0.00 0.0 - 0.2 K/uL    Differential Type AUTOMATED      Seg Neutrophils 62 43 - 78 %    Lymphocytes 25 13 - 44 %    Monocytes 9 4.0 - 12.0 %    Eosinophils % 2 0.5 - 7.8 %    Basophils 1 0.0 - 2.0 %    Immature Granulocytes 2 0.0 - 5.0 %    Segs Absolute 6.3 1.7 - 8.2 K/UL    Absolute Lymph # 2.6 0.5 - 4.6 K/UL    Absolute Mono # 0.9 0.1 - 1.3 K/UL    Absolute Eos # 0.2 0.0 - 0.8 K/UL    Basophils Absolute 0.1 0.0 - 0.2 K/UL    Absolute Immature Granulocyte 0.2 0.0 - 0.5 K/UL   CMP   Result Value Ref Range    Sodium 141 136 - 145 mmol/L    Potassium 4.5 3.5 - 5.1 mmol/L    Chloride 109 101 - 110 mmol/L    CO2 23 21 - 32 mmol/L    Anion Gap 9 4 - 13 mmol/L    Glucose 167 (H) 65 - 100 mg/dL    BUN 42 (H) 8 - 23 MG/DL    Creatinine 1.72 (H) 0.8 - 1.5 MG/DL    GFR African American 50 (L) >60 ml/min/1.73m2    GFR Non- 41 (L) >60 ml/min/1.73m2    Calcium 9.7 8.3 - 10.4 MG/DL    Total Bilirubin 0.4 0.2 - 1.1 MG/DL    ALT 28 12 - 65 U/L    AST 27 15 - 37 U/L    Alk Phosphatase 158 (H) 50 - 136 U/L    Total Protein 7.6 6.3 - 8.2 g/dL    Albumin 2.9 (L) 3.2 - 4.6 g/dL    Globulin 4.7 (H) 2.3 - 3.5 g/dL    Albumin/Globulin Ratio 0.6 (L) 1.2 - 3.5     Lipase   Result Value Ref Range    Lipase 30 (L) 73 - 393 U/L   Lactate, Sepsis (Select if patient is over 65 to rule out mesenteric ischemia)   Result Value Ref Range    Lactic Acid, Sepsis 1.3 0.5 - 2.0 MMOL/L   EKG 12 Lead (Select if Upper Abd Pain, or SOB, Diaphoresis or Tachy)   Result Value Ref Range    Ventricular Rate 82 BPM    Atrial Rate 82 BPM    P-R Interval 134 ms    QRS Duration 98 ms    Q-T Interval 376 ms    QTc Calculation (Bazett) 439 ms    P Axis 47 degrees    R Axis 24 degrees    T Axis 90 degrees    Diagnosis Normal sinus rhythm         CT ABDOMEN PELVIS WO CONTRAST Additional Contrast? Oral   Final Result   Atrophic pancreas. There is no associated fatty stranding or pancreatic   enlargement to suggest acute pancreatitis. Calcifications in this region are   likely in the pancreatic head. There is a small duodenal diverticulum immediately abutting the head of the   pancreas containing a small amount of gas. Diverticulosis throughout the entire colon with no acute diverticulitis. Moderate distention of the stomach with fluid and gas. Voice dictation software was used during the making of this note. This software is not perfect and grammatical and other typographical errors may be present. This note has not been completely proofread for errors.      Tino Garay MD  09/14/22 555 E. Juan C Durán MD  09/14/22 [id number]

## 2022-09-14 NOTE — ED NOTES
Report to Maida Dobbs [facility]. Care transferred at this time. Mik Ibrahim RN  09/14/22 [id number]

## 2022-09-14 NOTE — ED TRIAGE NOTES
Sudden onset of severe abd pain with nausea. States that he has a hx of pancreatitis.   Masked on a arrival

## 2022-10-03 ENCOUNTER — TELEPHONE (OUTPATIENT)
Dept: ORTHOPEDIC SURGERY | Age: 77
End: 2022-10-03

## 2023-02-03 ENCOUNTER — PROCEDURE VISIT (OUTPATIENT)
Dept: NEUROLOGY | Age: 78
End: 2023-02-03
Payer: MEDICARE

## 2023-02-03 VITALS
DIASTOLIC BLOOD PRESSURE: 70 MMHG | SYSTOLIC BLOOD PRESSURE: 127 MMHG | WEIGHT: 220 LBS | HEART RATE: 87 BPM | BODY MASS INDEX: 31.57 KG/M2

## 2023-02-03 DIAGNOSIS — R20.2 PARESTHESIAS IN LEFT HAND: Primary | ICD-10-CM

## 2023-02-03 DIAGNOSIS — R29.898 WEAKNESS OF BOTH LOWER EXTREMITIES: ICD-10-CM

## 2023-02-03 DIAGNOSIS — G56.22 LESION OF LEFT ULNAR NERVE: ICD-10-CM

## 2023-02-03 DIAGNOSIS — G56.03 CARPAL TUNNEL SYNDROME, BILATERAL: ICD-10-CM

## 2023-02-03 DIAGNOSIS — R29.898 WEAKNESS OF BOTH HANDS: ICD-10-CM

## 2023-02-03 PROCEDURE — 3078F DIAST BP <80 MM HG: CPT | Performed by: PSYCHIATRY & NEUROLOGY

## 2023-02-03 PROCEDURE — 1036F TOBACCO NON-USER: CPT | Performed by: PSYCHIATRY & NEUROLOGY

## 2023-02-03 PROCEDURE — G8484 FLU IMMUNIZE NO ADMIN: HCPCS | Performed by: PSYCHIATRY & NEUROLOGY

## 2023-02-03 PROCEDURE — 3017F COLORECTAL CA SCREEN DOC REV: CPT | Performed by: PSYCHIATRY & NEUROLOGY

## 2023-02-03 PROCEDURE — 3074F SYST BP LT 130 MM HG: CPT | Performed by: PSYCHIATRY & NEUROLOGY

## 2023-02-03 PROCEDURE — G8417 CALC BMI ABV UP PARAM F/U: HCPCS | Performed by: PSYCHIATRY & NEUROLOGY

## 2023-02-03 PROCEDURE — G8427 DOCREV CUR MEDS BY ELIG CLIN: HCPCS | Performed by: PSYCHIATRY & NEUROLOGY

## 2023-02-03 PROCEDURE — 1123F ACP DISCUSS/DSCN MKR DOCD: CPT | Performed by: PSYCHIATRY & NEUROLOGY

## 2023-02-03 ASSESSMENT — VISUAL ACUITY: OU: 1

## 2023-02-03 NOTE — PROGRESS NOTES
2/3/2023  Natalie Campbell     Patient is referred by the following provider for consultation regarding as below:    progressive general limb weakness and paresthesia for EMG upper. .. Dear      Dr Cruz Fail      hand ctr                             Dr Paulson Been   PCP          Dr Alden Butterfield              endocrinology                                   [facility]                   Chief Complaint:  Weakness and numbness left >>> Right hand  General weak  Fall  Gait dis  Severe diabetes. Pump. Left foot abnl. 79-year-old right-handed white male accompanied by his wife he is sent for his left upper extremity by Dr. Mat Serrano at the hand center. He is a pump controlled severe general diabetic. He has general weakness and paresthesia all limbs. His left leg = he is in a boot to protect his foot. He states to prevent amputation. He is a long-term diabetic. He does not remember his highest A1c. As noted he is on a diabetic pump and followed by Dr. Alden Butterfield. He has been having more more weakness but specifically today was sent for his left hand and possibility of cubital tunnel. Or EMG. Family history positive = = mother had ALS. Father had Parkinson's.                     right handed 78 y.o.       male       * I reviewed the available and pertinent records - including eHR and Care Everywhere - notes of PMHx, PSHx, Fam Hx, and  and have examined patient with the following findings:       IMAGING REVIEW:  I REVIEWED PERTINENT  IMAGES AND REPORTS WITH THE PATIENT PERSONALLY, DIRECTLY AND FULLY. 16 extra  MINUTES.      Past Medical History:  Past Medical History:   Diagnosis Date    Acute blood loss anemia 01/15/2022    Acute renal failure with acute tubular necrosis superimposed on stage 3b chronic kidney disease ([facility] [address] 07/13/2022    Arthritis     Blurred vision, right eye     BPH (benign prostatic hyperplasia)     Gout     History of Clostridioides difficile colitis 2010    History of pancreatitis     History of renal carcinoma     partial nephrectomy    Hyperlipidemia     Hypertension     Leukocytosis 01/15/2022    Nausea & vomiting     small amount of N/V and pt not sure of it antiemetics work    Neuropathy     Paroxysmal A-fib (HCC)     Presence of Watchman left atrial appendage closure device     Sleep apnea     compliant with C-pap    Thromboembolus (HCC)     hx of left lower extremity    Type 2 diabetes mellitus (HCC)     insulin reliant; AVG BS ; s.s. of hypoglycemia 65-75; last A1c 7.1    WPW (Robbi-Parkinson-White syndrome)     ablation x4       Past Surgical History:  Past Surgical History:   Procedure Laterality Date    ABLATION OF DYSRHYTHMIC FOCUS      WPW- ablations x3    BACK SURGERY      ruptured disc    CATARACT REMOVAL      CHOLECYSTECTOMY      COLONOSCOPY N/A 1/24/2022    COLONOSCOPY performed by Izaiah Harrison MD at [facility] ENDOSCOPY    ERCP N/A 8/9/2022    ERCP SPHINCTEROTOMY WITH BALLOON SWEEP performed by LAQUITA Miller MD at [facility] ENDOSCOPY    ERCP W/ SPHICTEROTOMY N/A 08/09/2022    KIDNEY REMOVAL      partial    LUMBAR LAMINECTOMY      ORTHOPEDIC SURGERY Left     great toe straightened    ORTHOPEDIC SURGERY Left     foot    OTHER SURGICAL HISTORY      placed watchman 2/2020    TOTAL KNEE ARTHROPLASTY Bilateral     UPPER GASTROINTESTINAL ENDOSCOPY      WISDOM TOOTH EXTRACTION         Social History:  Social History     Socioeconomic History    Marital status:      Spouse name: Not on file    Number of children: Not on file    Years of education: Not on file    Highest education level: Not on file   Occupational History    Not on file   Tobacco Use    Smoking status: Never    Smokeless tobacco: Never   Substance and Sexual Activity    Alcohol use: Not Currently    Drug use: Never    Sexual activity: Not on file   Other Topics Concern    Not on file   Social History Narrative    Not on file     Social Determinants of  Health     Financial Resource Strain: Not on file   Food Insecurity: Not on file   Transportation Needs: Not on file   Physical Activity: Not on file   Stress: Not on file   Social Connections: Not on file   Intimate Partner Violence: Not on file   Housing Stability: Not on file       Family History:   Family History   Problem Relation Age of Onset    Cancer Sister         ovarian    No Known Problems Sister     Cancer Sister         colon ca    Parkinson's Disease Father     Other Mother         ALS    Crohn's Disease Son     Crohn's Disease Daughter        Medications:      Current Outpatient Medications:     tamsulosin (FLOMAX) 0.4 MG capsule, Take 1 capsule by mouth daily, Disp: , Rfl:     ondansetron (ZOFRAN) 4 MG tablet, Take 1 tablet by mouth 3 times daily as needed for Nausea or Vomiting, Disp: 15 tablet, Rfl: 2    B Complex Vitamins (VITAMIN B COMPLEX PO), Take 1 tablet by mouth daily, Disp: , Rfl:     ZINC SULFATE PO, Take by mouth, Disp: , Rfl:     Acetaminophen (TYLENOL DISSOLVE PACKS) 500 MG PACK, Take 1,000 mg by mouth 2 times daily, Disp: , Rfl:     acetaminophen (TYLENOL) 500 MG tablet, Take 500 mg by mouth every 6 hours as needed, Disp: , Rfl:     allopurinol (ZYLOPRIM) 300 MG tablet, Take 300 mg by mouth daily, Disp: , Rfl:     ascorbic acid (VITAMIN C) 250 MG tablet, Take 1,000 mg by mouth daily, Disp: , Rfl:     aspirin 325 MG tablet, Take 325 mg by mouth Daily with supper, Disp: , Rfl:     vitamin D 25 MCG (1000 UT) CAPS, Take 1,000 Units by mouth, Disp: , Rfl:     finasteride (PROSCAR) 5 MG tablet, Take 5 mg by mouth daily, Disp: , Rfl:     gabapentin (NEURONTIN) 300 MG capsule, Take 300 mg by mouth 3 times daily.  , Disp: , Rfl:     glucagon 1 MG injection, Inject 1 mg into the muscle as needed, Disp: , Rfl:     insulin aspart (NOVOLOG) 100 UNIT/ML injection pen, Inject into the skin 3 times daily (before meals), Disp: , Rfl:     insulin lispro (HUMALOG) 100 UNIT/ML SOLN injection vial, INJECT 100 UNITS ONCE DAILY VIA PUMP, Disp: , Rfl:     oxybutynin (DITROPAN-XL) 10 MG extended release tablet, Take 10 mg by mouth daily, Disp: , Rfl:     lipase-protease-amylase (CREON) 55427-931862 units CPEP delayed release capsule, Take 4 capsules by mouth 3 times daily (with meals), Disp: , Rfl:     tadalafil (CIALIS) 20 MG tablet, Take 20 mg by mouth as needed, Disp: , Rfl:     traMADol (ULTRAM) 50 MG tablet, Take 50 mg by mouth every 6 hours as needed. , Disp: , Rfl:     colchicine (COLCRYS) 0.6 MG tablet, Take 0.6 mg by mouth daily, Disp: , Rfl:     pantoprazole (PROTONIX) 20 MG tablet, Take 20 mg by mouth daily, Disp: , Rfl:       Allergies   Allergen Reactions    Sulfamethoxazole-Trimethoprim Other (See Comments)     Elevated potassium level    Tramadol Diarrhea    Ace Inhibitors      pancreatitis    Codeine Other (See Comments)     \"makes me a little crazy\"    Fenofibrate Other (See Comments)     \"causes pancreatic attacks\"    Hydromorphone Other (See Comments)     \"gives me craziness\"    Metformin Diarrhea    Sitagliptin Other (See Comments)     Per pt causes pancreatic issues       Review of Systems:  Review of Systems   Musculoskeletal:  Positive for falls and joint pain. Neurological:  Positive for dizziness (Imbalance. Gait disorder. He has a Rollator because he has a risk of fall.), tingling, sensory change, focal weakness and weakness. Negative for tremors, speech change, seizures and loss of consciousness. Psychiatric/Behavioral: Negative. All other systems reviewed and are negative. Extended / Orthostatic Vitals:    Vitals:    02/03/23 0941   BP: 127/70   Site: Right Upper Arm   Pulse: 87   Weight: 220 lb (99.8 kg)        Physical Exam  Vitals reviewed. Constitutional:       General: He is awake. He is not in acute distress. Appearance: He is well-developed and well-groomed. He is ill-appearing and toxic-appearing. He is not diaphoretic.    HENT:      Head: Normocephalic and atraumatic. No raccoon eyes, abrasion, contusion, right periorbital erythema, left periorbital erythema or laceration. Right Ear: Hearing normal.      Left Ear: Hearing normal.   Eyes:      General: Lids are normal. Vision grossly intact. No visual field deficit or scleral icterus. Right eye: No discharge. Left eye: No discharge. Extraocular Movements: Extraocular movements intact. Right eye: Normal extraocular motion and no nystagmus. Left eye: Normal extraocular motion and no nystagmus. Conjunctiva/sclera: Conjunctivae normal.      Right eye: Right conjunctiva is not injected. Left eye: Left conjunctiva is not injected. Pupils: Pupils are equal, round, and reactive to light. Neck:      Trachea: Phonation normal.   Pulmonary:      Effort: Pulmonary effort is normal. No respiratory distress. Breath sounds: No wheezing. Musculoskeletal:         General: No swelling, deformity or signs of injury. Cervical back: Neck supple. No signs of trauma, rigidity or torticollis. Decreased range of motion. Comments: Boot on left foot. Skin:     General: Skin is warm and dry. Capillary Refill: Capillary refill takes less than 2 seconds. Coloration: Skin is not cyanotic, jaundiced or pale. Nails: There is no clubbing. Neurological:      Mental Status: He is alert and easily aroused. Mental status is at baseline. Cranial Nerves: Cranial nerves 2-12 are intact. No cranial nerve deficit, dysarthria or facial asymmetry. Sensory: Sensory deficit (Distal loss of vibration and pinprick symmetric length dependent all limbs. More in the lowers.) present. Motor: Weakness and atrophy present. No tremor, abnormal muscle tone or seizure activity. Coordination: Romberg sign positive (Rollator. ). Coordination abnormal (Rollator. ). Positive Romberg's test: Rollator. .      Gait: Gait abnormal (Rollator.) and tandem walk abnormal (Rollator. Rollator. ). Deep Tendon Reflexes: Reflexes abnormal.      Reflex Scores:       Bicep reflexes are 1+ on the right side and 1+ on the left side. Brachioradialis reflexes are 0 on the right side and 0 on the left side. Patellar reflexes are 0 on the right side and 0 on the left side. Achilles reflexes are 0 on the right side and 0 on the left side. Comments: PERRLA. No Arthur. OKN tape test normal.  No Spurling or Lhermitte sign. No Amos. No spasticity. No rigidity no glabellar. Psychiatric:         Attention and Perception: Attention normal.         Mood and Affect: Mood normal.         Speech: Speech normal.         Behavior: Behavior normal. Behavior is cooperative. Neurologic Exam     Mental Status   Attention: normal. Concentration: normal.   Speech: speech is normal   Level of consciousness: alert  Knowledge: good and consistent with education. Normal comprehension. Cranial Nerves   Cranial nerves II through XII intact. CN III, IV, VI   Pupils are equal, round, and reactive to light. Motor Exam   Muscle bulk: normal  Overall muscle tone: decreased  Right arm tone: decreased  Left arm tone: decreased    Sensory Exam   Distal limb decreased sensation to touch, pin, vibration. Especially lowers. But also left more than right hand. Gait, Coordination, and Reflexes     Coordination   Positive Romberg's test: Rollator. .  Tandem walking coordination: abnormal (Rollator. Rollator.)    Tremor   Resting tremor: absent  Intention tremor: absent  Action tremor: absent    Reflexes   Right brachioradialis: 0  Left brachioradialis: 0  Right biceps: 1+  Left biceps: 1+  Right patellar: 0  Left patellar: 0  Right achilles: 0  Left achilles: 0  Right Florentino: absent  Left Florentino: absentRollator. There is no tic, twitch, tonic or clonic activity noted. No dyskinesia.         Assessment   Assessment / Plan:    Diagnoses and all orders for this visit:    Paresthesias in left hand    Weakness of both hands    Weakness of both lower extremities  1. .  severely abnormal EMG L >> R Hand/upper. ..  with distal multinerve dysfunction, and mixed generalized polyneuropathy of uppers. 2.  Schedule return for his lower extremity EMG. He is missed this in the past.  3.  Overall he appears to have diffuse polyneuropathy probably from severe diabetes but his A1c does not match but I would defer that over to Dr. Tristen Escobar. 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 Explained to patient and wife. Information given. They will return for further testing. Certainly, the diabetes must be severe and more than at first glance. As far as his left hand he has neuropathy of the ulnar nerve but specifically question of cubital tunnel is probable because of the asymmetry where the left ulnar nerve across the cubital tunnel is markedly severe. 4.  Obviously his underlying neuropathy is so severe that it may affect    The Diagnosis and differential diagnostic considerations, and Rx Tx were reviewed with the patient at length. Basically is polyneuropathy with superimposed left Cubital Tunnel Syndrome and bilateral Carpal tunnel syndromes on suspected diabetic sensorimotor polyneuropathy. I have spent greater than 50% of visit discussing and counseling of patient 45 min visit for treatment and diagnostic plan review --  Extra to EMG Time*  . Patient is referred by the following provider for consultation regarding as below:      [[Please note that the consultation is a separate note/E-M from the EMG procedure. ]]                  More than 50% of this visit  time was spent in counseling and care coordination. The above time includes pre-  and post- face-face time in records review, and preparation including available pertinent images and reports.       Notes: Patient is to continue all medications as directed by prescribing physicians. Continuations on today's visit are made based on the patient's report of current medications. Patient acknowledges the above examination and reviews. Current Meds Verified: Current meds/immunizations reviewed, including purpose with pt. Med Recon list given to pt/family. Pt advised to discard old med lists and provide all providers with current list at each visit and carry list with them in case of emergency. [ *NOTE:  parts or all of this consultation are produced using artificial voice recognition software.   Some speech errors are inherent in such software and may be included in the produced record. ]                Justina Godwin MD  Consultative Neurology, [facility] [address]   [address] [place] [address]  Phone:  [phone]  Fax:   [phone]

## 2023-02-03 NOTE — PROGRESS NOTES
EMG/Nerve Conduction Study Procedure Note  [facility], [address] [place] [address] [place]   [phone]      Hx:    Exam:     78 y.o.y.o. male referred by Dr. Seferino Dominguez at the hand center for the hands. Left more than right. Atrophy at ulnar and median nerves -- worse LEFT == especially fourth and fifth digits compatible with ulnar neuropathy. Atrophy at first dorsal interosseous. Symptoms date back to December a couple months ago or more he was in rehab for his foot. He is on insulin pump. He has bad diabetes for years and years. He has a boot on his left foot. He is getting rehab for that. A1c noted 6.7 up to 7.8 but he is not 1 over the highest.  He is on gabapentin 300 mg 4 times daily. Burning neuropathy pain. He has a Rollator. See full examination at consultation also provided today. He is a highly complex patient and he might have diabetic amyotrophy. 5 foot 10 inch. 220 pounds. Summary               needle EMG of muscles tested especially left upper extremity but also right upper extremity = extensive but also conduction velocity testing both upper extremities. Controlled environmental factors / EMG lab. Temperature. NCV : sensory segments:    Markedly abnormal = = severe = the left ulnar distal SCV SNAP = absent no response; the right ulnar with moderate slowing SCV. Left and right median SCV is markedly slowed on the left and significant to marked slowing on the right with attenuated SNAP. Bilateral radial SCB SNAP normal.  NCV transcarpal sensory segments:     Markedly abnormal = left median and right median are significantly slowed more on the left median; transcarpal on the left ulnar is absent/no response; on the right the transcarpal ulnar is only minimally slowed and the peak difference of latency is 0.69 ms on the right (upper limit at 0.20 ms).   NCV Motor MCV segments:     Abnormal = = the left ulnar nerve with a markedly delayed and prolonged terminal latency with extreme attenuation of the CMAP amplitudes and significant to severe slowing at the elbow. Right ulnar just with the slowing at the elbow segment MCV with some attenuation of a mild amount  Possibly conduction blocking at the elbow. The bilateral median with significant prolonged terminal latencies 6.23 ms left median; on the right median 5.13 ms terminal latency both of the median's with significant amplitude attenuation CMAP and both of them with slowing at the proximal.  F-wave studies:         Abnormal = = markedly abnormal at the left ulnar F wave is absent/no response; right ulnar and bilateral median F waves are markedly delayed and prolonged. NEEDLE EMG:   Tested muscles[de-identified]    Bilateral FDI APB ADM EDC triceps deltoid biceps = left FCU = left FCU is within normal limits. The bilateral FDI = on the left 2+1+ 2+ IA fib PSW without any fasciculations or myotonia but with discrete recruitment; right FDI with 1+ IA and DA/RIP. Left APB 1+ IA/DA; right APB 1+/DA recruitment. Left ADM 2+ IA with discrete activity but without any fibrillations or positive sharp waves. Normal muscles include the following left FCU left EDC left triceps and deltoid and biceps; right ADM right EDC triceps deltoid and biceps. INTERPRETATION:      THESE FINDINGS ARE MARKEDLY AND SEVERELY ABNORMAL ELECTROPHYSIOLOGIC EVIDENCE OF SEVERE GENERALIZED SENSORIMOTOR POLYNEUROPATHY MIXED AXONAL DEMYELINATING WITH ENTRAPMENT ABNORMALITIES AT MEDIAN NERVES BILATERALLY AT THE WRIST; SEVERE OF THE LEFT ULNAR NERVE COMPATIBLE WITH CUBITAL TUNNEL SYNDROME. LESS SEVERE ON RIGHT ULNAR AT THE CUBITAL TUNNEL. NO MYOPATHY MYOTONIA OR FASCICULATIONS.            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 Procedure Details:      Correlates with the clinical history of weakness, paresthesia diffusely, making him prone to entrapment neuropathies as well as other neuropathies. Suspect diabetic amyotrophy to a degree. Lower extremities were not studied as of yet so we will schedule him for return for that. Differential diagnostic consideration and information was given directly to the patient and he will follow-up with his other several physicians that are helping him and treating him. His wife accompanied during portions of the evaluation. Understanding acknowledge and information provided. Please Note[de-identified]     Data and waveforms * filed under Procedure category [facility]. See Procedure Files for complete data pages. Angel Anna MD  Consultative Neurology, Neurodiagnostics   [facility]    [address]   [address]  Phone:  [phone]  Fax:   [phone]          + + +   Glossary:   MUP: motor unit potential;  SNAP: sensory nerve action potential; Fibs:  Fibrillations; Fascic: fasciculations; IA: insertional activity;  IP: interference pattern;  SCV :  Sensory conduction velocity;  MCV: motor conduction velocity; NOTE[de-identified] muscles are abbreviated latin initials. Nicolet raw datafile[de-identified]   * filed at Procedure or [employer].  *

## 2023-02-03 NOTE — LETTER
Diabetic neurop  Diabetic amyotrophy  Polyneuropathy     A1C           Justina Godwin MD  Consultative Neurology, [facility]    [address]  [place], [address] [place] [address]  Phone:  [phone]  Fax:   [phone] Gastroenterology Progress Note  Hanny Walton Patient Status:  Inpatient    1959 MRN [id number]   [facility] 8NE-A Attending Partha Grande MD   Hosp Day # 2 PCP Rommel Seymour PATIENT STATED HISTORY: (As transcribed by Technologist)  Patient is post G-J tube, assess location. FINDINGS:    Bowel gas pattern is non-obstructive.   There is a gastrojejunal tube with tip in the expected location of the jejunum within the lef forceps and gently dragged into the small bowel to the maximum extent possible. However, as there remained significant jejunal tube in the gastric lumen, the upper endoscope was withdawn and replaced with a pediatric colonoscope.   The colonoscope was then tension. She did not start tube feeds as of yesterday, but is to start today.         Tube feeds per Nutrition services    Follow labs     Would consider subacute rehab as she slowly ramps up her tube feeding, and may need some PT/OT, given her overall wea

## 2023-05-15 NOTE — THERAPY EVALUATION
Jing Prabhakar  : 1945  Primary:  (No info available)  Secondary:  [address] [facility] [address]  Phone: [phone]  Fax: [phone] Plan Frequency: 2 times a week for 8 weeks (Patient frequency has anticipated reduction to 1x week based on patient presentation, progress, and tissue irritability)    Plan of Care/Certification Expiration Date: 07/15/23 (Patient POC starting 23)      PT Visit Info:  Plan Frequency: 2 times a week for 8 weeks (Patient frequency has anticipated reduction to 1x week based on patient presentation, progress, and tissue irritability)  Plan of Care/Certification Expiration Date: 07/15/23 (Patient POC starting 23)      Visit Count:  1                OUTPATIENT PHYSICAL THERAPY:             OP NOTE TYPE: Initial Assessment 2023               Episode (L ankle pain/ general deconditioning) Appt Desk         Treatment Diagnosis:  Generalized Muscle Weakness (M62.81)  Difficulty in walking, Not elsewhere classified (R26.2)  Other abnormalities of gait and mobility (R26.89)  Charcot's joint, left ankle and footCharcot's joint, left ankle and foot [M14.672]    Medical/Referring Diagnosis:  Charcot's joint, left ankle and foot [W47.434]  Referring Physician:  Cass Blount MD MD Orders:  PT Eval and Treat   Return MD Appt:  Not scheduled at this time   Date of Onset:       Allergies:  Sulfamethoxazole-trimethoprim, Tramadol, Ace inhibitors, Codeine, Fenofibrate, Hydromorphone, Metformin, and Sitagliptin  Restrictions/Precautions:           Medications Last Reviewed:  2023     SUBJECTIVE   History of Injury/Illness (Reason for Referral):  Patient is a pleasant 78year old male presenting with generalized weakness and difficulty ambulating. Patient reports having surgery in 2022 for his L foot and is in a AFO on his L foot.  Patient reports he is currently contemplating whether or

## 2023-05-15 NOTE — PROGRESS NOTES
Deon Catherine   5/23/2023  3:30 PM Karolee Boatman, PT [facility] SFO   5/25/2023  3:30 PM Karolee Boatman, PT [facility]   6/6/2023 10:00 AM Karolee Boatman, PT [facility]   6/8/2023 11:00 AM Karolee Boatman, PT [facility]   6/13/2023 10:00 AM Karolee Boatman, PT [facility]   6/15/2023 10:00 AM Karolee Boatman, PT [facility]   6/20/2023 10:00 AM Karolee Boatman, PT [facility]   6/22/2023 10:00 AM Bobo Bustard, PTA [facility]   6/27/2023 10:00 AM Karolee Boatman, PT [facility]   6/29/2023 10:00 AM Karolee Boatman, PT [facility]   7/5/2023 10:00 AM Karolee Boatman, PT [facility]   7/7/2023 10:00 AM Bobo Bustard, PTA [facility]   7/11/2023 10:00 AM Karolee Boatman, PT [facility]   7/13/2023 10:00 AM Karolee Boatman, PT [facility]   7/18/2023 10:00 AM Karolee Boatman, PT [facility]   7/20/2023 10:00 AM Karolee Boatman, PT [facility]

## 2023-05-16 ENCOUNTER — HOSPITAL ENCOUNTER (OUTPATIENT)
Dept: PHYSICAL THERAPY | Age: 78
Setting detail: RECURRING SERIES
Discharge: HOME | End: 2023-05-19
Payer: MEDICARE

## 2023-05-16 PROCEDURE — 97162 PT EVAL MOD COMPLEX 30 MIN: CPT

## 2023-05-16 PROCEDURE — 97110 THERAPEUTIC EXERCISES: CPT

## 2023-05-17 ASSESSMENT — PAIN SCALES - GENERAL: PAINLEVEL_OUTOF10: 8

## 2023-05-18 ENCOUNTER — HOSPITAL ENCOUNTER (OUTPATIENT)
Dept: PHYSICAL THERAPY | Age: 78
Setting detail: RECURRING SERIES
End: 2023-05-18
Payer: MEDICARE

## 2023-05-23 ENCOUNTER — HOSPITAL ENCOUNTER (OUTPATIENT)
Dept: PHYSICAL THERAPY | Age: 78
Setting detail: RECURRING SERIES
End: 2023-05-23
Payer: MEDICARE

## 2023-05-25 ENCOUNTER — HOSPITAL ENCOUNTER (OUTPATIENT)
Dept: PHYSICAL THERAPY | Age: 78
Setting detail: RECURRING SERIES
Discharge: HOME | End: 2023-05-28
Payer: MEDICARE

## 2023-05-25 PROCEDURE — 97110 THERAPEUTIC EXERCISES: CPT

## 2023-05-25 ASSESSMENT — PAIN SCALES - GENERAL: PAINLEVEL_OUTOF10: 8

## 2023-06-05 NOTE — PROGRESS NOTES
Sam Fountain  : 1945  Primary:  (No info available)  Secondary:  [address] @ [address] [place] [address]  Phone: [phone]  Fax: [phone] Plan Frequency: 2 times a week for 8 weeks (Patient frequency has anticipated reduction to 1x week based on patient presentation, progress, and tissue irritability)  Plan of Care/Certification Expiration Date: 07/15/23 (Patient POC starting 23)      >PT Visit Info:  Plan Frequency: 2 times a week for 8 weeks (Patient frequency has anticipated reduction to 1x week based on patient presentation, progress, and tissue irritability)  Plan of Care/Certification Expiration Date: 07/15/23 (Patient POC starting 23)      Visit Count:  3    OUTPATIENT PHYSICAL THERAPY:OP NOTE TYPE: Treatment Note 2023       Episode  }Appt Desk             Treatment Diagnosis:  Generalized Muscle Weakness (M62.81)  Difficulty in walking, Not elsewhere classified (R26.2)  Other abnormalities of gait and mobility (R26.89)  Charcot's joint, left ankle and footCharcot's joint, left ankle and foot [M14.672]    Medical/Referring Diagnosis:  Charcot's joint, left ankle and foot [N72.474]  Referring Physician:  Darion Sanabria MD MD Orders:  PT Eval and Treat   Date of Onset:  No data recorded     Allergies:   Sulfamethoxazole-trimethoprim, Tramadol, Ace inhibitors, Codeine, Fenofibrate, Hydromorphone, Metformin, and Sitagliptin  Restrictions/Precautions:  No data recorded  No data recorded   Interventions Planned (Treatment may consist of any combination of the following):    Current Treatment Recommendations: Strengthening; ROM; Balance training; Functional mobility training; Transfer training; Endurance training; Gait training; Stair training; Neuromuscular re-education; Manual; Home exercise program; Modalities; Positioning; Dry needling; Therapeutic activities     >Subjective Comments:  Patient reports low pain today.

## 2023-06-06 ENCOUNTER — HOSPITAL ENCOUNTER (OUTPATIENT)
Dept: PHYSICAL THERAPY | Age: 78
Setting detail: RECURRING SERIES
Discharge: HOME | End: 2023-06-09
Payer: MEDICARE

## 2023-06-06 PROCEDURE — 97110 THERAPEUTIC EXERCISES: CPT

## 2023-06-06 ASSESSMENT — PAIN SCALES - GENERAL: PAINLEVEL_OUTOF10: 3

## 2023-06-15 ENCOUNTER — HOSPITAL ENCOUNTER (OUTPATIENT)
Dept: PHYSICAL THERAPY | Age: 78
Setting detail: RECURRING SERIES
Discharge: HOME | End: 2023-06-18
Payer: MEDICARE

## 2023-06-15 PROCEDURE — 97110 THERAPEUTIC EXERCISES: CPT

## 2023-06-15 ASSESSMENT — PAIN SCALES - GENERAL: PAINLEVEL_OUTOF10: 2

## 2023-06-22 ENCOUNTER — APPOINTMENT (OUTPATIENT)
Dept: PHYSICAL THERAPY | Age: 78
End: 2023-06-22
Payer: MEDICARE

## 2023-06-27 ENCOUNTER — HOSPITAL ENCOUNTER (OUTPATIENT)
Dept: PHYSICAL THERAPY | Age: 78
Setting detail: RECURRING SERIES
Discharge: HOME | End: 2023-06-30
Payer: MEDICARE

## 2023-06-27 PROCEDURE — 97110 THERAPEUTIC EXERCISES: CPT

## 2023-06-27 ASSESSMENT — PAIN SCALES - GENERAL: PAINLEVEL_OUTOF10: 2

## 2023-06-30 ENCOUNTER — HOSPITAL ENCOUNTER (OUTPATIENT)
Dept: PHYSICAL THERAPY | Age: 78
Setting detail: RECURRING SERIES
End: 2023-06-30
Payer: MEDICARE

## 2023-06-30 PROCEDURE — 97110 THERAPEUTIC EXERCISES: CPT

## 2023-06-30 ASSESSMENT — PAIN SCALES - GENERAL: PAINLEVEL_OUTOF10: 4

## 2023-07-03 NOTE — PROGRESS NOTES
Erin Streeter  : 1945  Primary:  (No info available)  Secondary:  [address] @ [address] [facility] [address]  Phone: [phone]  Fax: [phone] Plan Frequency: 2 times a week for 8 weeks (Patient frequency has anticipated reduction to 1x week based on patient presentation, progress, and tissue irritability)  Plan of Care/Certification Expiration Date: 07/15/23 (Patient POC starting 23)      >PT Visit Info:  Plan Frequency: 2 times a week for 8 weeks (Patient frequency has anticipated reduction to 1x week based on patient presentation, progress, and tissue irritability)  Plan of Care/Certification Expiration Date: 07/15/23 (Patient POC starting 23)      Visit Count:  9    OUTPATIENT PHYSICAL THERAPY:OP NOTE TYPE: Treatment Note 2023       Episode  }Appt Desk             Treatment Diagnosis:  Generalized Muscle Weakness (M62.81)  Difficulty in walking, Not elsewhere classified (R26.2)  Other abnormalities of gait and mobility (R26.89)  Charcot's joint, left ankle and footCharcot's joint, left ankle and foot [M14.672]    Medical/Referring Diagnosis:  Charcot's joint, left ankle and foot [G58.588]  Referring Physician:  Laine Griffin MD MD Orders:  PT Eval and Treat   Date of Onset:  No data recorded     Allergies:   Sulfamethoxazole-trimethoprim, Tramadol, Ace inhibitors, Codeine, Fenofibrate, Hydromorphone, Metformin, and Sitagliptin  Restrictions/Precautions:  No data recorded  No data recorded   Interventions Planned (Treatment may consist of any combination of the following):    Current Treatment Recommendations: Strengthening; ROM; Balance training; Functional mobility training; Transfer training; Endurance training; Gait training; Stair training; Neuromuscular re-education; Manual; Home exercise program; Modalities; Positioning; Dry needling;  Therapeutic activities     >Subjective Comments:  Patient reporting some increased

## 2023-07-05 ENCOUNTER — HOSPITAL ENCOUNTER (OUTPATIENT)
Dept: PHYSICAL THERAPY | Age: 78
Setting detail: RECURRING SERIES
Discharge: HOME | End: 2023-07-08
Payer: MEDICARE

## 2023-07-05 PROCEDURE — 97110 THERAPEUTIC EXERCISES: CPT

## 2023-07-05 ASSESSMENT — PAIN SCALES - GENERAL: PAINLEVEL_OUTOF10: 5

## 2023-07-07 ENCOUNTER — HOSPITAL ENCOUNTER (OUTPATIENT)
Dept: PHYSICAL THERAPY | Age: 78
Setting detail: RECURRING SERIES
Discharge: HOME | End: 2023-07-10
Payer: MEDICARE

## 2023-07-07 PROCEDURE — 97110 THERAPEUTIC EXERCISES: CPT

## 2023-07-07 ASSESSMENT — PAIN SCALES - GENERAL: PAINLEVEL_OUTOF10: 4

## 2023-07-10 NOTE — PROGRESS NOTES
Susie Allen  : 1945  Primary:  (No info available)  Secondary:  [address] @ [address] [place] [address]  Phone: [phone]  Fax: [phone] Plan Frequency: 2 times a week for 8 weeks (Patient frequency has anticipated reduction to 1x week based on patient presentation, progress, and tissue irritability)  Plan of Care/Certification Expiration Date: 07/15/23 (Patient POC starting 23)      >PT Visit Info:  Plan Frequency: 2 times a week for 8 weeks (Patient frequency has anticipated reduction to 1x week based on patient presentation, progress, and tissue irritability)  Plan of Care/Certification Expiration Date: 07/15/23 (Patient POC starting 23)      Visit Count:  11    OUTPATIENT PHYSICAL THERAPY:OP NOTE TYPE: Treatment Note 2023       Episode  }Appt Desk             Treatment Diagnosis:  Generalized Muscle Weakness (M62.81)  Difficulty in walking, Not elsewhere classified (R26.2)  Other abnormalities of gait and mobility (R26.89)  Charcot's joint, left ankle and footCharcot's joint, left ankle and foot [M14.672]    Medical/Referring Diagnosis:  Charcot's joint, left ankle and foot [F40.032]  Referring Physician:  Ruy Rodriguez MD MD Orders:  PT Eval and Treat   Date of Onset:  No data recorded     Allergies:   Sulfamethoxazole-trimethoprim, Tramadol, Ace inhibitors, Codeine, Fenofibrate, Hydromorphone, Metformin, and Sitagliptin  Restrictions/Precautions:  No data recorded  No data recorded   Interventions Planned (Treatment may consist of any combination of the following):    Current Treatment Recommendations: Strengthening; ROM; Balance training; Functional mobility training; Transfer training; Endurance training; Gait training; Stair training; Neuromuscular re-education; Manual; Home exercise program; Modalities; Positioning; Dry needling;  Therapeutic activities     >Subjective Comments:  Patient reporting decreased

## 2023-07-11 ENCOUNTER — HOSPITAL ENCOUNTER (OUTPATIENT)
Dept: PHYSICAL THERAPY | Age: 78
Setting detail: RECURRING SERIES
Discharge: HOME | End: 2023-07-14
Payer: MEDICARE

## 2023-07-11 PROCEDURE — 97110 THERAPEUTIC EXERCISES: CPT

## 2023-07-11 ASSESSMENT — PAIN SCALES - GENERAL: PAINLEVEL_OUTOF10: 2

## 2023-07-14 ENCOUNTER — HOSPITAL ENCOUNTER (OUTPATIENT)
Dept: PHYSICAL THERAPY | Age: 78
Setting detail: RECURRING SERIES
Discharge: HOME | End: 2023-07-17
Payer: MEDICARE

## 2023-07-14 PROCEDURE — 97110 THERAPEUTIC EXERCISES: CPT

## 2023-07-14 ASSESSMENT — PAIN SCALES - GENERAL: PAINLEVEL_OUTOF10: 2

## 2023-07-17 ENCOUNTER — HOSPITAL ENCOUNTER (OUTPATIENT)
Dept: PHYSICAL THERAPY | Age: 78
Setting detail: RECURRING SERIES
End: 2023-07-17
Payer: MEDICARE

## 2023-07-17 NOTE — PROGRESS NOTES
[address] [place] [address] 7/17/23    Patient did not show up to appointment today.

## 2023-07-19 NOTE — PROGRESS NOTES
4:30 PM Montse Saldaña, PT [facility]   8/8/2023  4:30 PM Montse Saldaña, PT [facility]   8/10/2023  4:30 PM Montse Saldaña, PT [facility]

## 2023-07-19 NOTE — THERAPY RECERTIFICATION
detectable change is 5.7 points. With the addition of the 8 questions in the \"Sports Subscale,\" there are 29 questions, each scored on a 5 point scale with 0 representing \"Unable to do\" and 4 representing \"No difficulty\". The lower the score, the greater the functional disability. 116/116 represents no disability. Minimal detectable change is 12.3 points. Medical Necessity:   > Patient is expected to demonstrate progress in strength, range of motion, balance, coordination, and functional technique to improve ability to navigate environment and perform daily tasks.  > Skilled intervention continues to be required due to decreased activity tolerance, decreased ROM, decreased strength, increased pain, and difficulty ambulating. Reason For Services/Other Comments:  > Patient continues to require skilled intervention due to increasing complexity of exercises within a graded exercise program.  Total Duration:       Regarding Anabell Clay therapy, I certify that the treatment plan above will be carried out by a therapist or under their direction.   Thank you for this referral,  Andie Maxwell, PT     Referring Physician Signature: Lary Arambula MD _______________________________ Date _____________        Post Session Pain  Charge Capture  PT Visit Info [facility]  MyChart

## 2023-07-20 ENCOUNTER — HOSPITAL ENCOUNTER (OUTPATIENT)
Dept: PHYSICAL THERAPY | Age: 78
Setting detail: RECURRING SERIES
Discharge: HOME | End: 2023-07-23
Payer: MEDICARE

## 2023-07-20 PROCEDURE — 97110 THERAPEUTIC EXERCISES: CPT

## 2023-07-20 ASSESSMENT — PAIN SCALES - GENERAL: PAINLEVEL_OUTOF10: 4

## 2023-07-24 NOTE — PROGRESS NOTES
Venancio Bliss at [facility] 7/25/23    Patient canceling appointment as he will be out of town.

## 2023-07-25 ENCOUNTER — HOSPITAL ENCOUNTER (OUTPATIENT)
Dept: PHYSICAL THERAPY | Age: 78
Setting detail: RECURRING SERIES
End: 2023-07-25
Payer: MEDICARE

## 2023-08-03 ENCOUNTER — HOSPITAL ENCOUNTER (OUTPATIENT)
Dept: PHYSICAL THERAPY | Age: 78
Setting detail: RECURRING SERIES
End: 2023-08-03
Payer: MEDICARE

## 2023-08-03 NOTE — PROGRESS NOTES
Venancio Bliss at [facility] 8/3/23    Patient canceled secondary to testing positive for c-diff.

## 2023-08-08 ENCOUNTER — HOSPITAL ENCOUNTER (OUTPATIENT)
Dept: PHYSICAL THERAPY | Age: 78
Setting detail: RECURRING SERIES
Discharge: HOME | End: 2023-08-11
Payer: MEDICARE

## 2023-08-08 PROCEDURE — 97110 THERAPEUTIC EXERCISES: CPT

## 2023-08-08 ASSESSMENT — PAIN SCALES - GENERAL: PAINLEVEL_OUTOF10: 1

## 2023-08-08 NOTE — PROGRESS NOTES
Rosina Patel  : 1945  Primary:  (No info available)  Secondary:  [address] @ [address] [facility] [address]  Phone: [phone]  Fax: [phone] Plan Frequency: 2 times a week for 8 weeks (Patient frequency has anticipated reduction to 1x week based on patient presentation, progress, and tissue irritability)  Plan of Care/Certification Expiration Date: 23 (POC starting 23)      >PT Visit Info:  Plan Frequency: 2 times a week for 8 weeks (Patient frequency has anticipated reduction to 1x week based on patient presentation, progress, and tissue irritability)  Plan of Care/Certification Expiration Date: 23 (POC starting 23)      Visit Count:  14    OUTPATIENT PHYSICAL THERAPY:OP NOTE TYPE: Treatment Note 2023       Episode  }Appt Desk             Treatment Diagnosis:  Generalized Muscle Weakness (M62.81)  Difficulty in walking, Not elsewhere classified (R26.2)  Other abnormalities of gait and mobility (R26.89)  Charcot's joint, left ankle and footCharcot's joint, left ankle and foot [M14.672]    Medical/Referring Diagnosis:  Charcot's joint, left ankle and foot [U29.101]  Referring Physician:  Hartford Hammans, MD MD Orders:  PT Eval and Treat   Date of Onset:  No data recorded     Allergies:   Sulfamethoxazole-trimethoprim, Tramadol, Ace inhibitors, Codeine, Fenofibrate, Hydromorphone, Metformin, and Sitagliptin  Restrictions/Precautions:  No data recorded  No data recorded   Interventions Planned (Treatment may consist of any combination of the following):    Current Treatment Recommendations: Strengthening; ROM; Balance training; Functional mobility training; Transfer training; Endurance training; Gait training; Stair training; Neuromuscular re-education; Manual; Home exercise program; Modalities; Positioning; Dry needling;  Therapeutic activities     >Subjective Comments:  Patient reports feeling better after being ill

## 2023-08-09 NOTE — PROGRESS NOTES
back today but reports therapy is improving function. >Initial:     1/10>Post Session:       2/10  Medications Last Reviewed:  8/10/2023  Updated Objective Findings:  Patient ambulating with decreased stance on R LE. Treatment   THERAPEUTIC EXERCISE: (55 minutes):    Exercises per grid below to improve mobility, strength, balance, and coordination. Required minimal visual, verbal, manual, and tactile cues to promote proper body alignment, promote proper body posture, promote proper body mechanics, and promote proper body breathing techniques. Progressed resistance, range, repetitions, and complexity of movement as indicated.    Date:  7/20/23 Date:  8/8/23 Date:  8/10/23   Activity/Exercise Parameters Parameters Parameters   Nu Step 10 min level 3.0 for ROM and endurance 10 min level 3.0 for ROM and endurance 10 min level 3.8 for ROM and endurance   Trunk Rotation 3x20 green physio ball 1x15 green ball    3x15  supine  3x15 supine    Bridge 5x5 3x12 6x5   Hip Flexion 2x10 supine on physio ball 3x10 supine    3x10 standing with isometric pause 3x10 supine    3x10 standing with isometric pause   Mini Squat 5x5 from plinth with pink ball 5x5 from plinth with pink ball 5x10 with pink ball   Bilateral knee flexion & extension Green swiss ball in supine x 20 reps  2x15 supine green ball Green swiss ball in supine x 20 reps    Standing marching X20 reps at bar with gait belt on and close supervision - X20 reps at bar with gait belt on and close supervision   Seated ball roll out  X 30 reps  X 30  reps  X 30 reps    Seated on edge of plinth Big blue roll out x 30 reps  - -   Upper trap and levator scapulae stretch  HEP HEP HEP   LAQ 3x10 with 3 lb with isometric quad contraction 3x10 with 3 lb with isometric quad contraction 3x10 with 3 lb with isometric quad contraction   Bed Mobility 5 min emphasis on log roll technique 5 min rolling 5 min emphasis on log roll technique    Hip Abduction 3x10 sidestepping with UE

## 2023-08-10 ENCOUNTER — HOSPITAL ENCOUNTER (OUTPATIENT)
Dept: PHYSICAL THERAPY | Age: 78
Setting detail: RECURRING SERIES
Discharge: HOME | End: 2023-08-13
Payer: MEDICARE

## 2023-08-10 PROCEDURE — 97110 THERAPEUTIC EXERCISES: CPT

## 2023-08-10 ASSESSMENT — PAIN SCALES - GENERAL: PAINLEVEL_OUTOF10: 1

## 2023-08-17 ENCOUNTER — HOSPITAL ENCOUNTER (OUTPATIENT)
Dept: PHYSICAL THERAPY | Age: 78
Setting detail: RECURRING SERIES
End: 2023-08-17
Payer: MEDICARE

## 2023-08-28 NOTE — PROGRESS NOTES
Savi Benitez  : 1945  Primary:  (No info available)  Secondary:  [address] @ [address] [place] [address]  Phone: [phone]  Fax: [phone] Plan Frequency: 2 times a week for 8 weeks (Patient frequency has anticipated reduction to 1x week based on patient presentation, progress, and tissue irritability)  Plan of Care/Certification Expiration Date: 23 (POC starting 23)      >PT Visit Info:  Plan Frequency: 2 times a week for 8 weeks (Patient frequency has anticipated reduction to 1x week based on patient presentation, progress, and tissue irritability)  Plan of Care/Certification Expiration Date: 23 (POC starting 23)      Visit Count:  16    OUTPATIENT PHYSICAL THERAPY:OP NOTE TYPE: Treatment Note 2023       Episode  }Appt Desk             Treatment Diagnosis:  Generalized Muscle Weakness (M62.81)  Difficulty in walking, Not elsewhere classified (R26.2)  Other abnormalities of gait and mobility (R26.89)  Charcot's joint, left ankle and footCharcot's joint, left ankle and foot [M14.672]    Medical/Referring Diagnosis:  Charcot's joint, left ankle and foot [K76.073]  Referring Physician:  Marisa Blanchard MD MD Orders:  PT Eval and Treat   Date of Onset:  No data recorded     Allergies:   Sulfamethoxazole-trimethoprim, Tramadol, Ace inhibitors, Codeine, Fenofibrate, Hydromorphone, Metformin, and Sitagliptin  Restrictions/Precautions:  No data recorded  No data recorded   Interventions Planned (Treatment may consist of any combination of the following):    Current Treatment Recommendations: Strengthening; ROM; Balance training; Functional mobility training; Transfer training; Endurance training; Gait training; Stair training; Neuromuscular re-education; Manual; Home exercise program; Modalities; Positioning; Dry needling;  Therapeutic activities     >Subjective Comments:  Patient reports having prostate surgery

## 2023-08-29 ENCOUNTER — HOSPITAL ENCOUNTER (OUTPATIENT)
Dept: PHYSICAL THERAPY | Age: 78
Setting detail: RECURRING SERIES
Discharge: HOME | End: 2023-09-01
Payer: MEDICARE

## 2023-08-29 PROCEDURE — 97110 THERAPEUTIC EXERCISES: CPT

## 2023-08-29 ASSESSMENT — PAIN SCALES - GENERAL: PAINLEVEL_OUTOF10: 6

## 2023-08-31 ENCOUNTER — HOSPITAL ENCOUNTER (OUTPATIENT)
Dept: PHYSICAL THERAPY | Age: 78
Setting detail: RECURRING SERIES
End: 2023-08-31
Payer: MEDICARE

## 2023-09-05 NOTE — PROGRESS NOTES
Venancio Bliss at [facility] 9/5/23    Patient not seen today as PT out sick.
ALINE   9/14/2023  4:30 PM Ryanne Castillo, PT [facility]RE[facility]   9/19/2023  4:30 PM DARSHAN Sweet BERNARDO

## 2023-09-06 NOTE — PROGRESS NOTES
Patrick Velasco  : 1945  Primary:  (No info available)  Secondary:  [address] @ [address] [place] [address]  Phone: [phone]  Fax: [phone] Plan Frequency: 2 times a week for 8 weeks (Patient frequency has anticipated reduction to 1x week based on patient presentation, progress, and tissue irritability)  Plan of Care/Certification Expiration Date: 23 (POC starting 23)      >PT Visit Info:  Plan Frequency: 2 times a week for 8 weeks (Patient frequency has anticipated reduction to 1x week based on patient presentation, progress, and tissue irritability)  Plan of Care/Certification Expiration Date: 23 (POC starting 23)      Visit Count:  17    OUTPATIENT PHYSICAL THERAPY:OP NOTE TYPE: Treatment Note 2023       Episode  }Appt Desk             Treatment Diagnosis:  Generalized Muscle Weakness (M62.81)  Difficulty in walking, Not elsewhere classified (R26.2)  Other abnormalities of gait and mobility (R26.89)  Charcot's joint, left ankle and footCharcot's joint, left ankle and foot [M14.672]    Medical/Referring Diagnosis:  Charcot's joint, left ankle and foot [P20.739]  Referring Physician:  Lissett Sellers MD MD Orders:  PT Eval and Treat   Date of Onset:  No data recorded     Allergies:   Sulfamethoxazole-trimethoprim, Tramadol, Ace inhibitors, Codeine, Fenofibrate, Hydromorphone, Metformin, and Sitagliptin  Restrictions/Precautions:  No data recorded  No data recorded   Interventions Planned (Treatment may consist of any combination of the following):    Current Treatment Recommendations: Strengthening; ROM; Balance training; Functional mobility training; Transfer training; Endurance training; Gait training; Stair training; Neuromuscular re-education; Manual; Home exercise program; Modalities; Positioning; Dry needling;  Therapeutic activities     >Subjective Comments:  Patient reports some pain and discomfort

## 2023-09-07 ENCOUNTER — HOSPITAL ENCOUNTER (OUTPATIENT)
Dept: PHYSICAL THERAPY | Age: 78
Setting detail: RECURRING SERIES
Discharge: HOME | End: 2023-09-10
Payer: MEDICARE

## 2023-09-07 PROCEDURE — 97110 THERAPEUTIC EXERCISES: CPT

## 2023-09-07 ASSESSMENT — PAIN SCALES - GENERAL: PAINLEVEL_OUTOF10: 2

## 2023-09-12 ENCOUNTER — HOSPITAL ENCOUNTER (OUTPATIENT)
Dept: PHYSICAL THERAPY | Age: 78
Setting detail: RECURRING SERIES
Discharge: HOME | End: 2023-09-15
Payer: MEDICARE

## 2023-09-12 PROCEDURE — 97110 THERAPEUTIC EXERCISES: CPT

## 2023-09-12 NOTE — PROGRESS NOTES
\"tried\" prior to today's therapy session. >Initial:     2/10>Post Session:       2/10  Medications Last Reviewed:  9/12/2023  Updated Objective Findings:  Patient presented with decreased step length and narrow GEOVANNY while ambulating in the clinic today. Treatment   THERAPEUTIC EXERCISE: (53 minutes):    Exercises per grid below to improve mobility, strength, balance, and coordination. Required minimal visual, verbal, manual, and tactile cues to promote proper body alignment, promote proper body posture, promote proper body mechanics, and promote proper body breathing techniques. Progressed resistance, range, repetitions, and complexity of movement as indicated.    Date:  8/29/23 Date:  9/7/23 Date:  9/12/23   Activity/Exercise Parameters Parameters Parameters   Nu Step 10 min level 3.0 for ROM and endurance 10 min level 3.0 for ROM and endurance 10 min level 3.9 for ROM and endurance   Trunk Rotation 3x20 supine 3x15  supine  3x15 supine    Bridge 3x12 with ball for adduction moment 3x12 with ball 3x12 with ball   Hip Flexion 3x12 supine  3x15 supine 3x10 supine   Mini Squat 4x10 with UE support from plinth  4x10 with pink ball to plinth Verbal review   Bilateral knee flexion & extension Green swiss ball in supine x 20 reps  Green swiss ball in supine x 20 reps  Green swiss ball in supine x 20 reps    Standing marching X20 reps at bar with gait belt on and close supervision 2x20 with 3 lb weight Resume next visit   Seated ball roll out  X 30 reps  X 30  reps  Resume next visit   Seated on edge of plinth Big blue roll out x 30 reps  Big blue roll out x 30 reps  Resume next visit   Upper trap and levator scapulae stretch  HEP HEP HEP   LAQ 3x10 with 3 lb with isometric quad contraction 3x10 with 3 lb with isometric quad contraction Resume next visit   Bed Mobility 5 min emphasis on log roll technique 5 min rolling 5 min emphasis on log roll technique    Hip Abduction 3x10 sidestepping with UE support  3x10

## 2023-09-13 ASSESSMENT — PAIN SCALES - GENERAL: PAINLEVEL_OUTOF10: 2

## 2023-09-14 ENCOUNTER — HOSPITAL ENCOUNTER (OUTPATIENT)
Dept: PHYSICAL THERAPY | Age: 78
Setting detail: RECURRING SERIES
End: 2023-09-14
Payer: MEDICARE

## 2023-09-14 NOTE — PROGRESS NOTES
Venancio Bliss at [facility] 9/14/23    Patient called and canceled appointment today.

## 2023-09-19 ENCOUNTER — APPOINTMENT (OUTPATIENT)
Dept: PHYSICAL THERAPY | Age: 78
End: 2023-09-19
Payer: MEDICARE

## 2023-09-21 ENCOUNTER — HOSPITAL ENCOUNTER (OUTPATIENT)
Dept: PHYSICAL THERAPY | Age: 78
Setting detail: RECURRING SERIES
End: 2023-09-21
Payer: MEDICARE

## 2023-09-21 NOTE — PROGRESS NOTES
Venancio [facility] 9/21/23    Patient called and canceled secondary to being in the hospital.

## 2023-09-26 NOTE — PROGRESS NOTES
Lucas Barba  : 1945  Primary:  (No info available)  Secondary:  [address] @ [facility] [address]  Phone: [phone]  Fax: [phone] Plan Frequency: 2 times a week for 8 weeks (Patient frequency has anticipated reduction to 1x week based on patient presentation, progress, and tissue irritability)  Plan of Care/Certification Expiration Date: 23 (POC starting 23)      >PT Visit Info:  Plan Frequency: 2 times a week for 8 weeks (Patient frequency has anticipated reduction to 1x week based on patient presentation, progress, and tissue irritability)  Plan of Care/Certification Expiration Date: 23 (POC starting 23)      Visit Count:  19    OUTPATIENT PHYSICAL THERAPY:OP NOTE TYPE: Treatment Note 2023       Episode  }Appt Desk             Treatment Diagnosis:  Generalized Muscle Weakness (M62.81)  Difficulty in walking, Not elsewhere classified (R26.2)  Other abnormalities of gait and mobility (R26.89)  Charcot's joint, left ankle and footCharcot's joint, left ankle and foot [M14.672]    Medical/Referring Diagnosis:  Charcot's joint, left ankle and foot [D25.473]  Referring Physician:  Yasmin Linares MD MD Orders:  PT Eval and Treat   Date of Onset:  No data recorded     Allergies:   Sulfamethoxazole-trimethoprim, Tramadol, Ace inhibitors, Codeine, Fenofibrate, Hydromorphone, Metformin, and Sitagliptin  Restrictions/Precautions:  No data recorded  No data recorded   Interventions Planned (Treatment may consist of any combination of the following):    Current Treatment Recommendations: Strengthening; ROM; Balance training; Functional mobility training; Transfer training; Endurance training; Gait training; Stair training; Neuromuscular re-education; Manual; Home exercise program; Modalities; Positioning; Dry needling;  Therapeutic activities     >Subjective Comments: Pt reports increased pain today and fatigue from

## 2023-09-27 ENCOUNTER — HOSPITAL ENCOUNTER (OUTPATIENT)
Dept: PHYSICAL THERAPY | Age: 78
Setting detail: RECURRING SERIES
Discharge: HOME | End: 2023-09-30
Payer: MEDICARE

## 2023-09-27 PROCEDURE — 97110 THERAPEUTIC EXERCISES: CPT

## 2023-09-27 ASSESSMENT — PAIN SCALES - GENERAL: PAINLEVEL_OUTOF10: 8

## 2023-09-27 NOTE — THERAPY RECERTIFICATION
discussed and agreed upon with patient.)  Short-Term Functional Goals: Time Frame: 4 weeks (ALL MET)  Patient will be indep with HEP in order to progress activity tolerance and functional indep. Patient will demonstrate improved posture and weight tolerance through R LE with ability to stand for 10 minutes without increase in pain. Patient will report pain improvement from 8/10 to 5/10 in order to improve ability to perform daily tasks. Discharge Goals: Time Frame: 8 weeks (ALL IN PROGRESS)  Patient will report symptom improvement to 80% or better in order to demonstrate improved ability to navigate environment. Patient will demonstrate gross 5/5 LE strength in order to squat and raise 10 pounds while performing household tasks. Patient will report improvement in FAAM from 28/84 to over 52/84 to demonstrate improved ability to perform daily tasks. Patient will be able to demonstrate improved stair navigation via step through method with ability to perform 3 steps mod indep with rails step through method. Patient will be able to ambulate 30 min mod indep with least restrictive assistive device in order to improve ability to shop for groceries and improve ability to ambulate in the community. Patient will demonstrate improved TUG to under 12 seconds to demonstrate decreased fall risk. Patient will be able to perform bed mobility and transfers with no increased pain indep in order to improve ability to change position. ( GOAL ADDED)         Outcome Measure: Tool Used: FOOT AND ANKLE ABILITY MEASURE  Score:  Initial: 28/84 Most Recent: 30/84 (Date: 9/27/23 )   Interpretation of Score: For the \"Activities of Daily Living\", there are 21 questions each scored on a 5 point scale with 0 representing \"Unable to do\" and 4 representing \"No difficulty\". The lower the score, the greater the functional disability. 84/84 represents no disability. Minimal detectable change is 5.7 points.   With the addition of the

## 2023-10-02 NOTE — PROGRESS NOTES
Lucas Barba  : 1945  Primary:  (No info available)  Secondary:  [address] @ [facility] [address]  Phone: [phone]  Fax: [phone] Plan Frequency: 2 times a week for 8 weeks (Patient frequency has anticipated reduction to 1x week based on patient presentation, progress, and tissue irritability)  Plan of Care/Certification Expiration Date: 23 (POC starting 23)      >PT Visit Info:  Plan Frequency: 2 times a week for 8 weeks (Patient frequency has anticipated reduction to 1x week based on patient presentation, progress, and tissue irritability)  Plan of Care/Certification Expiration Date: 23 (POC starting 23)      Visit Count:  20    OUTPATIENT PHYSICAL THERAPY:OP NOTE TYPE: Treatment Note 10/3/2023       Episode  }Appt Desk             Treatment Diagnosis:  Generalized Muscle Weakness (M62.81)  Difficulty in walking, Not elsewhere classified (R26.2)  Other abnormalities of gait and mobility (R26.89)  Charcot's joint, left ankle and footCharcot's joint, left ankle and foot [M14.672]    Medical/Referring Diagnosis:  Charcot's joint, left ankle and foot [X14.969]  Referring Physician:  Yasmin Linares MD MD Orders:  PT Eval and Treat   Date of Onset:  No data recorded     Allergies:   Sulfamethoxazole-trimethoprim, Tramadol, Ace inhibitors, Codeine, Fenofibrate, Hydromorphone, Metformin, and Sitagliptin  Restrictions/Precautions:  No data recorded  No data recorded   Interventions Planned (Treatment may consist of any combination of the following):    Current Treatment Recommendations: Strengthening; ROM; Balance training; Functional mobility training; Transfer training; Endurance training; Gait training; Stair training; Neuromuscular re-education; Manual; Home exercise program; Modalities; Positioning; Dry needling;  Therapeutic activities     >Subjective Comments: Pt reports he was in the hospital last weekend

## 2023-10-03 ENCOUNTER — HOSPITAL ENCOUNTER (OUTPATIENT)
Dept: PHYSICAL THERAPY | Age: 78
Setting detail: RECURRING SERIES
Discharge: HOME | End: 2023-10-06
Payer: MEDICARE

## 2023-10-03 PROCEDURE — 97110 THERAPEUTIC EXERCISES: CPT

## 2023-10-03 ASSESSMENT — PAIN SCALES - GENERAL: PAINLEVEL_OUTOF10: 4

## 2023-10-04 ENCOUNTER — HOSPITAL ENCOUNTER (OUTPATIENT)
Dept: PHYSICAL THERAPY | Age: 78
Setting detail: RECURRING SERIES
End: 2023-10-04
Payer: MEDICARE

## 2023-10-04 NOTE — PROGRESS NOTES
Venancio Bliss at [facility] 10/4/23    Patient canceled today secondary to increased pain and swelling in his foot.

## 2023-10-11 ENCOUNTER — HOSPITAL ENCOUNTER (OUTPATIENT)
Dept: PHYSICAL THERAPY | Age: 78
Setting detail: RECURRING SERIES
End: 2023-10-11
Payer: MEDICARE

## 2023-10-11 NOTE — PROGRESS NOTES
[facility] at [address] 10/11/23    Patient self discharged at this time.

## 2023-10-13 ENCOUNTER — APPOINTMENT (OUTPATIENT)
Dept: PHYSICAL THERAPY | Age: 78
End: 2023-10-13
Payer: MEDICARE

## 2023-10-17 ENCOUNTER — APPOINTMENT (OUTPATIENT)
Dept: PHYSICAL THERAPY | Age: 78
End: 2023-10-17
Payer: MEDICARE

## 2023-10-19 ENCOUNTER — APPOINTMENT (OUTPATIENT)
Dept: PHYSICAL THERAPY | Age: 78
End: 2023-10-19
Payer: MEDICARE

## 2023-12-19 ENCOUNTER — HOSPITAL ENCOUNTER (INPATIENT)
Age: 78
LOS: 3 days | Discharge: HOME | DRG: 392 | End: 2023-12-22
Attending: EMERGENCY MEDICINE | Admitting: FAMILY MEDICINE
Payer: MEDICARE

## 2023-12-19 ENCOUNTER — APPOINTMENT (OUTPATIENT)
Dept: CT IMAGING | Age: 78
DRG: 392 | End: 2023-12-19
Payer: MEDICARE

## 2023-12-19 DIAGNOSIS — K29.80 DUODENITIS: Primary | ICD-10-CM

## 2023-12-19 DIAGNOSIS — A04.72 C. DIFFICILE COLITIS: ICD-10-CM

## 2023-12-19 LAB
ALBUMIN SERPL-MCNC: 2.9 G/DL (ref 3.2–4.6)
ALBUMIN/GLOB SERPL: 0.7 (ref 0.4–1.6)
ALP SERPL-CCNC: 223 U/L (ref 50–136)
ALT SERPL-CCNC: 44 U/L (ref 12–65)
ANION GAP SERPL CALC-SCNC: 6 MMOL/L (ref 2–11)
APPEARANCE UR: CLEAR
AST SERPL-CCNC: 27 U/L (ref 15–37)
BACTERIA URNS QL MICRO: ABNORMAL /HPF
BASOPHILS # BLD: 0.1 K/UL (ref 0–0.2)
BASOPHILS NFR BLD: 1 % (ref 0–2)
BILIRUB SERPL-MCNC: 0.4 MG/DL (ref 0.2–1.1)
BILIRUB UR QL: NEGATIVE
BUN SERPL-MCNC: 33 MG/DL (ref 8–23)
CALCIUM SERPL-MCNC: 9.1 MG/DL (ref 8.3–10.4)
CASTS URNS QL MICRO: ABNORMAL /LPF
CHLORIDE SERPL-SCNC: 114 MMOL/L (ref 103–113)
CO2 SERPL-SCNC: 22 MMOL/L (ref 21–32)
COLOR UR: ABNORMAL
CREAT SERPL-MCNC: 1.55 MG/DL (ref 0.8–1.5)
DIFFERENTIAL METHOD BLD: ABNORMAL
EOSINOPHIL # BLD: 0.2 K/UL (ref 0–0.8)
EOSINOPHIL NFR BLD: 2 % (ref 0.5–7.8)
EPI CELLS #/AREA URNS HPF: ABNORMAL /HPF
ERYTHROCYTE [DISTWIDTH] IN BLOOD BY AUTOMATED COUNT: 15.9 % (ref 11.9–14.6)
GLOBULIN SER CALC-MCNC: 4.3 G/DL (ref 2.8–4.5)
GLUCOSE SERPL-MCNC: 100 MG/DL (ref 65–100)
GLUCOSE UR STRIP.AUTO-MCNC: NEGATIVE MG/DL
HCT VFR BLD AUTO: 37.9 % (ref 41.1–50.3)
HGB BLD-MCNC: 11.8 G/DL (ref 13.6–17.2)
HGB UR QL STRIP: NEGATIVE
IMM GRANULOCYTES # BLD AUTO: 0.1 K/UL (ref 0–0.5)
IMM GRANULOCYTES NFR BLD AUTO: 1 % (ref 0–5)
KETONES UR QL STRIP.AUTO: NEGATIVE MG/DL
LACTATE SERPL-SCNC: 1.3 MMOL/L (ref 0.4–2)
LEUKOCYTE ESTERASE UR QL STRIP.AUTO: ABNORMAL
LIPASE SERPL-CCNC: 78 U/L (ref 73–393)
LYMPHOCYTES # BLD: 3.2 K/UL (ref 0.5–4.6)
LYMPHOCYTES NFR BLD: 26 % (ref 13–44)
MCH RBC QN AUTO: 30.6 PG (ref 26.1–32.9)
MCHC RBC AUTO-ENTMCNC: 31.1 G/DL (ref 31.4–35)
MCV RBC AUTO: 98.2 FL (ref 82–102)
MONOCYTES # BLD: 0.8 K/UL (ref 0.1–1.3)
MONOCYTES NFR BLD: 6 % (ref 4–12)
MUCOUS THREADS URNS QL MICRO: 0 /LPF
NEUTS SEG # BLD: 7.9 K/UL (ref 1.7–8.2)
NEUTS SEG NFR BLD: 64 % (ref 43–78)
NITRITE UR QL STRIP.AUTO: NEGATIVE
NRBC # BLD: 0 K/UL (ref 0–0.2)
PH UR STRIP: 5 (ref 5–9)
PLATELET # BLD AUTO: 307 K/UL (ref 150–450)
PMV BLD AUTO: 9.3 FL (ref 9.4–12.3)
POTASSIUM SERPL-SCNC: 5.2 MMOL/L (ref 3.5–5.1)
PROT SERPL-MCNC: 7.2 G/DL (ref 6.3–8.2)
PROT UR STRIP-MCNC: 100 MG/DL
RBC # BLD AUTO: 3.86 M/UL (ref 4.23–5.6)
RBC #/AREA URNS HPF: ABNORMAL /HPF
SODIUM SERPL-SCNC: 142 MMOL/L (ref 136–146)
SP GR UR REFRACTOMETRY: 1.01 (ref 1–1.02)
URINE CULTURE IF INDICATED: ABNORMAL
UROBILINOGEN UR QL STRIP.AUTO: 0.2 EU/DL (ref 0.2–1)
WBC # BLD AUTO: 12.2 K/UL (ref 4.3–11.1)
WBC URNS QL MICRO: ABNORMAL /HPF

## 2023-12-19 PROCEDURE — 2580000003 HC RX 258

## 2023-12-19 PROCEDURE — 80053 COMPREHEN METABOLIC PANEL: CPT

## 2023-12-19 PROCEDURE — 74176 CT ABD & PELVIS W/O CONTRAST: CPT

## 2023-12-19 PROCEDURE — 6360000004 HC RX CONTRAST MEDICATION

## 2023-12-19 PROCEDURE — 87088 URINE BACTERIA CULTURE: CPT

## 2023-12-19 PROCEDURE — 85025 COMPLETE CBC W/AUTO DIFF WBC: CPT

## 2023-12-19 PROCEDURE — 99285 EMERGENCY DEPT VISIT HI MDM: CPT

## 2023-12-19 PROCEDURE — 96376 TX/PRO/DX INJ SAME DRUG ADON: CPT

## 2023-12-19 PROCEDURE — 83690 ASSAY OF LIPASE: CPT

## 2023-12-19 PROCEDURE — 83605 ASSAY OF LACTIC ACID: CPT

## 2023-12-19 PROCEDURE — 96375 TX/PRO/DX INJ NEW DRUG ADDON: CPT

## 2023-12-19 PROCEDURE — A4216 STERILE WATER/SALINE, 10 ML: HCPCS

## 2023-12-19 PROCEDURE — 87086 URINE CULTURE/COLONY COUNT: CPT

## 2023-12-19 PROCEDURE — 87040 BLOOD CULTURE FOR BACTERIA: CPT

## 2023-12-19 PROCEDURE — 81001 URINALYSIS AUTO W/SCOPE: CPT

## 2023-12-19 PROCEDURE — 82962 GLUCOSE BLOOD TEST: CPT

## 2023-12-19 PROCEDURE — C9113 INJ PANTOPRAZOLE SODIUM, VIA: HCPCS

## 2023-12-19 PROCEDURE — 87186 SC STD MICRODIL/AGAR DIL: CPT

## 2023-12-19 PROCEDURE — 96374 THER/PROPH/DIAG INJ IV PUSH: CPT

## 2023-12-19 PROCEDURE — 36415 COLL VENOUS BLD VENIPUNCTURE: CPT

## 2023-12-19 PROCEDURE — 1100000000 HC RM PRIVATE

## 2023-12-19 PROCEDURE — 6360000002 HC RX W HCPCS

## 2023-12-19 RX ORDER — POTASSIUM CHLORIDE 20 MEQ/1
40 TABLET, EXTENDED RELEASE ORAL PRN
Status: DISCONTINUED | OUTPATIENT
Start: 2023-12-19 | End: 2023-12-22 | Stop reason: HOSPADM

## 2023-12-19 RX ORDER — MAGNESIUM SULFATE IN WATER 40 MG/ML
2000 INJECTION, SOLUTION INTRAVENOUS PRN
Status: DISCONTINUED | OUTPATIENT
Start: 2023-12-19 | End: 2023-12-22 | Stop reason: HOSPADM

## 2023-12-19 RX ORDER — SODIUM CHLORIDE 9 MG/ML
INJECTION, SOLUTION INTRAVENOUS PRN
Status: DISCONTINUED | OUTPATIENT
Start: 2023-12-19 | End: 2023-12-22 | Stop reason: HOSPADM

## 2023-12-19 RX ORDER — INSULIN LISPRO 100 [IU]/ML
0-8 INJECTION, SOLUTION INTRAVENOUS; SUBCUTANEOUS
Status: DISCONTINUED | OUTPATIENT
Start: 2023-12-20 | End: 2023-12-22 | Stop reason: HOSPADM

## 2023-12-19 RX ORDER — 0.9 % SODIUM CHLORIDE 0.9 %
1000 INTRAVENOUS SOLUTION INTRAVENOUS ONCE
Status: COMPLETED | OUTPATIENT
Start: 2023-12-19 | End: 2023-12-19

## 2023-12-19 RX ORDER — PANTOPRAZOLE SODIUM 40 MG/1
40 TABLET, DELAYED RELEASE ORAL DAILY
Status: DISCONTINUED | OUTPATIENT
Start: 2023-12-20 | End: 2023-12-20

## 2023-12-19 RX ORDER — POTASSIUM CHLORIDE 7.45 MG/ML
10 INJECTION INTRAVENOUS PRN
Status: DISCONTINUED | OUTPATIENT
Start: 2023-12-19 | End: 2023-12-22 | Stop reason: HOSPADM

## 2023-12-19 RX ORDER — ALLOPURINOL 300 MG/1
300 TABLET ORAL DAILY
Status: DISCONTINUED | OUTPATIENT
Start: 2023-12-20 | End: 2023-12-22 | Stop reason: HOSPADM

## 2023-12-19 RX ORDER — MORPHINE SULFATE 4 MG/ML
4 INJECTION, SOLUTION INTRAMUSCULAR; INTRAVENOUS
Status: COMPLETED | OUTPATIENT
Start: 2023-12-19 | End: 2023-12-19

## 2023-12-19 RX ORDER — ASPIRIN 325 MG
325 TABLET ORAL
Status: DISCONTINUED | OUTPATIENT
Start: 2023-12-20 | End: 2023-12-22 | Stop reason: HOSPADM

## 2023-12-19 RX ORDER — HYDROMORPHONE HYDROCHLORIDE 1 MG/ML
0.25 INJECTION, SOLUTION INTRAMUSCULAR; INTRAVENOUS; SUBCUTANEOUS EVERY 4 HOURS PRN
Status: DISCONTINUED | OUTPATIENT
Start: 2023-12-19 | End: 2023-12-21

## 2023-12-19 RX ORDER — INSULIN LISPRO 100 [IU]/ML
0-4 INJECTION, SOLUTION INTRAVENOUS; SUBCUTANEOUS NIGHTLY
Status: DISCONTINUED | OUTPATIENT
Start: 2023-12-20 | End: 2023-12-22 | Stop reason: HOSPADM

## 2023-12-19 RX ORDER — SODIUM CHLORIDE 0.9 % (FLUSH) 0.9 %
5-40 SYRINGE (ML) INJECTION PRN
Status: DISCONTINUED | OUTPATIENT
Start: 2023-12-19 | End: 2023-12-22 | Stop reason: HOSPADM

## 2023-12-19 RX ORDER — TAMSULOSIN HYDROCHLORIDE 0.4 MG/1
0.4 CAPSULE ORAL DAILY
Status: DISCONTINUED | OUTPATIENT
Start: 2023-12-20 | End: 2023-12-20

## 2023-12-19 RX ORDER — OXYCODONE HYDROCHLORIDE 5 MG/1
5 TABLET ORAL EVERY 4 HOURS PRN
Status: DISCONTINUED | OUTPATIENT
Start: 2023-12-19 | End: 2023-12-22 | Stop reason: HOSPADM

## 2023-12-19 RX ORDER — SODIUM CHLORIDE 0.9 % (FLUSH) 0.9 %
5-40 SYRINGE (ML) INJECTION EVERY 12 HOURS SCHEDULED
Status: DISCONTINUED | OUTPATIENT
Start: 2023-12-20 | End: 2023-12-22 | Stop reason: HOSPADM

## 2023-12-19 RX ORDER — OXYBUTYNIN CHLORIDE 10 MG/1
10 TABLET, EXTENDED RELEASE ORAL DAILY
Status: DISCONTINUED | OUTPATIENT
Start: 2023-12-20 | End: 2023-12-20

## 2023-12-19 RX ORDER — GABAPENTIN 300 MG/1
300 CAPSULE ORAL 3 TIMES DAILY
Status: DISCONTINUED | OUTPATIENT
Start: 2023-12-20 | End: 2023-12-22 | Stop reason: HOSPADM

## 2023-12-19 RX ORDER — POLYETHYLENE GLYCOL 3350 17 G/17G
17 POWDER, FOR SOLUTION ORAL DAILY PRN
Status: DISCONTINUED | OUTPATIENT
Start: 2023-12-19 | End: 2023-12-22 | Stop reason: HOSPADM

## 2023-12-19 RX ORDER — ONDANSETRON 4 MG/1
4 TABLET, ORALLY DISINTEGRATING ORAL EVERY 8 HOURS PRN
Status: DISCONTINUED | OUTPATIENT
Start: 2023-12-19 | End: 2023-12-22 | Stop reason: HOSPADM

## 2023-12-19 RX ORDER — FINASTERIDE 5 MG/1
5 TABLET, FILM COATED ORAL DAILY
Status: DISCONTINUED | OUTPATIENT
Start: 2023-12-20 | End: 2023-12-20

## 2023-12-19 RX ORDER — ACETAMINOPHEN 325 MG/1
650 TABLET ORAL EVERY 6 HOURS PRN
Status: DISCONTINUED | OUTPATIENT
Start: 2023-12-19 | End: 2023-12-22 | Stop reason: HOSPADM

## 2023-12-19 RX ORDER — MORPHINE SULFATE 4 MG/ML
2 INJECTION INTRAVENOUS ONCE
Status: DISCONTINUED | OUTPATIENT
Start: 2023-12-19 | End: 2023-12-19

## 2023-12-19 RX ORDER — MORPHINE SULFATE 4 MG/ML
4 INJECTION INTRAVENOUS ONCE
Status: COMPLETED | OUTPATIENT
Start: 2023-12-19 | End: 2023-12-19

## 2023-12-19 RX ORDER — ACETAMINOPHEN 650 MG/1
650 SUPPOSITORY RECTAL EVERY 6 HOURS PRN
Status: DISCONTINUED | OUTPATIENT
Start: 2023-12-19 | End: 2023-12-22 | Stop reason: HOSPADM

## 2023-12-19 RX ORDER — ENOXAPARIN SODIUM 100 MG/ML
40 INJECTION SUBCUTANEOUS DAILY
Status: DISCONTINUED | OUTPATIENT
Start: 2023-12-20 | End: 2023-12-22 | Stop reason: HOSPADM

## 2023-12-19 RX ORDER — ONDANSETRON 2 MG/ML
4 INJECTION INTRAMUSCULAR; INTRAVENOUS EVERY 6 HOURS PRN
Status: DISCONTINUED | OUTPATIENT
Start: 2023-12-19 | End: 2023-12-22 | Stop reason: HOSPADM

## 2023-12-19 RX ORDER — ONDANSETRON 2 MG/ML
4 INJECTION INTRAMUSCULAR; INTRAVENOUS ONCE
Status: COMPLETED | OUTPATIENT
Start: 2023-12-19 | End: 2023-12-19

## 2023-12-19 RX ORDER — VANCOMYCIN HYDROCHLORIDE 125 MG/1
125 CAPSULE ORAL 4 TIMES DAILY
Status: DISCONTINUED | OUTPATIENT
Start: 2023-12-20 | End: 2023-12-22 | Stop reason: HOSPADM

## 2023-12-19 RX ORDER — SODIUM CHLORIDE 9 MG/ML
INJECTION, SOLUTION INTRAVENOUS CONTINUOUS
Status: ACTIVE | OUTPATIENT
Start: 2023-12-20 | End: 2023-12-22

## 2023-12-19 RX ADMIN — MORPHINE SULFATE 4 MG: 4 INJECTION, SOLUTION INTRAMUSCULAR; INTRAVENOUS at 21:13

## 2023-12-19 RX ADMIN — MORPHINE SULFATE 4 MG: 4 INJECTION, SOLUTION INTRAMUSCULAR; INTRAVENOUS at 16:44

## 2023-12-19 RX ADMIN — ONDANSETRON 4 MG: 2 INJECTION INTRAMUSCULAR; INTRAVENOUS at 16:44

## 2023-12-19 RX ADMIN — SODIUM CHLORIDE 1000 ML: 9 INJECTION, SOLUTION INTRAVENOUS at 16:44

## 2023-12-19 RX ADMIN — PANTOPRAZOLE SODIUM 80 MG: 40 INJECTION, POWDER, FOR SOLUTION INTRAVENOUS at 22:09

## 2023-12-19 RX ADMIN — DIATRIZOATE MEGLUMINE AND DIATRIZOATE SODIUM 15 ML: 660; 100 LIQUID ORAL; RECTAL at 16:44

## 2023-12-19 RX ADMIN — FENTANYL CITRATE 50 MCG: 50 INJECTION INTRAMUSCULAR; INTRAVENOUS at 18:25

## 2023-12-19 ASSESSMENT — PAIN SCALES - GENERAL
PAINLEVEL_OUTOF10: 9
PAINLEVEL_OUTOF10: 9
PAINLEVEL_OUTOF10: 10

## 2023-12-19 ASSESSMENT — PAIN DESCRIPTION - DESCRIPTORS: DESCRIPTORS: SHARP

## 2023-12-19 ASSESSMENT — LIFESTYLE VARIABLES
HOW OFTEN DO YOU HAVE A DRINK CONTAINING ALCOHOL: NEVER
HOW MANY STANDARD DRINKS CONTAINING ALCOHOL DO YOU HAVE ON A TYPICAL DAY: PATIENT DOES NOT DRINK

## 2023-12-19 ASSESSMENT — PAIN DESCRIPTION - LOCATION
LOCATION: ABDOMEN

## 2023-12-19 ASSESSMENT — PAIN - FUNCTIONAL ASSESSMENT: PAIN_FUNCTIONAL_ASSESSMENT: 0-10

## 2023-12-19 ASSESSMENT — PAIN DESCRIPTION - ORIENTATION
ORIENTATION: LOWER
ORIENTATION: RIGHT

## 2023-12-19 NOTE — ED PROVIDER NOTES
Emergency Department Provider Note       PCP: Curtis Elizabeth,    Age: 70 y.o. Sex: male     Ben Palma Admitted 12/19/2023 10:12:24 PM       ICD-10-CM    1. Duodenitis  K29.80       2. C. difficile colitis  A04.72           Medical Decision Making     Complexity of Problems Addressed:  1 or more acute illnesses that pose a threat to life or bodily function. Data Reviewed and Analyzed:  I independently ordered and reviewed each unique test.         I interpreted the CT Scan I reviewed with radiologist, likely duodenitis. Discussion of management or test interpretation. 70-year-old male presents with periumbilical abdominal pain developing over the past 24 to 48 hours. Recently diagnosed with C. difficile colitis during an admission in a hospital in [place] over the past week. He is on oral vancomycin. He continues to have loose bowel movements. Patient arrives vitally stable. He appears acutely distressed. He has some tenderness over the periumbilical region with guarding. Will plan to check labs, obtain CT imaging to evaluate for abscess. Labs notable for WBC of 12,000 without left shift. Stable normocytic anemia. Lactic acid within normal limits. Renal function consistent with baseline. Lipase within normal limits. CT did demonstrate evidence of duodenitis but no evidence of abscess, perforation or other surgical pathology. Have had difficulty controlling pain in the ED. Patient received multiple doses of IV narcotics without significant improvement. Patient seen and examined independently by attending physician. Given current C. difficile infection, new duodenitis, and refractory pain I will consult hospitalist for admission. Patient understanding and agreeable. The patient was admitted and I have discussed patient management with the admitting provider. Risk of Complications and/or Morbidity of Patient Management:  Parental controlled substances given in the ED. unremarkable. No intrahepatic mass or ductal dilatation  is evident. The gallbladder is unremarkable. Atrophic pancreas is noted. There  is calcified granulomas within the spleen. There is interval thickening and  induration of the duodenal diverticulum with adjacent inflammation. No drainable  fluid collection is evident.  The kidneys and adrenal glands are unremarkable.  No intrarenal stones are  noted.  There is no hydronephrosis. Renal cysts are stable.    No bowel obstruction. There is colonic diverticulosis without evidence of acute  diverticulitis.    The bladder and rectum are normal. No free intraperitoneal fluid or air is  evident.  There is no significant retroperitoneal lymphadenopathy.    The aorta, visceral vessels and renal arteries demonstrate normal caliber.  The  lower thoracic and lumbar vertebrae are in normal alignment.      Impression    Atrophic pancreas is noted. There is thickening and induration of the duodenal  diverticulum may represent duodenitis. There is no drainable fluid collections.    There is colonic diverticulosis without evidence of acute diverticulitis.           CBC with Auto Differential   Result Value Ref Range    WBC 12.2 (H) 4.3 - 11.1 K/uL    RBC 3.86 (L) 4.23 - 5.6 M/uL    Hemoglobin 11.8 (L) 13.6 - 17.2 g/dL    Hematocrit 37.9 (L) 41.1 - 50.3 %    MCV 98.2 82.0 - 102.0 FL    MCH 30.6 26.1 - 32.9 PG    MCHC 31.1 (L) 31.4 - 35.0 g/dL    RDW 15.9 (H) 11.9 - 14.6 %    Platelets 307 150 - 450 K/uL    MPV 9.3 (L) 9.4 - 12.3 FL    nRBC 0.00 0.0 - 0.2 K/uL    Differential Type AUTOMATED      Neutrophils % 64 43 - 78 %    Lymphocytes % 26 13 - 44 %    Monocytes % 6 4.0 - 12.0 %    Eosinophils % 2 0.5 - 7.8 %    Basophils % 1 0.0 - 2.0 %    Immature Granulocytes 1 0.0 - 5.0 %    Neutrophils Absolute 7.9 1.7 - 8.2 K/UL    Lymphocytes Absolute 3.2 0.5 - 4.6 K/UL    Monocytes Absolute 0.8 0.1 - 1.3 K/UL    Eosinophils Absolute 0.2 0.0 - 0.8 K/UL    Basophils Absolute 0.1 0.0 - 0.2  K/UL    Absolute Immature Granulocyte 0.1 0.0 - 0.5 K/UL   CMP   Result Value Ref Range    Sodium 142 136 - 146 mmol/L    Potassium 5.2 (H) 3.5 - 5.1 mmol/L    Chloride 114 (H) 103 - 113 mmol/L    CO2 22 21 - 32 mmol/L    Anion Gap 6 2 - 11 mmol/L    Glucose 100 65 - 100 mg/dL    BUN 33 (H) 8 - 23 MG/DL    Creatinine 1.55 (H) 0.8 - 1.5 MG/DL    Est, Glom Filt Rate 46 (L) >60 ml/min/1.73m2    Calcium 9.1 8.3 - 10.4 MG/DL    Total Bilirubin 0.4 0.2 - 1.1 MG/DL    ALT 44 12 - 65 U/L    AST 27 15 - 37 U/L    Alk Phosphatase 223 (H) 50 - 136 U/L    Total Protein 7.2 6.3 - 8.2 g/dL    Albumin 2.9 (L) 3.2 - 4.6 g/dL    Globulin 4.3 2.8 - 4.5 g/dL    Albumin/Globulin Ratio 0.7 0.4 - 1.6     Lactic Acid   Result Value Ref Range    Lactic Acid, Plasma 1.3 0.4 - 2.0 MMOL/L   Lipase   Result Value Ref Range    Lipase 78 73 - 393 U/L   Urinalysis with Reflex to Culture    Specimen: Urine   Result Value Ref Range    Color, UA YELLOW/STRAW      Appearance CLEAR      Specific Gravity, UA 1.015 1.001 - 1.023      pH, Urine 5.0 5.0 - 9.0      Protein,  (A) NEG mg/dL    Glucose, UA Negative mg/dL    Ketones, Urine Negative NEG mg/dL    Bilirubin Urine Negative NEG      Blood, Urine Negative NEG      Urobilinogen, Urine 0.2 0.2 - 1.0 EU/dL    Nitrite, Urine Negative NEG      Leukocyte Esterase, Urine SMALL (A) NEG      Urine Culture if Indicated URINE CULTURE ORDERED      WBC, UA 20-50 (A) U4 /hpf    RBC, UA 0-5 U5 /hpf    BACTERIA, URINE 2+ (A) NEG /hpf    Epithelial Cells UA 0-5 U5 /hpf    Casts 0-2 U2 /lpf    Mucus, UA 0 0 /lpf        CT ABDOMEN PELVIS WO CONTRAST Additional Contrast? Oral   Final Result      Atrophic pancreas is noted. There is thickening and induration of the duodenal   diverticulum may represent duodenitis. There is no drainable fluid collections. There is colonic diverticulosis without evidence of acute diverticulitis.                                     Voice dictation software was used during the

## 2023-12-19 NOTE — ED TRIAGE NOTES
Patient states he has severe abdominal pain. Has been in the hospital in [address] [place] [address] with dx of C-diff.  Is on Vancomycin, Abdominal pain is 10/10

## 2023-12-20 LAB
ALBUMIN SERPL-MCNC: 2.3 G/DL (ref 3.2–4.6)
ALBUMIN/GLOB SERPL: 0.7 (ref 0.4–1.6)
ALP SERPL-CCNC: 187 U/L (ref 50–136)
ALT SERPL-CCNC: 30 U/L (ref 12–65)
ANION GAP SERPL CALC-SCNC: 6 MMOL/L (ref 2–11)
AST SERPL-CCNC: 19 U/L (ref 15–37)
BASOPHILS # BLD: 0.1 K/UL (ref 0–0.2)
BASOPHILS NFR BLD: 1 % (ref 0–2)
BILIRUB SERPL-MCNC: 0.5 MG/DL (ref 0.2–1.1)
BUN SERPL-MCNC: 27 MG/DL (ref 8–23)
CALCIUM SERPL-MCNC: 8.5 MG/DL (ref 8.3–10.4)
CHLORIDE SERPL-SCNC: 119 MMOL/L (ref 103–113)
CO2 SERPL-SCNC: 18 MMOL/L (ref 21–32)
CREAT SERPL-MCNC: 1.52 MG/DL (ref 0.8–1.5)
DIFFERENTIAL METHOD BLD: ABNORMAL
EOSINOPHIL # BLD: 0.3 K/UL (ref 0–0.8)
EOSINOPHIL NFR BLD: 3 % (ref 0.5–7.8)
ERYTHROCYTE [DISTWIDTH] IN BLOOD BY AUTOMATED COUNT: 15.8 % (ref 11.9–14.6)
GLOBULIN SER CALC-MCNC: 3.3 G/DL (ref 2.8–4.5)
GLUCOSE BLD STRIP.AUTO-MCNC: 105 MG/DL (ref 65–100)
GLUCOSE BLD STRIP.AUTO-MCNC: 138 MG/DL (ref 65–100)
GLUCOSE BLD STRIP.AUTO-MCNC: 181 MG/DL (ref 65–100)
GLUCOSE BLD STRIP.AUTO-MCNC: 82 MG/DL (ref 65–100)
GLUCOSE BLD STRIP.AUTO-MCNC: 91 MG/DL (ref 65–100)
GLUCOSE BLD STRIP.AUTO-MCNC: 94 MG/DL (ref 65–100)
GLUCOSE SERPL-MCNC: 95 MG/DL (ref 65–100)
HCT VFR BLD AUTO: 32.8 % (ref 41.1–50.3)
HGB BLD-MCNC: 10 G/DL (ref 13.6–17.2)
IMM GRANULOCYTES # BLD AUTO: 0.1 K/UL (ref 0–0.5)
IMM GRANULOCYTES NFR BLD AUTO: 1 % (ref 0–5)
LYMPHOCYTES # BLD: 3.2 K/UL (ref 0.5–4.6)
LYMPHOCYTES NFR BLD: 33 % (ref 13–44)
MCH RBC QN AUTO: 30.3 PG (ref 26.1–32.9)
MCHC RBC AUTO-ENTMCNC: 30.5 G/DL (ref 31.4–35)
MCV RBC AUTO: 99.4 FL (ref 82–102)
MONOCYTES # BLD: 0.7 K/UL (ref 0.1–1.3)
MONOCYTES NFR BLD: 8 % (ref 4–12)
NEUTS SEG # BLD: 5.3 K/UL (ref 1.7–8.2)
NEUTS SEG NFR BLD: 54 % (ref 43–78)
NRBC # BLD: 0 K/UL (ref 0–0.2)
PLATELET # BLD AUTO: 201 K/UL (ref 150–450)
PMV BLD AUTO: 9.7 FL (ref 9.4–12.3)
POTASSIUM SERPL-SCNC: 5.1 MMOL/L (ref 3.5–5.1)
PROT SERPL-MCNC: 5.6 G/DL (ref 6.3–8.2)
RBC # BLD AUTO: 3.3 M/UL (ref 4.23–5.6)
SERVICE CMNT-IMP: ABNORMAL
SERVICE CMNT-IMP: NORMAL
SODIUM SERPL-SCNC: 143 MMOL/L (ref 136–146)
WBC # BLD AUTO: 9.8 K/UL (ref 4.3–11.1)

## 2023-12-20 PROCEDURE — 2580000003 HC RX 258: Performed by: FAMILY MEDICINE

## 2023-12-20 PROCEDURE — 1100000000 HC RM PRIVATE

## 2023-12-20 PROCEDURE — 85025 COMPLETE CBC W/AUTO DIFF WBC: CPT

## 2023-12-20 PROCEDURE — 6360000002 HC RX W HCPCS: Performed by: HOSPITALIST

## 2023-12-20 PROCEDURE — 6360000002 HC RX W HCPCS: Performed by: FAMILY MEDICINE

## 2023-12-20 PROCEDURE — 2580000003 HC RX 258: Performed by: HOSPITALIST

## 2023-12-20 PROCEDURE — 80053 COMPREHEN METABOLIC PANEL: CPT

## 2023-12-20 PROCEDURE — 6370000000 HC RX 637 (ALT 250 FOR IP): Performed by: FAMILY MEDICINE

## 2023-12-20 PROCEDURE — 36415 COLL VENOUS BLD VENIPUNCTURE: CPT

## 2023-12-20 PROCEDURE — A4216 STERILE WATER/SALINE, 10 ML: HCPCS | Performed by: HOSPITALIST

## 2023-12-20 PROCEDURE — C9113 INJ PANTOPRAZOLE SODIUM, VIA: HCPCS | Performed by: HOSPITALIST

## 2023-12-20 RX ORDER — SODIUM BICARBONATE 650 MG/1
650 TABLET ORAL 2 TIMES DAILY
COMMUNITY

## 2023-12-20 RX ORDER — BENZOCAINE/MENTHOL 6 MG-10 MG
LOZENGE MUCOUS MEMBRANE PRN
Status: DISCONTINUED | OUTPATIENT
Start: 2023-12-20 | End: 2023-12-22 | Stop reason: HOSPADM

## 2023-12-20 RX ORDER — LISINOPRIL 5 MG/1
10 TABLET ORAL DAILY
Status: ON HOLD | COMMUNITY
End: 2023-12-22

## 2023-12-20 RX ORDER — CIPROFLOXACIN 2 MG/ML
200 INJECTION, SOLUTION INTRAVENOUS EVERY 12 HOURS
Status: DISCONTINUED | OUTPATIENT
Start: 2023-12-20 | End: 2023-12-20

## 2023-12-20 RX ORDER — CIPROFLOXACIN 2 MG/ML
400 INJECTION, SOLUTION INTRAVENOUS EVERY 12 HOURS
Status: DISCONTINUED | OUTPATIENT
Start: 2023-12-20 | End: 2023-12-22 | Stop reason: HOSPADM

## 2023-12-20 RX ORDER — CIPROFLOXACIN 2 MG/ML
400 INJECTION, SOLUTION INTRAVENOUS EVERY 12 HOURS
Status: DISCONTINUED | OUTPATIENT
Start: 2023-12-20 | End: 2023-12-20

## 2023-12-20 RX ADMIN — HYDROMORPHONE HYDROCHLORIDE 0.25 MG: 1 INJECTION, SOLUTION INTRAMUSCULAR; INTRAVENOUS; SUBCUTANEOUS at 09:49

## 2023-12-20 RX ADMIN — GABAPENTIN 300 MG: 300 CAPSULE ORAL at 22:43

## 2023-12-20 RX ADMIN — SODIUM CHLORIDE: 9 INJECTION, SOLUTION INTRAVENOUS at 09:48

## 2023-12-20 RX ADMIN — PANTOPRAZOLE SODIUM 40 MG: 40 INJECTION, POWDER, FOR SOLUTION INTRAVENOUS at 11:28

## 2023-12-20 RX ADMIN — PANCRELIPASE LIPASE, PANCRELIPASE PROTEASE, PANCRELIPASE AMYLASE 5000 UNITS: 5000; 17000; 24000 CAPSULE, DELAYED RELEASE ORAL at 17:43

## 2023-12-20 RX ADMIN — PANCRELIPASE LIPASE, PANCRELIPASE PROTEASE, PANCRELIPASE AMYLASE 5000 UNITS: 5000; 17000; 24000 CAPSULE, DELAYED RELEASE ORAL at 13:24

## 2023-12-20 RX ADMIN — HYDROCORTISONE: 0.01 CREAM TOPICAL at 01:35

## 2023-12-20 RX ADMIN — PANCRELIPASE LIPASE, PANCRELIPASE PROTEASE, PANCRELIPASE AMYLASE 140000 UNITS: 20000; 63000; 84000 CAPSULE, DELAYED RELEASE ORAL at 17:43

## 2023-12-20 RX ADMIN — VANCOMYCIN HYDROCHLORIDE 125 MG: 125 CAPSULE ORAL at 00:52

## 2023-12-20 RX ADMIN — GABAPENTIN 300 MG: 300 CAPSULE ORAL at 13:30

## 2023-12-20 RX ADMIN — GABAPENTIN 300 MG: 300 CAPSULE ORAL at 09:25

## 2023-12-20 RX ADMIN — PANTOPRAZOLE SODIUM 40 MG: 40 INJECTION, POWDER, FOR SOLUTION INTRAVENOUS at 22:44

## 2023-12-20 RX ADMIN — PANTOPRAZOLE SODIUM 40 MG: 40 TABLET, DELAYED RELEASE ORAL at 05:31

## 2023-12-20 RX ADMIN — VANCOMYCIN HYDROCHLORIDE 125 MG: 125 CAPSULE ORAL at 13:25

## 2023-12-20 RX ADMIN — HYDROMORPHONE HYDROCHLORIDE 0.25 MG: 1 INJECTION, SOLUTION INTRAMUSCULAR; INTRAVENOUS; SUBCUTANEOUS at 18:40

## 2023-12-20 RX ADMIN — VANCOMYCIN HYDROCHLORIDE 125 MG: 125 CAPSULE ORAL at 22:43

## 2023-12-20 RX ADMIN — PANCRELIPASE LIPASE, PANCRELIPASE PROTEASE, PANCRELIPASE AMYLASE 5000 UNITS: 5000; 17000; 24000 CAPSULE, DELAYED RELEASE ORAL at 09:28

## 2023-12-20 RX ADMIN — HYDROMORPHONE HYDROCHLORIDE 0.25 MG: 1 INJECTION, SOLUTION INTRAMUSCULAR; INTRAVENOUS; SUBCUTANEOUS at 00:52

## 2023-12-20 RX ADMIN — ENOXAPARIN SODIUM 40 MG: 100 INJECTION SUBCUTANEOUS at 09:26

## 2023-12-20 RX ADMIN — VANCOMYCIN HYDROCHLORIDE 125 MG: 125 CAPSULE ORAL at 17:43

## 2023-12-20 RX ADMIN — ALLOPURINOL 300 MG: 300 TABLET ORAL at 09:21

## 2023-12-20 RX ADMIN — PANCRELIPASE LIPASE, PANCRELIPASE PROTEASE, PANCRELIPASE AMYLASE 140000 UNITS: 20000; 63000; 84000 CAPSULE, DELAYED RELEASE ORAL at 13:25

## 2023-12-20 RX ADMIN — SODIUM CHLORIDE: 9 INJECTION, SOLUTION INTRAVENOUS at 22:53

## 2023-12-20 RX ADMIN — SODIUM CHLORIDE: 9 INJECTION, SOLUTION INTRAVENOUS at 00:57

## 2023-12-20 RX ADMIN — CIPROFLOXACIN 400 MG: 400 INJECTION, SOLUTION INTRAVENOUS at 16:00

## 2023-12-20 RX ADMIN — PANCRELIPASE LIPASE, PANCRELIPASE PROTEASE, PANCRELIPASE AMYLASE 140000 UNITS: 20000; 63000; 84000 CAPSULE, DELAYED RELEASE ORAL at 09:38

## 2023-12-20 RX ADMIN — VANCOMYCIN HYDROCHLORIDE 125 MG: 125 CAPSULE ORAL at 09:21

## 2023-12-20 RX ADMIN — SODIUM CHLORIDE, PRESERVATIVE FREE 10 ML: 5 INJECTION INTRAVENOUS at 09:23

## 2023-12-20 ASSESSMENT — PAIN SCALES - GENERAL
PAINLEVEL_OUTOF10: 7
PAINLEVEL_OUTOF10: 7
PAINLEVEL_OUTOF10: 10

## 2023-12-20 ASSESSMENT — PAIN DESCRIPTION - DESCRIPTORS
DESCRIPTORS: ACHING
DESCRIPTORS: BURNING;ACHING

## 2023-12-20 ASSESSMENT — PAIN DESCRIPTION - LOCATION
LOCATION: ABDOMEN
LOCATION: ABDOMEN
LOCATION: ABDOMEN;PENIS

## 2023-12-20 NOTE — ASSESSMENT & PLAN NOTE
- CT findings may be related to recent c.diff infection  - Will continue PO vancomycin (will need Pharmacy assistance to determine EOT date as I cannot access outside records at this time.   Patient reports that the [facility] does not have Epic)  - No other pathology noted on CT to explain severe abdominal pain at this time  - Will check UA and blood cultures  - PRN dilaudid   - If condition does not improve, may need RUQ US

## 2023-12-20 NOTE — PROGRESS NOTES
Hospitalist Progress Note   Admit Date:  2023  3:56 PM   Name:  Yovany Chatman   Age:  78 y.o.  Sex:  male  :  1945   MRN:  [id number]     Presenting Complaint: abdominal pain  Reason(s) for Admission: Duodenitis [K29.80]  C. difficile colitis [A04.72]     Subjective / History:   HPI : Yovany Chatman is a 78 y.o. male with medical history of HTN, DM2, afib, CKD3 who presented to ED with severe abdominal pain.  Patient was recently hospitalized in [place] with c.diff and has been on PO vancomycin (reports this was from last Friday to this past ).  Reports that severe abdominal pain started today.  Reports pain as 10/10.  Upon ER evaluation, Cr is 1.55, but appears to be near baseline.  WBC is 12.2.  Lactic acid is 1.3.    CT shows:  IMPRESSION:  Atrophic pancreas is noted. There is thickening and induration of the duodenal  diverticulum may represent duodenitis. There is no drainable fluid collections.  There is colonic diverticulosis without evidence of acute diverticulitis.  Patient has required several doses of IV morphine without improvement in symptoms.  Hospitalist asked to admit.    : Patient was asked about his abdominal pain and whether it continued as severely as yesterday.  His pain is better controlled but he pointed me to the direction of the right lower quadrant.  He has not had pain in the epigastric region or the right upper quadrant.    Review of Systems:  10 systems reviewed and negative except as noted in HPI.  Assessment & Plan:     * Duodenitis??  Abdominal pain  Diverticular disease  C. difficile colitis  UTI?  Assessment & Plan  - CT findings may be related to recent c.diff infection  - Will continue PO vancomycin for now  - No other pathology noted on CT to explain severe abdominal pain at this time  - Checking UA and blood cultures  - PRN dilaudid   - Right upper quadrant ultrasound was discussed yesterday apparently but his pain is in the right lower  Calcium 9.1 8.3 - 10.4 MG/DL    Total Bilirubin 0.4 0.2 - 1.1 MG/DL    ALT 44 12 - 65 U/L    AST 27 15 - 37 U/L    Alk Phosphatase 223 (H) 50 - 136 U/L    Total Protein 7.2 6.3 - 8.2 g/dL    Albumin 2.9 (L) 3.2 - 4.6 g/dL    Globulin 4.3 2.8 - 4.5 g/dL    Albumin/Globulin Ratio 0.7 0.4 - 1.6     Lactic Acid    Collection Time: 12/19/23  4:18 PM   Result Value Ref Range    Lactic Acid, Plasma 1.3 0.4 - 2.0 MMOL/L   Lipase    Collection Time: 12/19/23  4:18 PM   Result Value Ref Range    Lipase 78 73 - 393 U/L   Urinalysis with Reflex to Culture    Collection Time: 12/19/23  9:55 PM    Specimen: Urine   Result Value Ref Range    Color, UA YELLOW/STRAW      Appearance CLEAR      Specific Gravity, UA 1.015 1.001 - 1.023      pH, Urine 5.0 5.0 - 9.0      Protein,  (A) NEG mg/dL    Glucose, UA Negative mg/dL    Ketones, Urine Negative NEG mg/dL    Bilirubin Urine Negative NEG      Blood, Urine Negative NEG      Urobilinogen, Urine 0.2 0.2 - 1.0 EU/dL    Nitrite, Urine Negative NEG      Leukocyte Esterase, Urine SMALL (A) NEG      Urine Culture if Indicated URINE CULTURE ORDERED      WBC, UA 20-50 (A) U4 /hpf    RBC, UA 0-5 U5 /hpf    BACTERIA, URINE 2+ (A) NEG /hpf    Epithelial Cells UA 0-5 U5 /hpf    Casts 0-2 U2 /lpf    Mucus, UA 0 0 /lpf   Culture, Urine    Collection Time: 12/19/23  9:55 PM    Specimen: Urine   Result Value Ref Range    Special Requests NO SPECIAL REQUESTS  Reflexed from [id number]        Culture        No growth after short period of incubation. Further results to follow after overnight incubation. Culture, Blood 1    Collection Time: 12/19/23 10:04 PM    Specimen: Blood   Result Value Ref Range    Special Requests LEFT  Antecubital        Culture NO GROWTH AFTER 9 HOURS     POCT Glucose    Collection Time: 12/19/23 11:59 PM   Result Value Ref Range    POC Glucose 82 65 - 100 mg/dL    Performed by:  WellsSamuelJoelPCT    POCT Glucose    Collection Time: 12/20/23  4:28 AM   Result Value Ref Range    POC Glucose 94 65 - 100 mg/dL    Performed by:  Ced    CBC with Auto Differential    Collection Time: 12/20/23  6:21 AM   Result Value Ref Range    WBC 9.8 4.3 - 11.1 K/uL    RBC 3.30 (L) 4.23 - 5.6 M/uL    Hemoglobin 10.0 (L) 13.6 - 17.2 g/dL    Hematocrit 32.8 (L) 41.1 - 50.3 %    MCV 99.4 82.0 - 102.0 FL    MCH 30.3 26.1 - 32.9 PG    MCHC 30.5 (L) 31.4 - 35.0 g/dL    RDW 15.8 (H) 11.9 - 14.6 %    Platelets 961 542 - 946 K/uL    MPV 9.7 9.4 - 12.3 FL    nRBC 0.00 0.0 - 0.2 K/uL    Differential Type AUTOMATED      Neutrophils % 54 43 - 78 %    Lymphocytes % 33 13 - 44 %    Monocytes % 8 4.0 - 12.0 %    Eosinophils % 3 0.5 - 7.8 %    Basophils % 1 0.0 - 2.0 %    Immature Granulocytes 1 0.0 - 5.0 %    Neutrophils Absolute 5.3 1.7 - 8.2 K/UL    Lymphocytes Absolute 3.2 0.5 - 4.6 K/UL    Monocytes Absolute 0.7 0.1 - 1.3 K/UL    Eosinophils Absolute 0.3 0.0 - 0.8 K/UL    Basophils Absolute 0.1 0.0 - 0.2 K/UL    Absolute Immature Granulocyte 0.1 0.0 - 0.5 K/UL   Comprehensive Metabolic Panel w/ Reflex to MG    Collection Time: 12/20/23  6:21 AM   Result Value Ref Range    Sodium 143 136 - 146 mmol/L    Potassium 5.1 3.5 - 5.1 mmol/L    Chloride 119 (H) 103 - 113 mmol/L    CO2 18 (L) 21 - 32 mmol/L    Anion Gap 6 2 - 11 mmol/L    Glucose 95 65 - 100 mg/dL    BUN 27 (H) 8 - 23 MG/DL    Creatinine 1.52 (H) 0.8 - 1.5 MG/DL    Est, Glom Filt Rate 47 (L) >60 ml/min/1.73m2    Calcium 8.5 8.3 - 10.4 MG/DL    Total Bilirubin 0.5 0.2 - 1.1 MG/DL    ALT 30 12 - 65 U/L    AST 19 15 - 37 U/L    Alk Phosphatase 187 (H) 50 - 136 U/L    Total Protein 5.6 (L) 6.3 - 8.2 g/dL    Albumin 2.3 (L) 3.2 - 4.6 g/dL    Globulin 3.3 2.8 - 4.5 g/dL    Albumin/Globulin Ratio 0.7 0.4 - 1.6     POCT Glucose    Collection Time: 12/20/23  7:19 AM   Result Value Ref Range    POC Glucose 91 65 - 100 mg/dL    Performed by: Sandra        Signed:  Durell Barthel, MD

## 2023-12-20 NOTE — PROGRESS NOTES
TRANSFER - IN REPORT:    Verbal report received from Layo Clark on Shahida Civil  being received from [facility] ED for routine progression of patient care      Report consisted of patient's Situation, Background, Assessment and   Recommendations(SBAR). Information from the following report(s) Nurse Handoff Report was reviewed with the receiving nurse. Opportunity for questions and clarification was provided. Assessment completed upon patient's arrival to unit and care assumed.

## 2023-12-20 NOTE — H&P
Hospitalist History and Physical   Admit Date:  2023  3:56 PM   Name:  Neal King   Age:  70 y.o. Sex:  male  :  1945   MRN:  [id number]     Presenting Complaint: abdominal pain  Reason(s) for Admission: Duodenitis [K29.80]  C. difficile colitis [A04.72]     History of Present Illness:   Neal King is a 70 y.o. male with medical history of HTN, DM2, afib, CKD3 who presented to ED with severe abdominal pain. Patient was recently hospitalized in [place] with c.diff and has been on PO vancomycin (reports this was from last Friday to this past ). Reports that severe abdominal pain started today. Reports pain as 10/10. Upon ER evaluation, Cr is 1.55, but appears to be near baseline. WBC is 12.2. Lactic acid is 1.3. CT shows:  IMPRESSION:  Atrophic pancreas is noted. There is thickening and induration of the duodenal  diverticulum may represent duodenitis. There is no drainable fluid collections. There is colonic diverticulosis without evidence of acute diverticulitis. Patient has required several doses of IV morphine without improvement in symptoms. Hospitalist asked to admit. Review of Systems:  10 systems reviewed and negative except as noted in HPI. Assessment & Plan:   * Duodenitis  Assessment & Plan  - CT findings may be related to recent c.diff infection  - Will continue PO vancomycin (will need Pharmacy assistance to determine EOT date as I cannot access outside records at this time.   Patient reports that the [facility] does not have Epic)  - No other pathology noted on CT to explain severe abdominal pain at this time  - Will check UA and blood cultures  - PRN dilaudid   - If condition does not improve, may need RUQ US    Robbi-Parkinson-White (WPW) syndrome  Assessment & Plan  - Noted  - S/P ablations and Watchman device in 2020    Paroxysmal atrial fibrillation (HCC)  Assessment & Plan  - S/P Watchman in 2020    History of partial nephrectomy  Assessment & Eosinophils Absolute 0.2 0.0 - 0.8 K/UL    Basophils Absolute 0.1 0.0 - 0.2 K/UL    Absolute Immature Granulocyte 0.1 0.0 - 0.5 K/UL   CMP    Collection Time: 12/19/23  4:18 PM   Result Value Ref Range    Sodium 142 136 - 146 mmol/L    Potassium 5.2 (H) 3.5 - 5.1 mmol/L    Chloride 114 (H) 103 - 113 mmol/L    CO2 22 21 - 32 mmol/L    Anion Gap 6 2 - 11 mmol/L    Glucose 100 65 - 100 mg/dL    BUN 33 (H) 8 - 23 MG/DL    Creatinine 1.55 (H) 0.8 - 1.5 MG/DL    Est, Glom Filt Rate 46 (L) >60 ml/min/1.73m2    Calcium 9.1 8.3 - 10.4 MG/DL    Total Bilirubin 0.4 0.2 - 1.1 MG/DL    ALT 44 12 - 65 U/L    AST 27 15 - 37 U/L    Alk Phosphatase 223 (H) 50 - 136 U/L    Total Protein 7.2 6.3 - 8.2 g/dL    Albumin 2.9 (L) 3.2 - 4.6 g/dL    Globulin 4.3 2.8 - 4.5 g/dL    Albumin/Globulin Ratio 0.7 0.4 - 1.6     Lactic Acid    Collection Time: 12/19/23  4:18 PM   Result Value Ref Range    Lactic Acid, Plasma 1.3 0.4 - 2.0 MMOL/L   Lipase    Collection Time: 12/19/23  4:18 PM   Result Value Ref Range    Lipase 78 73 - 393 U/L   Urinalysis with Reflex to Culture    Collection Time: 12/19/23  9:55 PM    Specimen: Urine   Result Value Ref Range    Color, UA YELLOW/STRAW      Appearance CLEAR      Specific Gravity, UA 1.015 1.001 - 1.023      pH, Urine 5.0 5.0 - 9.0      Protein,  (A) NEG mg/dL    Glucose, UA Negative mg/dL    Ketones, Urine Negative NEG mg/dL    Bilirubin Urine Negative NEG      Blood, Urine Negative NEG      Urobilinogen, Urine 0.2 0.2 - 1.0 EU/dL    Nitrite, Urine Negative NEG      Leukocyte Esterase, Urine SMALL (A) NEG      Urine Culture if Indicated URINE CULTURE ORDERED      WBC, UA 20-50 (A) U4 /hpf    RBC, UA 0-5 U5 /hpf    BACTERIA, URINE 2+ (A) NEG /hpf    Epithelial Cells UA 0-5 U5 /hpf    Casts 0-2 U2 /lpf    Mucus, UA 0 0 /lpf   POCT Glucose    Collection Time: 12/19/23 11:59 PM   Result Value Ref Range    POC Glucose 82 65 - 100 mg/dL    Performed by:  Ced    POCT Glucose    Collection

## 2023-12-20 NOTE — CARE COORDINATION
12/20/23 1430   Service Assessment   Patient Orientation Alert and Oriented   Cognition Alert   History Provided By Patient   Primary Caregiver Spouse   Support Systems Spouse/Significant Other   PCP Verified by CM Yes   Last Visit to PCP Within last year   Prior Functional Level Independent in ADLs/IADLs   Current Functional Level Independent in ADLs/IADLs   Can patient return to prior living arrangement Yes   Ability to make needs known: Good   Family able to assist with home care needs: Yes   Would you like for me to discuss the discharge plan with any other family members/significant others, and if so, who? No   Financial Resources None     John reviewed chart and discussed pt in Md rounds this am. Pt is a 70 yr old gentleman who was adm yest due to horrible abdominal pain. Pt is . Per chart he had been in a hospital in [facility] (unsure why or how long) and was dx with c-diff and placed on oral Vanc. Pt now with worsening abd pain. John attempted to see and assess pt but he was off the floor for testing. He has Medicare and a supplemental, as well as a PCP. Medical management and testing still in progress. John does not anticipate any specific discharge needs, but plans to follow up with pt/wife before discharge to clarify.   Freddie Akins

## 2023-12-20 NOTE — ED NOTES
TRANSFER - OUT REPORT:    Verbal report given to Yesenia Rees RN on Eugenia Smith  being transferred to [address] [place] [address] for routine progression of patient care       Report consisted of patient's Situation, Background, Assessment and   Recommendations(SBAR). Information from the following report(s) ED SBAR was reviewed with the receiving nurse. Lines:   Peripheral IV 12/19/23 Left Antecubital (Active)   Site Assessment Clean, dry & intact 12/19/23 1617   Line Status Blood return noted;Specimen collected; Flushed;Normal saline locked 12/19/23 1617   Phlebitis Assessment No symptoms 12/19/23 1617   Infiltration Assessment 0 12/19/23 1617        Opportunity for questions and clarification was provided.       Patient transported with:  Registered Nurse

## 2023-12-20 NOTE — ED NOTES
Pt verbalizes being down about his situation. Verbalizes depression due to being sick for so long and missing traditions for the holidays.

## 2023-12-21 LAB
ALBUMIN SERPL-MCNC: 2.4 G/DL (ref 3.2–4.6)
ALBUMIN/GLOB SERPL: 0.6 (ref 0.4–1.6)
ALP SERPL-CCNC: 197 U/L (ref 50–136)
ALT SERPL-CCNC: 26 U/L (ref 12–65)
ANION GAP SERPL CALC-SCNC: 6 MMOL/L (ref 2–11)
AST SERPL-CCNC: 17 U/L (ref 15–37)
BASOPHILS # BLD: 0.1 K/UL (ref 0–0.2)
BASOPHILS NFR BLD: 1 % (ref 0–2)
BILIRUB SERPL-MCNC: 0.3 MG/DL (ref 0.2–1.1)
BUN SERPL-MCNC: 29 MG/DL (ref 8–23)
CALCIUM SERPL-MCNC: 8.6 MG/DL (ref 8.3–10.4)
CHLORIDE SERPL-SCNC: 118 MMOL/L (ref 103–113)
CO2 SERPL-SCNC: 18 MMOL/L (ref 21–32)
CREAT SERPL-MCNC: 1.73 MG/DL (ref 0.8–1.5)
DIFFERENTIAL METHOD BLD: ABNORMAL
EOSINOPHIL # BLD: 0.2 K/UL (ref 0–0.8)
EOSINOPHIL NFR BLD: 3 % (ref 0.5–7.8)
ERYTHROCYTE [DISTWIDTH] IN BLOOD BY AUTOMATED COUNT: 15.4 % (ref 11.9–14.6)
GLOBULIN SER CALC-MCNC: 3.7 G/DL (ref 2.8–4.5)
GLUCOSE BLD STRIP.AUTO-MCNC: 111 MG/DL (ref 65–100)
GLUCOSE BLD STRIP.AUTO-MCNC: 159 MG/DL (ref 65–100)
GLUCOSE BLD STRIP.AUTO-MCNC: 167 MG/DL (ref 65–100)
GLUCOSE BLD STRIP.AUTO-MCNC: 200 MG/DL (ref 65–100)
GLUCOSE SERPL-MCNC: 105 MG/DL (ref 65–100)
HCT VFR BLD AUTO: 32.1 % (ref 41.1–50.3)
HGB BLD-MCNC: 9.8 G/DL (ref 13.6–17.2)
IMM GRANULOCYTES # BLD AUTO: 0.1 K/UL (ref 0–0.5)
IMM GRANULOCYTES NFR BLD AUTO: 1 % (ref 0–5)
LYMPHOCYTES # BLD: 2.4 K/UL (ref 0.5–4.6)
LYMPHOCYTES NFR BLD: 31 % (ref 13–44)
MCH RBC QN AUTO: 30.1 PG (ref 26.1–32.9)
MCHC RBC AUTO-ENTMCNC: 30.5 G/DL (ref 31.4–35)
MCV RBC AUTO: 98.5 FL (ref 82–102)
MONOCYTES # BLD: 0.6 K/UL (ref 0.1–1.3)
MONOCYTES NFR BLD: 8 % (ref 4–12)
NEUTS SEG # BLD: 4.5 K/UL (ref 1.7–8.2)
NEUTS SEG NFR BLD: 57 % (ref 43–78)
NRBC # BLD: 0 K/UL (ref 0–0.2)
PLATELET # BLD AUTO: 189 K/UL (ref 150–450)
PMV BLD AUTO: 9.5 FL (ref 9.4–12.3)
POTASSIUM SERPL-SCNC: 5.5 MMOL/L (ref 3.5–5.1)
PROT SERPL-MCNC: 6.1 G/DL (ref 6.3–8.2)
RBC # BLD AUTO: 3.26 M/UL (ref 4.23–5.6)
SERVICE CMNT-IMP: ABNORMAL
SODIUM SERPL-SCNC: 142 MMOL/L (ref 136–146)
WBC # BLD AUTO: 8 K/UL (ref 4.3–11.1)

## 2023-12-21 PROCEDURE — 6360000002 HC RX W HCPCS: Performed by: HOSPITALIST

## 2023-12-21 PROCEDURE — 85025 COMPLETE CBC W/AUTO DIFF WBC: CPT

## 2023-12-21 PROCEDURE — C9113 INJ PANTOPRAZOLE SODIUM, VIA: HCPCS | Performed by: HOSPITALIST

## 2023-12-21 PROCEDURE — 2580000003 HC RX 258: Performed by: FAMILY MEDICINE

## 2023-12-21 PROCEDURE — 2580000003 HC RX 258: Performed by: HOSPITALIST

## 2023-12-21 PROCEDURE — 1100000000 HC RM PRIVATE

## 2023-12-21 PROCEDURE — 6370000000 HC RX 637 (ALT 250 FOR IP): Performed by: FAMILY MEDICINE

## 2023-12-21 PROCEDURE — 6370000000 HC RX 637 (ALT 250 FOR IP): Performed by: HOSPITALIST

## 2023-12-21 PROCEDURE — 36415 COLL VENOUS BLD VENIPUNCTURE: CPT

## 2023-12-21 PROCEDURE — 6360000002 HC RX W HCPCS: Performed by: FAMILY MEDICINE

## 2023-12-21 PROCEDURE — 80053 COMPREHEN METABOLIC PANEL: CPT

## 2023-12-21 PROCEDURE — 82962 GLUCOSE BLOOD TEST: CPT

## 2023-12-21 PROCEDURE — A4216 STERILE WATER/SALINE, 10 ML: HCPCS | Performed by: HOSPITALIST

## 2023-12-21 RX ORDER — HYDROMORPHONE HYDROCHLORIDE 1 MG/ML
0.25 INJECTION, SOLUTION INTRAMUSCULAR; INTRAVENOUS; SUBCUTANEOUS EVERY 6 HOURS PRN
Status: DISCONTINUED | OUTPATIENT
Start: 2023-12-21 | End: 2023-12-22 | Stop reason: HOSPADM

## 2023-12-21 RX ADMIN — PANCRELIPASE LIPASE, PANCRELIPASE PROTEASE, PANCRELIPASE AMYLASE 140000 UNITS: 20000; 63000; 84000 CAPSULE, DELAYED RELEASE ORAL at 18:16

## 2023-12-21 RX ADMIN — PANCRELIPASE LIPASE, PANCRELIPASE PROTEASE, PANCRELIPASE AMYLASE 5000 UNITS: 5000; 17000; 24000 CAPSULE, DELAYED RELEASE ORAL at 14:22

## 2023-12-21 RX ADMIN — PANCRELIPASE LIPASE, PANCRELIPASE PROTEASE, PANCRELIPASE AMYLASE 5000 UNITS: 5000; 17000; 24000 CAPSULE, DELAYED RELEASE ORAL at 09:14

## 2023-12-21 RX ADMIN — SODIUM CHLORIDE: 9 INJECTION, SOLUTION INTRAVENOUS at 20:01

## 2023-12-21 RX ADMIN — SODIUM CHLORIDE: 9 INJECTION, SOLUTION INTRAVENOUS at 10:16

## 2023-12-21 RX ADMIN — PANTOPRAZOLE SODIUM 40 MG: 40 INJECTION, POWDER, FOR SOLUTION INTRAVENOUS at 09:16

## 2023-12-21 RX ADMIN — GABAPENTIN 300 MG: 300 CAPSULE ORAL at 09:14

## 2023-12-21 RX ADMIN — SODIUM CHLORIDE, PRESERVATIVE FREE 10 ML: 5 INJECTION INTRAVENOUS at 20:05

## 2023-12-21 RX ADMIN — PANCRELIPASE LIPASE, PANCRELIPASE PROTEASE, PANCRELIPASE AMYLASE 140000 UNITS: 20000; 63000; 84000 CAPSULE, DELAYED RELEASE ORAL at 14:21

## 2023-12-21 RX ADMIN — GABAPENTIN 300 MG: 300 CAPSULE ORAL at 14:22

## 2023-12-21 RX ADMIN — SODIUM ZIRCONIUM CYCLOSILICATE 5 G: 5 POWDER, FOR SUSPENSION ORAL at 20:05

## 2023-12-21 RX ADMIN — SODIUM ZIRCONIUM CYCLOSILICATE 5 G: 5 POWDER, FOR SUSPENSION ORAL at 15:45

## 2023-12-21 RX ADMIN — HYDROMORPHONE HYDROCHLORIDE 0.25 MG: 1 INJECTION, SOLUTION INTRAMUSCULAR; INTRAVENOUS; SUBCUTANEOUS at 09:12

## 2023-12-21 RX ADMIN — SODIUM CHLORIDE, PRESERVATIVE FREE 10 ML: 5 INJECTION INTRAVENOUS at 09:16

## 2023-12-21 RX ADMIN — GABAPENTIN 300 MG: 300 CAPSULE ORAL at 20:04

## 2023-12-21 RX ADMIN — PANTOPRAZOLE SODIUM 40 MG: 40 INJECTION, POWDER, FOR SOLUTION INTRAVENOUS at 20:04

## 2023-12-21 RX ADMIN — CIPROFLOXACIN 400 MG: 400 INJECTION, SOLUTION INTRAVENOUS at 03:57

## 2023-12-21 RX ADMIN — VANCOMYCIN HYDROCHLORIDE 125 MG: 125 CAPSULE ORAL at 15:44

## 2023-12-21 RX ADMIN — PANCRELIPASE LIPASE, PANCRELIPASE PROTEASE, PANCRELIPASE AMYLASE 140000 UNITS: 20000; 63000; 84000 CAPSULE, DELAYED RELEASE ORAL at 09:13

## 2023-12-21 RX ADMIN — ENOXAPARIN SODIUM 40 MG: 100 INJECTION SUBCUTANEOUS at 09:15

## 2023-12-21 RX ADMIN — CIPROFLOXACIN 400 MG: 400 INJECTION, SOLUTION INTRAVENOUS at 15:51

## 2023-12-21 RX ADMIN — VANCOMYCIN HYDROCHLORIDE 125 MG: 125 CAPSULE ORAL at 18:16

## 2023-12-21 RX ADMIN — VANCOMYCIN HYDROCHLORIDE 125 MG: 125 CAPSULE ORAL at 20:04

## 2023-12-21 RX ADMIN — PANCRELIPASE LIPASE, PANCRELIPASE PROTEASE, PANCRELIPASE AMYLASE 5000 UNITS: 5000; 17000; 24000 CAPSULE, DELAYED RELEASE ORAL at 18:16

## 2023-12-21 RX ADMIN — HYDROMORPHONE HYDROCHLORIDE 0.25 MG: 1 INJECTION, SOLUTION INTRAMUSCULAR; INTRAVENOUS; SUBCUTANEOUS at 15:45

## 2023-12-21 RX ADMIN — ALLOPURINOL 300 MG: 300 TABLET ORAL at 09:14

## 2023-12-21 RX ADMIN — VANCOMYCIN HYDROCHLORIDE 125 MG: 125 CAPSULE ORAL at 09:14

## 2023-12-21 ASSESSMENT — PAIN SCALES - GENERAL
PAINLEVEL_OUTOF10: 7
PAINLEVEL_OUTOF10: 0
PAINLEVEL_OUTOF10: 0
PAINLEVEL_OUTOF10: 7

## 2023-12-21 ASSESSMENT — PAIN DESCRIPTION - DESCRIPTORS
DESCRIPTORS: ACHING
DESCRIPTORS: ACHING

## 2023-12-21 ASSESSMENT — PAIN DESCRIPTION - LOCATION
LOCATION: ABDOMEN
LOCATION: ABDOMEN

## 2023-12-21 ASSESSMENT — PAIN DESCRIPTION - ORIENTATION: ORIENTATION: UPPER

## 2023-12-21 NOTE — CARE COORDINATION
9002: Phone call received from [facility] reporting the patient is current with their services for home health RN/PT/OT. 1018: Discharge planning was discussed with the attending MD. The home health orders were confirmed with read back. 1242: RN CM met with the patient and spouse at the bedside and discussed discharge planning. He confirmed he plans on returning home at discharge and would like to resume services with [facility]. The patient was provided with a list of home health agencies and their quality metrics. Orders and a referral were sent to the home health agency. He stated he owns a CPAP, wheelchair, rolling walker, upright walker, and a bedside commode. His wife will transport him home at discharge. RN CM encouraged them to ask questions. They verbalized understanding and agreement with the discharge plan.

## 2023-12-21 NOTE — PLAN OF CARE
Problem: Discharge Planning  Goal: Discharge to home or other facility with appropriate resources  12/21/2023 0618 by Isis Zimmerman RN  Outcome: Progressing  12/20/2023 1936 by Chery Roman RN  Outcome: Progressing     Problem: Pain  Goal: Verbalizes/displays adequate comfort level or baseline comfort level  12/21/2023 0618 by Isis Zimmerman RN  Outcome: Progressing  12/20/2023 1936 by Chery Roman RN  Outcome: Progressing     Problem: Skin/Tissue Integrity  Goal: Absence of new skin breakdown  Description: 1. Monitor for areas of redness and/or skin breakdown  2. Assess vascular access sites hourly  3. Every 4-6 hours minimum:  Change oxygen saturation probe site  4. Every 4-6 hours:  If on nasal continuous positive airway pressure, respiratory therapy assess nares and determine need for appliance change or resting period.   12/21/2023 0618 by Isis Zimmerman RN  Outcome: Progressing  12/20/2023 1936 by Chery Roman RN  Outcome: Progressing     Problem: Safety - Adult  Goal: Free from fall injury  12/21/2023 0618 by Isis Zimmerman RN  Outcome: Progressing  12/20/2023 1936 by Chery Roman RN  Outcome: Progressing     Problem: ABCDS Injury Assessment  Goal: Absence of physical injury  12/21/2023 0618 by Isis Zimmerman RN  Outcome: Progressing  12/20/2023 1936 by Chery Roman RN  Outcome: Progressing

## 2023-12-21 NOTE — PROGRESS NOTES
Hospitalist Progress Note   Admit Date:  2023  3:56 PM   Name:  Brianna London   Age:  70 y.o. Sex:  male  :  1945   MRN:  [id number]     Presenting Complaint: abdominal pain  Reason(s) for Admission: Duodenitis [K29.80]  C. difficile colitis [A04.72]     Subjective / History:   HPI : Brianna London is a 70 y.o. male with medical history of HTN, DM2, afib, CKD3 who presented to ED with severe abdominal pain. Patient was recently hospitalized in [place] with c.diff and has been on PO vancomycin (reports this was from last Friday to this past ). Reports that severe abdominal pain started today. Reports pain as 10/10. Upon ER evaluation, Cr is 1.55, but appears to be near baseline. WBC is 12.2. Lactic acid is 1.3. CT shows:  IMPRESSION:  Atrophic pancreas is noted. There is thickening and induration of the duodenal  diverticulum may represent duodenitis. There is no drainable fluid collections. There is colonic diverticulosis without evidence of acute diverticulitis. Patient has required several doses of IV morphine without improvement in symptoms. Hospitalist asked to admit. : Patient was asked about his abdominal pain and whether it continued as severely as yesterday. His pain is better controlled but he pointed me to the direction of the right lower quadrant. He has not had pain in the epigastric region or the right upper quadrant. : Patient is feeling better after starting antibiotics for possible prostatitis. He is not feeling quite well enough to go home but thinks he will be by tomorrow. He is a still using the pain medication. Review of Systems:  10 systems reviewed and negative except as noted in HPI. Assessment & Plan:     * Duodenitis? ?  Abdominal pain  Diverticular disease  C. difficile colitis  UTI /prostatitis?   Assessment & Plan  - CT findings may be related to recent c.diff infection  - Will continue PO vancomycin for now  - No

## 2023-12-21 NOTE — ICUWATCH
RRT Clinical Rounding Nurse Assistance    Called to assist primary RN with IV start. 20g SANDER u/s guided placed. Primary RN notified.     Dez Courtney RN  [place]town: [address]: [phone]

## 2023-12-22 VITALS
HEIGHT: 70 IN | WEIGHT: 197 LBS | RESPIRATION RATE: 18 BRPM | TEMPERATURE: 100.2 F | SYSTOLIC BLOOD PRESSURE: 123 MMHG | OXYGEN SATURATION: 96 % | DIASTOLIC BLOOD PRESSURE: 73 MMHG | BODY MASS INDEX: 28.2 KG/M2 | HEART RATE: 72 BPM

## 2023-12-22 LAB
ALBUMIN SERPL-MCNC: 2.4 G/DL (ref 3.2–4.6)
ALBUMIN/GLOB SERPL: 0.7 (ref 0.4–1.6)
ALP SERPL-CCNC: 195 U/L (ref 50–136)
ALT SERPL-CCNC: 27 U/L (ref 12–65)
ANION GAP SERPL CALC-SCNC: 7 MMOL/L (ref 2–11)
AST SERPL-CCNC: 19 U/L (ref 15–37)
BASOPHILS # BLD: 0.1 K/UL (ref 0–0.2)
BASOPHILS NFR BLD: 1 % (ref 0–2)
BILIRUB SERPL-MCNC: 0.3 MG/DL (ref 0.2–1.1)
BUN SERPL-MCNC: 24 MG/DL (ref 8–23)
CALCIUM SERPL-MCNC: 9.1 MG/DL (ref 8.3–10.4)
CHLORIDE SERPL-SCNC: 117 MMOL/L (ref 103–113)
CO2 SERPL-SCNC: 19 MMOL/L (ref 21–32)
CREAT SERPL-MCNC: 1.56 MG/DL (ref 0.8–1.5)
DIFFERENTIAL METHOD BLD: ABNORMAL
EOSINOPHIL # BLD: 0.3 K/UL (ref 0–0.8)
EOSINOPHIL NFR BLD: 5 % (ref 0.5–7.8)
ERYTHROCYTE [DISTWIDTH] IN BLOOD BY AUTOMATED COUNT: 15.5 % (ref 11.9–14.6)
GLOBULIN SER CALC-MCNC: 3.6 G/DL (ref 2.8–4.5)
GLUCOSE BLD STRIP.AUTO-MCNC: 101 MG/DL (ref 65–100)
GLUCOSE BLD STRIP.AUTO-MCNC: 107 MG/DL (ref 65–100)
GLUCOSE SERPL-MCNC: 119 MG/DL (ref 65–100)
HCT VFR BLD AUTO: 32.3 % (ref 41.1–50.3)
HGB BLD-MCNC: 9.9 G/DL (ref 13.6–17.2)
IMM GRANULOCYTES # BLD AUTO: 0.1 K/UL (ref 0–0.5)
IMM GRANULOCYTES NFR BLD AUTO: 1 % (ref 0–5)
LYMPHOCYTES # BLD: 2.3 K/UL (ref 0.5–4.6)
LYMPHOCYTES NFR BLD: 33 % (ref 13–44)
MCH RBC QN AUTO: 30.1 PG (ref 26.1–32.9)
MCHC RBC AUTO-ENTMCNC: 30.7 G/DL (ref 31.4–35)
MCV RBC AUTO: 98.2 FL (ref 82–102)
MONOCYTES # BLD: 0.5 K/UL (ref 0.1–1.3)
MONOCYTES NFR BLD: 7 % (ref 4–12)
NEUTS SEG # BLD: 3.7 K/UL (ref 1.7–8.2)
NEUTS SEG NFR BLD: 53 % (ref 43–78)
NRBC # BLD: 0 K/UL (ref 0–0.2)
PLATELET # BLD AUTO: 204 K/UL (ref 150–450)
PMV BLD AUTO: 10 FL (ref 9.4–12.3)
POTASSIUM SERPL-SCNC: 5.1 MMOL/L (ref 3.5–5.1)
PROT SERPL-MCNC: 6 G/DL (ref 6.3–8.2)
RBC # BLD AUTO: 3.29 M/UL (ref 4.23–5.6)
SERVICE CMNT-IMP: ABNORMAL
SERVICE CMNT-IMP: ABNORMAL
SODIUM SERPL-SCNC: 143 MMOL/L (ref 136–146)
WBC # BLD AUTO: 6.9 K/UL (ref 4.3–11.1)

## 2023-12-22 PROCEDURE — 2580000003 HC RX 258: Performed by: HOSPITALIST

## 2023-12-22 PROCEDURE — 6370000000 HC RX 637 (ALT 250 FOR IP): Performed by: FAMILY MEDICINE

## 2023-12-22 PROCEDURE — 6360000002 HC RX W HCPCS: Performed by: HOSPITALIST

## 2023-12-22 PROCEDURE — 2580000003 HC RX 258: Performed by: FAMILY MEDICINE

## 2023-12-22 PROCEDURE — 97530 THERAPEUTIC ACTIVITIES: CPT

## 2023-12-22 PROCEDURE — C9113 INJ PANTOPRAZOLE SODIUM, VIA: HCPCS | Performed by: HOSPITALIST

## 2023-12-22 PROCEDURE — 80053 COMPREHEN METABOLIC PANEL: CPT

## 2023-12-22 PROCEDURE — 82962 GLUCOSE BLOOD TEST: CPT

## 2023-12-22 PROCEDURE — 97161 PT EVAL LOW COMPLEX 20 MIN: CPT

## 2023-12-22 PROCEDURE — 97165 OT EVAL LOW COMPLEX 30 MIN: CPT

## 2023-12-22 PROCEDURE — 36415 COLL VENOUS BLD VENIPUNCTURE: CPT

## 2023-12-22 PROCEDURE — 6370000000 HC RX 637 (ALT 250 FOR IP): Performed by: HOSPITALIST

## 2023-12-22 PROCEDURE — 85025 COMPLETE CBC W/AUTO DIFF WBC: CPT

## 2023-12-22 PROCEDURE — A4216 STERILE WATER/SALINE, 10 ML: HCPCS | Performed by: HOSPITALIST

## 2023-12-22 PROCEDURE — 6360000002 HC RX W HCPCS: Performed by: FAMILY MEDICINE

## 2023-12-22 RX ORDER — LISINOPRIL 5 MG/1
5 TABLET ORAL DAILY
Qty: 30 TABLET | Refills: 3 | Status: ON HOLD | OUTPATIENT
Start: 2023-12-22 | End: 2024-01-11 | Stop reason: HOSPADM

## 2023-12-22 RX ORDER — CIPROFLOXACIN 500 MG/1
500 TABLET, FILM COATED ORAL 2 TIMES DAILY
Qty: 20 TABLET | Refills: 0 | Status: SHIPPED | OUTPATIENT
Start: 2023-12-22 | End: 2024-01-01

## 2023-12-22 RX ORDER — TAMSULOSIN HYDROCHLORIDE 0.4 MG/1
0.4 CAPSULE ORAL DAILY
Qty: 30 CAPSULE | Refills: 3 | Status: SHIPPED | OUTPATIENT
Start: 2023-12-22

## 2023-12-22 RX ORDER — VANCOMYCIN HYDROCHLORIDE 125 MG/1
125 CAPSULE ORAL 4 TIMES DAILY
Qty: 140 CAPSULE | Refills: 0 | Status: ON HOLD | OUTPATIENT
Start: 2023-12-22 | End: 2024-01-11 | Stop reason: HOSPADM

## 2023-12-22 RX ADMIN — PANCRELIPASE LIPASE, PANCRELIPASE PROTEASE, PANCRELIPASE AMYLASE 5000 UNITS: 5000; 17000; 24000 CAPSULE, DELAYED RELEASE ORAL at 08:15

## 2023-12-22 RX ADMIN — SODIUM ZIRCONIUM CYCLOSILICATE 5 G: 5 POWDER, FOR SUSPENSION ORAL at 08:15

## 2023-12-22 RX ADMIN — ENOXAPARIN SODIUM 40 MG: 100 INJECTION SUBCUTANEOUS at 08:14

## 2023-12-22 RX ADMIN — HYDROMORPHONE HYDROCHLORIDE 0.25 MG: 1 INJECTION, SOLUTION INTRAMUSCULAR; INTRAVENOUS; SUBCUTANEOUS at 10:52

## 2023-12-22 RX ADMIN — PANCRELIPASE LIPASE, PANCRELIPASE PROTEASE, PANCRELIPASE AMYLASE 5000 UNITS: 5000; 17000; 24000 CAPSULE, DELAYED RELEASE ORAL at 12:51

## 2023-12-22 RX ADMIN — VANCOMYCIN HYDROCHLORIDE 125 MG: 125 CAPSULE ORAL at 08:15

## 2023-12-22 RX ADMIN — SODIUM CHLORIDE, PRESERVATIVE FREE 10 ML: 5 INJECTION INTRAVENOUS at 08:16

## 2023-12-22 RX ADMIN — PANCRELIPASE LIPASE, PANCRELIPASE PROTEASE, PANCRELIPASE AMYLASE 140000 UNITS: 20000; 63000; 84000 CAPSULE, DELAYED RELEASE ORAL at 12:51

## 2023-12-22 RX ADMIN — ALLOPURINOL 300 MG: 300 TABLET ORAL at 08:14

## 2023-12-22 RX ADMIN — GABAPENTIN 300 MG: 300 CAPSULE ORAL at 08:16

## 2023-12-22 RX ADMIN — SODIUM CHLORIDE: 9 INJECTION, SOLUTION INTRAVENOUS at 03:56

## 2023-12-22 RX ADMIN — VANCOMYCIN HYDROCHLORIDE 125 MG: 125 CAPSULE ORAL at 12:51

## 2023-12-22 RX ADMIN — PANCRELIPASE LIPASE, PANCRELIPASE PROTEASE, PANCRELIPASE AMYLASE 140000 UNITS: 20000; 63000; 84000 CAPSULE, DELAYED RELEASE ORAL at 08:15

## 2023-12-22 RX ADMIN — CIPROFLOXACIN 400 MG: 400 INJECTION, SOLUTION INTRAVENOUS at 03:56

## 2023-12-22 RX ADMIN — PANTOPRAZOLE SODIUM 40 MG: 40 INJECTION, POWDER, FOR SOLUTION INTRAVENOUS at 09:20

## 2023-12-22 ASSESSMENT — PAIN DESCRIPTION - LOCATION: LOCATION: ABDOMEN

## 2023-12-22 ASSESSMENT — PAIN SCALES - GENERAL
PAINLEVEL_OUTOF10: 0
PAINLEVEL_OUTOF10: 7

## 2023-12-22 ASSESSMENT — PAIN DESCRIPTION - DESCRIPTORS: DESCRIPTORS: DULL;ACHING

## 2023-12-22 NOTE — CARE COORDINATION
Discharge planning was discussed with the Interdisciplinary Team. The patient is a potential discharge home today with [facility].

## 2023-12-22 NOTE — DISCHARGE INSTRUCTIONS
You were admitted for severe abdominal pain that I believe is coming from a urinary tract infection and most likely prostatitis. This is not unusual after recent prostate procedures. You are on appropriate treatment for C. difficile colitis. You have been started on antibiotics for the prostatitis. Unfortunately, antibiotics make it more likely to get C. difficile. Finish the course of antibiotics and continue the vancomycin as directed. Since you are going to be on 10 days of ciprofloxacin, I want you to continue oral vancomycin for 21 days. After 21 days, stop the oral vancomycin, wait 1 week and then resume the oral vancomycin for another 2 weeks. I will send a prescription in for 5 weeks of oral vancomycin. Please follow-up as soon as possible with your urologist and review your antibiotics with your urologist.  Eve Ashraf may also take any over-the-counter probiotic you wish to take or as recommended by your pharmacist.  With your kidney function mildly reduced I would recommend taking only 5 mg of lisinopril daily until you talk to your doctor. You are taking aspirin 325 mg daily and may benefit as much with reduced side effects from taking 81 mg daily-please talk to your doctor. Please follow-up with your doctor within a week to 10 days of discharge. You are on Protonix as an outpatient. Talk with your doctor about this. It may make you more susceptible to C. difficile. 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 Have a very Adilene Keegan Vale and a wonderful halfway.

## 2023-12-22 NOTE — CARE COORDINATION
12/22/23 3407   Service Assessment   Patient Orientation Alert and Oriented   Cognition Alert   History Provided By Patient   Primary Caregiver Self   Support Systems Spouse/Significant Other   Prior Functional Level Independent in ADLs/IADLs   Current Functional Level Independent in ADLs/IADLs   Can patient return to prior living arrangement Yes   Ability to make needs known: Good   Would you like for me to discuss the discharge plan with any other family members/significant others, and if so, who? No   Financial Resources Medicare   Social/Functional History   Lives With Spouse   Type of [address], 4 wheeled  (CPAP, shower chair, upright walker)   ADL Assistance Independent   Mode of Transportation Car   Discharge Planning   Type of [facility] Prior To Admission Ben Calixto  [facility])   Potential Assistance Purchasing Medications No   Type of [address] [employer] PT;OT;Nursing Services   Patient expects to be discharged to: [facility] Discharge   Services At/After Discharge Home Health   Confirm Follow Up Transport Family   Condition of Participation: Discharge Planning   The Patient and/or Patient Representative was provided with a Choice of Provider? Patient   The Patient and/Or Patient Representative agree with the Discharge Plan? Yes   Freedom of Choice list was provided with basic dialogue that supports the patient's individualized plan of care/goals, treatment preferences, and shares the quality data associated with the providers? Yes     The patient is discharging home with his wife. Orders were sent to [facility] to resume services. JESSICA SOTO spoke with Kati Israel at [facility]. She confirmed she received the referral and orders.  Per her report, the patient called [facility] and requested to place home health on hold until after he has an upcoming surgery. He has DMEs. No other case management needs were identified.

## 2023-12-22 NOTE — DISCHARGE SUMMARY
Hospitalist Discharge Summary     Admit Date:  2023  3:56 PM   DC Note date: 2023  Name:  Yovany Chatman   Age:  78 y.o.  Sex:  male  :  1945   MRN:  [id number]   Room:  [facility]  PCP:  Jeffy Lovell DO    Presenting Complaint: Abdominal Pain     Initial Admission Diagnosis: Duodenitis [K29.80]  C. difficile colitis [A04.72]     Problem List for this Hospitalization (present on admission):    Principal Problem:    Duodenitis  Active Problems:    Robbi-Parkinson-White (WPW) syndrome    Stage 3b chronic kidney disease (HCC)    Primary hypertension    Type 2 diabetes mellitus (HCC)    History of partial nephrectomy    Paroxysmal atrial fibrillation (HCC)  Resolved Problems:    * No resolved hospital problems. *      Hospital Course:  Yovany Chatman is a 78 y.o. male with medical history of HTN, DM2, afib, CKD3 who presented to ED with severe abdominal pain.  Patient was recently hospitalized in [place] with c.diff and has been on PO vancomycin (reports this was from last Friday to this past ).  Reports that severe abdominal pain started today. Reports pain as 10/10.  Upon ER evaluation, Cr is 1.55, but appears to be near baseline.  WBC is 12.2.  Lactic acid is 1.3.  CT shows Atrophic pancreas is noted. There is thickening and induration of the duodenal  diverticulum may represent duodenitis. There is no drainable fluid collections.  There is colonic diverticulosis without evidence of acute diverticulitis.  Patient has required several doses of IV morphine without improvement in symptoms.  Hospitalist asked to admit.   : Patient was asked about his abdominal pain and whether it continued as severely as yesterday.  His pain is better controlled but he pointed me to the direction of the right lower quadrant.  He has not had pain in the epigastric region or the right upper quadrant.   : Patient is feeling better after starting antibiotics for possible prostatitis.  He is not  % 53 43 - 78 %    Lymphocytes % 33 13 - 44 %    Monocytes % 7 4.0 - 12.0 %    Eosinophils % 5 0.5 - 7.8 %    Basophils % 1 0.0 - 2.0 %    Immature Granulocytes 1 0.0 - 5.0 %    Neutrophils Absolute 3.7 1.7 - 8.2 K/UL    Lymphocytes Absolute 2.3 0.5 - 4.6 K/UL    Monocytes Absolute 0.5 0.1 - 1.3 K/UL    Eosinophils Absolute 0.3 0.0 - 0.8 K/UL    Basophils Absolute 0.1 0.0 - 0.2 K/UL    Absolute Immature Granulocyte 0.1 0.0 - 0.5 K/UL   Comprehensive Metabolic Panel w/ Reflex to MG    Collection Time: 12/22/23  5:45 AM   Result Value Ref Range    Sodium 143 136 - 146 mmol/L    Potassium 5.1 3.5 - 5.1 mmol/L    Chloride 117 (H) 103 - 113 mmol/L    CO2 19 (L) 21 - 32 mmol/L    Anion Gap 7 2 - 11 mmol/L    Glucose 119 (H) 65 - 100 mg/dL    BUN 24 (H) 8 - 23 MG/DL    Creatinine 1.56 (H) 0.8 - 1.5 MG/DL    Est, Glom Filt Rate 46 (L) >60 ml/min/1.73m2    Calcium 9.1 8.3 - 10.4 MG/DL    Total Bilirubin 0.3 0.2 - 1.1 MG/DL    ALT 27 12 - 65 U/L    AST 19 15 - 37 U/L    Alk Phosphatase 195 (H) 50 - 136 U/L    Total Protein 6.0 (L) 6.3 - 8.2 g/dL    Albumin 2.4 (L) 3.2 - 4.6 g/dL    Globulin 3.6 2.8 - 4.5 g/dL    Albumin/Globulin Ratio 0.7 0.4 - 1.6     POCT Glucose    Collection Time: 12/22/23  7:40 AM   Result Value Ref Range    POC Glucose 101 (H) 65 - 100 mg/dL    Performed by: Alexia Peralta    POCT Glucose    Collection Time: 12/22/23 11:40 AM   Result Value Ref Range    POC Glucose 107 (H) 65 - 100 mg/dL    Performed by: Alexia Peralta        Allergies   Allergen Reactions    Sulfamethoxazole-Trimethoprim Other (See Comments)     Elevated potassium level    Tramadol Diarrhea    Ace Inhibitors      pancreatitis    Codeine Other (See Comments)     \"makes me a little crazy\"    Fenofibrate Other (See Comments)     \"causes pancreatic attacks\"    Hydromorphone Other (See Comments)     \"gives me craziness\"    Metformin Diarrhea    Oxycodone Other (See Comments)     Pt states \"makes me go crazy. \"    Sitagliptin Other Net -88.22 ml         Physical Exam:    General:    Well nourished. No overt distress  Head:  Normocephalic, atraumatic  Eyes:  Sclerae appear normal.  Pupils equally round. HENT:  Nares appear normal, no drainage. Moist mucous membranes  Neck:  No restricted ROM. Trachea midline  CV:   RRR. No m/r/g. No JVD  Lungs:   CTAB. No wheezing, rhonchi, or rales. Respirations even, unlabored  Abdomen:   Soft, nontender, nondistended. Extremities: Warm and dry. No edema. Skin:     No rashes. Normal coloration  Neuro:  CN II-XII grossly intact. Psych:  Normal mood and affect. Signed:  Dalila Malave MD    Part of this note may have been written by using a voice dictation software. The note has been proof read but may still contain some grammatical/other typographical errors.

## 2023-12-22 NOTE — PLAN OF CARE
Problem: Discharge Planning  Goal: Discharge to home or other facility with appropriate resources  12/21/2023 1948 by Kali Ambrose RN  Outcome: Progressing  12/21/2023 0618 by Emani Craig RN  Outcome: Progressing     Problem: Pain  Goal: Verbalizes/displays adequate comfort level or baseline comfort level  12/21/2023 1948 by Kali Ambrose RN  Outcome: Progressing  12/21/2023 0618 by Emani Craig RN  Outcome: Progressing     Problem: Skin/Tissue Integrity  Goal: Absence of new skin breakdown  Description: 1. Monitor for areas of redness and/or skin breakdown  2. Assess vascular access sites hourly  3. Every 4-6 hours minimum:  Change oxygen saturation probe site  4. Every 4-6 hours:  If on nasal continuous positive airway pressure, respiratory therapy assess nares and determine need for appliance change or resting period.   12/21/2023 1948 by Kali Ambrose RN  Outcome: Progressing  12/21/2023 0618 by Emani Craig RN  Outcome: Progressing     Problem: Safety - Adult  Goal: Free from fall injury  12/21/2023 1948 by Kali Ambrose RN  Outcome: Progressing  12/21/2023 0618 by Emani Craig RN  Outcome: Progressing     Problem: ABCDS Injury Assessment  Goal: Absence of physical injury  12/21/2023 1948 by Kali Ambrose RN  Outcome: Progressing  12/21/2023 0618 by Emani Craig RN  Outcome: Progressing     Problem: Chronic Conditions and Co-morbidities  Goal: Patient's chronic conditions and co-morbidity symptoms are monitored and maintained or improved  Outcome: Progressing

## 2023-12-23 LAB
BACTERIA SPEC CULT: ABNORMAL
SERVICE CMNT-IMP: ABNORMAL

## 2023-12-24 LAB
BACTERIA SPEC CULT: NORMAL
SERVICE CMNT-IMP: NORMAL

## 2024-01-07 ENCOUNTER — HOSPITAL ENCOUNTER (INPATIENT)
Age: 79
LOS: 2 days | Discharge: HOME | DRG: 683 | End: 2024-01-11
Attending: FAMILY MEDICINE | Admitting: INTERNAL MEDICINE
Payer: MEDICARE

## 2024-01-07 ENCOUNTER — APPOINTMENT (OUTPATIENT)
Dept: CT IMAGING | Age: 79
DRG: 683 | End: 2024-01-07
Payer: MEDICARE

## 2024-01-07 DIAGNOSIS — R10.13 ABDOMINAL PAIN, EPIGASTRIC: ICD-10-CM

## 2024-01-07 DIAGNOSIS — N17.9 AKI (ACUTE KIDNEY INJURY) (HCC): ICD-10-CM

## 2024-01-07 DIAGNOSIS — R11.0 NAUSEA: Primary | ICD-10-CM

## 2024-01-07 DIAGNOSIS — E87.5 HYPERKALEMIA: ICD-10-CM

## 2024-01-07 PROBLEM — K85.90 ACUTE PANCREATITIS WITHOUT INFECTION OR NECROSIS: Status: ACTIVE | Noted: 2022-07-16

## 2024-01-07 LAB
ALBUMIN SERPL-MCNC: 3 G/DL (ref 3.2–4.6)
ALBUMIN/GLOB SERPL: 0.7 (ref 0.4–1.6)
ALP SERPL-CCNC: 237 U/L (ref 50–136)
ALT SERPL-CCNC: 33 U/L (ref 12–65)
ANION GAP SERPL CALC-SCNC: 6 MMOL/L (ref 2–11)
AST SERPL-CCNC: 26 U/L (ref 15–37)
BASOPHILS # BLD: 0.1 K/UL (ref 0–0.2)
BASOPHILS NFR BLD: 1 % (ref 0–2)
BILIRUB SERPL-MCNC: 0.6 MG/DL (ref 0.2–1.1)
BUN SERPL-MCNC: 41 MG/DL (ref 8–23)
CALCIUM SERPL-MCNC: 9.1 MG/DL (ref 8.3–10.4)
CHLORIDE SERPL-SCNC: 113 MMOL/L (ref 103–113)
CO2 SERPL-SCNC: 17 MMOL/L (ref 21–32)
CREAT SERPL-MCNC: 2.5 MG/DL (ref 0.8–1.5)
DIFFERENTIAL METHOD BLD: ABNORMAL
EOSINOPHIL # BLD: 0.3 K/UL (ref 0–0.8)
EOSINOPHIL NFR BLD: 2 % (ref 0.5–7.8)
ERYTHROCYTE [DISTWIDTH] IN BLOOD BY AUTOMATED COUNT: 15.6 % (ref 11.9–14.6)
GLOBULIN SER CALC-MCNC: 4.3 G/DL (ref 2.8–4.5)
GLUCOSE SERPL-MCNC: 144 MG/DL (ref 65–100)
HCT VFR BLD AUTO: 42.1 % (ref 41.1–50.3)
HGB BLD-MCNC: 13 G/DL (ref 13.6–17.2)
IMM GRANULOCYTES # BLD AUTO: 0.1 K/UL (ref 0–0.5)
IMM GRANULOCYTES NFR BLD AUTO: 1 % (ref 0–5)
LACTATE SERPL-SCNC: 1.1 MMOL/L (ref 0.4–2)
LIPASE SERPL-CCNC: 20 U/L (ref 73–393)
LYMPHOCYTES # BLD: 3.1 K/UL (ref 0.5–4.6)
LYMPHOCYTES NFR BLD: 27 % (ref 13–44)
MCH RBC QN AUTO: 30.1 PG (ref 26.1–32.9)
MCHC RBC AUTO-ENTMCNC: 30.9 G/DL (ref 31.4–35)
MCV RBC AUTO: 97.5 FL (ref 82–102)
MONOCYTES # BLD: 0.7 K/UL (ref 0.1–1.3)
MONOCYTES NFR BLD: 6 % (ref 4–12)
NEUTS SEG # BLD: 7.4 K/UL (ref 1.7–8.2)
NEUTS SEG NFR BLD: 63 % (ref 43–78)
NRBC # BLD: 0 K/UL (ref 0–0.2)
PLATELET # BLD AUTO: 299 K/UL (ref 150–450)
PMV BLD AUTO: 9.3 FL (ref 9.4–12.3)
POTASSIUM SERPL-SCNC: 6.1 MMOL/L (ref 3.5–5.1)
PROT SERPL-MCNC: 7.3 G/DL (ref 6.3–8.2)
RBC # BLD AUTO: 4.32 M/UL (ref 4.23–5.6)
SODIUM SERPL-SCNC: 136 MMOL/L (ref 136–146)
WBC # BLD AUTO: 11.7 K/UL (ref 4.3–11.1)

## 2024-01-07 PROCEDURE — 2580000003 HC RX 258: Performed by: NURSE PRACTITIONER

## 2024-01-07 PROCEDURE — 99285 EMERGENCY DEPT VISIT HI MDM: CPT

## 2024-01-07 PROCEDURE — 96375 TX/PRO/DX INJ NEW DRUG ADDON: CPT

## 2024-01-07 PROCEDURE — 96361 HYDRATE IV INFUSION ADD-ON: CPT

## 2024-01-07 PROCEDURE — 83605 ASSAY OF LACTIC ACID: CPT

## 2024-01-07 PROCEDURE — 74176 CT ABD & PELVIS W/O CONTRAST: CPT

## 2024-01-07 PROCEDURE — 6360000002 HC RX W HCPCS: Performed by: NURSE PRACTITIONER

## 2024-01-07 PROCEDURE — 85025 COMPLETE CBC W/AUTO DIFF WBC: CPT

## 2024-01-07 PROCEDURE — 96365 THER/PROPH/DIAG IV INF INIT: CPT

## 2024-01-07 PROCEDURE — 96376 TX/PRO/DX INJ SAME DRUG ADON: CPT

## 2024-01-07 PROCEDURE — 96374 THER/PROPH/DIAG INJ IV PUSH: CPT

## 2024-01-07 PROCEDURE — 83690 ASSAY OF LIPASE: CPT

## 2024-01-07 PROCEDURE — 6370000000 HC RX 637 (ALT 250 FOR IP): Performed by: NURSE PRACTITIONER

## 2024-01-07 PROCEDURE — 80053 COMPREHEN METABOLIC PANEL: CPT

## 2024-01-07 RX ORDER — 0.9 % SODIUM CHLORIDE 0.9 %
500 INTRAVENOUS SOLUTION INTRAVENOUS
Status: COMPLETED | OUTPATIENT
Start: 2024-01-07 | End: 2024-01-07

## 2024-01-07 RX ORDER — DEXTROSE MONOHYDRATE 25 G/50ML
25 INJECTION, SOLUTION INTRAVENOUS
Status: DISCONTINUED | OUTPATIENT
Start: 2024-01-07 | End: 2024-01-07 | Stop reason: SDUPTHER

## 2024-01-07 RX ORDER — 0.9 % SODIUM CHLORIDE 0.9 %
500 INTRAVENOUS SOLUTION INTRAVENOUS
Status: DISCONTINUED | OUTPATIENT
Start: 2024-01-07 | End: 2024-01-07

## 2024-01-07 RX ORDER — ONDANSETRON 2 MG/ML
4 INJECTION INTRAMUSCULAR; INTRAVENOUS
Status: COMPLETED | OUTPATIENT
Start: 2024-01-07 | End: 2024-01-07

## 2024-01-07 RX ADMIN — SODIUM CHLORIDE 500 ML: 9 INJECTION, SOLUTION INTRAVENOUS at 21:25

## 2024-01-07 RX ADMIN — HYDROMORPHONE HYDROCHLORIDE 1 MG: 1 INJECTION, SOLUTION INTRAMUSCULAR; INTRAVENOUS; SUBCUTANEOUS at 23:01

## 2024-01-07 RX ADMIN — DEXTROSE MONOHYDRATE 250 ML: 10 INJECTION, SOLUTION INTRAVENOUS at 21:47

## 2024-01-07 RX ADMIN — HYDROMORPHONE HYDROCHLORIDE 1 MG: 1 INJECTION, SOLUTION INTRAMUSCULAR; INTRAVENOUS; SUBCUTANEOUS at 21:33

## 2024-01-07 RX ADMIN — INSULIN HUMAN 5 UNITS: 100 INJECTION, SOLUTION PARENTERAL at 21:42

## 2024-01-07 RX ADMIN — ONDANSETRON 4 MG: 2 INJECTION INTRAMUSCULAR; INTRAVENOUS at 21:33

## 2024-01-07 RX ADMIN — SODIUM ZIRCONIUM CYCLOSILICATE 10 G: 5 POWDER, FOR SUSPENSION ORAL at 21:55

## 2024-01-07 RX ADMIN — SODIUM CHLORIDE 500 ML: 9 INJECTION, SOLUTION INTRAVENOUS at 21:52

## 2024-01-07 ASSESSMENT — ENCOUNTER SYMPTOMS
NAUSEA: 1
BACK PAIN: 0
ABDOMINAL PAIN: 1
EYES NEGATIVE: 1
DIARRHEA: 0
VOMITING: 1
SHORTNESS OF BREATH: 0

## 2024-01-07 ASSESSMENT — PAIN - FUNCTIONAL ASSESSMENT: PAIN_FUNCTIONAL_ASSESSMENT: 0-10

## 2024-01-07 ASSESSMENT — PAIN SCALES - GENERAL
PAINLEVEL_OUTOF10: 9

## 2024-01-08 PROBLEM — K86.1 ACUTE ON CHRONIC PANCREATITIS (HCC): Status: ACTIVE | Noted: 2024-01-08

## 2024-01-08 PROBLEM — E87.5 HYPERKALEMIA: Status: ACTIVE | Noted: 2024-01-08

## 2024-01-08 PROBLEM — A04.72 COLITIS DUE TO CLOSTRIDIUM DIFFICILE: Status: ACTIVE | Noted: 2024-01-08

## 2024-01-08 PROBLEM — K85.90 ACUTE ON CHRONIC PANCREATITIS (HCC): Status: ACTIVE | Noted: 2024-01-08

## 2024-01-08 LAB
ALBUMIN SERPL-MCNC: 2.7 G/DL (ref 3.2–4.6)
ALBUMIN/GLOB SERPL: 0.8 (ref 0.4–1.6)
ALP SERPL-CCNC: 206 U/L (ref 50–136)
ALT SERPL-CCNC: 27 U/L (ref 12–65)
ANION GAP SERPL CALC-SCNC: 2 MMOL/L (ref 2–11)
AST SERPL-CCNC: 21 U/L (ref 15–37)
BILIRUB SERPL-MCNC: 0.5 MG/DL (ref 0.2–1.1)
BUN SERPL-MCNC: 40 MG/DL (ref 8–23)
CALCIUM SERPL-MCNC: 8.7 MG/DL (ref 8.3–10.4)
CHLORIDE SERPL-SCNC: 118 MMOL/L (ref 103–113)
CHOLEST SERPL-MCNC: 109 MG/DL
CO2 SERPL-SCNC: 18 MMOL/L (ref 21–32)
CREAT SERPL-MCNC: 2.3 MG/DL (ref 0.8–1.5)
EST. AVERAGE GLUCOSE BLD GHB EST-MCNC: 108 MG/DL
GLOBULIN SER CALC-MCNC: 3.6 G/DL (ref 2.8–4.5)
GLUCOSE BLD STRIP.AUTO-MCNC: 115 MG/DL (ref 65–100)
GLUCOSE BLD STRIP.AUTO-MCNC: 49 MG/DL (ref 65–100)
GLUCOSE BLD STRIP.AUTO-MCNC: 57 MG/DL (ref 65–100)
GLUCOSE BLD STRIP.AUTO-MCNC: 58 MG/DL (ref 65–100)
GLUCOSE BLD STRIP.AUTO-MCNC: 61 MG/DL (ref 65–100)
GLUCOSE BLD STRIP.AUTO-MCNC: 68 MG/DL (ref 65–100)
GLUCOSE BLD STRIP.AUTO-MCNC: 84 MG/DL (ref 65–100)
GLUCOSE SERPL-MCNC: 78 MG/DL (ref 65–100)
HBA1C MFR BLD: 5.4 % (ref 4.8–5.6)
HDLC SERPL-MCNC: 23 MG/DL (ref 40–60)
HDLC SERPL: 4.7
LACTATE SERPL-SCNC: 1 MMOL/L (ref 0.4–2)
LDLC SERPL CALC-MCNC: 44.2 MG/DL
MAGNESIUM SERPL-MCNC: 1.9 MG/DL (ref 1.8–2.4)
POTASSIUM SERPL-SCNC: 5.7 MMOL/L (ref 3.5–5.1)
PROCALCITONIN SERPL-MCNC: 0.08 NG/ML (ref 0–0.49)
PROT SERPL-MCNC: 6.3 G/DL (ref 6.3–8.2)
SERVICE CMNT-IMP: ABNORMAL
SERVICE CMNT-IMP: NORMAL
SERVICE CMNT-IMP: NORMAL
SODIUM SERPL-SCNC: 138 MMOL/L (ref 136–146)
TRIGL SERPL-MCNC: 209 MG/DL (ref 35–150)
VLDLC SERPL CALC-MCNC: 41.8 MG/DL (ref 6–23)

## 2024-01-08 PROCEDURE — 84145 PROCALCITONIN (PCT): CPT

## 2024-01-08 PROCEDURE — 96372 THER/PROPH/DIAG INJ SC/IM: CPT

## 2024-01-08 PROCEDURE — G0378 HOSPITAL OBSERVATION PER HR: HCPCS

## 2024-01-08 PROCEDURE — 83036 HEMOGLOBIN GLYCOSYLATED A1C: CPT

## 2024-01-08 PROCEDURE — 2580000003 HC RX 258: Performed by: NURSE PRACTITIONER

## 2024-01-08 PROCEDURE — 82962 GLUCOSE BLOOD TEST: CPT

## 2024-01-08 PROCEDURE — 6360000002 HC RX W HCPCS: Performed by: FAMILY MEDICINE

## 2024-01-08 PROCEDURE — 36415 COLL VENOUS BLD VENIPUNCTURE: CPT

## 2024-01-08 PROCEDURE — 83735 ASSAY OF MAGNESIUM: CPT

## 2024-01-08 PROCEDURE — 6370000000 HC RX 637 (ALT 250 FOR IP): Performed by: NURSE PRACTITIONER

## 2024-01-08 PROCEDURE — 97535 SELF CARE MNGMENT TRAINING: CPT

## 2024-01-08 PROCEDURE — 80061 LIPID PANEL: CPT

## 2024-01-08 PROCEDURE — 6370000000 HC RX 637 (ALT 250 FOR IP): Performed by: INTERNAL MEDICINE

## 2024-01-08 PROCEDURE — 6370000000 HC RX 637 (ALT 250 FOR IP): Performed by: FAMILY MEDICINE

## 2024-01-08 PROCEDURE — 97165 OT EVAL LOW COMPLEX 30 MIN: CPT

## 2024-01-08 PROCEDURE — 96376 TX/PRO/DX INJ SAME DRUG ADON: CPT

## 2024-01-08 PROCEDURE — 2580000003 HC RX 258: Performed by: FAMILY MEDICINE

## 2024-01-08 PROCEDURE — 83605 ASSAY OF LACTIC ACID: CPT

## 2024-01-08 PROCEDURE — 80053 COMPREHEN METABOLIC PANEL: CPT

## 2024-01-08 RX ORDER — GABAPENTIN 300 MG/1
600 CAPSULE ORAL NIGHTLY
Status: DISCONTINUED | OUTPATIENT
Start: 2024-01-08 | End: 2024-01-09

## 2024-01-08 RX ORDER — SODIUM BICARBONATE 650 MG/1
650 TABLET ORAL 2 TIMES DAILY
Status: DISCONTINUED | OUTPATIENT
Start: 2024-01-08 | End: 2024-01-11 | Stop reason: HOSPADM

## 2024-01-08 RX ORDER — LANOLIN ALCOHOL/MO/W.PET/CERES
3 CREAM (GRAM) TOPICAL NIGHTLY
Status: DISCONTINUED | OUTPATIENT
Start: 2024-01-08 | End: 2024-01-11 | Stop reason: HOSPADM

## 2024-01-08 RX ORDER — ACETAMINOPHEN 650 MG/1
650 SUPPOSITORY RECTAL EVERY 6 HOURS PRN
Status: DISCONTINUED | OUTPATIENT
Start: 2024-01-08 | End: 2024-01-11 | Stop reason: HOSPADM

## 2024-01-08 RX ORDER — ENOXAPARIN SODIUM 100 MG/ML
30 INJECTION SUBCUTANEOUS DAILY
Status: DISCONTINUED | OUTPATIENT
Start: 2024-01-08 | End: 2024-01-08 | Stop reason: ALTCHOICE

## 2024-01-08 RX ORDER — TAMSULOSIN HYDROCHLORIDE 0.4 MG/1
0.4 CAPSULE ORAL DAILY
Status: DISCONTINUED | OUTPATIENT
Start: 2024-01-08 | End: 2024-01-11 | Stop reason: HOSPADM

## 2024-01-08 RX ORDER — SODIUM CHLORIDE 9 MG/ML
INJECTION, SOLUTION INTRAVENOUS CONTINUOUS
Status: ACTIVE | OUTPATIENT
Start: 2024-01-08 | End: 2024-01-10

## 2024-01-08 RX ORDER — ONDANSETRON 4 MG/1
4 TABLET, ORALLY DISINTEGRATING ORAL EVERY 8 HOURS PRN
Status: DISCONTINUED | OUTPATIENT
Start: 2024-01-08 | End: 2024-01-11 | Stop reason: HOSPADM

## 2024-01-08 RX ORDER — POLYETHYLENE GLYCOL 3350 17 G/17G
17 POWDER, FOR SOLUTION ORAL DAILY PRN
Status: DISCONTINUED | OUTPATIENT
Start: 2024-01-08 | End: 2024-01-11 | Stop reason: HOSPADM

## 2024-01-08 RX ORDER — ASPIRIN 325 MG
325 TABLET ORAL
Status: DISCONTINUED | OUTPATIENT
Start: 2024-01-08 | End: 2024-01-11 | Stop reason: HOSPADM

## 2024-01-08 RX ORDER — LISINOPRIL 5 MG/1
5 TABLET ORAL DAILY
Status: DISCONTINUED | OUTPATIENT
Start: 2024-01-08 | End: 2024-01-11 | Stop reason: HOSPADM

## 2024-01-08 RX ORDER — VANCOMYCIN HYDROCHLORIDE 125 MG/1
125 CAPSULE ORAL 4 TIMES DAILY
Status: DISCONTINUED | OUTPATIENT
Start: 2024-01-08 | End: 2024-01-11 | Stop reason: HOSPADM

## 2024-01-08 RX ORDER — DEXTROSE MONOHYDRATE 100 MG/ML
INJECTION, SOLUTION INTRAVENOUS CONTINUOUS PRN
Status: DISCONTINUED | OUTPATIENT
Start: 2024-01-08 | End: 2024-01-11 | Stop reason: HOSPADM

## 2024-01-08 RX ORDER — DEXTROSE MONOHYDRATE 100 MG/ML
INJECTION, SOLUTION INTRAVENOUS CONTINUOUS PRN
Status: DISCONTINUED | OUTPATIENT
Start: 2024-01-08 | End: 2024-01-08 | Stop reason: SDUPTHER

## 2024-01-08 RX ORDER — ACETAMINOPHEN 325 MG/1
650 TABLET ORAL EVERY 6 HOURS PRN
Status: DISCONTINUED | OUTPATIENT
Start: 2024-01-08 | End: 2024-01-11 | Stop reason: HOSPADM

## 2024-01-08 RX ORDER — SODIUM CHLORIDE 9 MG/ML
INJECTION, SOLUTION INTRAVENOUS PRN
Status: DISCONTINUED | OUTPATIENT
Start: 2024-01-08 | End: 2024-01-11 | Stop reason: HOSPADM

## 2024-01-08 RX ORDER — PANTOPRAZOLE SODIUM 40 MG/1
40 TABLET, DELAYED RELEASE ORAL DAILY
Status: DISCONTINUED | OUTPATIENT
Start: 2024-01-08 | End: 2024-01-11 | Stop reason: HOSPADM

## 2024-01-08 RX ORDER — GABAPENTIN 300 MG/1
300 CAPSULE ORAL 4 TIMES DAILY
Status: DISCONTINUED | OUTPATIENT
Start: 2024-01-08 | End: 2024-01-08

## 2024-01-08 RX ORDER — MAGNESIUM HYDROXIDE/ALUMINUM HYDROXICE/SIMETHICONE 120; 1200; 1200 MG/30ML; MG/30ML; MG/30ML
30 SUSPENSION ORAL EVERY 6 HOURS PRN
Status: DISCONTINUED | OUTPATIENT
Start: 2024-01-08 | End: 2024-01-11 | Stop reason: HOSPADM

## 2024-01-08 RX ORDER — SODIUM CHLORIDE 0.9 % (FLUSH) 0.9 %
5-40 SYRINGE (ML) INJECTION EVERY 12 HOURS SCHEDULED
Status: DISCONTINUED | OUTPATIENT
Start: 2024-01-08 | End: 2024-01-11 | Stop reason: HOSPADM

## 2024-01-08 RX ORDER — HYDROMORPHONE HYDROCHLORIDE 1 MG/ML
0.5 INJECTION, SOLUTION INTRAMUSCULAR; INTRAVENOUS; SUBCUTANEOUS EVERY 4 HOURS PRN
Status: DISCONTINUED | OUTPATIENT
Start: 2024-01-08 | End: 2024-01-11 | Stop reason: HOSPADM

## 2024-01-08 RX ORDER — ALLOPURINOL 300 MG/1
300 TABLET ORAL DAILY
Status: DISCONTINUED | OUTPATIENT
Start: 2024-01-08 | End: 2024-01-11 | Stop reason: HOSPADM

## 2024-01-08 RX ORDER — HYDROCODONE BITARTRATE AND ACETAMINOPHEN 5; 325 MG/1; MG/1
1 TABLET ORAL EVERY 6 HOURS PRN
Status: DISCONTINUED | OUTPATIENT
Start: 2024-01-08 | End: 2024-01-11 | Stop reason: HOSPADM

## 2024-01-08 RX ORDER — ASCORBIC ACID 500 MG
1000 TABLET ORAL DAILY
Status: DISCONTINUED | OUTPATIENT
Start: 2024-01-08 | End: 2024-01-11 | Stop reason: HOSPADM

## 2024-01-08 RX ORDER — SODIUM CHLORIDE 0.9 % (FLUSH) 0.9 %
5-40 SYRINGE (ML) INJECTION PRN
Status: DISCONTINUED | OUTPATIENT
Start: 2024-01-08 | End: 2024-01-11 | Stop reason: HOSPADM

## 2024-01-08 RX ORDER — ONDANSETRON 2 MG/ML
4 INJECTION INTRAMUSCULAR; INTRAVENOUS EVERY 6 HOURS PRN
Status: DISCONTINUED | OUTPATIENT
Start: 2024-01-08 | End: 2024-01-11 | Stop reason: HOSPADM

## 2024-01-08 RX ORDER — HEPARIN SODIUM 5000 [USP'U]/ML
5000 INJECTION, SOLUTION INTRAVENOUS; SUBCUTANEOUS EVERY 8 HOURS SCHEDULED
Status: DISCONTINUED | OUTPATIENT
Start: 2024-01-08 | End: 2024-01-11 | Stop reason: HOSPADM

## 2024-01-08 RX ADMIN — TAMSULOSIN HYDROCHLORIDE 0.4 MG: 0.4 CAPSULE ORAL at 07:58

## 2024-01-08 RX ADMIN — HYDROMORPHONE HYDROCHLORIDE 0.5 MG: 1 INJECTION, SOLUTION INTRAMUSCULAR; INTRAVENOUS; SUBCUTANEOUS at 03:15

## 2024-01-08 RX ADMIN — Medication 3 MG: at 20:38

## 2024-01-08 RX ADMIN — Medication 16 G: at 17:21

## 2024-01-08 RX ADMIN — HYDROCODONE BITARTRATE AND ACETAMINOPHEN 1 TABLET: 5; 325 TABLET ORAL at 01:38

## 2024-01-08 RX ADMIN — SODIUM ZIRCONIUM CYCLOSILICATE 10 G: 10 POWDER, FOR SUSPENSION ORAL at 20:38

## 2024-01-08 RX ADMIN — ASPIRIN 325 MG ORAL TABLET 325 MG: 325 PILL ORAL at 16:17

## 2024-01-08 RX ADMIN — SODIUM ZIRCONIUM CYCLOSILICATE 10 G: 10 POWDER, FOR SUSPENSION ORAL at 14:08

## 2024-01-08 RX ADMIN — SODIUM CHLORIDE, PRESERVATIVE FREE 10 ML: 5 INJECTION INTRAVENOUS at 08:02

## 2024-01-08 RX ADMIN — SODIUM BICARBONATE 650 MG TABLET 650 MG: at 07:57

## 2024-01-08 RX ADMIN — SODIUM BICARBONATE 650 MG TABLET 650 MG: at 20:38

## 2024-01-08 RX ADMIN — PANCRELIPASE LIPASE, PANCRELIPASE PROTEASE, PANCRELIPASE AMYLASE 20000 UNITS: 20000; 63000; 84000 CAPSULE, DELAYED RELEASE ORAL at 07:58

## 2024-01-08 RX ADMIN — SODIUM CHLORIDE: 9 INJECTION, SOLUTION INTRAVENOUS at 14:13

## 2024-01-08 RX ADMIN — SODIUM CHLORIDE: 9 INJECTION, SOLUTION INTRAVENOUS at 01:41

## 2024-01-08 RX ADMIN — HYDROCODONE BITARTRATE AND ACETAMINOPHEN 1 TABLET: 5; 325 TABLET ORAL at 11:02

## 2024-01-08 RX ADMIN — OXYCODONE HYDROCHLORIDE AND ACETAMINOPHEN 1000 MG: 500 TABLET ORAL at 07:58

## 2024-01-08 RX ADMIN — HEPARIN SODIUM 5000 UNITS: 5000 INJECTION INTRAVENOUS; SUBCUTANEOUS at 21:45

## 2024-01-08 RX ADMIN — ALLOPURINOL 300 MG: 300 TABLET ORAL at 07:58

## 2024-01-08 RX ADMIN — Medication 16 G: at 16:36

## 2024-01-08 RX ADMIN — PANTOPRAZOLE SODIUM 40 MG: 40 TABLET, DELAYED RELEASE ORAL at 05:53

## 2024-01-08 RX ADMIN — HYDROMORPHONE HYDROCHLORIDE 0.5 MG: 1 INJECTION, SOLUTION INTRAMUSCULAR; INTRAVENOUS; SUBCUTANEOUS at 08:56

## 2024-01-08 RX ADMIN — Medication 16 G: at 17:06

## 2024-01-08 RX ADMIN — HEPARIN SODIUM 5000 UNITS: 5000 INJECTION INTRAVENOUS; SUBCUTANEOUS at 14:03

## 2024-01-08 RX ADMIN — GABAPENTIN 600 MG: 300 CAPSULE ORAL at 20:38

## 2024-01-08 RX ADMIN — HEPARIN SODIUM 5000 UNITS: 5000 INJECTION INTRAVENOUS; SUBCUTANEOUS at 05:53

## 2024-01-08 ASSESSMENT — PAIN SCALES - GENERAL
PAINLEVEL_OUTOF10: 7
PAINLEVEL_OUTOF10: 9
PAINLEVEL_OUTOF10: 6
PAINLEVEL_OUTOF10: 5
PAINLEVEL_OUTOF10: 6
PAINLEVEL_OUTOF10: 10
PAINLEVEL_OUTOF10: 1
PAINLEVEL_OUTOF10: 6
PAINLEVEL_OUTOF10: 7

## 2024-01-08 ASSESSMENT — PAIN DESCRIPTION - LOCATION
LOCATION: ABDOMEN

## 2024-01-08 ASSESSMENT — PAIN DESCRIPTION - DESCRIPTORS
DESCRIPTORS: ACHING;THROBBING
DESCRIPTORS: ACHING
DESCRIPTORS: ACHING;DULL
DESCRIPTORS: ACHING;THROBBING

## 2024-01-08 NOTE — PROGRESS NOTES
ACUTE OCCUPATIONAL THERAPY GOALS:   (Developed with and agreed upon by patient and/or caregiver.)  Patient will complete functional transfers with stand by assist. -GOAL MET 1/8/24    Patient will complete grooming tasks independently. -GOAL MET 1/8/24        OCCUPATIONAL THERAPY Initial Assessment and Discharge       OT Visit Days: 1  Acknowledge Orders  Time  OT Charge Capture  Rehab Caseload Tracker      Yovany Chatman is a 78 y.o. male   PRIMARY DIAGNOSIS: Acute pancreatitis without infection or necrosis  Abdominal pain, epigastric [R10.13]  Hyperkalemia [E87.5]  Nausea [R11.0]  LANDON (acute kidney injury) (HCC) [N17.9]  Acute on chronic pancreatitis (HCC) [K85.90, K86.1]       Reason for Referral: Generalized Muscle Weakness (M62.81)  Observation: Payor: MEDICARE / Plan: MEDICARE PART A AND B / Product Type: *No Product type* /     ASSESSMENT:     REHAB RECOMMENDATIONS:   Recommendation to date pending progress:  Setting:  No further skilled occupational therapy after discharge from hospital    Equipment:    None     ASSESSMENT:  Mr. Chatman is a 78 year old admitted for acute pancreatitis. Patient has bilateral Charcot foot, he is waiting to have another surgery on the L foot in January per patient he is NWB L LE. Patient lives with his wife and reports being independent with ADL tasks. Patient able to complete pivot to chair with stand by assist. He is completing ADL tasks at an independent-stand by assist level. Patient is functioning near baseline level, no further skilled OT services recommended at this time.      [facility] AM-PAC™ “6 Clicks” Daily Activity Inpatient Short Form:    AM-PAC Daily Activity - Inpatient   How much help is needed for putting on and taking off regular lower body clothing?: A Little  How much help is needed for bathing (which includes washing, rinsing, drying)?: A Little  How much help is needed for toileting (which includes using toilet, bedpan, or urinal)?: A Little  How

## 2024-01-08 NOTE — H&P
status: Prior      Non-peripheral Lines and Tubes (if present):             Hospital Problems:  Principal Problem:    Acute pancreatitis without infection or necrosis  Active Problems:    Stage 3b chronic kidney disease (HCC)    Primary hypertension    Type 2 diabetes mellitus (HCC)    Paroxysmal atrial fibrillation (HCC)    Colitis due to Clostridium difficile    Acute on chronic pancreatitis (HCC)  Resolved Problems:    * No resolved hospital problems. *      Past History:     Past Medical History:   Diagnosis Date    Acute blood loss anemia 01/15/2022    Acute renal failure with acute tubular necrosis superimposed on stage 3b chronic kidney disease (HCC) 07/13/2022    Arthritis     Blurred vision, right eye     BPH (benign prostatic hyperplasia)     Gout     History of Clostridioides difficile colitis 2010    History of pancreatitis     History of renal carcinoma     partial nephrectomy    Hyperlipidemia     Hypertension     Leukocytosis 01/15/2022    Nausea & vomiting     small amount of N/V and pt not sure of it antiemetics work    Neuropathy     Paroxysmal A-fib (HCC)     Presence of Watchman left atrial appendage closure device     Sleep apnea     compliant with C-pap    Thromboembolus (HCC)     hx of left lower extremity    Type 2 diabetes mellitus (HCC)     insulin reliant; AVG BS ; s.s. of hypoglycemia 65-75; last A1c 7.1    WPW (Robbi-Parkinson-White syndrome)     ablation x4       Past Surgical History:   Procedure Laterality Date    ABLATION OF DYSRHYTHMIC FOCUS      WPW- ablations x3    BACK SURGERY      ruptured disc    CATARACT REMOVAL      CHOLECYSTECTOMY      COLONOSCOPY N/A 1/24/2022    COLONOSCOPY performed by Izaiah Harrison MD at [facility]    ERCP N/A 8/9/2022    ERCP SPHINCTEROTOMY WITH BALLOON SWEEP performed by LAQUITA Miller MD at [facility] ENDOSCOPY    ERCP W/ SPHICTEROTOMY N/A 08/09/2022    KIDNEY REMOVAL      partial    LUMBAR LAMINECTOMY      ORTHOPEDIC SURGERY Left      (Zostavax) 01/01/2012     Allergies   Allergen Reactions    Sulfamethoxazole-Trimethoprim Other (See Comments)     Elevated potassium level    Tramadol Diarrhea    Ace Inhibitors      pancreatitis    Codeine Other (See Comments)     \"makes me a little crazy\"    Fenofibrate Other (See Comments)     \"causes pancreatic attacks\"    Hydromorphone Other (See Comments)     \"gives me craziness\"    Metformin Diarrhea    Oxycodone Other (See Comments)     Pt states \"makes me go crazy.\"    Sitagliptin Other (See Comments)     Per pt causes pancreatic issues     Prior to Admit Medications:  Current Outpatient Medications   Medication Instructions    acetaminophen (TYLENOL) 500 mg, Oral, EVERY 6 HOURS PRN    allopurinol (ZYLOPRIM) 300 mg, Oral, DAILY    ascorbic acid (VITAMIN C) 1,000 mg, Oral, DAILY    aspirin 325 mg, Oral, DAILY WITH DINNER    B Complex Vitamins (VITAMIN B COMPLEX PO) 1 tablet, Oral, DAILY    colchicine (COLCRYS) 0.6 mg, Oral, DAILY    gabapentin (NEURONTIN) 300 mg, Oral, 4 TIMES DAILY    glucagon 1 mg, IntraMUSCular, PRN    insulin aspart (NOVOLOG) 100 UNIT/ML injection pen SubCUTAneous, 3 TIMES DAILY BEFORE MEALS    lipase-protease-amylase (CREON) 73548-072241 units CPEP delayed release capsule 4 capsules, Oral, 3 TIMES DAILY WITH MEALS    lisinopril (PRINIVIL;ZESTRIL) 5 mg, Oral, DAILY    pantoprazole (PROTONIX) 20 mg, Oral, DAILY    sodium bicarbonate 650 mg, Oral, 2 TIMES DAILY    tadalafil (CIALIS) 20 mg, PRN    tamsulosin (FLOMAX) 0.4 mg, Oral, DAILY    traMADol (ULTRAM) 50 mg, Oral, EVERY 6 HOURS PRN    vancomycin (VANCOCIN) 125 mg, Oral, 4 TIMES DAILY, Take 4 times a day for the next 3 weeks, stop for a week and then resume for another 2 weeks.    vitamin D 1,000 Units, Oral    ZINC SULFATE PO 50 mg, Oral         Objective:   Patient Vitals for the past 24 hrs:   Temp Pulse Resp BP SpO2   01/07/24 2300 97.9 °F (36.6 °C) 82 18 125/74 98 %   01/07/24 2245 -- -- -- 113/67 98 %   01/07/24 1958 97.5 °F

## 2024-01-08 NOTE — PROGRESS NOTES
THIS IS A NON-BILLABLE NOTE; pt seen / admitted by nocturnist after MN.    HPI:  Yovany Chatman is a 78 y.o. male with medical history of type 2 diabetes mellitus on insulin pump, hypertension, A-fib status post anticoagulation, CKD 3, WPW syndrome status post remote ablations x 4, recent C. difficile colitis on out pt oral vancomycin antibx tx, patient was just discharged from the hospital about 2 weeks ago and presents now with epigastric pain, nausea and vomiting and poor p.o. tolerance.  No fevers or chills, melena or hematochezia or bright red blood per rectum. Hgb stable. Afebrile.      Vital signs are stable.  BMP shows hyperkalemia and metabolic acidosis.  CBC is unremarkable. CT abdomen is pending.  Hx of colitis, and pancreatitis. Lipase 20. He was given 5 units of insulin, 1 amp of D50 and Lokelma in the ED.  He has slight LANDON as well.  Baseline creatinine is around 1.5 now up to 2.5.     CT abdomen states GB surgically absent, pancrease consistent w chronic pancreatitis, no fat stranding, no duodenitis, bilateral renal cysts, diverticulosis wo diverticulitis.     Keep NPO, IV flds, pain medication, monitor labs. Hospitalist to admit.     ROS: Some mild Nausea, no vomiting, no diarrhea. Mild abdominal pain, overall, nothing focal. Has had pancreatitis many times, tried to hydrate at home and do clears, but sxc worsened, feeling a little better today. No CP, SOB, F/C. Went over AM labs, tx plan w patient. All questions answered.     PE:  General:          Well nourished.    Head:               Normocephalic, atraumatic  Eyes:               Sclerae appear normal.  Pupils equally round.  ENT:                Nares appear normal.  Moist oral mucosa  Neck:               No restricted ROM.  Trachea midline   CV:                  RRR.  No m/r/g.  No jugular venous distension.  Lungs:             CTAB.  No wheezing, rhonchi, or rales.  Symmetric expansion.  Abdomen:        Soft, nondistended. Mild tenderness,  nothing focal. Hypoactive bowel sounds. Passing gas. No vomiting or diarrhea.   Extremities:     No cyanosis or clubbing.  No edema. Right Charcot foot and left ankle postoperative appearing.  Skin:                No rashes and normal coloration.   Warm and dry.    Neuro:             CN II-XII grossly intact.  Sensation intact. A&O x 4.   Psych:             Normal mood and affect.       Dx;Plan of Care:  Principal Problem:    Acute pancreatitis without infection or necrosis  Plan: Acute on chronic recurrent pancreatitis. No duodenitis on CT. Lipase not elevated. CT scan of the abdomen diverticulosis. On IV flds. NPO. Treat pain and nausea as needed. Follow labs. Continue home med creon.     Active Problems:   LANDON/stage 3b chronic kidney disease (HCC)  Plan: cr 2.5 on arrival --->2.3 this am. Avoid nephrotoxicity.  IV fluids. BMP in a.m. Continue on home med bicarb tabs.       Primary hypertension  Plan: Stable; hold lisinopril 2/2 LANDON      Type 2 diabetes mellitus (HCC)  Plan: Hemoglobin A1c pending.  CHO diet. On DM pump. DM educator alerted. Hypoglycemic protocol in place.       Paroxysmal atrial fibrillation (HCC)  Plan: Stable. Not on meds. No chest pain and heart rate is normal      Colitis due to Clostridium difficile  Plan: Recent C. difficile colitis, currently no diarrhea. Has been on oral vacomycin out pt for 2 wks, was told to hold for a week after that and then resume week 4, the hold week would be now, may have been due to his kidney function. Will hold the vancomycin therapy.       Hyperkalemia  Plan: potassium 6.1 on arrival -> 10 mg po lokelma given  K+ 5.7 this am; given an additional 2 doses and get BMP in the am  Hold lisinopril - may need another BP med at DC  (Pt is not on spironolactone per mar and verified w pt)        Alison Alfredo NP  [facility] Team

## 2024-01-08 NOTE — PROGRESS NOTES
TRANSFER - IN REPORT:    Verbal report received from Juan Carlos RN on Yovany Chatman  being received from [facility] ED for routine progression of patient care      Report consisted of patient's Situation, Background, Assessment and   Recommendations(SBAR).     Information from the following report(s) Nurse Handoff Report was reviewed with the receiving nurse.    Opportunity for questions and clarification was provided.      Assessment completed upon patient's arrival to unit and care assumed.

## 2024-01-08 NOTE — DIABETES MGMT
Patient seen for insulin pump notification. Patient is alert and oriented x4. Admitting blood glucose 144. HbA1c 5.9. Patient has a past medical history of stage 3b CKD, pancreatitis, HTN, type 2 diabetes, colitis, paroxysmal afib. Patient states they were diagnosed many years ago with diabetes. Patient states they do have a working glucometer with supplies at home. Per patient they typically check blood glucose levels multiple times a day as patient uses CGM. Per patient their blood glucose levels have been running low 100s at home. Patient states they have a Dexcom G6 continual glucose monitor. Patient states they are currently using a tslim insulin pump for management of diabetes. Patient pump infusing Novolog.  Patient basal rates on profile \"vijay\":  7129-3032: 1.75 units/hr  5388-9497: 2.25 units/hr  1582-7881; 1.75 units/hr  Total Daily Basal dose: ~49.5 units/day    Carb ratio: 3  Insulin sensitivity: MN- 20, 6A- 25  Target glucose 110  Active insulin time- 5 hrs    Hospital profile (which patient states he switched to at this time to reduce risk of hypoglycemia):  Basal rates:  0000: 1 unit/hr, sensitivity 20, target goal 150, carb ratio 1:5  2100: 1 unit/hr, sensitivity 20, target goal 160, carb ratio 1:5  Total daily basal dose: ~24 units/day    Noted insulin pump agreement was signed and placed in patient medical chart. Patient states they do have extra supplies for their insulin pump. Patient states last site change was yesterday 1/7/24.  Patient verbalizes understanding that per hospital policy staff will check fingerstick blood glucose levels as ordered by provider. Patient also verbalizes understanding that they need to report bolus doses to primary RN for documentation. Patient has attended formal diabetes education in the past. Patient politely declines education materials regarding diabetes stating he is comfortable with this. Patient reports no difficulty with affording their diabetic supplies.

## 2024-01-08 NOTE — ED NOTES
TRANSFER - OUT REPORT:    Verbal report given to Tahmina LOPEZ on Yovany Chatman  being transferred to [place] [id number] for routine progression of patient care       Report consisted of patient's Situation, Background, Assessment and   Recommendations(SBAR).     Information from the following report(s) ED SBAR was reviewed with the receiving nurse.    Lines:   Peripheral IV 01/07/24 Right Antecubital (Active)   Site Assessment Clean, dry & intact 01/07/24 2032   Line Status Blood return noted;Specimen collected;Flushed;Normal saline locked 01/07/24 2032   Phlebitis Assessment No symptoms 01/07/24 2032   Infiltration Assessment 0 01/07/24 2032        Opportunity for questions and clarification was provided.      Patient transported with:  Registered Nurse

## 2024-01-08 NOTE — ED PROVIDER NOTES
Emergency Department Provider Note       PCP: Jeffy Lovell DO   Age: 78 y.o.   Sex: male     DISPOSITION Admitted 01/08/2024 12:05:08 AM       ICD-10-CM    1. Nausea  R11.0       2. Abdominal pain, epigastric  R10.13       3. LANDON (acute kidney injury) (HCC)  N17.9       4. Hyperkalemia  E87.5           Medical Decision Making     Complexity of Problems Addressed:  1 or more acute illnesses that pose a threat to life or bodily function.     Data Reviewed and Analyzed:   I independently ordered and reviewed each unique test.               Discussion of management or test interpretation.  Seamus is a 78-year-old male with numerous comorbidities including HTN, DM 2, A-fib and CKD 3, presenting to the ED for evaluation of epigastric pain since Friday with nausea.  Patient is ill-appearing but nontoxic with normal vitals.  Blood pressure is on the lower side at 108/75.  Denies fever.  Not concern for sepsis at this time.  Will treat him with 1 mg Dilaudid, 4 mg of Zofran given a liter of fluid.  Critical value called of a potassium at 6.1.  Will give 5 units of IV insulin with D50 IV push.  Will also give lOKELMA.  Creatinine is up to 2.50, up from 1.56 on December 22.  Lipase of 20 with normal T. bili at 0.6.  Elevated white count at 11.7, hemoglobin 13.0.  Will check urine, plan for noncontrasted CT scan of the abdomen and pelvis and admission for LANDON.      12:32 AM  CT scan shows chronic pancreatitis.  No active duodenitis or diverticulitis.  Patient to be transferred for admission upstairs.  Vitals are stable.  Resting comfortably.    Brenda Velasquez, FNP-C, ENP-C  12:33 AM      The patient was admitted and I have discussed patient management with the admitting provider.    Risk of Complications and/or Morbidity of Patient Management:  Prescription drug management performed.  Shared medical decision making was utilized in creating the patients health plan today.         Is this patient to be included in the  ACETAMINOPHEN (TYLENOL) 500 MG TABLET    Take 1 tablet by mouth every 6 hours as needed    ALLOPURINOL (ZYLOPRIM) 300 MG TABLET    Take 1 tablet by mouth daily    ASCORBIC ACID (VITAMIN C) 250 MG TABLET    Take 4 tablets by mouth daily    ASPIRIN 325 MG TABLET    Take 1 tablet by mouth Daily with supper    B COMPLEX VITAMINS (VITAMIN B COMPLEX PO)    Take 1 tablet by mouth daily    COLCHICINE (COLCRYS) 0.6 MG TABLET    Take 0.6 mg by mouth daily    GABAPENTIN (NEURONTIN) 300 MG CAPSULE    Take 1 capsule by mouth 4 times daily.    GLUCAGON 1 MG INJECTION    Inject 1 mg into the muscle as needed    INSULIN ASPART (NOVOLOG) 100 UNIT/ML INJECTION PEN    Inject into the skin 3 times daily (before meals)    LIPASE-PROTEASE-AMYLASE (CREON) 69782-526066 UNITS CPEP DELAYED RELEASE CAPSULE    Take 4 capsules by mouth 3 times daily (with meals)    LISINOPRIL (PRINIVIL;ZESTRIL) 5 MG TABLET    Take 1 tablet by mouth daily    PANTOPRAZOLE (PROTONIX) 20 MG TABLET    Take 1 tablet by mouth daily    SODIUM BICARBONATE 650 MG TABLET    Take 1 tablet by mouth 2 times daily    TADALAFIL (CIALIS) 20 MG TABLET    Take 20 mg by mouth as needed    TAMSULOSIN (FLOMAX) 0.4 MG CAPSULE    Take 1 capsule by mouth daily    TRAMADOL (ULTRAM) 50 MG TABLET    Take 1 tablet by mouth every 6 hours as needed.    VANCOMYCIN (VANCOCIN) 125 MG CAPSULE    Take 1 capsule by mouth 4 times daily Take 4 times a day for the next 3 weeks, stop for a week and then resume for another 2 weeks.    VITAMIN D 25 MCG (1000 UT) CAPS    Take 1 capsule by mouth    ZINC SULFATE PO    Take 50 mg by mouth        Results for orders placed or performed during the hospital encounter of 01/07/24   CT ABDOMEN PELVIS WO CONTRAST Additional Contrast? None    Narrative    CT OF THE ABDOMEN AND PELVIS    INDICATION: Concern for pancreatitis versus duodenitis    Multiple axial images were obtained through the abdomen and pelvis without IV  contrast.  Oral contrast was used for bowel

## 2024-01-09 PROBLEM — K85.90 PANCREATITIS, UNSPECIFIED PANCREATITIS TYPE: Status: ACTIVE | Noted: 2024-01-09

## 2024-01-09 LAB
ANION GAP SERPL CALC-SCNC: 3 MMOL/L (ref 2–11)
BASOPHILS # BLD: 0.1 K/UL (ref 0–0.2)
BASOPHILS NFR BLD: 1 % (ref 0–2)
BUN SERPL-MCNC: 37 MG/DL (ref 8–23)
CALCIUM SERPL-MCNC: 8.5 MG/DL (ref 8.3–10.4)
CHLORIDE SERPL-SCNC: 119 MMOL/L (ref 103–113)
CHP ED QC CHECK: NORMAL
CO2 SERPL-SCNC: 17 MMOL/L (ref 21–32)
CREAT SERPL-MCNC: 2.21 MG/DL (ref 0.8–1.5)
CRP SERPL-MCNC: 1.3 MG/DL (ref 0–0.9)
DIFFERENTIAL METHOD BLD: ABNORMAL
EOSINOPHIL # BLD: 0.2 K/UL (ref 0–0.8)
EOSINOPHIL NFR BLD: 2 % (ref 0.5–7.8)
ERYTHROCYTE [DISTWIDTH] IN BLOOD BY AUTOMATED COUNT: 15.4 % (ref 11.9–14.6)
GLUCOSE BLD STRIP.AUTO-MCNC: 55 MG/DL (ref 65–100)
GLUCOSE BLD STRIP.AUTO-MCNC: 89 MG/DL (ref 65–100)
GLUCOSE BLD-MCNC: 127 MG/DL
GLUCOSE BLD-MCNC: 152 MG/DL
GLUCOSE BLD-MCNC: 152 MG/DL
GLUCOSE BLD-MCNC: 166 MG/DL
GLUCOSE SERPL-MCNC: 204 MG/DL (ref 65–100)
HCT VFR BLD AUTO: 36.8 % (ref 41.1–50.3)
HGB BLD-MCNC: 11 G/DL (ref 13.6–17.2)
IMM GRANULOCYTES # BLD AUTO: 0.1 K/UL (ref 0–0.5)
IMM GRANULOCYTES NFR BLD AUTO: 1 % (ref 0–5)
LIPASE SERPL-CCNC: 18 U/L (ref 73–393)
LYMPHOCYTES # BLD: 1.9 K/UL (ref 0.5–4.6)
LYMPHOCYTES NFR BLD: 27 % (ref 13–44)
MCH RBC QN AUTO: 30.1 PG (ref 26.1–32.9)
MCHC RBC AUTO-ENTMCNC: 29.9 G/DL (ref 31.4–35)
MCV RBC AUTO: 100.5 FL (ref 82–102)
MONOCYTES # BLD: 0.4 K/UL (ref 0.1–1.3)
MONOCYTES NFR BLD: 6 % (ref 4–12)
NEUTS SEG # BLD: 4.4 K/UL (ref 1.7–8.2)
NEUTS SEG NFR BLD: 63 % (ref 43–78)
NRBC # BLD: 0 K/UL (ref 0–0.2)
PLATELET # BLD AUTO: 187 K/UL (ref 150–450)
PMV BLD AUTO: 9.4 FL (ref 9.4–12.3)
POTASSIUM SERPL-SCNC: 5.3 MMOL/L (ref 3.5–5.1)
RBC # BLD AUTO: 3.66 M/UL (ref 4.23–5.6)
SERVICE CMNT-IMP: ABNORMAL
SERVICE CMNT-IMP: NORMAL
SODIUM SERPL-SCNC: 139 MMOL/L (ref 136–146)
WBC # BLD AUTO: 6.9 K/UL (ref 4.3–11.1)

## 2024-01-09 PROCEDURE — 2580000003 HC RX 258: Performed by: HOSPITALIST

## 2024-01-09 PROCEDURE — 86140 C-REACTIVE PROTEIN: CPT

## 2024-01-09 PROCEDURE — 2580000003 HC RX 258: Performed by: NURSE PRACTITIONER

## 2024-01-09 PROCEDURE — 6360000002 HC RX W HCPCS: Performed by: FAMILY MEDICINE

## 2024-01-09 PROCEDURE — 82962 GLUCOSE BLOOD TEST: CPT

## 2024-01-09 PROCEDURE — 6370000000 HC RX 637 (ALT 250 FOR IP): Performed by: FAMILY MEDICINE

## 2024-01-09 PROCEDURE — 83690 ASSAY OF LIPASE: CPT

## 2024-01-09 PROCEDURE — 85025 COMPLETE CBC W/AUTO DIFF WBC: CPT

## 2024-01-09 PROCEDURE — 80048 BASIC METABOLIC PNL TOTAL CA: CPT

## 2024-01-09 PROCEDURE — 96372 THER/PROPH/DIAG INJ SC/IM: CPT

## 2024-01-09 PROCEDURE — 36415 COLL VENOUS BLD VENIPUNCTURE: CPT

## 2024-01-09 PROCEDURE — 6370000000 HC RX 637 (ALT 250 FOR IP): Performed by: NURSE PRACTITIONER

## 2024-01-09 PROCEDURE — 1100000000 HC RM PRIVATE

## 2024-01-09 PROCEDURE — 6370000000 HC RX 637 (ALT 250 FOR IP): Performed by: HOSPITALIST

## 2024-01-09 RX ORDER — DEXTROSE AND SODIUM CHLORIDE 5; .45 G/100ML; G/100ML
INJECTION, SOLUTION INTRAVENOUS CONTINUOUS
Status: DISCONTINUED | OUTPATIENT
Start: 2024-01-09 | End: 2024-01-11 | Stop reason: HOSPADM

## 2024-01-09 RX ORDER — SENNOSIDES A AND B 8.6 MG/1
2 TABLET, FILM COATED ORAL DAILY
Status: DISCONTINUED | OUTPATIENT
Start: 2024-01-09 | End: 2024-01-11 | Stop reason: HOSPADM

## 2024-01-09 RX ORDER — GABAPENTIN 300 MG/1
300 CAPSULE ORAL 4 TIMES DAILY
Status: DISCONTINUED | OUTPATIENT
Start: 2024-01-09 | End: 2024-01-10

## 2024-01-09 RX ADMIN — DEXTROSE AND SODIUM CHLORIDE: 5; 450 INJECTION, SOLUTION INTRAVENOUS at 07:23

## 2024-01-09 RX ADMIN — OXYCODONE HYDROCHLORIDE AND ACETAMINOPHEN 1000 MG: 500 TABLET ORAL at 09:50

## 2024-01-09 RX ADMIN — SODIUM ZIRCONIUM CYCLOSILICATE 10 G: 10 POWDER, FOR SUSPENSION ORAL at 16:09

## 2024-01-09 RX ADMIN — ASPIRIN 325 MG ORAL TABLET 325 MG: 325 PILL ORAL at 17:47

## 2024-01-09 RX ADMIN — HEPARIN SODIUM 5000 UNITS: 5000 INJECTION INTRAVENOUS; SUBCUTANEOUS at 06:13

## 2024-01-09 RX ADMIN — DEXTROSE MONOHYDRATE 125 ML: 100 INJECTION, SOLUTION INTRAVENOUS at 09:49

## 2024-01-09 RX ADMIN — HEPARIN SODIUM 5000 UNITS: 5000 INJECTION INTRAVENOUS; SUBCUTANEOUS at 21:16

## 2024-01-09 RX ADMIN — ALLOPURINOL 300 MG: 300 TABLET ORAL at 09:50

## 2024-01-09 RX ADMIN — HEPARIN SODIUM 5000 UNITS: 5000 INJECTION INTRAVENOUS; SUBCUTANEOUS at 16:08

## 2024-01-09 RX ADMIN — PANTOPRAZOLE SODIUM 40 MG: 40 TABLET, DELAYED RELEASE ORAL at 06:14

## 2024-01-09 RX ADMIN — TAMSULOSIN HYDROCHLORIDE 0.4 MG: 0.4 CAPSULE ORAL at 09:50

## 2024-01-09 RX ADMIN — SODIUM BICARBONATE 650 MG TABLET 650 MG: at 21:16

## 2024-01-09 RX ADMIN — GABAPENTIN 300 MG: 300 CAPSULE ORAL at 21:16

## 2024-01-09 RX ADMIN — DEXTROSE MONOHYDRATE 125 ML: 100 INJECTION, SOLUTION INTRAVENOUS at 00:06

## 2024-01-09 RX ADMIN — SODIUM BICARBONATE 650 MG TABLET 650 MG: at 09:50

## 2024-01-09 RX ADMIN — SODIUM ZIRCONIUM CYCLOSILICATE 10 G: 10 POWDER, FOR SUSPENSION ORAL at 09:55

## 2024-01-09 ASSESSMENT — PAIN SCALES - GENERAL
PAINLEVEL_OUTOF10: 1
PAINLEVEL_OUTOF10: 1

## 2024-01-09 NOTE — PROGRESS NOTES
PT note:  Patient known to this therapist from last admission in December.  Patient with history of B charcot deformities.  Recent surgery on L and is NWB with an additional surgery planned.  Patient uses a sliding board and w/c for transfers/mobility or a stand pivot for transfers.  Patient is current with HH services.  Seen by OT on 1/8 and able to perform transfers without assist.  Patient does not have any acute PT needs at this time.  Spoke with RN who confirmed.  Please re-order if new needs arise.    Beena Tran, PT

## 2024-01-09 NOTE — CARE COORDINATION
Care Management Initial Assessment       RUR: N/A  Readmission? No    Initial note: Chart reviewed for updates. CM met with at bedside to compete assessment. Pt was asleep and an unable to respond to CM. CM completed a chart review to complete assessment. Pt was on OBS status and then changed to inpatient. He lives in a house with his wife. Pt is independent with ADL's at home. Pt has DME at home to include: a walker, CPAP, WC and shower chair. Pt is insured and has a PCP listed on his medical chart. He was open with [facility] on his last admission in December 2023. Wife will provide transport at d/c. CM will continue to follow up with d/c planning.     01/09/24 0918   Service Assessment   Patient Orientation Alert and Oriented   Cognition Alert   History Provided By Medical Record   Primary Caregiver Self   Accompanied By/Relationship N/A   Support Systems Spouse/Significant Other   Patient's Healthcare Decision Maker is: Legal Next of Kin  (Pt's spouse Shyanne Chatman [phone])   PCP Verified by CM Yes   Prior Functional Level Independent in ADLs/IADLs   Current Functional Level Independent in ADLs/IADLs   Can patient return to prior living arrangement Yes   Ability to make needs known: Good   Family able to assist with home care needs: Yes   Would you like for me to discuss the discharge plan with any other family members/significant others, and if so, who? No   Financial Resources Medicare   Community Resources None   Social/Functional History   Lives With Spouse   Type of Home House   Home Layout Two level   Home Access Ramped entrance   Bathroom Shower/Tub Walk-in shower   Bathroom Equipment Shower chair   Home Equipment Wheelchair-manual;Walker, rolling   ADL Assistance Independent   Ambulation Assistance Independent   Transfer Assistance Independent   Active  Yes   Mode of Transportation Car   Discharge Planning   Type of Residence House   Living Arrangements Spouse/Significant Other

## 2024-01-09 NOTE — DIABETES MGMT
Spoke with patient for follow up diabetes education as patient has had multiple episodes of hypoglycemia. Patient states his pump has been suspending his insulin and that he is running his hospital basal rate profile. Patient states he is able to try clear liquids today and his wife will be bringing in chicken broth and white grape juice. Anticipate as appetite improves glycemic control likely to improve. Reviewed hypoglycemia signs, symptoms, and treatment. Encouraged avoidance of hypoglycemia. Patient has no questions regarding diabetes management.

## 2024-01-09 NOTE — PROGRESS NOTES
Ratio 0.7 0.4 - 1.6     Lipase    Collection Time: 01/07/24  8:33 PM   Result Value Ref Range    Lipase 20 (L) 73 - 393 U/L   Lactic Acid    Collection Time: 01/07/24  9:25 PM   Result Value Ref Range    Lactic Acid, Plasma 1.1 0.4 - 2.0 MMOL/L   POCT Glucose    Collection Time: 01/08/24  1:54 AM   Result Value Ref Range    POC Glucose 61 (L) 65 - 100 mg/dL    Performed by: Zoey    POCT Glucose    Collection Time: 01/08/24  2:22 AM   Result Value Ref Range    POC Glucose 84 65 - 100 mg/dL    Performed by: Zoey    Lipid Panel    Collection Time: 01/08/24  9:13 AM   Result Value Ref Range    Cholesterol, Total 109 MG/DL    Triglycerides 209 (H) 35 - 150 MG/DL    HDL 23 (L) 40 - 60 MG/DL    LDL Calculated 44.2 <100 MG/DL    VLDL Cholesterol Calculated 41.8 (H) 6.0 - 23.0 MG/DL    Chol/HDL Ratio 4.7 <200     Comprehensive Metabolic Panel    Collection Time: 01/08/24  9:13 AM   Result Value Ref Range    Sodium 138 136 - 146 mmol/L    Potassium 5.7 (H) 3.5 - 5.1 mmol/L    Chloride 118 (H) 103 - 113 mmol/L    CO2 18 (L) 21 - 32 mmol/L    Anion Gap 2 2 - 11 mmol/L    Glucose 78 65 - 100 mg/dL    BUN 40 (H) 8 - 23 MG/DL    Creatinine 2.30 (H) 0.8 - 1.5 MG/DL    Est, Glom Filt Rate 29 (L) >60 ml/min/1.73m2    Calcium 8.7 8.3 - 10.4 MG/DL    Total Bilirubin 0.5 0.2 - 1.1 MG/DL    ALT 27 12 - 65 U/L    AST 21 15 - 37 U/L    Alk Phosphatase 206 (H) 50 - 136 U/L    Total Protein 6.3 6.3 - 8.2 g/dL    Albumin 2.7 (L) 3.2 - 4.6 g/dL    Globulin 3.6 2.8 - 4.5 g/dL    Albumin/Globulin Ratio 0.8 0.4 - 1.6     Magnesium    Collection Time: 01/08/24  9:13 AM   Result Value Ref Range    Magnesium 1.9 1.8 - 2.4 mg/dL   Procalcitonin    Collection Time: 01/08/24  9:13 AM   Result Value Ref Range    Procalcitonin 0.08 0.00 - 0.49 ng/mL   Lactic Acid    Collection Time: 01/08/24  9:13 AM   Result Value Ref Range    Lactic Acid, Plasma 1.0 0.4 - 2.0 MMOL/L   Hemoglobin A1c    Collection Time: 01/08/24  9:13 AM   Result Value  note may have been written by using a voice dictation software.  The note has been proof read but may still contain some grammatical/other typographical errors.

## 2024-01-10 LAB
ANION GAP SERPL CALC-SCNC: 3 MMOL/L (ref 2–11)
BASOPHILS # BLD: 0.1 K/UL (ref 0–0.2)
BASOPHILS NFR BLD: 1 % (ref 0–2)
BUN SERPL-MCNC: 27 MG/DL (ref 8–23)
CALCIUM SERPL-MCNC: 8.5 MG/DL (ref 8.3–10.4)
CHLORIDE SERPL-SCNC: 121 MMOL/L (ref 103–113)
CHP ED QC CHECK: NORMAL
CO2 SERPL-SCNC: 20 MMOL/L (ref 21–32)
CREAT SERPL-MCNC: 1.99 MG/DL (ref 0.8–1.5)
DIFFERENTIAL METHOD BLD: ABNORMAL
EOSINOPHIL # BLD: 0.3 K/UL (ref 0–0.8)
EOSINOPHIL NFR BLD: 5 % (ref 0.5–7.8)
ERYTHROCYTE [DISTWIDTH] IN BLOOD BY AUTOMATED COUNT: 15.4 % (ref 11.9–14.6)
GLUCOSE BLD STRIP.AUTO-MCNC: 92 MG/DL (ref 65–100)
GLUCOSE BLD STRIP.AUTO-MCNC: 93 MG/DL (ref 65–100)
GLUCOSE BLD-MCNC: 117 MG/DL
GLUCOSE SERPL-MCNC: 99 MG/DL (ref 65–100)
HCT VFR BLD AUTO: 33.5 % (ref 41.1–50.3)
HGB BLD-MCNC: 10.3 G/DL (ref 13.6–17.2)
IMM GRANULOCYTES # BLD AUTO: 0.1 K/UL (ref 0–0.5)
IMM GRANULOCYTES NFR BLD AUTO: 1 % (ref 0–5)
LYMPHOCYTES # BLD: 2.1 K/UL (ref 0.5–4.6)
LYMPHOCYTES NFR BLD: 36 % (ref 13–44)
MCH RBC QN AUTO: 30.1 PG (ref 26.1–32.9)
MCHC RBC AUTO-ENTMCNC: 30.7 G/DL (ref 31.4–35)
MCV RBC AUTO: 98 FL (ref 82–102)
MONOCYTES # BLD: 0.4 K/UL (ref 0.1–1.3)
MONOCYTES NFR BLD: 7 % (ref 4–12)
NEUTS SEG # BLD: 3 K/UL (ref 1.7–8.2)
NEUTS SEG NFR BLD: 51 % (ref 43–78)
NRBC # BLD: 0 K/UL (ref 0–0.2)
PLATELET # BLD AUTO: 175 K/UL (ref 150–450)
PMV BLD AUTO: 9.2 FL (ref 9.4–12.3)
POTASSIUM SERPL-SCNC: 4.7 MMOL/L (ref 3.5–5.1)
RBC # BLD AUTO: 3.42 M/UL (ref 4.23–5.6)
SERVICE CMNT-IMP: NORMAL
SERVICE CMNT-IMP: NORMAL
SODIUM SERPL-SCNC: 144 MMOL/L (ref 136–146)
WBC # BLD AUTO: 5.8 K/UL (ref 4.3–11.1)

## 2024-01-10 PROCEDURE — 6360000002 HC RX W HCPCS: Performed by: FAMILY MEDICINE

## 2024-01-10 PROCEDURE — 85025 COMPLETE CBC W/AUTO DIFF WBC: CPT

## 2024-01-10 PROCEDURE — 36415 COLL VENOUS BLD VENIPUNCTURE: CPT

## 2024-01-10 PROCEDURE — 6370000000 HC RX 637 (ALT 250 FOR IP): Performed by: STUDENT IN AN ORGANIZED HEALTH CARE EDUCATION/TRAINING PROGRAM

## 2024-01-10 PROCEDURE — 80048 BASIC METABOLIC PNL TOTAL CA: CPT

## 2024-01-10 PROCEDURE — 2580000003 HC RX 258: Performed by: HOSPITALIST

## 2024-01-10 PROCEDURE — 82962 GLUCOSE BLOOD TEST: CPT

## 2024-01-10 PROCEDURE — 6370000000 HC RX 637 (ALT 250 FOR IP): Performed by: FAMILY MEDICINE

## 2024-01-10 PROCEDURE — 1100000000 HC RM PRIVATE

## 2024-01-10 PROCEDURE — 2580000003 HC RX 258: Performed by: NURSE PRACTITIONER

## 2024-01-10 RX ORDER — SODIUM CHLORIDE 9 MG/ML
INJECTION, SOLUTION INTRAVENOUS CONTINUOUS
Status: DISCONTINUED | OUTPATIENT
Start: 2024-01-10 | End: 2024-01-11 | Stop reason: HOSPADM

## 2024-01-10 RX ORDER — 0.9 % SODIUM CHLORIDE 0.9 %
500 INTRAVENOUS SOLUTION INTRAVENOUS ONCE
Status: COMPLETED | OUTPATIENT
Start: 2024-01-10 | End: 2024-01-10

## 2024-01-10 RX ORDER — GABAPENTIN 300 MG/1
300 CAPSULE ORAL 3 TIMES DAILY
Status: DISCONTINUED | OUTPATIENT
Start: 2024-01-10 | End: 2024-01-11 | Stop reason: HOSPADM

## 2024-01-10 RX ORDER — DEXTROSE AND SODIUM CHLORIDE 5; .45 G/100ML; G/100ML
INJECTION, SOLUTION INTRAVENOUS CONTINUOUS
Status: DISCONTINUED | OUTPATIENT
Start: 2024-01-10 | End: 2024-01-10

## 2024-01-10 RX ADMIN — SODIUM CHLORIDE 500 ML: 9 INJECTION, SOLUTION INTRAVENOUS at 08:32

## 2024-01-10 RX ADMIN — GABAPENTIN 300 MG: 300 CAPSULE ORAL at 08:27

## 2024-01-10 RX ADMIN — PANCRELIPASE LIPASE, PANCRELIPASE PROTEASE, PANCRELIPASE AMYLASE 20000 UNITS: 20000; 63000; 84000 CAPSULE, DELAYED RELEASE ORAL at 17:20

## 2024-01-10 RX ADMIN — SODIUM CHLORIDE: 9 INJECTION, SOLUTION INTRAVENOUS at 12:06

## 2024-01-10 RX ADMIN — GABAPENTIN 300 MG: 300 CAPSULE ORAL at 14:34

## 2024-01-10 RX ADMIN — PANCRELIPASE LIPASE, PANCRELIPASE PROTEASE, PANCRELIPASE AMYLASE 30000 UNITS: 5000; 17000; 24000 CAPSULE, DELAYED RELEASE ORAL at 12:07

## 2024-01-10 RX ADMIN — SODIUM BICARBONATE 650 MG TABLET 650 MG: at 08:28

## 2024-01-10 RX ADMIN — PANCRELIPASE LIPASE, PANCRELIPASE PROTEASE, PANCRELIPASE AMYLASE 30000 UNITS: 5000; 17000; 24000 CAPSULE, DELAYED RELEASE ORAL at 17:20

## 2024-01-10 RX ADMIN — ASPIRIN 325 MG ORAL TABLET 325 MG: 325 PILL ORAL at 17:20

## 2024-01-10 RX ADMIN — GABAPENTIN 300 MG: 300 CAPSULE ORAL at 21:28

## 2024-01-10 RX ADMIN — PANTOPRAZOLE SODIUM 40 MG: 40 TABLET, DELAYED RELEASE ORAL at 05:21

## 2024-01-10 RX ADMIN — TAMSULOSIN HYDROCHLORIDE 0.4 MG: 0.4 CAPSULE ORAL at 08:28

## 2024-01-10 RX ADMIN — PANCRELIPASE LIPASE, PANCRELIPASE PROTEASE, PANCRELIPASE AMYLASE 20000 UNITS: 20000; 63000; 84000 CAPSULE, DELAYED RELEASE ORAL at 08:25

## 2024-01-10 RX ADMIN — DEXTROSE MONOHYDRATE 125 ML: 100 INJECTION, SOLUTION INTRAVENOUS at 14:14

## 2024-01-10 RX ADMIN — OXYCODONE HYDROCHLORIDE AND ACETAMINOPHEN 1000 MG: 500 TABLET ORAL at 08:25

## 2024-01-10 RX ADMIN — ALLOPURINOL 300 MG: 300 TABLET ORAL at 08:26

## 2024-01-10 RX ADMIN — DEXTROSE MONOHYDRATE AND SODIUM CHLORIDE: 5; .45 INJECTION, SOLUTION INTRAVENOUS at 01:50

## 2024-01-10 RX ADMIN — HEPARIN SODIUM 5000 UNITS: 5000 INJECTION INTRAVENOUS; SUBCUTANEOUS at 14:35

## 2024-01-10 RX ADMIN — PANCRELIPASE LIPASE, PANCRELIPASE PROTEASE, PANCRELIPASE AMYLASE 20000 UNITS: 20000; 63000; 84000 CAPSULE, DELAYED RELEASE ORAL at 12:07

## 2024-01-10 RX ADMIN — HEPARIN SODIUM 5000 UNITS: 5000 INJECTION INTRAVENOUS; SUBCUTANEOUS at 05:21

## 2024-01-10 RX ADMIN — SODIUM BICARBONATE 650 MG TABLET 650 MG: at 21:28

## 2024-01-10 RX ADMIN — HEPARIN SODIUM 5000 UNITS: 5000 INJECTION INTRAVENOUS; SUBCUTANEOUS at 21:29

## 2024-01-10 ASSESSMENT — PAIN SCALES - GENERAL
PAINLEVEL_OUTOF10: 0
PAINLEVEL_OUTOF10: 1

## 2024-01-10 NOTE — CARE COORDINATION
John reviewed chart and discussed pt in Md rounds this am. John called [facility] as previous CM indicated that pt was current. [facility] informed pt is not current with them as he was discharged from their services on 12/15. Pt was receiving Rn, PT, OT. Per PT/OT evals here in the hospital, pt does need any further therapy. If pt is anticipating more surgery, he will likely need home care services at that time. Md informed she planned to discharge pt tomorrow as he is approaching medical stability from his pancreatitis.

## 2024-01-10 NOTE — PROGRESS NOTES
Hospitalist Progress Note   Admit Date:  2024  8:10 PM   Name:  Yovany Chatman   Age:  78 y.o.  Sex:  male  :  1945   MRN:  [id number]   Room:  319/01    Presenting Complaint: Abdominal Pain     Reason(s) for Admission: Abdominal pain, epigastric [R10.13]  Hyperkalemia [E87.5]  Nausea [R11.0]  LANDON (acute kidney injury) (HCC) [N17.9]  Acute on chronic pancreatitis (HCC) [K85.90, K86.1]  Pancreatitis, unspecified pancreatitis type [K85.90]     Hospital Course:   Yovany Chatman is a 78 y.o. male with medical history of type 2 diabetes mellitus on insulin pump, hypertension, A-fib status post anticoagulation, CKD 3, WPW syndrome status post remote ablations x 4, recent C. difficile colitis on out pt oral vancomycin antibx tx, patient was just discharged from the hospital about 2 weeks ago and presents now with epigastric pain, nausea and vomiting and poor p.o. tolerance.  No fevers or chills, melena or hematochezia or bright red blood per rectum. Hgb stable. Afebrile.      Vital signs are stable.  BMP shows hyperkalemia and metabolic acidosis.  CBC is unremarkable. CT abdomen is pending.  Hx of colitis, and pancreatitis. Lipase 20. He was given 5 units of insulin, 1 amp of D50 and Lokelma in the ED.  He has slight LANDON as well.  Baseline creatinine is around 1.5 now up to 2.5.      CT abdomen states GB surgically absent, pancrease consistent w chronic pancreatitis, no fat stranding, no duodenitis, bilateral renal cysts, diverticulosis wo diverticulitis.      Keep NPO, IV flds, pain medication, monitor labs. Hospitalist to admit.      Rounds 2024:  Pt resting in bed. Had some difficulty w low glucose overnight and managing insulin pump while admitted, to now drop sugars to low. Oj given, D10 bolus given, glucose recovered. He tends to manage very tightly at home but was NPO here yesterday. DM educator discussed w pt. Pt has bowel sounds, no abd pain, no N/V/D but has not had a BM yet. Will advance to

## 2024-01-11 VITALS
OXYGEN SATURATION: 98 % | BODY MASS INDEX: 27.49 KG/M2 | HEART RATE: 63 BPM | RESPIRATION RATE: 18 BRPM | TEMPERATURE: 98.2 F | HEIGHT: 70 IN | WEIGHT: 192 LBS | SYSTOLIC BLOOD PRESSURE: 128 MMHG | DIASTOLIC BLOOD PRESSURE: 72 MMHG

## 2024-01-11 PROBLEM — K86.1 ACUTE ON CHRONIC PANCREATITIS (HCC): Status: RESOLVED | Noted: 2024-01-08 | Resolved: 2024-01-11

## 2024-01-11 PROBLEM — K85.90 ACUTE PANCREATITIS WITHOUT INFECTION OR NECROSIS: Status: RESOLVED | Noted: 2022-07-16 | Resolved: 2024-01-11

## 2024-01-11 PROBLEM — E87.5 HYPERKALEMIA: Status: RESOLVED | Noted: 2024-01-08 | Resolved: 2024-01-11

## 2024-01-11 PROBLEM — K86.1 OTHER CHRONIC PANCREATITIS (HCC): Status: ACTIVE | Noted: 2024-01-09

## 2024-01-11 PROBLEM — K85.90 ACUTE ON CHRONIC PANCREATITIS (HCC): Status: RESOLVED | Noted: 2024-01-08 | Resolved: 2024-01-11

## 2024-01-11 PROBLEM — A04.72 COLITIS DUE TO CLOSTRIDIUM DIFFICILE: Status: RESOLVED | Noted: 2024-01-08 | Resolved: 2024-01-11

## 2024-01-11 PROBLEM — K85.90 PANCREATITIS, UNSPECIFIED PANCREATITIS TYPE: Status: RESOLVED | Noted: 2024-01-09 | Resolved: 2024-01-11

## 2024-01-11 LAB
ANION GAP SERPL CALC-SCNC: 2 MMOL/L (ref 2–11)
BASOPHILS # BLD: 0.1 K/UL (ref 0–0.2)
BASOPHILS NFR BLD: 1 % (ref 0–2)
BUN SERPL-MCNC: 22 MG/DL (ref 8–23)
CALCIUM SERPL-MCNC: 8.1 MG/DL (ref 8.3–10.4)
CHLORIDE SERPL-SCNC: 123 MMOL/L (ref 103–113)
CO2 SERPL-SCNC: 21 MMOL/L (ref 21–32)
CREAT SERPL-MCNC: 1.98 MG/DL (ref 0.8–1.5)
DIFFERENTIAL METHOD BLD: ABNORMAL
EOSINOPHIL # BLD: 0.2 K/UL (ref 0–0.8)
EOSINOPHIL NFR BLD: 4 % (ref 0.5–7.8)
ERYTHROCYTE [DISTWIDTH] IN BLOOD BY AUTOMATED COUNT: 15.4 % (ref 11.9–14.6)
GLUCOSE BLD STRIP.AUTO-MCNC: 112 MG/DL (ref 65–100)
GLUCOSE SERPL-MCNC: 109 MG/DL (ref 65–100)
HCT VFR BLD AUTO: 31.8 % (ref 41.1–50.3)
HGB BLD-MCNC: 9.8 G/DL (ref 13.6–17.2)
IMM GRANULOCYTES # BLD AUTO: 0 K/UL (ref 0–0.5)
IMM GRANULOCYTES NFR BLD AUTO: 0 % (ref 0–5)
LYMPHOCYTES # BLD: 2.3 K/UL (ref 0.5–4.6)
LYMPHOCYTES NFR BLD: 42 % (ref 13–44)
MCH RBC QN AUTO: 30.2 PG (ref 26.1–32.9)
MCHC RBC AUTO-ENTMCNC: 30.8 G/DL (ref 31.4–35)
MCV RBC AUTO: 97.8 FL (ref 82–102)
MONOCYTES # BLD: 0.4 K/UL (ref 0.1–1.3)
MONOCYTES NFR BLD: 7 % (ref 4–12)
NEUTS SEG # BLD: 2.5 K/UL (ref 1.7–8.2)
NEUTS SEG NFR BLD: 46 % (ref 43–78)
NRBC # BLD: 0 K/UL (ref 0–0.2)
PLATELET # BLD AUTO: 177 K/UL (ref 150–450)
PMV BLD AUTO: 9.8 FL (ref 9.4–12.3)
POTASSIUM SERPL-SCNC: 5 MMOL/L (ref 3.5–5.1)
RBC # BLD AUTO: 3.25 M/UL (ref 4.23–5.6)
SERVICE CMNT-IMP: ABNORMAL
SODIUM SERPL-SCNC: 146 MMOL/L (ref 136–146)
WBC # BLD AUTO: 5.4 K/UL (ref 4.3–11.1)

## 2024-01-11 PROCEDURE — 6360000002 HC RX W HCPCS: Performed by: FAMILY MEDICINE

## 2024-01-11 PROCEDURE — 80048 BASIC METABOLIC PNL TOTAL CA: CPT

## 2024-01-11 PROCEDURE — 6370000000 HC RX 637 (ALT 250 FOR IP): Performed by: FAMILY MEDICINE

## 2024-01-11 PROCEDURE — 85025 COMPLETE CBC W/AUTO DIFF WBC: CPT

## 2024-01-11 PROCEDURE — 82962 GLUCOSE BLOOD TEST: CPT

## 2024-01-11 PROCEDURE — 36415 COLL VENOUS BLD VENIPUNCTURE: CPT

## 2024-01-11 PROCEDURE — 6370000000 HC RX 637 (ALT 250 FOR IP): Performed by: STUDENT IN AN ORGANIZED HEALTH CARE EDUCATION/TRAINING PROGRAM

## 2024-01-11 RX ADMIN — PANTOPRAZOLE SODIUM 40 MG: 40 TABLET, DELAYED RELEASE ORAL at 05:59

## 2024-01-11 RX ADMIN — GABAPENTIN 300 MG: 300 CAPSULE ORAL at 08:45

## 2024-01-11 RX ADMIN — HEPARIN SODIUM 5000 UNITS: 5000 INJECTION INTRAVENOUS; SUBCUTANEOUS at 05:59

## 2024-01-11 RX ADMIN — PANCRELIPASE LIPASE, PANCRELIPASE PROTEASE, PANCRELIPASE AMYLASE 30000 UNITS: 5000; 17000; 24000 CAPSULE, DELAYED RELEASE ORAL at 11:03

## 2024-01-11 RX ADMIN — TAMSULOSIN HYDROCHLORIDE 0.4 MG: 0.4 CAPSULE ORAL at 08:45

## 2024-01-11 RX ADMIN — SODIUM BICARBONATE 650 MG TABLET 650 MG: at 08:43

## 2024-01-11 RX ADMIN — OXYCODONE HYDROCHLORIDE AND ACETAMINOPHEN 1000 MG: 500 TABLET ORAL at 08:45

## 2024-01-11 RX ADMIN — PANCRELIPASE LIPASE, PANCRELIPASE PROTEASE, PANCRELIPASE AMYLASE 20000 UNITS: 20000; 63000; 84000 CAPSULE, DELAYED RELEASE ORAL at 11:03

## 2024-01-11 RX ADMIN — ALLOPURINOL 300 MG: 300 TABLET ORAL at 08:43

## 2024-01-11 RX ADMIN — PANCRELIPASE LIPASE, PANCRELIPASE PROTEASE, PANCRELIPASE AMYLASE 30000 UNITS: 5000; 17000; 24000 CAPSULE, DELAYED RELEASE ORAL at 08:44

## 2024-01-11 RX ADMIN — PANCRELIPASE LIPASE, PANCRELIPASE PROTEASE, PANCRELIPASE AMYLASE 20000 UNITS: 20000; 63000; 84000 CAPSULE, DELAYED RELEASE ORAL at 08:44

## 2024-01-11 ASSESSMENT — PAIN SCALES - GENERAL: PAINLEVEL_OUTOF10: 1

## 2024-01-11 NOTE — DISCHARGE INSTRUCTIONS
Pancreatitis: Care Instructions  Overview     The pancreas is an organ behind the stomach. It makes hormones and enzymes to help your body digest food.  But if these enzymes attack the pancreas, it can get inflamed. This is called pancreatitis. Most cases are caused by gallstones or by heavy alcohol use.  If you take care of yourself at home, it will help you get better. It will also help you avoid more problems with your pancreas.  How can you care for yourself at home?  Drink clear liquids and eat bland foods until you feel better. [place] foods include rice, dry toast, and crackers. They also include bananas and applesauce.  Eat a low-fat diet until your doctor says your pancreas is healed.  Do not drink alcohol. Tell your doctor if you need help to quit. Counseling, support groups, and sometimes medicines can help you stay sober.  Be safe with medicines. Read and follow all instructions on the label.  If the doctor gave you a prescription medicine for pain, take it as prescribed.  If you are not taking a prescription pain medicine, ask your doctor if you can take an over-the-counter medicine.  If your doctor prescribed antibiotics, take them as directed. Do not stop taking them just because you feel better. You need to take the full course of antibiotics.  Get extra rest until you feel better.  To prevent future problems with your pancreas  Do not drink alcohol.  Tell your doctors and pharmacist that you've had pancreatitis. They can help you avoid medicines that may cause this problem again.  Where can you learn more?  Go to https://www.[employer].net/patientEd and enter J381 to learn more about \"Pancreatitis: Care Instructions.\"  Current as of: March 21, 2023               Content Version: 13.9  © 3508-2677 [employer].   Care instructions adapted under license by [employer]. If you have questions about a medical condition or this instruction, always ask your healthcare professional.

## 2024-01-11 NOTE — DISCHARGE SUMMARY
Hospitalist Discharge Summary   Admit Date:  2024  8:10 PM   DC Note date: 2024  Name:  Yovany Chatman   Age:  78 y.o.  Sex:  male  :  1945   MRN:  [id number]   Room:  [facility]  PCP:  Jeffy Lovell DO    Presenting Complaint: Abdominal Pain     Initial Admission Diagnosis: Abdominal pain, epigastric [R10.13]  Hyperkalemia [E87.5]  Nausea [R11.0]  LANDON (acute kidney injury) (HCC) [N17.9]  Acute on chronic pancreatitis (HCC) [K85.90, K86.1]  Pancreatitis, unspecified pancreatitis type [K85.90]     Problem List for this Hospitalization (present on admission):    Principal Problem (Resolved):    Acute pancreatitis without infection or necrosis  Active Problems:    Stage 3b chronic kidney disease (HCC)    Primary hypertension    Type 2 diabetes mellitus (HCC)    Paroxysmal atrial fibrillation (HCC)    Other chronic pancreatitis (HCC)  Resolved Problems:    Colitis due to Clostridium difficile    Acute on chronic pancreatitis (HCC)    Hyperkalemia      Hospital Course:  Yovany Chatman is a 78 y.o. male with medical history of type 2 diabetes mellitus on insulin pump, hypertension, A-fib status post anticoagulation, CKD 3, WPW syndrome status post remote ablations x 4, recent C. difficile colitis on outpt oral vancomycin antibx tx, Charcoat ankle patient was just discharged from the hospital about 2 weeks ago and presented with epigastric pain, nausea and vomiting and poor p.o. tolerance. On admission patient was afebrile with stable Hg. BMP showed hyperkalemia, LANDON, and metabolic acidosis.  CBC was unremarkable. CT abdomen showed GB surgically absent, pancrease consistent w chronic pancreatitis, no fat stranding, no duodenitis, bilateral renal cysts, diverticulosis wo diverticulitis. Lipase 20. Patient admitted for pancreatitis and acute on chronic renal failure which improved during hospital stay. Upon discharge baseline Cr about 1.99. During admission lisinopril held and patient made NPO with slow  12/19/2023 09:55 PM    BLOODU Negative 12/19/2023 09:55 PM    UROBILINOGEN 0.2 12/19/2023 09:55 PM    NITRU Negative 12/19/2023 09:55 PM    LEUKOCYTESUR SMALL 12/19/2023 09:55 PM    WBCUA 20-50 12/19/2023 09:55 PM    RBCUA 0-5 12/19/2023 09:55 PM    EPITHUA 0-5 12/19/2023 09:55 PM    BACTERIA 2+ 12/19/2023 09:55 PM    LABCAST 0-2 12/19/2023 09:55 PM    MUCUS 0 12/19/2023 09:55 PM        Microbiology:  Results       ** No results found for the last 336 hours. **            All Labs from Last 24 Hrs:  Recent Results (from the past 24 hour(s))   POCT Glucose    Collection Time: 01/10/24  2:33 PM   Result Value Ref Range    POC Glucose 92 65 - 100 mg/dL    Performed by: Jose    Basic Metabolic Panel w/ Reflex to MG    Collection Time: 01/11/24  4:03 AM   Result Value Ref Range    Sodium 146 136 - 146 mmol/L    Potassium 5.0 3.5 - 5.1 mmol/L    Chloride 123 (H) 103 - 113 mmol/L    CO2 21 21 - 32 mmol/L    Anion Gap 2 2 - 11 mmol/L    Glucose 109 (H) 65 - 100 mg/dL    BUN 22 8 - 23 MG/DL    Creatinine 1.98 (H) 0.8 - 1.5 MG/DL    Est, Glom Filt Rate 34 (L) >60 ml/min/1.73m2    Calcium 8.1 (L) 8.3 - 10.4 MG/DL   CBC with Auto Differential    Collection Time: 01/11/24  4:03 AM   Result Value Ref Range    WBC 5.4 4.3 - 11.1 K/uL    RBC 3.25 (L) 4.23 - 5.6 M/uL    Hemoglobin 9.8 (L) 13.6 - 17.2 g/dL    Hematocrit 31.8 (L) 41.1 - 50.3 %    MCV 97.8 82.0 - 102.0 FL    MCH 30.2 26.1 - 32.9 PG    MCHC 30.8 (L) 31.4 - 35.0 g/dL    RDW 15.4 (H) 11.9 - 14.6 %    Platelets 177 150 - 450 K/uL    MPV 9.8 9.4 - 12.3 FL    nRBC 0.00 0.0 - 0.2 K/uL    Differential Type AUTOMATED      Neutrophils % 46 43 - 78 %    Lymphocytes % 42 13 - 44 %    Monocytes % 7 4.0 - 12.0 %    Eosinophils % 4 0.5 - 7.8 %    Basophils % 1 0.0 - 2.0 %    Immature Granulocytes 0 0.0 - 5.0 %    Neutrophils Absolute 2.5 1.7 - 8.2 K/UL    Lymphocytes Absolute 2.3 0.5 - 4.6 K/UL    Monocytes Absolute 0.4 0.1 - 1.3 K/UL    Eosinophils Absolute 0.2 0.0 -

## 2024-01-11 NOTE — CARE COORDINATION
Pt dc to home today with wife. Pt is NOT current with [facility] as they d/c'd him in Dec. He declined offer to arrange new [facility] has he has scheduled for a total joint on the 19th and plans to go to rehab after that. PT also did not indicate he needed further therapy. Pt denies any d/c planning needs.      01/09/24 0918   Service Assessment   Patient Orientation Alert and Oriented   Cognition Alert   History Provided By Medical Record   Primary Caregiver Self   Accompanied By/Relationship N/A   Support Systems Spouse/Significant Other   Patient's Healthcare Decision Maker is: Legal Next of Kin  (Pt's spouse Shyanne Chatman [phone])   PCP Verified by CM Yes   Prior Functional Level Independent in ADLs/IADLs   Current Functional Level Independent in ADLs/IADLs   Can patient return to prior living arrangement Yes   Ability to make needs known: Good   Family able to assist with home care needs: Yes   Would you like for me to discuss the discharge plan with any other family members/significant others, and if so, who? No   Financial Resources Medicare   Community Resources None   Social/Functional History   Lives With Spouse   Type of Home House   Home Layout Two level   Home Access Ramped entrance   Bathroom Shower/Tub Walk-in shower   Bathroom Equipment Shower chair   Home Equipment Wheelchair-manual;Walker, rolling   ADL Assistance Independent   Ambulation Assistance Independent   Transfer Assistance Independent   Active  Yes   Mode of Transportation Car   Discharge Planning   Type of Residence House   Living Arrangements Spouse/Significant Other   Current Services Prior To Admission Durable Medical Equipment   Current DME Prior to Arrival Walker;Wheelchair;Cpap;Shower Chair   Potential Assistance Needed N/A   DME Ordered? No   Potential Assistance Purchasing Medications No   Type of Home Care Services PT   Patient expects to be discharged to: House   Services At/After Discharge   Services At/After Discharge

## 2024-01-11 NOTE — DIABETES MGMT
Patient admitted with Acute pancreatitis without infection or necrosis. Blood glucose ranged  yesterday with patient receiving insulin via insulin pump and D10 125 mL. Blood glucose this morning was 112. Creatinine 1.98. GFR 34. Reviewed patient current regimen: insulin pump.  Glycemic control improving on current regimen.  Will follow along.

## 2024-01-11 NOTE — FLOWSHEET NOTE
01/11/24 1400   AVS Reviewed   AVS & discharge instructions reviewed with patient and/or representative? Yes   Reviewed instructions with Patient   Level of Understanding Questions answered     IV removed without complications.

## 2024-01-18 NOTE — PROGRESS NOTES
Physician Progress Note      PATIENT:               JOYCE HERNANDEZ  CSN #:                  [id number]  :                       1945  ADMIT DATE:       2023 3:56 PM  DISCH DATE:        2023 4:51 PM  RESPONDING  PROVIDER #:        Matt Boykin MD          QUERY TEXT:    Pt admitted with abdominal pain. Pt noted to have prostatitis. If possible,   please document in progress notes and discharge summary the relationship, if   any, between prostatitis and the following.    The medical record reflects the following:  Risk Factors: recent TURP  Clinical Indicators: Discharge summary noted, \"Duodenitis??-unlikely the   presenting issue. Felling better after starting abx for possible prostatitis.   Recent TURP. You were admitted for severe abdominal pain that I believe is   coming from a urinary tract infection and most likely prostatitis. This is not   unusual after recent prostate procedures.\" Urine culture grew E. Coli.  Treatment: IV abx  Options provided:  -- Prostatitis directly related to recent TURP/postoperative infection  -- Prostatitis unrelated to procedure  -- Other - I will add my own diagnosis  -- Disagree - Not applicable / Not valid  -- Disagree - Clinically unable to determine / Unknown  -- Refer to Clinical Documentation Reviewer    PROVIDER RESPONSE TEXT:    This patient has Prostatitis directly related to recent TURP/postoperative   infection.    Query created by: Tati Mello on 1/3/2024 7:50 AM      Electronically signed by:  Matt Boykin MD 2024 5:07 PM

## 2024-01-19 LAB
GLUCOSE BLD STRIP.AUTO-MCNC: 129 MG/DL (ref 65–100)
GLUCOSE BLD STRIP.AUTO-MCNC: 165 MG/DL (ref 65–100)

## 2024-02-05 LAB
BACTERIA SPEC CULT: NORMAL
SERVICE CMNT-IMP: NORMAL

## (undated) DEVICE — CANNULA NSL ORAL AD FOR CAPNOFLEX CO2 O2 AIRLFE

## (undated) DEVICE — Device: Brand: BALLOON3

## (undated) DEVICE — ZIMMER® STERILE DISPOSABLE TOURNIQUET CUFF WITH PLC, DUAL PORT, SINGLE BLADDER, 18 IN. (46 CM)

## (undated) DEVICE — 1200 GUARD II KIT W/5MM TUBE W/O VAC TUBE: Brand: GUARDIAN

## (undated) DEVICE — ELECTRODE PT RET AD L9FT HI MOIST COND ADH HYDRGEL CORDED

## (undated) DEVICE — PADDING CAST W2INXL4YD ST COT COHESIVE HND TEARABLE SPEC

## (undated) DEVICE — THE TORRENT IRRIGATION TUBING IS INTENDED TO PROVIDE IRRIGATION VIA IRRIGATION FLUIDS, SUCH AS STERILE WATER, DURING GASTROINTESTINAL ENDOSCOPIC PROCEDURES WHEN USED IN CONJUNCTION WITH AN IRRIGATION PUMP OR ELECTROSURGICAL UNIT.: Brand: TORRENT

## (undated) DEVICE — NON-ADHERING DRESSING: Brand: ADAPTIC®

## (undated) DEVICE — Z DISCONTINUED NO SUB IDED SHIELD EYE PLAS RT BGE M 7.5CMX5.7CM VISITEC

## (undated) DEVICE — GAUZE,SPONGE,4"X4",12PLY,WOVEN,NS,LF: Brand: MEDLINE

## (undated) DEVICE — YANKAUER,BULB TIP,W/O VENT,RIGID,STERILE: Brand: MEDLINE

## (undated) DEVICE — ENDOSCOPY PACK UPPER: Brand: MEDLINE INDUSTRIES, INC.

## (undated) DEVICE — CANNULA NASAL 02/C02 ADULT

## (undated) DEVICE — NEEDLE SYR 18GA L1.5IN RED PLAS HUB S STL BLNT FILL W/O

## (undated) DEVICE — PAD,EYE,1-5/8X2 5/8,STERILE,LF,1/PK: Brand: MEDLINE

## (undated) DEVICE — LINE MNTR ADLT SET O2 INTMD

## (undated) DEVICE — CONMED SCOPE SAVER BITE BLOCK, 20X27 MM: Brand: SCOPE SAVER

## (undated) DEVICE — BNDG ELAS ESMARK 4INX12FT LF -- STRL

## (undated) DEVICE — 25+* ULTRAVIT* VITRECTOMY PROBE BEVELED 25+* /10,000 CPM: Brand: CONSTELLATION, ULTRAVIT, ENGAUGE, 25+

## (undated) DEVICE — SYRINGE MED 10ML LUERLOCK TIP W/O SFTY DISP

## (undated) DEVICE — SOLUTION IRRIGATION BAL SALT SOLUTION 500 ML STRL BSS

## (undated) DEVICE — RETRIEVAL BALLOON CATHETER: Brand: EXTRACTOR™ PRO RX

## (undated) DEVICE — SOLUTION IV 1000ML 0.9% SOD CHL

## (undated) DEVICE — 3M™ STERI-DRAPE™ INSTRUMENT POUCH 1018: Brand: STERI-DRAPE™

## (undated) DEVICE — STRIP,CLOSURE,WOUND,MEDI-STRIP,1/2X4: Brand: MEDLINE

## (undated) DEVICE — BLOCK BITE AD 60FR W/ VELC STRP ADDRESSES MOST PT AND

## (undated) DEVICE — SPHINCTEROTOME: Brand: JAGTOME RX 44

## (undated) DEVICE — SURGICAL PROCEDURE PACK EYE CDS

## (undated) DEVICE — 2000CC GUARDIAN II: Brand: GUARDIAN

## (undated) DEVICE — FOOT & ANKLE SOFT DR WOMACK: Brand: MEDLINE INDUSTRIES, INC.

## (undated) DEVICE — NEEDLE HYPO 27GA L1.25IN GRY POLYPR HUB S STL REG BVL STR

## (undated) DEVICE — AIRLIFE™ OXYGEN TUBING 7 FEET (2.1 M) CRUSH RESISTANT OXYGEN TUBING, VINYL TIPPED: Brand: AIRLIFE™

## (undated) DEVICE — NDL PRT INJ NSAF BLNT 18GX1.5 --

## (undated) DEVICE — SYRINGE, LUER SLIP, STERILE, 60ML: Brand: MEDLINE

## (undated) DEVICE — K-WIRE
Type: IMPLANTABLE DEVICE | Site: FOOT | Status: NON-FUNCTIONAL
Brand: PHALINX
Removed: 2021-09-09

## (undated) DEVICE — SYR 5ML 1/5 GRAD LL NSAF LF --

## (undated) DEVICE — SYRINGE MED 3ML CLR PLAS STD N CTRL LUERLOCK TIP DISP

## (undated) DEVICE — CONNECTOR TBNG OD5-7MM O2 END DISP

## (undated) DEVICE — 3M™ TEGADERM™ TRANSPARENT FILM DRESSING FRAME STYLE, 1626W, 4 IN X 4-3/4 IN (10 CM X 12 CM), 50/CT 4CT/CASE: Brand: 3M™ TEGADERM™

## (undated) DEVICE — DRAPE TWL SURG 16X26IN BLU ORB04] ALLCARE INC]

## (undated) DEVICE — SYR 3ML LL TIP 1/10ML GRAD --

## (undated) DEVICE — IRRIGATION KT OPHTH 80IN --

## (undated) DEVICE — AMD ANTIMICROBIAL GAUZE SPONGES,12 PLY USP TYPE VII, 0.2% POLYHEXAMETHYLENE BIGUANIDE HCI (PHMB): Brand: CURITY

## (undated) DEVICE — NON-THREADED KWIRE
Type: IMPLANTABLE DEVICE | Site: FOOT | Status: NON-FUNCTIONAL
Brand: MINI
Removed: 2021-09-09

## (undated) DEVICE — 3M™ COBAN™ NL STERILE NON-LATEX SELF-ADHERENT WRAP, 2082S, 2 IN X 5 YD (5 CM X 4,5 M), 36 ROLLS/CASE: Brand: 3M™ COBAN™

## (undated) DEVICE — SUTURE ETHLN SZ 4-0 L18IN NONABSORBABLE BLK L19MM PS-2 3/8 1667H

## (undated) DEVICE — SOLUTION IV STRL H2O 500 ML AQUALITE POUR BTL

## (undated) DEVICE — NEEDLE HYPO 25GA L5/8IN ORNG HUB S STL LATCH BVL UP

## (undated) DEVICE — SINGLE PORT MANIFOLD: Brand: NEPTUNE 2

## (undated) DEVICE — CONSTELLATION VISION SYSTEM 5000 CPM ULTRAVIT PROBE STRAIGHT ENDOILLUMINATOR 25+ TOTALPLUS VITRECTOMY PAK EDGEPLUS BLADE VALVED ENTRY SYSTEM: Brand: CONSTELLATION, ULTRAVIT, 25+, TOTALPLUS, EDGEPLUS

## (undated) DEVICE — REM POLYHESIVE ADULT PATIENT RETURN ELECTRODE: Brand: VALLEYLAB

## (undated) DEVICE — COVER,MAYO STAND,STERILE: Brand: MEDLINE

## (undated) DEVICE — PREP SKN CHLRAPRP APL 26ML STR --

## (undated) DEVICE — BANDAGE,GAUZE,CONFORMING,2"X75",STRL,LF: Brand: MEDLINE INDUSTRIES, INC.

## (undated) DEVICE — KENDALL RADIOLUCENT FOAM MONITORING ELECTRODE RECTANGULAR SHAPE: Brand: KENDALL

## (undated) DEVICE — DRAPE C ARM W54XL84IN MINI FOR OEC 6800

## (undated) DEVICE — SOLUTION IRRIGATION BAL SALT SOLUTION 500 ML BTL 6/CA BSS +

## (undated) DEVICE — GARMENT,MEDLINE,DVT,INT,CALF,MED, GEN2: Brand: MEDLINE

## (undated) DEVICE — LUBE JELLY FOIL PACK 1.4 OZ: Brand: MEDLINE INDUSTRIES, INC.

## (undated) DEVICE — EYE SPEAR / FINE DISSECTOR: Brand: DEROYAL